# Patient Record
Sex: FEMALE | Race: WHITE | NOT HISPANIC OR LATINO
[De-identification: names, ages, dates, MRNs, and addresses within clinical notes are randomized per-mention and may not be internally consistent; named-entity substitution may affect disease eponyms.]

---

## 2017-09-18 ENCOUNTER — RX RENEWAL (OUTPATIENT)
Age: 82
End: 2017-09-18

## 2018-04-06 ENCOUNTER — INPATIENT (INPATIENT)
Facility: HOSPITAL | Age: 83
LOS: 16 days | Discharge: HOME | End: 2018-04-23
Attending: INTERNAL MEDICINE | Admitting: INTERNAL MEDICINE
Payer: MEDICARE

## 2018-04-06 VITALS — HEIGHT: 65 IN | WEIGHT: 184.09 LBS

## 2018-04-06 DIAGNOSIS — I10 ESSENTIAL (PRIMARY) HYPERTENSION: ICD-10-CM

## 2018-04-06 DIAGNOSIS — Z29.9 ENCOUNTER FOR PROPHYLACTIC MEASURES, UNSPECIFIED: ICD-10-CM

## 2018-04-06 DIAGNOSIS — D64.9 ANEMIA, UNSPECIFIED: ICD-10-CM

## 2018-04-06 DIAGNOSIS — J96.01 ACUTE RESPIRATORY FAILURE WITH HYPOXIA: ICD-10-CM

## 2018-04-06 DIAGNOSIS — K92.1 MELENA: ICD-10-CM

## 2018-04-06 DIAGNOSIS — J44.1 CHRONIC OBSTRUCTIVE PULMONARY DISEASE WITH (ACUTE) EXACERBATION: ICD-10-CM

## 2018-04-06 LAB
ALBUMIN SERPL ELPH-MCNC: 2.9 G/DL — LOW (ref 3.3–5)
ALP SERPL-CCNC: 77 U/L — SIGNIFICANT CHANGE UP (ref 40–120)
ALT FLD-CCNC: 20 U/L — SIGNIFICANT CHANGE UP (ref 12–78)
ANION GAP SERPL CALC-SCNC: 8 MMOL/L — SIGNIFICANT CHANGE UP (ref 5–17)
APPEARANCE UR: CLEAR — SIGNIFICANT CHANGE UP
APTT BLD: 25.8 SEC — LOW (ref 27.5–37.4)
AST SERPL-CCNC: 14 U/L — LOW (ref 15–37)
BACTERIA # UR AUTO: (no result)
BASOPHILS # BLD AUTO: 0.05 K/UL — SIGNIFICANT CHANGE UP (ref 0–0.2)
BASOPHILS NFR BLD AUTO: 0.5 % — SIGNIFICANT CHANGE UP (ref 0–2)
BILIRUB SERPL-MCNC: 0.4 MG/DL — SIGNIFICANT CHANGE UP (ref 0.2–1.2)
BILIRUB UR-MCNC: NEGATIVE — SIGNIFICANT CHANGE UP
BLD GP AB SCN SERPL QL: SIGNIFICANT CHANGE UP
BUN SERPL-MCNC: 19 MG/DL — SIGNIFICANT CHANGE UP (ref 7–23)
CALCIUM SERPL-MCNC: 9.1 MG/DL — SIGNIFICANT CHANGE UP (ref 8.5–10.1)
CHLORIDE SERPL-SCNC: 105 MMOL/L — SIGNIFICANT CHANGE UP (ref 96–108)
CO2 SERPL-SCNC: 28 MMOL/L — SIGNIFICANT CHANGE UP (ref 22–31)
COLOR SPEC: YELLOW — SIGNIFICANT CHANGE UP
CREAT SERPL-MCNC: 0.67 MG/DL — SIGNIFICANT CHANGE UP (ref 0.5–1.3)
DIFF PNL FLD: (no result)
EOSINOPHIL # BLD AUTO: 0.17 K/UL — SIGNIFICANT CHANGE UP (ref 0–0.5)
EOSINOPHIL NFR BLD AUTO: 1.6 % — SIGNIFICANT CHANGE UP (ref 0–6)
EPI CELLS # UR: SIGNIFICANT CHANGE UP
GLUCOSE SERPL-MCNC: 82 MG/DL — SIGNIFICANT CHANGE UP (ref 70–99)
GLUCOSE UR QL: NEGATIVE MG/DL — SIGNIFICANT CHANGE UP
HCT VFR BLD CALC: 28.5 % — LOW (ref 34.5–45)
HGB BLD-MCNC: 8.1 G/DL — LOW (ref 11.5–15.5)
IMM GRANULOCYTES NFR BLD AUTO: 0.3 % — SIGNIFICANT CHANGE UP (ref 0–1.5)
INR BLD: 1.09 RATIO — SIGNIFICANT CHANGE UP (ref 0.88–1.16)
KETONES UR-MCNC: NEGATIVE — SIGNIFICANT CHANGE UP
LEUKOCYTE ESTERASE UR-ACNC: (no result)
LYMPHOCYTES # BLD AUTO: 1.3 K/UL — SIGNIFICANT CHANGE UP (ref 1–3.3)
LYMPHOCYTES # BLD AUTO: 11.9 % — LOW (ref 13–44)
MCHC RBC-ENTMCNC: 22 PG — LOW (ref 27–34)
MCHC RBC-ENTMCNC: 28.4 GM/DL — LOW (ref 32–36)
MCV RBC AUTO: 77.4 FL — LOW (ref 80–100)
MONOCYTES # BLD AUTO: 0.95 K/UL — HIGH (ref 0–0.9)
MONOCYTES NFR BLD AUTO: 8.7 % — SIGNIFICANT CHANGE UP (ref 2–14)
NEUTROPHILS # BLD AUTO: 8.42 K/UL — HIGH (ref 1.8–7.4)
NEUTROPHILS NFR BLD AUTO: 77 % — SIGNIFICANT CHANGE UP (ref 43–77)
NITRITE UR-MCNC: POSITIVE
NRBC # BLD: 0 /100 WBCS — SIGNIFICANT CHANGE UP (ref 0–0)
PH UR: 5 — SIGNIFICANT CHANGE UP (ref 5–8)
PLATELET # BLD AUTO: 375 K/UL — SIGNIFICANT CHANGE UP (ref 150–400)
POTASSIUM SERPL-MCNC: 3.8 MMOL/L — SIGNIFICANT CHANGE UP (ref 3.5–5.3)
POTASSIUM SERPL-SCNC: 3.8 MMOL/L — SIGNIFICANT CHANGE UP (ref 3.5–5.3)
PROT SERPL-MCNC: 6.8 GM/DL — SIGNIFICANT CHANGE UP (ref 6–8.3)
PROT UR-MCNC: 15 MG/DL
PROTHROM AB SERPL-ACNC: 11.8 SEC — SIGNIFICANT CHANGE UP (ref 9.8–12.7)
RBC # BLD: 3.68 M/UL — LOW (ref 3.8–5.2)
RBC # FLD: 18.6 % — HIGH (ref 10.3–14.5)
RBC CASTS # UR COMP ASSIST: SIGNIFICANT CHANGE UP /HPF (ref 0–4)
SODIUM SERPL-SCNC: 141 MMOL/L — SIGNIFICANT CHANGE UP (ref 135–145)
SP GR SPEC: 1.01 — SIGNIFICANT CHANGE UP (ref 1.01–1.02)
TROPONIN I SERPL-MCNC: <0.015 NG/ML — SIGNIFICANT CHANGE UP (ref 0.01–0.04)
TROPONIN I SERPL-MCNC: <0.015 NG/ML — SIGNIFICANT CHANGE UP (ref 0.01–0.04)
TYPE + AB SCN PNL BLD: SIGNIFICANT CHANGE UP
UROBILINOGEN FLD QL: NEGATIVE MG/DL — SIGNIFICANT CHANGE UP
WBC # BLD: 10.92 K/UL — HIGH (ref 3.8–10.5)
WBC # FLD AUTO: 10.92 K/UL — HIGH (ref 3.8–10.5)
WBC UR QL: SIGNIFICANT CHANGE UP /HPF (ref 0–5)

## 2018-04-06 PROCEDURE — 71045 X-RAY EXAM CHEST 1 VIEW: CPT | Mod: 26

## 2018-04-06 PROCEDURE — 99285 EMERGENCY DEPT VISIT HI MDM: CPT

## 2018-04-06 PROCEDURE — 93010 ELECTROCARDIOGRAM REPORT: CPT

## 2018-04-06 RX ORDER — ALPRAZOLAM 0.25 MG
0.5 TABLET ORAL
Qty: 0 | Refills: 0 | COMMUNITY

## 2018-04-06 RX ORDER — ONDANSETRON 8 MG/1
4 TABLET, FILM COATED ORAL EVERY 6 HOURS
Qty: 0 | Refills: 0 | Status: DISCONTINUED | OUTPATIENT
Start: 2018-04-06 | End: 2018-04-23

## 2018-04-06 RX ORDER — UMECLIDINIUM BROMIDE AND VILANTEROL TRIFENATATE 62.5; 25 UG/1; UG/1
1 POWDER RESPIRATORY (INHALATION)
Qty: 0 | Refills: 0 | COMMUNITY

## 2018-04-06 RX ORDER — AMLODIPINE BESYLATE 2.5 MG/1
5 TABLET ORAL DAILY
Qty: 0 | Refills: 0 | Status: DISCONTINUED | OUTPATIENT
Start: 2018-04-06 | End: 2018-04-19

## 2018-04-06 RX ORDER — PANTOPRAZOLE SODIUM 20 MG/1
40 TABLET, DELAYED RELEASE ORAL EVERY 12 HOURS
Qty: 0 | Refills: 0 | Status: DISCONTINUED | OUTPATIENT
Start: 2018-04-06 | End: 2018-04-07

## 2018-04-06 RX ORDER — MONTELUKAST 4 MG/1
10 TABLET, CHEWABLE ORAL DAILY
Qty: 0 | Refills: 0 | Status: DISCONTINUED | OUTPATIENT
Start: 2018-04-06 | End: 2018-04-23

## 2018-04-06 RX ORDER — ALBUTEROL 90 UG/1
2 AEROSOL, METERED ORAL
Qty: 0 | Refills: 0 | COMMUNITY

## 2018-04-06 RX ORDER — IPRATROPIUM/ALBUTEROL SULFATE 18-103MCG
3 AEROSOL WITH ADAPTER (GRAM) INHALATION EVERY 6 HOURS
Qty: 0 | Refills: 0 | Status: DISCONTINUED | OUTPATIENT
Start: 2018-04-06 | End: 2018-04-23

## 2018-04-06 RX ORDER — ALPRAZOLAM 0.25 MG
0.12 TABLET ORAL DAILY
Qty: 0 | Refills: 0 | Status: DISCONTINUED | OUTPATIENT
Start: 2018-04-06 | End: 2018-04-13

## 2018-04-06 RX ORDER — IPRATROPIUM/ALBUTEROL SULFATE 18-103MCG
3 AEROSOL WITH ADAPTER (GRAM) INHALATION ONCE
Qty: 0 | Refills: 0 | Status: COMPLETED | OUTPATIENT
Start: 2018-04-06 | End: 2018-04-06

## 2018-04-06 RX ORDER — SODIUM CHLORIDE 9 MG/ML
3 INJECTION INTRAMUSCULAR; INTRAVENOUS; SUBCUTANEOUS ONCE
Qty: 0 | Refills: 0 | Status: COMPLETED | OUTPATIENT
Start: 2018-04-06 | End: 2018-04-06

## 2018-04-06 RX ORDER — HYDROCHLOROTHIAZIDE 25 MG
25 TABLET ORAL DAILY
Qty: 0 | Refills: 0 | Status: DISCONTINUED | OUTPATIENT
Start: 2018-04-06 | End: 2018-04-23

## 2018-04-06 RX ORDER — CHOLECALCIFEROL (VITAMIN D3) 125 MCG
1 CAPSULE ORAL
Qty: 0 | Refills: 0 | COMMUNITY

## 2018-04-06 RX ORDER — LOSARTAN POTASSIUM 100 MG/1
50 TABLET, FILM COATED ORAL DAILY
Qty: 0 | Refills: 0 | Status: DISCONTINUED | OUTPATIENT
Start: 2018-04-06 | End: 2018-04-19

## 2018-04-06 RX ORDER — MONTELUKAST 4 MG/1
1 TABLET, CHEWABLE ORAL
Qty: 0 | Refills: 0 | COMMUNITY

## 2018-04-06 RX ADMIN — Medication 40 MILLIGRAM(S): at 23:28

## 2018-04-06 RX ADMIN — SODIUM CHLORIDE 3 MILLILITER(S): 9 INJECTION INTRAMUSCULAR; INTRAVENOUS; SUBCUTANEOUS at 13:45

## 2018-04-06 RX ADMIN — MONTELUKAST 10 MILLIGRAM(S): 4 TABLET, CHEWABLE ORAL at 23:28

## 2018-04-06 RX ADMIN — Medication 3 MILLILITER(S): at 13:43

## 2018-04-06 RX ADMIN — Medication 3 MILLILITER(S): at 19:48

## 2018-04-06 RX ADMIN — PANTOPRAZOLE SODIUM 40 MILLIGRAM(S): 20 TABLET, DELAYED RELEASE ORAL at 23:21

## 2018-04-06 NOTE — H&P ADULT - ASSESSMENT
84 F w/PMH COPD, CPAP at night, HTN, has been experiencing progressively dyspnea over past several weeks.  Found w/ anemia, +melena  Currently, pt is dyspneic w/ conversation, she's satting 85% on 3LNC, changed to ventimask, now at 95%.  She is feeling very anxious and requesting xanax- which she takes prn at home 1/2 of 0.25mg dose.      Hg 8.1- receiving 2 units PRBC  wheezing on lung exam

## 2018-04-06 NOTE — H&P ADULT - NSHPLABSRESULTS_GEN_ALL_CORE
Labs personally reviewed.                          8.1    10.92 )-----------( 375      ( 2018 12:29 )             28.5           141  |  105  |  19  ----------------------------<  82  3.8   |  28  |  0.67    Ca    9.1      2018 12:29    TPro  6.8  /  Alb  2.9<L>  /  TBili  0.4  /  DBili  x   /  AST  14<L>  /  ALT  20  /  AlkPhos  77                Urinalysis Basic - ( 2018 16:57 )    Color: Yellow / Appearance: Clear / S.015 / pH: x  Gluc: x / Ketone: Negative  / Bili: Negative / Urobili: Negative mg/dL   Blood: x / Protein: 15 mg/dL / Nitrite: Positive   Leuk Esterase: Small / RBC: x / WBC x   Sq Epi: x / Non Sq Epi: x / Bacteria: x        PT/INR - ( 2018 12:29 )   PT: 11.8 sec;   INR: 1.09 ratio         PTT - ( 2018 12:29 )  PTT:25.8 sec    Lactate Trend      CARDIAC MARKERS ( 2018 16:07 )  <0.015 ng/mL / x     / x     / x     / x      CARDIAC MARKERS ( 2018 12:29 )  <0.015 ng/mL / x     / x     / x     / x        Imaging personally reviewed.      EKG personally reviewed. Labs personally reviewed.                          8.1    10.92 )-----------( 375      ( 2018 12:29 )             28.5           141  |  105  |  19  ----------------------------<  82  3.8   |  28  |  0.67    Ca    9.1      2018 12:29    TPro  6.8  /  Alb  2.9<L>  /  TBili  0.4  /  DBili  x   /  AST  14<L>  /  ALT  20  /  AlkPhos  77  -          Urinalysis Basic - ( 2018 16:57 )    Color: Yellow / Appearance: Clear / S.015 / pH: x  Gluc: x / Ketone: Negative  / Bili: Negative / Urobili: Negative mg/dL   Blood: x / Protein: 15 mg/dL / Nitrite: Positive   Leuk Esterase: Small / RBC: x / WBC x   Sq Epi: x / Non Sq Epi: x / Bacteria: x      PT/INR - ( 2018 12:29 )   PT: 11.8 sec;   INR: 1.09 ratio      PTT - ( 2018 12:29 )  PTT:25.8 sec    CARDIAC MARKERS ( 2018 16:07 )  <0.015 ng/mL / x     / x     / x     / x      CARDIAC MARKERS ( 2018 12:29 )  <0.015 ng/mL / x     / x     / x     / x        Imaging personally reviewed.  CXR neg    EKG personally reviewed.- No STT changes

## 2018-04-06 NOTE — H&P ADULT - PROBLEM SELECTOR PLAN 1
multifactorial: anemia d/t GIB, COPD, anxiety  85% on 3LNC, now on ventimask at 95%  monitor sats, titrate O2 to keep sats>90%  treat underlying cause

## 2018-04-06 NOTE — CHART NOTE - NSCHARTNOTEFT_GEN_A_CORE
Called by Dr. Taylor - he was GI consult for this patient. He reports that he hasn't  been coming in to  for ~2 years. Canceled the consult with him and placed new GI consult order with Dr. Vizcarra.

## 2018-04-06 NOTE — H&P ADULT - PROBLEM SELECTOR PLAN 3
request GI cs for further eval- may need EGD- pt made aware  IV PPI BID  no indication of active bleed at this time, vitals stable    monitor h/h and treat as above

## 2018-04-06 NOTE — ED ADULT NURSE REASSESSMENT NOTE - NS ED NURSE REASSESS COMMENT FT1
A+OX3 VSS/O2 92% 3LNC/respirations unlabored. 1 of 2 units PRBC infusing/no adverse reaction noted. Pt ate sandwich. Resting comfortably. Safety maintained/call bell within reach.

## 2018-04-06 NOTE — H&P ADULT - PROBLEM SELECTOR PLAN 2
contributing to progressive sob  monitor symptoms after transfusion  monitor H/H and transfuse as needed

## 2018-04-06 NOTE — H&P ADULT - NSHPSOCIALHISTORY_GEN_ALL_CORE
lives w/  (he requires home aid)  she has ind ADLs  quit smoking 20 years, smoked 1ppdx 50yrs  drinks one glass of kobi daily

## 2018-04-06 NOTE — ED PROVIDER NOTE - OBJECTIVE STATEMENT
83 y/o F with PMHx of emphysema not on home O2 and on CPAP at night, HTN and HLD presents to the ED with daughter at bedside c/o worsening SOB for the past two weeks. Pt states that she has felt more SOB than baseline. Pt saw Dr. Baker and had CXR WNL and was given Prednisone to be taken for a week. Pt completed Prednisone on the 3-. Pt still felt SOB after completion. Pt then saw Dr. Murguia and was considered for started beta-blockers. Pt was not started on any new medication. Pt was to f/u with Dr. Baker and Dr. Murguia at the end of this month. Pt was told to see GI. Pt saw Dr. Taylor yesterday and had blood work done. Pt had hemoglobin of 8 and was told to come to the ED for a transfusion. +abd pain. +melena (tarry stool) for the past week. Pt states that when she puts pressure on her abd she feels pain underneath her diaphragm. Denies fever, N/V, cough, CP or edema. Pt last had stress test and echo done in  which was WNL. Former smoker, quit 20 years ago. Dr. Oksana Espinosa, PCP. Dr. Baker, pulmonologist. Dr. Murguia, cardio. Dr. Taylor, GI.

## 2018-04-06 NOTE — ED PROVIDER NOTE - MEDICAL DECISION MAKING DETAILS
Plan for cardiac workup and recheck CBC and H&H. Obtain type & screen for likely transfusion for symptomatic anemia.

## 2018-04-06 NOTE — H&P ADULT - NEUROLOGICAL DETAILS
responds to pain/alert and oriented x 3/sensation intact/cranial nerves intact/responds to verbal commands

## 2018-04-06 NOTE — ED ADULT TRIAGE NOTE - CHIEF COMPLAINT QUOTE
c/o SOB. Patient reports she needs a blood transfusion. Hx of emphysema, reports low O2 sats. O2 sat 78% at RA, denies CP, dizziness. Patient A&ox4 at this time.

## 2018-04-06 NOTE — H&P ADULT - NSHPPHYSICALEXAM_GEN_ALL_CORE
Vital Signs Last 24 Hrs  T(C): 36.7 (06 Apr 2018 15:49), Max: 36.8 (06 Apr 2018 09:42)  T(F): 98.1 (06 Apr 2018 15:49), Max: 98.3 (06 Apr 2018 09:42)  HR: 81 (06 Apr 2018 15:49) (78 - 83)  BP: 125/65 (06 Apr 2018 15:49) (125/65 - 143/58)  SpO2: 92% (06 Apr 2018 15:49) (92% - 92%)

## 2018-04-06 NOTE — ED ADULT NURSE REASSESSMENT NOTE - NS ED NURSE REASSESS COMMENT FT1
17:30 O2 86% on 3LNC. Dr Hernández at bedside/pt placed on 35%VM. O2 97%/respirations unlabored. Safety maintained. Will continue to monitor.

## 2018-04-06 NOTE — H&P ADULT - PROBLEM SELECTOR PLAN 4
w/ hypoxia, wheezing on exam, dyspnea at rest   duonebs Q6 hr  IV solumedrol 40 mg   O2 supplement to keep sats>90%  pulm cs for further input

## 2018-04-06 NOTE — H&P ADULT - HISTORY OF PRESENT ILLNESS
84 F w/PMH COPD, CPAP at night, HTN, has been experiencing progressively dyspnea over past several weeks.  She went to her pulmonologist, CXR was neg, she referred to Cardio.  After work up w/ ST, TTE was not very revealing, she was referred to GI.  She was found on anemic w/ Hg of 8 on cbc, which is not normal for her.  She has noticed some melena 1-2 episodes over the past month.  She denies any hematochezia, hematemesis. She endorses a feeling of fullness after she eats a small amount.  She's also been having pain across her upper abdomen, just below her diaphragm.  She cannot tell me if this pain is related to eating.  She does have a good appetite but just gets full fast.  Gdtr at bedside notes that she doesn't eat as much.  Pt denies any nausea.  Her last colonoscopy was 2 years ago.  She endorsed h/o gastric ulcers.     Currently, pt is dyspneic w/ conversation, she's satting 85% on 3LNC, changed to ventimask, now at 95%.  She is feeling very anxious and requesting xanax- which she takes prn at home 1/2 of 0.25mg dose.

## 2018-04-07 LAB
ANION GAP SERPL CALC-SCNC: 7 MMOL/L — SIGNIFICANT CHANGE UP (ref 5–17)
APTT BLD: 26.3 SEC — LOW (ref 27.5–37.4)
BUN SERPL-MCNC: 17 MG/DL — SIGNIFICANT CHANGE UP (ref 7–23)
CALCIUM SERPL-MCNC: 8.8 MG/DL — SIGNIFICANT CHANGE UP (ref 8.5–10.1)
CHLORIDE SERPL-SCNC: 104 MMOL/L — SIGNIFICANT CHANGE UP (ref 96–108)
CO2 SERPL-SCNC: 30 MMOL/L — SIGNIFICANT CHANGE UP (ref 22–31)
CREAT SERPL-MCNC: 0.64 MG/DL — SIGNIFICANT CHANGE UP (ref 0.5–1.3)
D DIMER BLD IA.RAPID-MCNC: 831 NG/ML DDU — HIGH
FERRITIN SERPL-MCNC: 22 NG/ML — SIGNIFICANT CHANGE UP (ref 15–150)
FOLATE SERPL-MCNC: 15.9 NG/ML — SIGNIFICANT CHANGE UP (ref 4.8–24.2)
GLUCOSE SERPL-MCNC: 152 MG/DL — HIGH (ref 70–99)
HCT VFR BLD CALC: 32.4 % — LOW (ref 34.5–45)
HCT VFR BLD CALC: 35.7 % — SIGNIFICANT CHANGE UP (ref 34.5–45)
HGB BLD-MCNC: 10.6 G/DL — LOW (ref 11.5–15.5)
HGB BLD-MCNC: 9.6 G/DL — LOW (ref 11.5–15.5)
IRON SATN MFR SERPL: 18 UG/DL — LOW (ref 30–160)
IRON SATN MFR SERPL: 5 % — LOW (ref 14–50)
MCHC RBC-ENTMCNC: 23.3 PG — LOW (ref 27–34)
MCHC RBC-ENTMCNC: 29.7 GM/DL — LOW (ref 32–36)
MCV RBC AUTO: 78.5 FL — LOW (ref 80–100)
NRBC # BLD: 0 /100 WBCS — SIGNIFICANT CHANGE UP (ref 0–0)
PLATELET # BLD AUTO: 353 K/UL — SIGNIFICANT CHANGE UP (ref 150–400)
POTASSIUM SERPL-MCNC: 4 MMOL/L — SIGNIFICANT CHANGE UP (ref 3.5–5.3)
POTASSIUM SERPL-SCNC: 4 MMOL/L — SIGNIFICANT CHANGE UP (ref 3.5–5.3)
RAPID RVP RESULT: SIGNIFICANT CHANGE UP
RBC # BLD: 4.55 M/UL — SIGNIFICANT CHANGE UP (ref 3.8–5.2)
RBC # FLD: 17.9 % — HIGH (ref 10.3–14.5)
SODIUM SERPL-SCNC: 141 MMOL/L — SIGNIFICANT CHANGE UP (ref 135–145)
TIBC SERPL-MCNC: 348 UG/DL — SIGNIFICANT CHANGE UP (ref 220–430)
UIBC SERPL-MCNC: 330 UG/DL — SIGNIFICANT CHANGE UP (ref 110–370)
VIT B12 SERPL-MCNC: 495 PG/ML — SIGNIFICANT CHANGE UP (ref 232–1245)
WBC # BLD: 10.2 K/UL — SIGNIFICANT CHANGE UP (ref 3.8–10.5)
WBC # FLD AUTO: 10.2 K/UL — SIGNIFICANT CHANGE UP (ref 3.8–10.5)

## 2018-04-07 PROCEDURE — 71275 CT ANGIOGRAPHY CHEST: CPT | Mod: 26

## 2018-04-07 PROCEDURE — 93970 EXTREMITY STUDY: CPT | Mod: 26

## 2018-04-07 RX ORDER — PANTOPRAZOLE SODIUM 20 MG/1
8 TABLET, DELAYED RELEASE ORAL
Qty: 80 | Refills: 0 | Status: DISCONTINUED | OUTPATIENT
Start: 2018-04-07 | End: 2018-04-11

## 2018-04-07 RX ORDER — HEPARIN SODIUM 5000 [USP'U]/ML
5000 INJECTION INTRAVENOUS; SUBCUTANEOUS ONCE
Qty: 0 | Refills: 0 | Status: COMPLETED | OUTPATIENT
Start: 2018-04-07 | End: 2018-04-07

## 2018-04-07 RX ORDER — AZITHROMYCIN 500 MG/1
500 TABLET, FILM COATED ORAL ONCE
Qty: 0 | Refills: 0 | Status: COMPLETED | OUTPATIENT
Start: 2018-04-07 | End: 2018-04-07

## 2018-04-07 RX ORDER — AZITHROMYCIN 500 MG/1
500 TABLET, FILM COATED ORAL EVERY 24 HOURS
Qty: 0 | Refills: 0 | Status: COMPLETED | OUTPATIENT
Start: 2018-04-08 | End: 2018-04-09

## 2018-04-07 RX ORDER — HEPARIN SODIUM 5000 [USP'U]/ML
INJECTION INTRAVENOUS; SUBCUTANEOUS
Qty: 25000 | Refills: 0 | Status: DISCONTINUED | OUTPATIENT
Start: 2018-04-07 | End: 2018-04-21

## 2018-04-07 RX ORDER — HEPARIN SODIUM 5000 [USP'U]/ML
5000 INJECTION INTRAVENOUS; SUBCUTANEOUS EVERY 6 HOURS
Qty: 0 | Refills: 0 | Status: DISCONTINUED | OUTPATIENT
Start: 2018-04-07 | End: 2018-04-21

## 2018-04-07 RX ORDER — AZITHROMYCIN 500 MG/1
TABLET, FILM COATED ORAL
Qty: 0 | Refills: 0 | Status: COMPLETED | OUTPATIENT
Start: 2018-04-07 | End: 2018-04-09

## 2018-04-07 RX ADMIN — HEPARIN SODIUM 1000 UNIT(S)/HR: 5000 INJECTION INTRAVENOUS; SUBCUTANEOUS at 20:52

## 2018-04-07 RX ADMIN — Medication 40 MILLIGRAM(S): at 05:21

## 2018-04-07 RX ADMIN — PANTOPRAZOLE SODIUM 40 MILLIGRAM(S): 20 TABLET, DELAYED RELEASE ORAL at 05:21

## 2018-04-07 RX ADMIN — Medication 25 MILLIGRAM(S): at 05:21

## 2018-04-07 RX ADMIN — Medication 40 MILLIGRAM(S): at 18:40

## 2018-04-07 RX ADMIN — Medication 3 MILLILITER(S): at 13:01

## 2018-04-07 RX ADMIN — AZITHROMYCIN 255 MILLIGRAM(S): 500 TABLET, FILM COATED ORAL at 18:40

## 2018-04-07 RX ADMIN — Medication 200 MILLIGRAM(S): at 23:28

## 2018-04-07 RX ADMIN — PANTOPRAZOLE SODIUM 40 MILLIGRAM(S): 20 TABLET, DELAYED RELEASE ORAL at 18:40

## 2018-04-07 RX ADMIN — Medication 3 MILLILITER(S): at 20:08

## 2018-04-07 RX ADMIN — Medication 200 MILLIGRAM(S): at 18:41

## 2018-04-07 RX ADMIN — Medication 3 MILLILITER(S): at 03:18

## 2018-04-07 RX ADMIN — PANTOPRAZOLE SODIUM 10 MG/HR: 20 TABLET, DELAYED RELEASE ORAL at 21:49

## 2018-04-07 RX ADMIN — Medication 0.12 MILLIGRAM(S): at 00:48

## 2018-04-07 RX ADMIN — Medication 3 MILLILITER(S): at 07:59

## 2018-04-07 RX ADMIN — AMLODIPINE BESYLATE 5 MILLIGRAM(S): 2.5 TABLET ORAL at 05:21

## 2018-04-07 RX ADMIN — LOSARTAN POTASSIUM 50 MILLIGRAM(S): 100 TABLET, FILM COATED ORAL at 05:21

## 2018-04-07 RX ADMIN — HEPARIN SODIUM 5000 UNIT(S): 5000 INJECTION INTRAVENOUS; SUBCUTANEOUS at 20:50

## 2018-04-07 RX ADMIN — MONTELUKAST 10 MILLIGRAM(S): 4 TABLET, CHEWABLE ORAL at 15:13

## 2018-04-07 NOTE — CONSULT NOTE ADULT - ASSESSMENT
PROBLEMS:    Acute respiratory failure with hypoxia  acute excerbation of COPD  Symptomatic anemia- s/p transfusion  Gastrointestinal hemorrhage with melena  Essential hypertension    PLAN;    IV STEROIDS  AEROSLS  RVP  SUPPORTIVE CARE  DVT PROPHYLASIX

## 2018-04-07 NOTE — CONSULT NOTE ADULT - ASSESSMENT
85yo female with COPD presents with new anemia, upper abd discomfort and intermittent black stool  likely subacute upper gi bleed  pt currently at high risk for endoscopy given pulm status - increased chance of intubation during sedation for endoscopy  would maximize pulm status before consideration of EGD unless significant increased active bleeding  serial h and h, transfuse as needed  continue PPI  ok for po intake

## 2018-04-07 NOTE — CONSULT NOTE ADULT - SUBJECTIVE AND OBJECTIVE BOX
HPI:      84 y.o.f w/PMH COPD, CPAP at night, HTN, seen in the office for progressively dyspnea CXR was neg, Rx with short course of steroids & was send to Cardio & her work up w/ ST, TTE was neg, she was seen by GI & was found on anemic w/ Hg of 8 on cbc, & had melena 1-2 episodes over the past month. she had transfusion with two units of blood & her last colonoscopy was 2 years ago. but pt was continue dyspneic w/ conversation, & was satting 85% on 3LNC, changed to ventimask, now at 95%.  She was ask to for pulmonary for persistant sob.      PAST MEDICAL & SURGICAL HISTORY:  COPD (chronic obstructive pulmonary disease)  Hypertension  Peptic Ulcer  Hyperlipemia  S/P Cataract Extraction: right eye 5/27/10      Home Medications:  amLODIPine 5 mg oral tablet: 1 tab(s) orally once a day (2018 17:25)  Anoro Ellipta 62.5 mcg-25 mcg/inh inhalation powder: 1 puff(s) inhaled once a day (2018 17:25)  calcium (as carbonate) 600 mg oral tablet: 2 tab(s) orally once a day (2018 17:25)  hydroCHLOROthiazide 25 mg oral tablet: 1 tab(s) orally once a day (2018 17:25)  losartan 50 mg oral tablet: 1 tab(s) orally once a day (2018 17:25)  montelukast 10 mg oral tablet: 1 tab(s) orally once a day (2018 17:25)  predniSONE 20 mg oral tablet: 1 tab(s) orally once a day    ***COURSE COMPLETED*** (2018 17:25)  ProAir HFA 90 mcg/inh inhalation aerosol: 2 puff(s) inhaled 4 times a day, As Needed (2018 17:25)  Vitamin D3 2000 intl units oral tablet: 1 tab(s) orally once a day (2018 17:25)  Xanax 0.25 mg oral tablet: 0.5 tab(s) orally once a day, As Needed for anxiety  (2018 17:25)      MEDICATIONS  (STANDING):  ALBUTerol/ipratropium for Nebulization 3 milliLiter(s) Nebulizer every 6 hours  amLODIPine   Tablet 5 milliGRAM(s) Oral daily  hydrochlorothiazide 25 milliGRAM(s) Oral daily  losartan 50 milliGRAM(s) Oral daily  methylPREDNISolone sodium succinate Injectable 40 milliGRAM(s) IV Push every 12 hours  montelukast 10 milliGRAM(s) Oral daily  pantoprazole  Injectable 40 milliGRAM(s) IV Push every 12 hours    MEDICATIONS  (PRN):  ALPRAZolam 0.125 milliGRAM(s) Oral daily PRN anxiety  ondansetron Injectable 4 milliGRAM(s) IV Push every 6 hours PRN Nausea      Allergies    No Known Allergies    Intolerances        SOCIAL HISTORY: Denies tobacco, etoh abuse or illicit drug use    FAMILY HISTORY:  No pertinent family history in first degree relatives      Vital Signs Last 24 Hrs  T(C): 36.7 (2018 05:08), Max: 37.1 (2018 18:44)  T(F): 98.1 (2018 05:08), Max: 98.7 (2018 18:44)  HR: 66 (2018 08:01) (66 - 86)  BP: 140/64 (2018 05:08) (118/55 - 154/58)  BP(mean): --  RR: 20 (2018 05:08) (20 - 28)  SpO2: 93% (2018 05:08) (92% - 98%)        REVIEW OF SYSTEMS:    CONSTITUTIONAL:  As per HPI.  HEENT:  Eyes:  No diplopia or blurred vision. ENT:  No earache, sore throat or runny nose.  CARDIOVASCULAR:  No pressure, squeezing, tightness, heaviness or aching about the chest, neck, axilla or epigastrium.  RESPIRATORY:  No cough, shortness of breath, PND or orthopnea.  GASTROINTESTINAL:  No nausea, vomiting or diarrhea.  GENITOURINARY:  No dysuria, frequency or urgency.  MUSCULOSKELETAL:  As per HPI.  SKIN:  No change in skin, hair or nails.  NEUROLOGIC:  No paresthesias, fasciculations, seizures or weakness.  PSYCHIATRIC:  No disorder of thought or mood.  ENDOCRINE:  No heat or cold intolerance, polyuria or polydipsia.  HEMATOLOGICAL:  No easy bruising or bleedings:  .     PHYSICAL EXAMINATION:    GENERAL APPEARANCE:  Pt. is not currently dyspneic, in no distress. Pt. is alert, oriented, and pleasant.  HEENT:  Pupils are normal and react normally. No icterus. Mucous membranes well colored.  NECK:  Supple. No lymphadenopathy. Jugular venous pressure not elevated. Carotids equal.   HEART:   The cardiac impulse has a normal quality. Regular. Normal S1 and S2. There are no murmurs, rubs or gallops noted  CHEST:  Chest few rhonchi to auscultation. Normal respiratory effort.  ABDOMEN:  Soft and nontender.   EXTREMITIES:  There is no cyanosis, clubbing or edema.   SKIN:  No rash or significant lesions are noted.    LABS:                        10.6   10.20 )-----------( 353      ( 2018 07:23 )             35.7         141  |  104  |  17  ----------------------------<  152<H>  4.0   |  30  |  0.64    Ca    8.8      2018 07:23    TPro  6.8  /  Alb  2.9<L>  /  TBili  0.4  /  DBili  x   /  AST  14<L>  /  ALT  20  /  AlkPhos  77  04-06    LIVER FUNCTIONS - ( 2018 12:29 )  Alb: 2.9 g/dL / Pro: 6.8 gm/dL / ALK PHOS: 77 U/L / ALT: 20 U/L / AST: 14 U/L / GGT: x           PT/INR - ( 2018 12:29 )   PT: 11.8 sec;   INR: 1.09 ratio         PTT - ( 2018 12:29 )  PTT:25.8 sec  CARDIAC MARKERS ( 2018 16:07 )  <0.015 ng/mL / x     / x     / x     / x      CARDIAC MARKERS ( 2018 12:29 )  <0.015 ng/mL / x     / x     / x     / x          Urinalysis Basic - ( 2018 16:57 )    Color: Yellow / Appearance: Clear / S.015 / pH: x  Gluc: x / Ketone: Negative  / Bili: Negative / Urobili: Negative mg/dL   Blood: x / Protein: 15 mg/dL / Nitrite: Positive   Leuk Esterase: Small / RBC: 0-2 /HPF / WBC 5-8 /HPF   Sq Epi: x / Non Sq Epi: Occasional / Bacteria: Many    RADIOLOGY & ADDITIONAL STUDIES:     CXR: no infiltrates

## 2018-04-07 NOTE — PROGRESS NOTE ADULT - SUBJECTIVE AND OBJECTIVE BOX
Subjective:  Patient is a 84y old  Female who presents with a chief complaint of sob, anemia      HPI:     84 F w/PMH COPD, CPAP at night, HTN, presents on 18 with progressively worse dyspnea over past several weeks.  She went to her pulmonologist, CXR was neg, she referred to Cardio.  After work up w/ ST, TTE was not very revealing, she was referred to GI.  She was found on anemic w/ Hg of 8 on cbc, which is not normal for her.  She has noticed some melena 1-2 episodes over the past month.  She denies any hematochezia, hematemesis. She endorses a feeling of fullness after she eats a small amount.  She's also been having pain across her upper abdomen, just below her diaphragm.  She cannot tell me if this pain is related to eating.  She does have a good appetite but just gets full fast.  Gdtr at bedside notes that she doesn't eat as much.  Pt denies any nausea.  Her last colonoscopy was 2 years ago.  She endorsed h/o gastric ulcers.     In ED  pt is dyspneic w/ conversation, she's satting 85% on 3LNC, changed to ventimask, now at 95%.  She is feeling very anxious and requesting xanax- which she takes prn at home 1/2 of 0.25mg dose.     18 - Patient seen and examined at bedside earlier today, dyspneic with minimal exertion, even talking, but reports slight improvement of the dyspnea ,     Review of system- Rest of the review of system are negative except mentioned in HPI    OBJECTIVE:   T(C): 37.2 (18 @ 11:55), Max: 37.2 (18 @ 11:55)  HR: 64 (18 @ 13:02) (64 - 86)  BP: 123/50 (18 @ 11:55) (118/55 - 154/58)  RR: 18 (18 @ 11:55) (18 - 28)  SpO2: 93% (18 @ 11:55) (93% - 98%)  Wt(kg): --  Daily     Daily Weight in k.5 (2018 00:09)    PHYSICAL EXAM:  GENERAL: NAD  NERVOUS SYSTEM:  Alert & Oriented X3, non- focal exam,  HEAD:  Atraumatic, Normocephalic  EYES: EOMI, PERRLA, conjunctiva and sclera clear  HEENT: Moist mucous membranes  NECK: Supple, No JVD  CHEST/LUNG: BS decreased bilaterally; No rales, no rhonchi, +  wheezing,  HEART: Regular rate and rhythm; No murmurs, rubs, or gallops  ABDOMEN: Soft, Nontender, Nondistended; Bowel sounds present  GENITOURINARY- Voiding, no suprapubic tenderness  EXTREMITIES:  2+ Peripheral Pulses, No clubbing, cyanosis, or edema  MUSCULOSKELETAL:- No muscle tenderness, Muscle tone normal, No joint tenderness, no Joint swelling, Joint range of motion-normal  SKIN-no rash, no lesion    LABS:                        10.6   10.20 )-----------( 353      ( 2018 07:23 )             35.7         141  |  104  |  17  ----------------------------<  152<H>  4.0   |  30  |  0.64    Ca    8.8      2018 07:23    TPro  6.8  /  Alb  2.9<L>  /  TBili  0.4  /  DBili  x   /  AST  14<L>  /  ALT  20  /  AlkPhos  77      PT/INR - ( 2018 12:29 )   PT: 11.8 sec;   INR: 1.09 ratio         PTT - ( 2018 12:29 )  PTT:25.8 sec  CARDIAC MARKERS ( 2018 16:07 )  <0.015 ng/mL / x     / x     / x     / x      CARDIAC MARKERS ( 2018 12:29 )  <0.015 ng/mL / x     / x     / x     / x          Urinalysis Basic - ( 2018 16:57 )    Color: Yellow / Appearance: Clear / S.015 / pH: x  Gluc: x / Ketone: Negative  / Bili: Negative / Urobili: Negative mg/dL   Blood: x / Protein: 15 mg/dL / Nitrite: Positive   Leuk Esterase: Small / RBC: 0-2 /HPF / WBC 5-8 /HPF   Sq Epi: x / Non Sq Epi: Occasional / Bacteria: Many      CAPILLARY BLOOD GLUCOSE    RECENT CULTURES:    RADIOLOGY & ADDITIONAL TESTS:    < from: Xray Chest 1 View AP/PA. (18 @ 10:47) >  Heart magnified by projection, unchanged. Atherosclerotic aorta.   Symmetrically hyperinflated lungs. No focal consolidation or pleural   effusion. Old fracture right seventh posterior rib.    Impression: Hyperinflated lungs. No active infiltrates.    < end of copied text >  Current medications:  ALBUTerol/ipratropium for Nebulization 3 milliLiter(s) Nebulizer every 6 hours  ALPRAZolam 0.125 milliGRAM(s) Oral daily PRN  amLODIPine   Tablet 5 milliGRAM(s) Oral daily  hydrochlorothiazide 25 milliGRAM(s) Oral daily  losartan 50 milliGRAM(s) Oral daily  methylPREDNISolone sodium succinate Injectable 40 milliGRAM(s) IV Push every 12 hours  montelukast 10 milliGRAM(s) Oral daily  ondansetron Injectable 4 milliGRAM(s) IV Push every 6 hours PRN  pantoprazole  Injectable 40 milliGRAM(s) IV Push every 12 hours

## 2018-04-07 NOTE — CHART NOTE - NSCHARTNOTEFT_GEN_A_CORE
Doopler LE - positive for left soleal vein thrombosis   CTA - small focal PE in posterior RUL segmental vessels    A/P  * Distal left LE DVT  * PE  - d/w patient Dr. Arizmendi and Dr. Kramer  - pt high risk for full AC, but will try low range AC  - if she will develop GI bleed will benefit from IVC   - PTT stat and H/H q6h  - monitor for signd of bleeding  - AC consult   - Rossy De Leon consult     D/w pt , RN Doopler LE - positive for left soleal vein thrombosis   CTA - small focal PE in posterior RUL segmental vessels    A/P  * Distal left LE DVT  * PE  - d/w patient Dr. Arizmendi and Dr. Kramer  - pt high risk for full AC, but will try low range AC  - if she will develop GI bleed will benefit from IVC   - PTT stat and H/H q6h  - monitor for signd of bleeding  - AC consult   - Rossy De Leon consult   - 2 d echo  - cardiology Dr. Murguia    D/w pt , RN

## 2018-04-07 NOTE — PROGRESS NOTE ADULT - ASSESSMENT
84 F w/PMH COPD, CPAP at night, HTN, presents on 4/6/18 with progressively dyspnea over past several weeks.  Found w/ anemia, +melena  Currently, pt is dyspneic w/ conversation, she's satting 85% on 3LNC, changed to ventimask, now at 95%.  She is feeling very anxious and requesting xanax- which she takes prn at home 1/2 of 0.25mg dose.      Hg 8.1- receiving 2 units PRBC      * Acute respiratory failure with hypoxia,  multifactorial:  * Acute COPD exacerbation  - 85% on 3LNC, now on ventimask at 95%  - monitor sats, titrate O2 to keep sats>90%  - IV steroids  - add IV azithromycin 5 days  - nebs, mucolytics  - cingulair  - pulmonary consult    * Elevated D-dimers r/o VTE  - CTA chest   - Doppler LE  - can not AC due to suspected GI bleed    *  Symptomatic anemia.   - s/p 2 units of PRBC  - H/H q12h  - check iron studies, B12, folate      * Melena suspected subacute Upper GI bleed  - GI consult  - will benefit form EGD once respiratory status improves  - high risk for endoscopy at this time  - c/w PPI BID  - monitor H/H and transfuse as needed.     *  Essential hypertension.    bp stable,   c/w home meds HCTZ, losartan, amlodipine  monitor and titrate meds as indicated.     * Anxiety  - c/w benzo prn    * Prophylactic measure. Plan: SCDs- no chem d/t GIB.

## 2018-04-07 NOTE — CONSULT NOTE ADULT - SUBJECTIVE AND OBJECTIVE BOX
Patient is a 84y old  Female who presents with a chief complaint of sob, anemia (06 Apr 2018 17:10)      HPI:  84 F w/PMH COPD, CPAP at night, HTN, has been experiencing progressively dyspnea over past several weeks.  She went to her pulmonologist, CXR was neg, she referred to Cardio.  After work up w/ ST, TTE was not very revealing, she was referred to GI.  She was found to be newly anemic w/ Hg of 8 She has noticed some melena 1-2 episodes over the past month.  She denies any hematochezia, hematemesis. Pt does note some recent early satiety and intermittent upper abdominal pain, though unclear if exacerbated by eating.  Pt resting comfortably.  When awakened, she is tachypneic but comfortable.  She reports mild SOB but no abdominal pain.    PAST MEDICAL & SURGICAL HISTORY:  COPD (chronic obstructive pulmonary disease)  Hypertension  Peptic Ulcer  Hyperlipemia  S/P Cataract Extraction: right eye 5/27/10    MEDICATIONS  (STANDING):  ALBUTerol/ipratropium for Nebulization 3 milliLiter(s) Nebulizer every 6 hours  amLODIPine   Tablet 5 milliGRAM(s) Oral daily  hydrochlorothiazide 25 milliGRAM(s) Oral daily  losartan 50 milliGRAM(s) Oral daily  methylPREDNISolone sodium succinate Injectable 40 milliGRAM(s) IV Push daily  montelukast 10 milliGRAM(s) Oral daily  pantoprazole  Injectable 40 milliGRAM(s) IV Push every 12 hours    MEDICATIONS  (PRN):  ALPRAZolam 0.125 milliGRAM(s) Oral daily PRN anxiety  ondansetron Injectable 4 milliGRAM(s) IV Push every 6 hours PRN Nausea    Allergies  No Known Allergies    SOCIAL HISTORY: extob, no etoh, lives home    FAMILY HISTORY:  No pertinent family history in first degree relatives      REVIEW OF SYSTEMS:    CONSTITUTIONAL: weakness  EYES/ENT: No visual changes;  No vertigo or throat pain   NECK: No pain or stiffness  RESPIRATORY: cough, SOB  CARDIOVASCULAR: No chest pain or palpitations  GASTROINTESTINAL: as above  GENITOURINARY: No dysuria, frequency or hematuria  NEUROLOGICAL: No numbness or weakness  SKIN: No itching, burning, rashes, or lesions   PSYCH: Normal mood and affect  All other review of systems is negative unless indicated above.    Vital Signs Last 24 Hrs  T(C): 36.7 (07 Apr 2018 05:08), Max: 37.1 (06 Apr 2018 18:44)  T(F): 98.1 (07 Apr 2018 05:08), Max: 98.7 (06 Apr 2018 18:44)  HR: 72 (07 Apr 2018 05:08) (72 - 86)  BP: 140/64 (07 Apr 2018 05:08) (118/55 - 154/58)  BP(mean): --  RR: 24 (07 Apr 2018 05:08) (20 - 28)  SpO2: 93% (07 Apr 2018 05:08) (92% - 98%)    PHYSICAL EXAM:    Constitutional: NAD, on venti mask  HEENT: MMM  Neck: No LAD  Respiratory: rales and rhonchi  Cardiovascular: S1 and S2  Gastrointestinal: BS+, soft, NT/ND  Extremities: No peripheral edema  Vascular: 2+ peripheral pulses  Neurological: A/O x 3, no focal deficits  Psychiatric: Normal mood, normal affect  Skin: No rashes    LABS:                        8.1    10.92 )-----------( 375      ( 06 Apr 2018 12:29 )             28.5     04-06    141  |  105  |  19  ----------------------------<  82  3.8   |  28  |  0.67    Ca    9.1      06 Apr 2018 12:29    TPro  6.8  /  Alb  2.9<L>  /  TBili  0.4  /  DBili  x   /  AST  14<L>  /  ALT  20  /  AlkPhos  77  04-06    PT/INR - ( 06 Apr 2018 12:29 )   PT: 11.8 sec;   INR: 1.09 ratio         PTT - ( 06 Apr 2018 12:29 )  PTT:25.8 sec  LIVER FUNCTIONS - ( 06 Apr 2018 12:29 )  Alb: 2.9 g/dL / Pro: 6.8 gm/dL / ALK PHOS: 77 U/L / ALT: 20 U/L / AST: 14 U/L / GGT: x             RADIOLOGY & ADDITIONAL STUDIES:

## 2018-04-08 DIAGNOSIS — I26.99 OTHER PULMONARY EMBOLISM WITHOUT ACUTE COR PULMONALE: ICD-10-CM

## 2018-04-08 DIAGNOSIS — D50.9 IRON DEFICIENCY ANEMIA, UNSPECIFIED: ICD-10-CM

## 2018-04-08 DIAGNOSIS — I82.409 ACUTE EMBOLISM AND THROMBOSIS OF UNSPECIFIED DEEP VEINS OF UNSPECIFIED LOWER EXTREMITY: ICD-10-CM

## 2018-04-08 LAB
-  AMIKACIN: SIGNIFICANT CHANGE UP
-  AMOXICILLIN/CLAVULANIC ACID: SIGNIFICANT CHANGE UP
-  AMPICILLIN/SULBACTAM: SIGNIFICANT CHANGE UP
-  AMPICILLIN: SIGNIFICANT CHANGE UP
-  AZTREONAM: SIGNIFICANT CHANGE UP
-  CEFAZOLIN: SIGNIFICANT CHANGE UP
-  CEFEPIME: SIGNIFICANT CHANGE UP
-  CEFOXITIN: SIGNIFICANT CHANGE UP
-  CEFTRIAXONE: SIGNIFICANT CHANGE UP
-  CIPROFLOXACIN: SIGNIFICANT CHANGE UP
-  ERTAPENEM: SIGNIFICANT CHANGE UP
-  GENTAMICIN: SIGNIFICANT CHANGE UP
-  IMIPENEM: SIGNIFICANT CHANGE UP
-  LEVOFLOXACIN: SIGNIFICANT CHANGE UP
-  MEROPENEM: SIGNIFICANT CHANGE UP
-  NITROFURANTOIN: SIGNIFICANT CHANGE UP
-  PIPERACILLIN/TAZOBACTAM: SIGNIFICANT CHANGE UP
-  TIGECYCLINE: SIGNIFICANT CHANGE UP
-  TOBRAMYCIN: SIGNIFICANT CHANGE UP
-  TRIMETHOPRIM/SULFAMETHOXAZOLE: SIGNIFICANT CHANGE UP
ADD ON TEST-SPECIMEN IN LAB: SIGNIFICANT CHANGE UP
ANION GAP SERPL CALC-SCNC: 6 MMOL/L — SIGNIFICANT CHANGE UP (ref 5–17)
APTT BLD: 43.3 SEC — HIGH (ref 27.5–37.4)
APTT BLD: 51 SEC — HIGH (ref 27.5–37.4)
APTT BLD: 65.3 SEC — HIGH (ref 27.5–37.4)
BUN SERPL-MCNC: 25 MG/DL — HIGH (ref 7–23)
CALCIUM SERPL-MCNC: 9 MG/DL — SIGNIFICANT CHANGE UP (ref 8.5–10.1)
CHLORIDE SERPL-SCNC: 104 MMOL/L — SIGNIFICANT CHANGE UP (ref 96–108)
CO2 SERPL-SCNC: 31 MMOL/L — SIGNIFICANT CHANGE UP (ref 22–31)
CREAT SERPL-MCNC: 0.79 MG/DL — SIGNIFICANT CHANGE UP (ref 0.5–1.3)
CULTURE RESULTS: SIGNIFICANT CHANGE UP
GLUCOSE SERPL-MCNC: 140 MG/DL — HIGH (ref 70–99)
HCT VFR BLD CALC: 31.7 % — LOW (ref 34.5–45)
HCT VFR BLD CALC: 32.6 % — LOW (ref 34.5–45)
HCT VFR BLD CALC: 32.7 % — LOW (ref 34.5–45)
HCT VFR BLD CALC: 32.9 % — LOW (ref 34.5–45)
HCT VFR BLD CALC: 35 % — SIGNIFICANT CHANGE UP (ref 34.5–45)
HGB BLD-MCNC: 10 G/DL — LOW (ref 11.5–15.5)
HGB BLD-MCNC: 9.2 G/DL — LOW (ref 11.5–15.5)
HGB BLD-MCNC: 9.3 G/DL — LOW (ref 11.5–15.5)
HGB BLD-MCNC: 9.4 G/DL — LOW (ref 11.5–15.5)
HGB BLD-MCNC: 9.6 G/DL — LOW (ref 11.5–15.5)
MCHC RBC-ENTMCNC: 22.7 PG — LOW (ref 27–34)
MCHC RBC-ENTMCNC: 23 PG — LOW (ref 27–34)
MCHC RBC-ENTMCNC: 28.5 GM/DL — LOW (ref 32–36)
MCHC RBC-ENTMCNC: 29.4 GM/DL — LOW (ref 32–36)
MCV RBC AUTO: 78.4 FL — LOW (ref 80–100)
MCV RBC AUTO: 79.7 FL — LOW (ref 80–100)
METHOD TYPE: SIGNIFICANT CHANGE UP
NRBC # BLD: 0 /100 WBCS — SIGNIFICANT CHANGE UP (ref 0–0)
NRBC # BLD: 0 /100 WBCS — SIGNIFICANT CHANGE UP (ref 0–0)
ORGANISM # SPEC MICROSCOPIC CNT: SIGNIFICANT CHANGE UP
ORGANISM # SPEC MICROSCOPIC CNT: SIGNIFICANT CHANGE UP
PLATELET # BLD AUTO: 337 K/UL — SIGNIFICANT CHANGE UP (ref 150–400)
PLATELET # BLD AUTO: 351 K/UL — SIGNIFICANT CHANGE UP (ref 150–400)
POTASSIUM SERPL-MCNC: 4.3 MMOL/L — SIGNIFICANT CHANGE UP (ref 3.5–5.3)
POTASSIUM SERPL-SCNC: 4.3 MMOL/L — SIGNIFICANT CHANGE UP (ref 3.5–5.3)
RBC # BLD: 4.09 M/UL — SIGNIFICANT CHANGE UP (ref 3.8–5.2)
RBC # BLD: 4.17 M/UL — SIGNIFICANT CHANGE UP (ref 3.8–5.2)
RBC # FLD: 18.4 % — HIGH (ref 10.3–14.5)
RBC # FLD: 18.4 % — HIGH (ref 10.3–14.5)
SODIUM SERPL-SCNC: 141 MMOL/L — SIGNIFICANT CHANGE UP (ref 135–145)
SPECIMEN SOURCE: SIGNIFICANT CHANGE UP
T4 FREE SERPL-MCNC: 1.03 NG/DL — SIGNIFICANT CHANGE UP (ref 0.76–1.46)
TSH SERPL-MCNC: 0.35 UU/ML — LOW (ref 0.36–3.74)
WBC # BLD: 18.67 K/UL — HIGH (ref 3.8–10.5)
WBC # BLD: 18.8 K/UL — HIGH (ref 3.8–10.5)
WBC # FLD AUTO: 18.67 K/UL — HIGH (ref 3.8–10.5)
WBC # FLD AUTO: 18.8 K/UL — HIGH (ref 3.8–10.5)

## 2018-04-08 PROCEDURE — 93010 ELECTROCARDIOGRAM REPORT: CPT

## 2018-04-08 PROCEDURE — 99223 1ST HOSP IP/OBS HIGH 75: CPT

## 2018-04-08 RX ORDER — SODIUM FERRIC GLUCONAT/SUCROSE 62.5MG/5ML
125 AMPUL (ML) INTRAVENOUS DAILY
Qty: 0 | Refills: 0 | Status: COMPLETED | OUTPATIENT
Start: 2018-04-08 | End: 2018-04-13

## 2018-04-08 RX ADMIN — Medication 200 MILLIGRAM(S): at 17:20

## 2018-04-08 RX ADMIN — Medication 200 MILLIGRAM(S): at 06:34

## 2018-04-08 RX ADMIN — MONTELUKAST 10 MILLIGRAM(S): 4 TABLET, CHEWABLE ORAL at 11:33

## 2018-04-08 RX ADMIN — AZITHROMYCIN 255 MILLIGRAM(S): 500 TABLET, FILM COATED ORAL at 17:20

## 2018-04-08 RX ADMIN — Medication 200 MILLIGRAM(S): at 11:35

## 2018-04-08 RX ADMIN — Medication 110 MILLIGRAM(S): at 21:15

## 2018-04-08 RX ADMIN — Medication 3 MILLILITER(S): at 20:31

## 2018-04-08 RX ADMIN — AMLODIPINE BESYLATE 5 MILLIGRAM(S): 2.5 TABLET ORAL at 06:33

## 2018-04-08 RX ADMIN — Medication 3 MILLILITER(S): at 13:57

## 2018-04-08 RX ADMIN — Medication 40 MILLIGRAM(S): at 06:33

## 2018-04-08 RX ADMIN — LOSARTAN POTASSIUM 50 MILLIGRAM(S): 100 TABLET, FILM COATED ORAL at 06:33

## 2018-04-08 RX ADMIN — Medication 0.12 MILLIGRAM(S): at 01:44

## 2018-04-08 RX ADMIN — Medication 20 MILLIGRAM(S): at 17:20

## 2018-04-08 RX ADMIN — HEPARIN SODIUM 1150 UNIT(S)/HR: 5000 INJECTION INTRAVENOUS; SUBCUTANEOUS at 03:32

## 2018-04-08 RX ADMIN — Medication 200 MILLIGRAM(S): at 23:12

## 2018-04-08 RX ADMIN — HEPARIN SODIUM 1300 UNIT(S)/HR: 5000 INJECTION INTRAVENOUS; SUBCUTANEOUS at 18:47

## 2018-04-08 RX ADMIN — Medication 25 MILLIGRAM(S): at 06:33

## 2018-04-08 RX ADMIN — Medication 0.12 MILLIGRAM(S): at 23:12

## 2018-04-08 NOTE — CONSULT NOTE ADULT - SUBJECTIVE AND OBJECTIVE BOX
Cardiology Consultation    HPI: 84 F w/PMH COPD, CPAP at night, HTN, has been experiencing progressively dyspnea over past several weeks.  She went to her pulmonologist, CXR was neg, she referred to Cardio.  After work up w/ ST, TTE was not very revealing, she was referred to GI.  She was found on anemic w/ Hg of 8 on cbc, which is not normal for her.  She has noticed some melena 1-2 episodes over the past month.  She denies any hematochezia, hematemesis. She endorses a feeling of fullness after she eats a small amount.  She's also been having pain across her upper abdomen, just below her diaphragm.  She cannot tell me if this pain is related to eating.  She does have a good appetite but just gets full fast.  Gdtr at bedside notes that she doesn't eat as much.  Pt denies any nausea.  Her last colonoscopy was 2 years ago.  She endorsed h/o gastric ulcers. Pt is dyspneic w/ conversation, she's satting 85% on 3LNC, changed to ventimask, now at 95%.  She is feeling very anxious and requesting xanax- which she takes prn at home 1/2 of 0.25mg dose. (2018 17:10)    /- No CP. +SOB. SR on tele. Feels tired. No events last pm.     PAST MEDICAL & SURGICAL HISTORY:  COPD (chronic obstructive pulmonary disease)  Hypertension  Peptic Ulcer  Hyperlipemia  S/P Cataract Extraction: right eye 5/27/10    Allergies  No Known Allergies    SOCIAL HISTORY: Denies tobacco, etoh abuse or illicit drug use    FAMILY HISTORY: No pertinent family history in first degree relatives      MEDICATIONS  (STANDING):  ALBUTerol/ipratropium for Nebulization 3 milliLiter(s) Nebulizer every 6 hours  amLODIPine   Tablet 5 milliGRAM(s) Oral daily  azithromycin  IVPB 500 milliGRAM(s) IV Intermittent every 24 hours  azithromycin  IVPB      guaiFENesin    Syrup 200 milliGRAM(s) Oral every 6 hours  heparin  Infusion.  Unit(s)/Hr (10 mL/Hr) IV Continuous <Continuous>  hydrochlorothiazide 25 milliGRAM(s) Oral daily  losartan 50 milliGRAM(s) Oral daily  methylPREDNISolone sodium succinate Injectable 40 milliGRAM(s) IV Push every 12 hours  montelukast 10 milliGRAM(s) Oral daily  pantoprazole Infusion 8 mG/Hr (10 mL/Hr) IV Continuous <Continuous>    MEDICATIONS  (PRN):  ALPRAZolam 0.125 milliGRAM(s) Oral daily PRN anxiety  heparin  Injectable 5000 Unit(s) IV Push every 6 hours PRN For aPTT less than 40  ondansetron Injectable 4 milliGRAM(s) IV Push every 6 hours PRN Nausea      Vital Signs Last 24 Hrs  T(C): 36.4 (2018 05:42), Max: 37.2 (2018 11:55)  T(F): 97.5 (2018 05:42), Max: 99 (2018 11:55)  HR: 53 (2018 05:42) (50 - 88)  BP: 110/55 (2018 05:42) (110/55 - 131/65)  BP(mean): --  RR: 18 (2018 05:42) (18 - 22)  SpO2: 94% (2018 05:42) (85% - 95%)    REVIEW OF SYSTEMS:    CONSTITUTIONAL:  As per HPI.  HEENT:  Eyes:  No diplopia or blurred vision. ENT:  No earache, sore throat or runny nose.  CARDIOVASCULAR:  No pressure, squeezing, strangling, tightness, heaviness or aching about the chest, neck, axilla or epigastrium.  RESPIRATORY:  No cough, shortness of breath, PND or orthopnea.  GASTROINTESTINAL:  No nausea, vomiting or diarrhea.  GENITOURINARY:  No dysuria, frequency or urgency.  MUSCULOSKELETAL:  As per HPI.  SKIN:  No change in skin, hair or nails.  NEUROLOGIC:  No paresthesias, fasciculations, seizures or weakness.    PHYSICAL EXAMINATION:    GENERAL APPEARANCE:  Pt. is not currently dyspneic, in no distress. Pt. is alert, oriented, and pleasant.  HEENT:  Pupils are normal and react normally. No icterus. Mucous membranes well colored.  NECK:  Supple. No lymphadenopathy. Jugular venous pressure not elevated. Carotids equal.   HEART:   The cardiac impulse has a normal quality. There are no murmurs, rubs or gallops noted  CHEST:  Chest is clear to auscultation. Normal respiratory effort.  ABDOMEN:  Soft and nontender.   EXTREMITIES:  There is no edema.     I&O's Summary      LABS:                        9.3    18.67 )-----------( 351      ( 2018 06:03 )             32.6         141  |  104  |  25<H>  ----------------------------<  140<H>  4.3   |  31  |  0.79    Ca    9.0      2018 06:03    TPro  6.8  /  Alb  2.9<L>  /  TBili  0.4  /  DBili  x   /  AST  14<L>  /  ALT  20  /  AlkPhos  77  04-06    LIVER FUNCTIONS - ( 2018 12:29 )  Alb: 2.9 g/dL / Pro: 6.8 gm/dL / ALK PHOS: 77 U/L / ALT: 20 U/L / AST: 14 U/L / GGT: x           PT/INR - ( 2018 12:29 )   PT: 11.8 sec;   INR: 1.09 ratio         PTT - ( 2018 02:41 )  PTT:43.3 sec  CARDIAC MARKERS ( 2018 16:07 )  <0.015 ng/mL / x     / x     / x     / x      CARDIAC MARKERS ( 2018 12:29 )  <0.015 ng/mL / x     / x     / x     / x        Urinalysis Basic - ( 2018 16:57 )    Color: Yellow / Appearance: Clear / S.015 / pH: x  Gluc: x / Ketone: Negative  / Bili: Negative / Urobili: Negative mg/dL   Blood: x / Protein: 15 mg/dL / Nitrite: Positive   Leuk Esterase: Small / RBC: 0-2 /HPF / WBC 5-8 /HPF   Sq Epi: x / Non Sq Epi: Occasional / Bacteria: Many    EKG: < from: 12 Lead ECG (18 @ 09:44) >  Sinus rhythm with Premature supraventricular complexes and with occasional Premature ventricular complexes  Possible Left atrial enlargement  Nonspecific ST abnormality  Abnormal ECG    TELEMETRY: SR    CARDIAC TESTS: pending    RADIOLOGY & ADDITIONAL STUDIES: < from: US Duplex Venous Lower Ext Complete, Bilateral (18 @ 17:25) >    Left soleal vein thrombosis without extension into the popliteal vein.    No sonographic evidence of acute deep venous thrombosis in the   femoropopliteal systems bilaterally.     < from: CT Angio Chest PE Protocol w/ IV Cont (18 @ 16:53) >    Small focal pulmonary embolus in a posterior right upper lobe segmental   vessel.    ASSESSMENT & PLAN: Cardiology Consultation    HPI: 84 F w/PMH COPD, CPAP at night, HTN, has been experiencing progressively dyspnea over past several weeks.  She went to her pulmonologist, CXR was neg, she referred to Cardio.  After work up w/ ST, TTE was not very revealing, she was referred to GI.  She was found on anemic w/ Hg of 8 on cbc, which is not normal for her.  She has noticed some melena 1-2 episodes over the past month.  She denies any hematochezia, hematemesis. She endorses a feeling of fullness after she eats a small amount.  She's also been having pain across her upper abdomen, just below her diaphragm.  She cannot tell me if this pain is related to eating.  She does have a good appetite but just gets full fast.  Gdtr at bedside notes that she doesn't eat as much.  Pt denies any nausea.  Her last colonoscopy was 2 years ago.  She endorsed h/o gastric ulcers. Pt is dyspneic w/ conversation, she's satting 85% on 3LNC, changed to ventimask, now at 95%.  She is feeling very anxious and requesting xanax- which she takes prn at home 1/2 of 0.25mg dose. (2018 17:10)    /- No CP. +SOB. SR on tele. Feels tired. No events last pm.     PAST MEDICAL & SURGICAL HISTORY:  COPD (chronic obstructive pulmonary disease)  Hypertension  Peptic Ulcer  Hyperlipemia  S/P Cataract Extraction: right eye 5/27/10    Allergies  No Known Allergies    SOCIAL HISTORY: Denies tobacco, etoh abuse or illicit drug use    FAMILY HISTORY: No pertinent family history in first degree relatives      MEDICATIONS  (STANDING):  ALBUTerol/ipratropium for Nebulization 3 milliLiter(s) Nebulizer every 6 hours  amLODIPine   Tablet 5 milliGRAM(s) Oral daily  azithromycin  IVPB 500 milliGRAM(s) IV Intermittent every 24 hours  azithromycin  IVPB      guaiFENesin    Syrup 200 milliGRAM(s) Oral every 6 hours  heparin  Infusion.  Unit(s)/Hr (10 mL/Hr) IV Continuous <Continuous>  hydrochlorothiazide 25 milliGRAM(s) Oral daily  losartan 50 milliGRAM(s) Oral daily  methylPREDNISolone sodium succinate Injectable 40 milliGRAM(s) IV Push every 12 hours  montelukast 10 milliGRAM(s) Oral daily  pantoprazole Infusion 8 mG/Hr (10 mL/Hr) IV Continuous <Continuous>    MEDICATIONS  (PRN):  ALPRAZolam 0.125 milliGRAM(s) Oral daily PRN anxiety  heparin  Injectable 5000 Unit(s) IV Push every 6 hours PRN For aPTT less than 40  ondansetron Injectable 4 milliGRAM(s) IV Push every 6 hours PRN Nausea      Vital Signs Last 24 Hrs  T(C): 36.4 (2018 05:42), Max: 37.2 (2018 11:55)  T(F): 97.5 (2018 05:42), Max: 99 (2018 11:55)  HR: 53 (2018 05:42) (50 - 88)  BP: 110/55 (2018 05:42) (110/55 - 131/65)  BP(mean): --  RR: 18 (2018 05:42) (18 - 22)  SpO2: 94% (2018 05:42) (85% - 95%)    REVIEW OF SYSTEMS:    CONSTITUTIONAL:  As per HPI.  HEENT:  Eyes:  No diplopia or blurred vision. ENT:  No earache, sore throat or runny nose.  CARDIOVASCULAR:  No pressure, squeezing, strangling, tightness, heaviness or aching about the chest, neck, axilla or epigastrium.  RESPIRATORY:  No cough, shortness of breath, PND or orthopnea.  GASTROINTESTINAL:  No nausea, vomiting or diarrhea.  GENITOURINARY:  No dysuria, frequency or urgency.  MUSCULOSKELETAL:  As per HPI.  SKIN:  No change in skin, hair or nails.  NEUROLOGIC:  No paresthesias, fasciculations, seizures or weakness.    PHYSICAL EXAMINATION:    GENERAL APPEARANCE:  Pt. is not currently dyspneic, in no distress. Pt. is alert, oriented, and pleasant.  HEENT:  Pupils are normal and react normally. No icterus. Mucous membranes well colored.  NECK:  Supple. No lymphadenopathy. Jugular venous pressure not elevated. Carotids equal.   HEART:   The cardiac impulse has a normal quality. There are no murmurs, rubs or gallops noted  CHEST:  Chest is clear to auscultation. Normal respiratory effort.  ABDOMEN:  Soft and nontender.   EXTREMITIES:  There is no edema.     I&O's Summary      LABS:                        9.3    18.67 )-----------( 351      ( 2018 06:03 )             32.6         141  |  104  |  25<H>  ----------------------------<  140<H>  4.3   |  31  |  0.79    Ca    9.0      2018 06:03    TPro  6.8  /  Alb  2.9<L>  /  TBili  0.4  /  DBili  x   /  AST  14<L>  /  ALT  20  /  AlkPhos  77  04-06    LIVER FUNCTIONS - ( 2018 12:29 )  Alb: 2.9 g/dL / Pro: 6.8 gm/dL / ALK PHOS: 77 U/L / ALT: 20 U/L / AST: 14 U/L / GGT: x           PT/INR - ( 2018 12:29 )   PT: 11.8 sec;   INR: 1.09 ratio         PTT - ( 2018 02:41 )  PTT:43.3 sec  CARDIAC MARKERS ( 2018 16:07 )  <0.015 ng/mL / x     / x     / x     / x      CARDIAC MARKERS ( 2018 12:29 )  <0.015 ng/mL / x     / x     / x     / x        Urinalysis Basic - ( 2018 16:57 )    Color: Yellow / Appearance: Clear / S.015 / pH: x  Gluc: x / Ketone: Negative  / Bili: Negative / Urobili: Negative mg/dL   Blood: x / Protein: 15 mg/dL / Nitrite: Positive   Leuk Esterase: Small / RBC: 0-2 /HPF / WBC 5-8 /HPF   Sq Epi: x / Non Sq Epi: Occasional / Bacteria: Many    EKG: < from: 12 Lead ECG (18 @ 09:44) >  Sinus rhythm with Premature supraventricular complexes and with occasional Premature ventricular complexes  Possible Left atrial enlargement  Nonspecific ST abnormality  Abnormal ECG    TELEMETRY: SR    CARDIAC TESTS: pending    RADIOLOGY & ADDITIONAL STUDIES: < from: US Duplex Venous Lower Ext Complete, Bilateral (18 @ 17:25) >    Left soleal vein thrombosis without extension into the popliteal vein.    No sonographic evidence of acute deep venous thrombosis in the   femoropopliteal systems bilaterally.     < from: CT Angio Chest PE Protocol w/ IV Cont (18 @ 16:53) >    Small focal pulmonary embolus in a posterior right upper lobe segmental   vessel.        ASSESSMENT & PLAN:

## 2018-04-08 NOTE — PROGRESS NOTE ADULT - ASSESSMENT
84 F w/PMH COPD, CPAP at night, HTN, presents on 4/6/18 with progressively dyspnea over past several weeks.  Found w/ anemia, +melena  Currently, pt is dyspneic w/ conversation, she's satting 85% on 3LNC, changed to ventimask, now at 95%.  She is feeling very anxious and requesting xanax- which she takes prn at home 1/2 of 0.25mg dose.      Hg 8.1- receiving 2 units PRBC      * Acute respiratory failure with hypoxia,  multifactorial:  * Acute COPD exacerbation  * Sleep apnea   - 85% on 3LNC, now on ventimask at 95%  - monitor sats, titrate O2 to keep sats>90%  - IV steroids  - add IV azithromycin 5 days  - nebs, mucolytics  - cingulair  - pulmonary consult  - add CPAP qhs and incentive spirometry    * Elevated D-dimers due to   * Left DVT soleal vein  * PE RUL segmental vessel  - CTA - positive for PE  - Doppler LE - positive for DVT  - d/w Dr. Arizmendi due to high risk of GI bleed would benefit from IVC filter , ok to use low range heparin until source of GI bleed established, high range of heparin drip will be to risky  - started on IV low range heparin  - - AC services consult  - Dr. Mendoza consult - r/o hypercoagulability state, underlying malignancy , advised on appropriate AC  - O2 monitoring  -interventional cardiology consult Dr. Kramer for possible IVC filter placement   - 2 d echo  - Dr. Murguia cardiology consult    *  Symptomatic anemia. , stable  - no active bleeding  - s/p 2 units of PRBC  - H/H q12h--> q6h while on heparin drip  - check iron studies, B12, folate      * Melena suspected subacute Upper GI bleed  - GI consult  - will benefit form EGD once respiratory status improves  - high risk for endoscopy at this time  - c/w PPI BID--> PPI drip while on heparin drip  - monitor H/H and transfuse as needed.  if Hb <8    *  Essential hypertension.    bp stable,   c/w home meds HCTZ, losartan, amlodipine  monitor and titrate meds as indicated.     * Anxiety  - c/w benzo prn    * Prophylactic measure. Plan: SCDs- no chem d/t GIB.

## 2018-04-08 NOTE — CONSULT NOTE ADULT - SUBJECTIVE AND OBJECTIVE BOX
Patient is a 84y old  Female who presents with a chief complaint of sob, anemia       HPI:  84 F w/PMH COPD, CPAP at night, HTN, has been experiencing progressively dyspnea over past several weeks.    her breathing was worse than usual  diagnosed to have below knee DVt complicated by PE  now on IV heparin    also noted to have anemia due to iron deficiency  melena few months back. know peptic ulcer disease and colonic polyps    no past history of DVT even during pregnancy. no FH of DVT/PE        PAST MEDICAL & SURGICAL HISTORY:  COPD (chronic obstructive pulmonary disease)  Hypertension  Peptic Ulcer  Hyperlipemia  S/P Cataract Extraction: right eye 5/27/10      REVIEW OF SYSTEMS    General:	The patient feels short of breath  no unexpected weight loss. Appetite good. no night sweats.  Skin: no Rash.	  ENT: no sore throat or oral pain. no difficulty with swallowing  Respiratory: No chest pain, + shortness of breath, no hemoptysis, no cough, no chest pain.  Cardiovascular : No chest pain. no palpitation.  Gastrointestinal:  No anorexia. no pain, history of melena  Genitourinary: No hematuria , no dysuria	  Musculoskeletal: No myalgia, no arthralgia, no back pain, no joint swelling  Neurological: no headaches, no dizzyness, no weakness, no double vision. 	  Psychiatric: no change in mood. 	  Hematology/Lymphatics: = weakness. 	  Endocrine:	no burning in urine or frequency  Allergic/Immunologic:	 no fever.     Allergies    No Known Allergies    Intolerances        Occupation:  [facility]: Denied Smoking: former smoker   retired nurse         FAMILY HISTORY:  No pertinent family history in first degree relatives      Home Medications:  amLODIPine 5 mg oral tablet: 1 tab(s) orally once a day (06 Apr 2018 17:25)  Anoro Ellipta 62.5 mcg-25 mcg/inh inhalation powder: 1 puff(s) inhaled once a day (06 Apr 2018 17:25)  calcium (as carbonate) 600 mg oral tablet: 2 tab(s) orally once a day (06 Apr 2018 17:25)  hydroCHLOROthiazide 25 mg oral tablet: 1 tab(s) orally once a day (06 Apr 2018 17:25)  losartan 50 mg oral tablet: 1 tab(s) orally once a day (06 Apr 2018 17:25)  montelukast 10 mg oral tablet: 1 tab(s) orally once a day (06 Apr 2018 17:25)  predniSONE 20 mg oral tablet: 1 tab(s) orally once a day    ***COURSE COMPLETED*** (06 Apr 2018 17:25)  ProAir HFA 90 mcg/inh inhalation aerosol: 2 puff(s) inhaled 4 times a day, As Needed (06 Apr 2018 17:25)  Vitamin D3 2000 intl units oral tablet: 1 tab(s) orally once a day (06 Apr 2018 17:25)  Xanax 0.25 mg oral tablet: 0.5 tab(s) orally once a day, As Needed for anxiety  (06 Apr 2018 17:25)      MEDICATIONS  (STANDING):  ALBUTerol/ipratropium for Nebulization 3 milliLiter(s) Nebulizer every 6 hours  amLODIPine   Tablet 5 milliGRAM(s) Oral daily  azithromycin  IVPB 500 milliGRAM(s) IV Intermittent every 24 hours  azithromycin  IVPB      guaiFENesin    Syrup 200 milliGRAM(s) Oral every 6 hours  heparin  Infusion.  Unit(s)/Hr (10 mL/Hr) IV Continuous <Continuous>  hydrochlorothiazide 25 milliGRAM(s) Oral daily  losartan 50 milliGRAM(s) Oral daily  methylPREDNISolone sodium succinate Injectable 20 milliGRAM(s) IV Push every 12 hours  montelukast 10 milliGRAM(s) Oral daily  pantoprazole Infusion 8 mG/Hr (10 mL/Hr) IV Continuous <Continuous>    MEDICATIONS  (PRN):  ALPRAZolam 0.125 milliGRAM(s) Oral daily PRN anxiety  heparin  Injectable 5000 Unit(s) IV Push every 6 hours PRN For aPTT less than 40  ondansetron Injectable 4 milliGRAM(s) IV Push every 6 hours PRN Nausea      PHYSICAL EXAM:  Vital Signs Last 24 Hrs  T(C): 36.8 (08 Apr 2018 11:22), Max: 36.8 (08 Apr 2018 11:22)  T(F): 98.2 (08 Apr 2018 11:22), Max: 98.2 (08 Apr 2018 11:22)  HR: 92 (08 Apr 2018 11:22) (50 - 92)  BP: 130/91 (08 Apr 2018 11:22) (110/55 - 131/65)  BP(mean): --  RR: 18 (08 Apr 2018 11:22) (18 - 22)  SpO2: 97% (08 Apr 2018 11:22) (85% - 97%)        Gen:   somewhat overweight  short of breath  chest wheeze+  no LNs  no edema        LABS:                        9.3    18.67 )-----------( 351      ( 08 Apr 2018 06:03 )             32.6     08 Apr 2018 06:03    141    |  104    |  25     ----------------------------<  140    4.3     |  31     |  0.79     Ca    9.0        08 Apr 2018 06:03    Fe 18 TIBC 348  Iron sat 8%  Ferritin 22      PTT - ( 08 Apr 2018 09:45 )  PTT:65.3 sec      RADIOLOGY & ADDITIONAL STUDIES:    IMPRESSION:         Small focal pulmonary embolus in a posterior right upper lobe segmental   vessel.    US duplex    IMPRESSION:     Left soleal vein thrombosis without extension into the popliteal vein.    No sonographic evidence of acute deep venous thrombosis in the   femoropopliteal systems bilaterally.

## 2018-04-08 NOTE — CONSULT NOTE ADULT - PROBLEM SELECTOR RECOMMENDATION 9
below knee distal DVT however complicated by PE  shall send hypercoagulable workup as outpatient in 2-3 weeks  may have false positive tests now  rec CT abdomen pelvis to look for any malignancy given spontaneous DVT//PE  agree with anticoagulation  coumadin can be reversed easily if she has recurrent GI bleed  agree with plan for temporary IVC filter given history of GI Bleed recently

## 2018-04-08 NOTE — PROGRESS NOTE ADULT - ASSESSMENT
83 yo female with abdominal pain and anemia. No complaints currently of pain. Will need eventual upper endoscopy when improved from respiratory standpoint.

## 2018-04-08 NOTE — PROGRESS NOTE ADULT - SUBJECTIVE AND OBJECTIVE BOX
Rn reported that Pt was sating 85% on 3LNC while sitting on bedpan. O2 sat improved after few minutes to baseline 93% in 3LNC. Will transfer pt to Telemetry for continuous pulse ox. monitoring.    Pt had  Doppler LE - positive for left soleal vein thrombosis and CTA - small focal PE in posterior RUL segmental vessels. Pt is on Heparin gtt started during daytime. RN denies any blood in stool or urine.     Vitals: Temp: 98.1F, BP: 127/53 mm hg, HR: 50, RR: 20, O2 sat: 92% on 3LNC    # Monitor on Tele  # Continuous pulse oximetry  # Cardio consult    Hospitalist to f/u in AM    Plan d/w Night Rn on 5S Rn reported that Pt was sating 85% on 3LNC while sitting on bedpan. O2 sat improved after few minutes to baseline 93% in 3LNC. Will transfer pt to Telemetry for continuous pulse ox. monitoring.    Pt had  Doppler LE - positive for left soleal vein thrombosis and CTA - small focal PE in posterior RUL segmental vessels. Pt is on Heparin gtt started during daytime. RN denies any blood in stool or urine.     Vitals: Temp: 98.1F, BP: 127/53 mm hg, HR: 50, RR: 20, O2 sat: 92% on 3LNC    # Monitor on Tele  # Continuous pulse oximetry  # Cardio consult    Hospitalist to f/u in AM    Plan d/w Night Rn on 5S    Addendum: RN reported that pt states that she uses CPAP at home, order placed. Pt was seen at bedside as Rn reported that Pt was sating 85% on 3LNC while sitting on bedpan. O2 sat improved after few minutes to baseline 93% in 3LNC. Pt denies headache, dizziness, chest pain, SOB or palpitations. AAO x 3.  Will transfer pt to Telemetry for continuous pulse ox. monitoring.    Pt had  Doppler LE - positive for left soleal vein thrombosis and CTA - small focal PE in posterior RUL segmental vessels. Pt is on Heparin gtt started during daytime. RN denies any blood in stool or urine.     Vitals: Temp: 98.1F, BP: 127/53 mm hg, HR: 50, RR: 20, O2 sat: 92% on 3LNC    # Transfer to Tele  # Continuous pulse oximetry  # Cardio consult  # Heparin gtt       Hospitalist to f/u in AM    Plan d/w Night Rn on 5S    Addendum: RN reported that pt states that she uses CPAP at home, order placed.

## 2018-04-08 NOTE — CONSULT NOTE ADULT - SUBJECTIVE AND OBJECTIVE BOX
HPI: This is a 85 y/o female w PMH COPD, CPAP at night, HTN, has been experiencing progressively dyspnea over past several weeks.  She went to her pulmonologist, CXR was neg, she referred to Cardio.  After work up w/ ST, TTE was not very revealing, she was referred to GI.  She was found on anemic w/ Hg of 8 on cbc, which is not normal for her.  She has noticed some melena 1-2 episodes over the past month.  She denies any hematochezia, hematemesis. She endorses a feeling of fullness after she eats a small amount.  She's also been having pain across her upper abdomen, just below her diaphragm.  She cannot tell me if this pain is related to eating.  She does have a good appetite but just gets full fast.  Gdtr at bedside notes that she doesn't eat as much.  Pt denies any nausea.  Her last colonoscopy was 2 years ago.  She endorsed h/o gastric ulcers.     Currently, pt is dyspneic w/ conversation, she's satting 85% on 3LNC, changed to ventimask, now at 95%.  She is feeling very anxious and requesting xanax- which she takes prn at home 1/2 of 0.25mg dose. (06 Apr 2018 17:10)      Patient is a 84y old  Female who presents with a chief complaint of sob, anemia (06 Apr 2018 17:10)      Consulted by            for VTE prophylaxis, risk stratification, and anticoagulation management.    PAST MEDICAL & SURGICAL HISTORY:  COPD (chronic obstructive pulmonary disease)  Hypertension  Peptic Ulcer  Hyperlipemia  S/P Cataract Extraction: right eye 5/27/10          BMI:    CrCL:    Caprini VTE Risk Score:    IMPROVE VTE Risk Score:    [id number] Score:     IMPROVE Bleeding Risk Score    Falls Risk:   High (  )  Mod (  )  Low (  )    FAMILY HISTORY:  No pertinent family history in first degree relatives    Denies any personal or familial history of clotting or bleeding disorders.    Allergies    No Known Allergies    Intolerances    REVIEW OF SYSTEMS    (  )Fever	     (  )Constipation	(  )SOB			(  )Headache	(  )Dysuria  (  )Chills	     (  )Melena	(  )Dyspnea present on exertion    (  )Dizziness                    (  )Polyuria  (  )Nausea	     (  )Hematochezia	(  )Cough		                    (  )Syncope   	(  )Hematuria  (  )Vomiting    (  )Chest Pain	(  )Wheezing		(  )Weakness  (  )Diarrhea     (  )Palpitations	(  )Anorexia		(  )Myalgia    All other review of systems negative: Yes    Unable to obtain review of systems due to:      PHYSICAL EXAM:    Constitutional: Appears Well    Neurological: A& O x 3    Skin: Warm    Respiratory and Thorax: normal effort; Breath sounds: normal; No rales/wheezing/rhonchi  	  Cardiovascular: S1, S2, regular, NMBR	    Gastrointestinal: BS + x 4Q, nontender	    Genitourinary:  Bladder nondistended, nontender    Musculoskeletal:   General Right:   no muscle/joint tenderness,   normal tone, no joint swelling,   ROM: limited/full	    General Left:   no muscle/joint tenderness,   normal tone, no joint swelling,   ROM: limited/full    Lower extrems:   Right: no calf tenderness              negative spencer's sign               + pedal pulses    Left:   no calf tenderness              negative spencer's sign               + pedal pulses                          9.3    18.67 )-----------( 351      ( 08 Apr 2018 06:03 )             32.6       04-08    141  |  104  |  25<H>  ----------------------------<  140<H>  4.3   |  31  |  0.79    Ca    9.0      08 Apr 2018 06:03    TPro  6.8  /  Alb  2.9<L>  /  TBili  0.4  /  DBili  x   /  AST  14<L>  /  ALT  20  /  AlkPhos  77  04-06    PT/INR - ( 06 Apr 2018 12:29 )   PT: 11.8 sec;   INR: 1.09 ratio    PTT - ( 08 Apr 2018 09:45 )  PTT:65.3 sec				    MEDICATIONS  (STANDING):  ALBUTerol/ipratropium for Nebulization 3 milliLiter(s) Nebulizer every 6 hours  amLODIPine   Tablet 5 milliGRAM(s) Oral daily  azithromycin  IVPB 500 milliGRAM(s) IV Intermittent every 24 hours  azithromycin  IVPB      guaiFENesin    Syrup 200 milliGRAM(s) Oral every 6 hours  heparin  Infusion.  Unit(s)/Hr IV Continuous <Continuous>  hydrochlorothiazide 25 milliGRAM(s) Oral daily  losartan 50 milliGRAM(s) Oral daily  methylPREDNISolone sodium succinate Injectable 20 milliGRAM(s) IV Push every 12 hours  montelukast 10 milliGRAM(s) Oral daily  pantoprazole Infusion 8 mG/Hr IV Continuous <Continuous>    Vital Signs Last 24 Hrs  T(C): 36.8 (08 Apr 2018 11:22), Max: 37.2 (07 Apr 2018 11:55)  T(F): 98.2 (08 Apr 2018 11:22), Max: 99 (07 Apr 2018 11:55)  HR: 92 (08 Apr 2018 11:22) (50 - 92)  BP: 130/91 (08 Apr 2018 11:22) (110/55 - 131/65)  BP(mean): --  RR: 18 (08 Apr 2018 11:22) (18 - 22)  SpO2: 97% (08 Apr 2018 11:22) (85% - 97%)    IMAGING:  US DPLX LWR EXT VEINS COMPL BI:  04/07/2018    FINDINGS:  RIGHT:  There is normal compressibility of the common femoral, femoral, and popliteal veins.  Visualized posterior tibial veins are within normal limits. Doppler examination shows normal spontaneous and phasic flow. Right popliteal cyst.    LEFT: There is normal compressibility of the common femoral, femoral, and popliteal veins. Thrombosis of the left soleal vein without extension into the popliteal vein. Doppler examination shows normal spontaneous and phasic flow. Left popliteal fossa cyst.    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 No sonographic evidence of acute deep venous thrombosis in the femoropopliteal systems bilaterally.     EXAM:  CT ANGIO CHEST PE PROTOCOL IC:  04/07/2018    IMPRESSION: Small focal pulmonary embolus in a posterior right upper lobe segmental vessel.    DVT Prophylaxis:  LMWH                   (  )  Heparin SQ           (  )  Coumadin             (  )  Xarelto                  (  )  Eliquis                   (  )  Venodynes           (  )  Ambulation          (X )  UFH                       ( X )  Contraindicated  (  ) HPI: This is a 85 y/o female w PMH COPD, CPAP at night, HTN, has been experiencing progressively dyspnea over past several weeks.  She went to her pulmonologist, CXR was neg, she referred to Cardio.  After work up w/ ST, TTE was not very revealing, she was referred to GI.  She was found on anemic w/ Hg of 8 on cbc, which is not normal for her.  She has noticed some melena 1-2 episodes over the past month.  She denies any hematochezia, hematemesis. She endorses a feeling of fullness after she eats a small amount.  She's also been having pain across her upper abdomen, just below her diaphragm.  She cannot tell me if this pain is related to eating.  She does have a good appetite but just gets full fast.  Gdtr at bedside notes that she doesn't eat as much.  Pt denies any nausea.  Her last colonoscopy was 2 years ago.  She endorsed h/o gastric ulcers.     Currently, pt is dyspneic w/ conversation, she's satting 85% on 3LNC, changed to ventimask, now at 95%.  She is feeling very anxious and requesting xanax- which she takes prn at home 1/2 of 0.25mg dose. 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 She continues to report of SOB. She denies prior VTE, CVA, MI, no blood clotting disorder. She is a former smoker, has been under stress recently. She reports of being an active caregiver to her  with Parkinson's disease, but now has HHA and decreased her mobility. Informed pt of possible oral anticoagulation with warfarin and she agrees to therapy, but will need to have endoscopy done first. Pending IVC filter placement tomorrow. Discussed with primary RN and no active bleeding noted today. Her H/H today 9.3/32.6 after s/p 2 units pRBC.    Patient is a 84y old  Female who presents with a chief complaint of sob, anemia (06 Apr 2018 17:10)    Consulted by Dr. Sinha for VTE prophylaxis, risk stratification, and anticoagulation management.    PAST MEDICAL & SURGICAL HISTORY:  COPD (chronic obstructive pulmonary disease)  Hypertension  Peptic Ulcer  Hyperlipemia  S/P Cataract Extraction: right eye 5/27/10    BMI: 30.6    CrCL: 69.88    IMPROVE VTE Risk Score: 5    IMPROVE Bleeding Risk Score: 5.5    Falls Risk:   High (  )  Mod (  )  Low ( X )    FAMILY HISTORY:  No pertinent family history in first degree relatives    Denies any personal or familial history of clotting or bleeding disorders.    Allergies    No Known Allergies    Intolerances    REVIEW OF SYSTEMS    (  )Fever	     (  )Constipation	( X )SOB			(  )Headache	(  )Dysuria  (  )Chills	     (  )Melena	(  )Dyspnea present on exertion    (  )Dizziness                    (  )Polyuria  (  )Nausea	     (  )Hematochezia	(  )Cough		                    (  )Syncope   	(  )Hematuria  (  )Vomiting    (  )Chest Pain	(  )Wheezing		(  )Weakness  (  )Diarrhea     (  )Palpitations	(  )Anorexia		(  )Myalgia    All other review of systems negative: Yes    PHYSICAL EXAM:    Constitutional: Appears Well    Neurological: A& O x 3    Skin: Warm    Respiratory and Thorax: increased effort; Breath sounds: normal; No rales/wheezing/rhonchi  	  Cardiovascular: S1, S2, regular, NMBR	    Gastrointestinal: BS + x 4Q, nontender	    Genitourinary:  Bladder nondistended, nontender    Musculoskeletal:   General Right:   no muscle/joint tenderness,   normal tone, no joint swelling,   ROM: full	    General Left:   no muscle/joint tenderness,   normal tone, no joint swelling,   ROM: full    Lower extrems:   Right: no calf tenderness              negative spencer's sign               + pedal pulses    Left:   no calf tenderness              negative spencer's sign               + pedal pulses                          9.3    18.67 )-----------( 351      ( 08 Apr 2018 06:03 )             32.6       04-08    141  |  104  |  25<H>  ----------------------------<  140<H>  4.3   |  31  |  0.79    Ca    9.0      08 Apr 2018 06:03    TPro  6.8  /  Alb  2.9<L>  /  TBili  0.4  /  DBili  x   /  AST  14<L>  /  ALT  20  /  AlkPhos  77  04-06    PT/INR - ( 06 Apr 2018 12:29 )   PT: 11.8 sec;   INR: 1.09 ratio    PTT - ( 08 Apr 2018 09:45 )  PTT:65.3 sec				    MEDICATIONS  (STANDING):  ALBUTerol/ipratropium for Nebulization 3 milliLiter(s) Nebulizer every 6 hours  amLODIPine   Tablet 5 milliGRAM(s) Oral daily  azithromycin  IVPB 500 milliGRAM(s) IV Intermittent every 24 hours  azithromycin  IVPB      guaiFENesin    Syrup 200 milliGRAM(s) Oral every 6 hours  heparin  Infusion.  Unit(s)/Hr IV Continuous <Continuous>  hydrochlorothiazide 25 milliGRAM(s) Oral daily  losartan 50 milliGRAM(s) Oral daily  methylPREDNISolone sodium succinate Injectable 20 milliGRAM(s) IV Push every 12 hours  montelukast 10 milliGRAM(s) Oral daily  pantoprazole Infusion 8 mG/Hr IV Continuous <Continuous>    Vital Signs Last 24 Hrs  T(C): 36.8 (08 Apr 2018 11:22), Max: 37.2 (07 Apr 2018 11:55)  T(F): 98.2 (08 Apr 2018 11:22), Max: 99 (07 Apr 2018 11:55)  HR: 92 (08 Apr 2018 11:22) (50 - 92)  BP: 130/91 (08 Apr 2018 11:22) (110/55 - 131/65)  BP(mean): --  RR: 18 (08 Apr 2018 11:22) (18 - 22)  SpO2: 97% (08 Apr 2018 11:22) (85% - 97%)    IMAGING:  US DPLX LWR EXT VEINS COMPL BI:  04/07/2018    FINDINGS:  RIGHT:  There is normal compressibility of the common femoral, femoral, and popliteal veins.  Visualized posterior tibial veins are within normal limits. Doppler examination shows normal spontaneous and phasic flow. Right popliteal cyst.    LEFT: There is normal compressibility of the common femoral, femoral, and popliteal veins. Thrombosis of the left soleal vein without extension into the popliteal vein. Doppler examination shows normal spontaneous and phasic flow. Left popliteal fossa cyst.    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 No sonographic evidence of acute deep venous thrombosis in the femoropopliteal systems bilaterally.     EXAM:  CT ANGIO CHEST PE PROTOCOL IC:  04/07/2018    IMPRESSION: Small focal pulmonary embolus in a posterior right upper lobe segmental vessel.    DVT Prophylaxis:  LMWH                   (  )  Heparin SQ           (  )  Coumadin             (  )  Xarelto                  (  )  Eliquis                   (  )  Venodynes           (  )  Ambulation          (X )  UFH                       ( X )  Contraindicated  (  )

## 2018-04-08 NOTE — PROGRESS NOTE ADULT - SUBJECTIVE AND OBJECTIVE BOX
Subjective:  Patient is a 84y old  Female who presents with a chief complaint of sob, anemia      HPI:     84 F w/PMH COPD, CPAP at night, HTN, presents on 18 with progressively worse dyspnea over past several weeks.  She went to her pulmonologist, CXR was neg, she referred to Cardio.  After work up w/ ST, TTE was not very revealing, she was referred to GI.  She was found on anemic w/ Hg of 8 on cbc, which is not normal for her.  She has noticed some melena 1-2 episodes over the past month.  She denies any hematochezia, hematemesis. She endorses a feeling of fullness after she eats a small amount.  She's also been having pain across her upper abdomen, just below her diaphragm.  She cannot tell me if this pain is related to eating.  She does have a good appetite but just gets full fast.  Gdtr at bedside notes that she doesn't eat as much.  Pt denies any nausea.  Her last colonoscopy was 2 years ago.  She endorsed h/o gastric ulcers.     In ED  pt is dyspneic w/ conversation, she's satting 85% on 3LNC, changed to ventimask, now at 95%.  She is feeling very anxious and requesting xanax- which she takes prn at home 1/2 of 0.25mg dose.     18 - Patient seen and examined at bedside earlier today, dyspneic with minimal exertion, even talking, but reports slight improvement of the dyspnea   18 - events noted, transferred to tele for o2 monitoring due to desaturation , reports improvement of the dyspnea, cough, 1 BM brown overnight  tele- o2 sats to 85%, episodes of bigemeny     Review of system- Rest of the review of system are negative except mentioned in HPI    OBJECTIVE:   T(C): 36.4 (18 @ 05:42), Max: 37.2 (18 @ 11:55)  T(F): 97.5 (18 @ 05:42), Max: 99 (18 @ 11:55)  HR: 53 (18 @ 05:42) (50 - 88)  BP: 110/55 (18 @ 05:42) (110/55 - 131/65)  RR: 18 (18 @ 05:42) (18 - 22)  SpO2: 94% (18 @ 05:42) (85% - 95%)  Wt(kg): --  Daily Weight in k.5 (2018 00:09)    PHYSICAL EXAM:  GENERAL: NAD  NERVOUS SYSTEM:  Alert & Oriented X3, non- focal exam,  HEAD:  Atraumatic, Normocephalic  EYES: EOMI, PERRLA, conjunctiva and sclera clear  HEENT: Moist mucous membranes  NECK: Supple, No JVD  CHEST/LUNG: BS decreased bilaterally; No rales, no rhonchi, +  wheezing,  HEART: Regular rate and rhythm; No murmurs, rubs, or gallops  ABDOMEN: Soft, Nontender, Nondistended; Bowel sounds present  GENITOURINARY- Voiding, no suprapubic tenderness  EXTREMITIES:  2+ Peripheral Pulses, No clubbing, cyanosis, or edema  MUSCULOSKELETAL:- No muscle tenderness, Muscle tone normal, No joint tenderness, no Joint swelling, Joint range of motion-normal  SKIN-no rash, no lesion    LABS:      141  |  104  |  25<H>  ----------------------------<  140<H>  4.3   |  31  |  0.79    Ca    9.0      2018 06:03    TPro  6.8  /  Alb  2.9<L>  /  TBili  0.4  /  DBili  x   /  AST  14<L>  /  ALT  20  /  AlkPhos  77                              9.3    18.67 )-----------( 351      ( 2018 06:03 )             32.6       CARDIAC MARKERS ( 2018 16:07 )  <0.015 ng/mL / x     / x     / x     / x      CARDIAC MARKERS ( 2018 12:29 )  <0.015 ng/mL / x     / x     / x     / x            LIVER FUNCTIONS - ( 2018 12:29 )  Alb: 2.9 g/dL / Pro: 6.8 gm/dL / ALK PHOS: 77 U/L / ALT: 20 U/L / AST: 14 U/L / GGT: x             PT/INR - ( 2018 12:29 )   PT: 11.8 sec;   INR: 1.09 ratio         PTT - ( 2018 09:45 )  PTT:65.3 sec    Urinalysis Basic - ( 2018 16:57 )    Color: Yellow / Appearance: Clear / S.015 / pH: x  Gluc: x / Ketone: Negative  / Bili: Negative / Urobili: Negative mg/dL   Blood: x / Protein: 15 mg/dL / Nitrite: Positive   Leuk Esterase: Small / RBC: 0-2 /HPF / WBC 5-8 /HPF   Sq Epi: x / Non Sq Epi: Occasional / Bacteria: Many                            10.6   10.20 )-----------( 353      ( 2018 07:23 )             35.7         141  |  104  |  17  ----------------------------<  152<H>  4.0   |  30  |  0.64    Ca    8.8      2018 07:23    TPro  6.8  /  Alb  2.9<L>  /  TBili  0.4  /  DBili  x   /  AST  14<L>  /  ALT  20  /  AlkPhos  77  04-06    PT/INR - ( 2018 12:29 )   PT: 11.8 sec;   INR: 1.09 ratio         PTT - ( 2018 12:29 )  PTT:25.8 sec  CARDIAC MARKERS ( 2018 16:07 )  <0.015 ng/mL / x     / x     / x     / x      CARDIAC MARKERS ( 2018 12:29 )  <0.015 ng/mL / x     / x     / x     / x          Urinalysis Basic - ( 2018 16:57 )    Color: Yellow / Appearance: Clear / S.015 / pH: x  Gluc: x / Ketone: Negative  / Bili: Negative / Urobili: Negative mg/dL   Blood: x / Protein: 15 mg/dL / Nitrite: Positive   Leuk Esterase: Small / RBC: 0-2 /HPF / WBC 5-8 /HPF   Sq Epi: x / Non Sq Epi: Occasional / Bacteria: Many      CAPILLARY BLOOD GLUCOSE    RECENT CULTURES:    RADIOLOGY & ADDITIONAL TESTS:    < from: US Duplex Venous Lower Ext Complete, Bilateral (18 @ 17:25) >  IMPRESSION:     Left soleal vein thrombosis without extension into the popliteal vein.    No sonographic evidence of acute deep venous thrombosis in the   femoropopliteal systems bilaterally.     < end of copied text >  < from: CT Angio Chest PE Protocol w/ IV Cont (18 @ 16:53) >  Lungs, Airways and Pleura: Central tracheobronchial tree is grossly   patent. No endobronchial lesions. Small to mild bilateral pleural   effusions right greater than left with bibasilar compressive atelectasis.   No pneumothorax. No discrete pulmonary nodules. Subsegmental atelectasis   in the lingula.    Heart and Great Vessels: Atherosclerotic changes of the aorta and   coronary vasculature. Study is mildly limited secondary to heterogeneous   opacification of the distal segmental and subsegmental pulmonary vessels.   Focal filling defect within the mid to distal posterior right upper lobe   segmental vessel compatible with a focal pulmonary embolus.. No other   evidence of acute pulmonary embolismwithin the limits of the study.. No   aortic aneurysm. No definite evidence of aortic dissection. Heart is   normal in size. No pericardial effusion.    Mediastinum and Lanette: Pretracheal and right hilar lymph nodes measuring   up to 1.3 cm.    Neck and Chest Wall: within normal limits    Bones: Degenerative changes and osteopenia. Mild to moderate compression   deformities of T6-T8 chronic in appearance. Compression deformity of L1   appears chronic in nature.    Upper Abdomen:  Left lateral midpole renal cyst. Elevation of the right   hemidiaphragm.    IMPRESSION:         Small focal pulmonary embolus in a posterior right upper lobe segmental   vessel.    < end of copied text >    < from: Xray Chest 1 View AP/PA. (18 @ 10:47) >  Heart magnified by projection, unchanged. Atherosclerotic aorta.   Symmetrically hyperinflated lungs. No focal consolidation or pleural   effusion. Old fracture right seventh posterior rib.    Impression: Hyperinflated lungs. No active infiltrates.    < end of copied text >  MEDICATIONS  (STANDING):  ALBUTerol/ipratropium for Nebulization 3 milliLiter(s) Nebulizer every 6 hours  amLODIPine   Tablet 5 milliGRAM(s) Oral daily  azithromycin  IVPB 500 milliGRAM(s) IV Intermittent every 24 hours  azithromycin  IVPB      guaiFENesin    Syrup 200 milliGRAM(s) Oral every 6 hours  heparin  Infusion.  Unit(s)/Hr (10 mL/Hr) IV Continuous <Continuous>  hydrochlorothiazide 25 milliGRAM(s) Oral daily  losartan 50 milliGRAM(s) Oral daily  methylPREDNISolone sodium succinate Injectable 20 milliGRAM(s) IV Push every 12 hours  montelukast 10 milliGRAM(s) Oral daily  pantoprazole Infusion 8 mG/Hr (10 mL/Hr) IV Continuous <Continuous>    MEDICATIONS  (PRN):  ALPRAZolam 0.125 milliGRAM(s) Oral daily PRN anxiety  heparin  Injectable 5000 Unit(s) IV Push every 6 hours PRN For aPTT less than 40  ondansetron Injectable 4 milliGRAM(s) IV Push every 6 hours PRN Nausea

## 2018-04-08 NOTE — CHART NOTE - NSCHARTNOTEFT_GEN_A_CORE
Bipap ordered initially on another patient in error. RRT not made aware of BiPAP order or transfer of patient. RRT only aware of order around 5AM. Will put pt on CPAP tonight. Pt resting well at this time and sleeping peacefully. RN made aware of issue.

## 2018-04-08 NOTE — CONSULT NOTE ADULT - ASSESSMENT
84 F w/PMH COPD, CPAP at night, HTN, presents on 4/6/18 with progressively dyspnea over past several weeks.  Found w/ anemia, +melena. Pt also with small PE, L soleal vein thrombosis.     1. GI bleed- GI following. Transfuse to keep Hgb > 8. Will need EGD to assess cause. Trend H/H. Continue PPI. Mgmt as per GI.    2. PE- small RUL PE- Also with L soleal vein thrombosis. Pt currently on IV heparin in setting of GI bleed. Will need IVC filter to prevent worsening of PE and in setting of contraindication to LTA. Suggest hematology consult as well. Keep NPO after MN.     3. Essential hypertension- stable, continue medical mgmt.     4. DVT proph- SCDs. Replete lytes as needed. 84 F w/PMH COPD, CPAP at night, HTN, presents on 4/6/18 with progressively dyspnea over past several weeks.  Found w/ anemia, +melena. Pt also with small PE, L soleal vein thrombosis.     1. GI bleed- GI following. Transfuse to keep Hgb > 8. Will need EGD to assess cause. Trend H/H. Continue PPI. Mgmt as per GI.    2. PE- small RUL PE- Also with L soleal vein thrombosis. Pt currently on IV heparin in setting of GI bleed. Will need IVC filter to prevent worsening of PE and in setting of contraindication to LTA. Suggest hematology consult as well. Keep NPO after MN. Check 2Decho as well.     3. Essential hypertension- stable, continue medical mgmt.     4. DVT proph- SCDs. Replete lytes as needed.

## 2018-04-08 NOTE — PROGRESS NOTE ADULT - SUBJECTIVE AND OBJECTIVE BOX
Subjective:    pat better, R soleal vein DVT & RUL PE, on iv heparin., high wbc due to steroids.    Home Medications:  amLODIPine 5 mg oral tablet: 1 tab(s) orally once a day (2018 17:25)  Anoro Ellipta 62.5 mcg-25 mcg/inh inhalation powder: 1 puff(s) inhaled once a day (2018 17:25)  calcium (as carbonate) 600 mg oral tablet: 2 tab(s) orally once a day (2018 17:25)  hydroCHLOROthiazide 25 mg oral tablet: 1 tab(s) orally once a day (2018 17:25)  losartan 50 mg oral tablet: 1 tab(s) orally once a day (2018 17:25)  montelukast 10 mg oral tablet: 1 tab(s) orally once a day (2018 17:25)  predniSONE 20 mg oral tablet: 1 tab(s) orally once a day    ***COURSE COMPLETED*** (2018 17:25)  ProAir HFA 90 mcg/inh inhalation aerosol: 2 puff(s) inhaled 4 times a day, As Needed (2018 17:25)  Vitamin D3 2000 intl units oral tablet: 1 tab(s) orally once a day (2018 17:25)  Xanax 0.25 mg oral tablet: 0.5 tab(s) orally once a day, As Needed for anxiety  (2018 17:25)    MEDICATIONS  (STANDING):  ALBUTerol/ipratropium for Nebulization 3 milliLiter(s) Nebulizer every 6 hours  amLODIPine   Tablet 5 milliGRAM(s) Oral daily  azithromycin  IVPB 500 milliGRAM(s) IV Intermittent every 24 hours  azithromycin  IVPB      guaiFENesin    Syrup 200 milliGRAM(s) Oral every 6 hours  heparin  Infusion.  Unit(s)/Hr (10 mL/Hr) IV Continuous <Continuous>  hydrochlorothiazide 25 milliGRAM(s) Oral daily  losartan 50 milliGRAM(s) Oral daily  methylPREDNISolone sodium succinate Injectable 40 milliGRAM(s) IV Push every 12 hours  montelukast 10 milliGRAM(s) Oral daily  pantoprazole Infusion 8 mG/Hr (10 mL/Hr) IV Continuous <Continuous>    MEDICATIONS  (PRN):  ALPRAZolam 0.125 milliGRAM(s) Oral daily PRN anxiety  heparin  Injectable 5000 Unit(s) IV Push every 6 hours PRN For aPTT less than 40  ondansetron Injectable 4 milliGRAM(s) IV Push every 6 hours PRN Nausea      Allergies    No Known Allergies    Intolerances        Vital Signs Last 24 Hrs  T(C): 36.4 (2018 05:42), Max: 37.2 (2018 11:55)  T(F): 97.5 (2018 05:42), Max: 99 (2018 11:55)  HR: 53 (2018 05:42) (50 - 88)  BP: 110/55 (2018 05:42) (110/55 - 131/65)  BP(mean): --  RR: 18 (2018 05:42) (18 - 22)  SpO2: 94% (2018 05:42) (85% - 95%)      PHYSICAL EXAMINATION:    NECK:  Supple. No lymphadenopathy. Jugular venous pressure not elevated. Carotids equal.   HEART:   The cardiac impulse has a normal quality. Reg., Nl S1 and S2.  There are no murmurs, rubs or gallops noted  CHEST:  Chest rhonchi to auscultation. Normal respiratory effort.  ABDOMEN:  Soft and nontender.   EXTREMITIES:  There is no edema.       LABS:                        9.3    18.67 )-----------( 351      ( 2018 06:03 )             32.6     04-08    141  |  104  |  25<H>  ----------------------------<  140<H>  4.3   |  31  |  0.79    Ca    9.0      2018 06:03    TPro  6.8  /  Alb  2.9<L>  /  TBili  0.4  /  DBili  x   /  AST  14<L>  /  ALT  20  /  AlkPhos  77  04-06    PT/INR - ( 2018 12:29 )   PT: 11.8 sec;   INR: 1.09 ratio         PTT - ( 2018 02:41 )  PTT:43.3 sec  Urinalysis Basic - ( 2018 16:57 )    Color: Yellow / Appearance: Clear / S.015 / pH: x  Gluc: x / Ketone: Negative  / Bili: Negative / Urobili: Negative mg/dL   Blood: x / Protein: 15 mg/dL / Nitrite: Positive   Leuk Esterase: Small / RBC: 0-2 /HPF / WBC 5-8 /HPF   Sq Epi: x / Non Sq Epi: Occasional / Bacteria: Many    Rapid Respiratory Viral Panel (18 @ 12:15)    Rapid RVP Result: NotDetec: The Film[employer] RVP Rapid uses polymerase chain reaction (PCR) and melt  curve analysis to screen for adenovirus; coronavirus HKU1, NL63, 229E,  OC43; human metapneumovirus (hMPV); human enterovirus/rhinovirus  (Entero/RV); influenza A; influenza A/H1;influenza A/H3; influenza  A/H1-2009; influenza B; parainfluenza viruses 1, 2, 3, 4; respiratory  syncytial virus; Bordetella pertussis; Mycoplasma pneumoniae; and  Chlamydophila pneumoniae.    CT Angio Chest PE Protocol w/ IV Cont (18 @ 16:53) >  IMPRESSION:         Small focal pulmonary embolus in a posterior right upper lobe segmental   vessel.    US Duplex Venous Lower Ext Complete, Bilateral (18 @ 17:25) >  IMPRESSION:     Left soleal vein thrombosis without extension into the popliteal vein.    No sonographic evidence of acute deep venous thrombosis in the   femoropopliteal systems bilaterally.

## 2018-04-08 NOTE — PROGRESS NOTE ADULT - ASSESSMENT
PROBLEMS:    RUL PE & DVT  Acute respiratory failure with hypoxia  acute excerbation of COPD  Symptomatic anemia- s/p transfusion  Gastrointestinal hemorrhage with melena  Essential hypertension    PLAN;    iv heparin  taper IV solumedrols 20mg q12hr  AEROSLS  RVP  SUPPORTIVE CARE  DVT PROPHYLASIX

## 2018-04-08 NOTE — PROGRESS NOTE ADULT - SUBJECTIVE AND OBJECTIVE BOX
Patient is a 84y old  Female who presents with a chief complaint of sob, anemia (06 Apr 2018 17:10)      HPI:  84 F w/PMH COPD, CPAP at night, HTN, has been experiencing progressively dyspnea over past several weeks.  She went to her pulmonologist, CXR was neg, she referred to Cardio.  After work up w/ ST, TTE was not very revealing, she was referred to GI.  She was found on anemic w/ Hg of 8 on cbc, which is not normal for her.  She has noticed some melena 1-2 episodes over the past month.  She denies any hematochezia, hematemesis. She endorses a feeling of fullness after she eats a small amount.  She's also been having pain across her upper abdomen, just below her diaphragm.  She cannot tell me if this pain is related to eating.  She does have a good appetite but just gets full fast.  Gdtr at bedside notes that she doesn't eat as much.  Pt denies any nausea.  Her last colonoscopy was 2 years ago.  She endorsed h/o gastric ulcers.     Currently, pt is dyspneic w/ conversation, she's satting 85% on 3LNC, changed to ventimask, now at 95%.  She is feeling very anxious and requesting xanax- which she takes prn at home 1/2 of 0.25mg dose. (06 Apr 2018 17:10)    No complaints of abdominal pain. Tolerating diet. Main issues is dyspnea.      PAST MEDICAL & SURGICAL HISTORY:  COPD (chronic obstructive pulmonary disease)  Hypertension  Peptic Ulcer  Hyperlipemia  S/P Cataract Extraction: right eye 5/27/10      MEDICATIONS  (STANDING):  ALBUTerol/ipratropium for Nebulization 3 milliLiter(s) Nebulizer every 6 hours  amLODIPine   Tablet 5 milliGRAM(s) Oral daily  azithromycin  IVPB 500 milliGRAM(s) IV Intermittent every 24 hours  azithromycin  IVPB      guaiFENesin    Syrup 200 milliGRAM(s) Oral every 6 hours  heparin  Infusion.  Unit(s)/Hr (10 mL/Hr) IV Continuous <Continuous>  hydrochlorothiazide 25 milliGRAM(s) Oral daily  losartan 50 milliGRAM(s) Oral daily  methylPREDNISolone sodium succinate Injectable 40 milliGRAM(s) IV Push every 12 hours  montelukast 10 milliGRAM(s) Oral daily  pantoprazole Infusion 8 mG/Hr (10 mL/Hr) IV Continuous <Continuous>    MEDICATIONS  (PRN):  ALPRAZolam 0.125 milliGRAM(s) Oral daily PRN anxiety  heparin  Injectable 5000 Unit(s) IV Push every 6 hours PRN For aPTT less than 40  ondansetron Injectable 4 milliGRAM(s) IV Push every 6 hours PRN Nausea      Allergies    No Known Allergies    Intolerances        SOCIAL HISTORY:    FAMILY HISTORY:  No pertinent family history in first degree relatives        Vital Signs Last 24 Hrs  T(C): 36.4 (08 Apr 2018 05:42), Max: 37.2 (07 Apr 2018 11:55)  T(F): 97.5 (08 Apr 2018 05:42), Max: 99 (07 Apr 2018 11:55)  HR: 53 (08 Apr 2018 05:42) (50 - 88)  BP: 110/55 (08 Apr 2018 05:42) (110/55 - 131/65)  BP(mean): --  RR: 18 (08 Apr 2018 05:42) (18 - 22)  SpO2: 94% (08 Apr 2018 05:42) (85% - 95%)    PHYSICAL EXAM:    Constitutional: NAD, well-developed  HEENT: MMM  Neck: No LAD  Respiratory: decreased  Cardiovascular: S1 and S2, RRR, no M/G/R  Gastrointestinal: BS+, soft, NT/ND    LABS:                        9.3    18.67 )-----------( 351      ( 08 Apr 2018 06:03 )             32.6     04-08    141  |  104  |  25<H>  ----------------------------<  140<H>  4.3   |  31  |  0.79    Ca    9.0      08 Apr 2018 06:03    TPro  6.8  /  Alb  2.9<L>  /  TBili  0.4  /  DBili  x   /  AST  14<L>  /  ALT  20  /  AlkPhos  77  04-06    PT/INR - ( 06 Apr 2018 12:29 )   PT: 11.8 sec;   INR: 1.09 ratio         PTT - ( 08 Apr 2018 02:41 )  PTT:43.3 sec  LIVER FUNCTIONS - ( 06 Apr 2018 12:29 )  Alb: 2.9 g/dL / Pro: 6.8 gm/dL / ALK PHOS: 77 U/L / ALT: 20 U/L / AST: 14 U/L / GGT: x             RADIOLOGY & ADDITIONAL STUDIES:

## 2018-04-08 NOTE — CONSULT NOTE ADULT - ASSESSMENT
A 85 y/o female w PMH COPD, HTN/HLD, peptic ulcer presents for progressively worsening dyspnea. She was seen by her pulmonologist and CXR was negative then referred to cardiology. ST & TTE negative, and was referred to GI.  She was found to be anemic w/ hgb of 8, which is not normal for her.  She has noticed some melena 1-2 episodes over the past month. Her last colonoscopy was 2 years ago and was informed of "polyps." She is s/p 2 units pRBC with H/H improvement noted of 9.3/32.6 today. Pending endoscopy but pt in acute respiratory failure, saturating ~85% on 3LNC, and improved with ventimask. GI team would like to stabilize her respiratory status prior to procedure. Pt currently on low dose heparin gtt for new left soleal vein DVT and RUL focal PE found on admission. Will need to transition her to oral AC, possibly with warfarin, after endoscopy and hematology's evaluation.     PLAN:  - c/w current heparin gtt for now since pending IVC filter, endoscopy  - will eventually recommend oral AC with warfarin once cleared from GI and hematology team  - serial CBCs, daily BMP  - encourage ambulation  - venodyne contraindicated in left lower extremity    Thank you for consult, will continue to follow.

## 2018-04-09 LAB
ANION GAP SERPL CALC-SCNC: 7 MMOL/L — SIGNIFICANT CHANGE UP (ref 5–17)
APTT BLD: 167.8 SEC — CRITICAL HIGH (ref 27.5–37.4)
APTT BLD: 65.7 SEC — HIGH (ref 27.5–37.4)
APTT BLD: > 200 SEC (ref 27.5–37.4)
BUN SERPL-MCNC: 35 MG/DL — HIGH (ref 7–23)
CALCIUM SERPL-MCNC: 9 MG/DL — SIGNIFICANT CHANGE UP (ref 8.5–10.1)
CHLORIDE SERPL-SCNC: 103 MMOL/L — SIGNIFICANT CHANGE UP (ref 96–108)
CO2 SERPL-SCNC: 31 MMOL/L — SIGNIFICANT CHANGE UP (ref 22–31)
CREAT SERPL-MCNC: 0.89 MG/DL — SIGNIFICANT CHANGE UP (ref 0.5–1.3)
GLUCOSE SERPL-MCNC: 124 MG/DL — HIGH (ref 70–99)
HCT VFR BLD CALC: 33.6 % — LOW (ref 34.5–45)
HCT VFR BLD CALC: 34.6 % — SIGNIFICANT CHANGE UP (ref 34.5–45)
HCT VFR BLD CALC: 34.8 % — SIGNIFICANT CHANGE UP (ref 34.5–45)
HGB BLD-MCNC: 10 G/DL — LOW (ref 11.5–15.5)
HGB BLD-MCNC: 9.4 G/DL — LOW (ref 11.5–15.5)
HGB BLD-MCNC: 9.9 G/DL — LOW (ref 11.5–15.5)
MCHC RBC-ENTMCNC: 22.8 PG — LOW (ref 27–34)
MCHC RBC-ENTMCNC: 28 GM/DL — LOW (ref 32–36)
MCV RBC AUTO: 81.6 FL — SIGNIFICANT CHANGE UP (ref 80–100)
NRBC # BLD: 0 /100 WBCS — SIGNIFICANT CHANGE UP (ref 0–0)
PLATELET # BLD AUTO: 332 K/UL — SIGNIFICANT CHANGE UP (ref 150–400)
POTASSIUM SERPL-MCNC: 4.2 MMOL/L — SIGNIFICANT CHANGE UP (ref 3.5–5.3)
POTASSIUM SERPL-SCNC: 4.2 MMOL/L — SIGNIFICANT CHANGE UP (ref 3.5–5.3)
RBC # BLD: 4.12 M/UL — SIGNIFICANT CHANGE UP (ref 3.8–5.2)
RBC # FLD: 18.6 % — HIGH (ref 10.3–14.5)
SODIUM SERPL-SCNC: 141 MMOL/L — SIGNIFICANT CHANGE UP (ref 135–145)
T3FREE SERPL-MCNC: 1.98 PG/ML — SIGNIFICANT CHANGE UP (ref 1.8–4.6)
WBC # BLD: 22.5 K/UL — HIGH (ref 3.8–10.5)
WBC # FLD AUTO: 22.5 K/UL — HIGH (ref 3.8–10.5)

## 2018-04-09 PROCEDURE — 74177 CT ABD & PELVIS W/CONTRAST: CPT | Mod: 26

## 2018-04-09 PROCEDURE — 99233 SBSQ HOSP IP/OBS HIGH 50: CPT

## 2018-04-09 PROCEDURE — 93306 TTE W/DOPPLER COMPLETE: CPT | Mod: 26

## 2018-04-09 RX ORDER — MEROPENEM 1 G/30ML
1000 INJECTION INTRAVENOUS EVERY 8 HOURS
Qty: 0 | Refills: 0 | Status: COMPLETED | OUTPATIENT
Start: 2018-04-09 | End: 2018-04-11

## 2018-04-09 RX ORDER — MEROPENEM 1 G/30ML
1000 INJECTION INTRAVENOUS ONCE
Qty: 0 | Refills: 0 | Status: COMPLETED | OUTPATIENT
Start: 2018-04-09 | End: 2018-04-09

## 2018-04-09 RX ORDER — SENNA PLUS 8.6 MG/1
2 TABLET ORAL AT BEDTIME
Qty: 0 | Refills: 0 | Status: DISCONTINUED | OUTPATIENT
Start: 2018-04-09 | End: 2018-04-23

## 2018-04-09 RX ORDER — DOCUSATE SODIUM 100 MG
100 CAPSULE ORAL
Qty: 0 | Refills: 0 | Status: DISCONTINUED | OUTPATIENT
Start: 2018-04-09 | End: 2018-04-23

## 2018-04-09 RX ORDER — MEROPENEM 1 G/30ML
INJECTION INTRAVENOUS
Qty: 0 | Refills: 0 | Status: COMPLETED | OUTPATIENT
Start: 2018-04-09 | End: 2018-04-11

## 2018-04-09 RX ADMIN — Medication 3 MILLILITER(S): at 02:00

## 2018-04-09 RX ADMIN — MEROPENEM 100 MILLIGRAM(S): 1 INJECTION INTRAVENOUS at 22:01

## 2018-04-09 RX ADMIN — Medication 3 MILLILITER(S): at 13:22

## 2018-04-09 RX ADMIN — MEROPENEM 100 MILLIGRAM(S): 1 INJECTION INTRAVENOUS at 13:47

## 2018-04-09 RX ADMIN — Medication 25 MILLIGRAM(S): at 06:35

## 2018-04-09 RX ADMIN — Medication 3 MILLILITER(S): at 20:34

## 2018-04-09 RX ADMIN — MONTELUKAST 10 MILLIGRAM(S): 4 TABLET, CHEWABLE ORAL at 22:01

## 2018-04-09 RX ADMIN — Medication 200 MILLIGRAM(S): at 12:07

## 2018-04-09 RX ADMIN — Medication 110 MILLIGRAM(S): at 12:06

## 2018-04-09 RX ADMIN — Medication 200 MILLIGRAM(S): at 17:02

## 2018-04-09 RX ADMIN — HEPARIN SODIUM 800 UNIT(S)/HR: 5000 INJECTION INTRAVENOUS; SUBCUTANEOUS at 19:30

## 2018-04-09 RX ADMIN — HEPARIN SODIUM 1050 UNIT(S)/HR: 5000 INJECTION INTRAVENOUS; SUBCUTANEOUS at 03:45

## 2018-04-09 RX ADMIN — HEPARIN SODIUM 0 UNIT(S)/HR: 5000 INJECTION INTRAVENOUS; SUBCUTANEOUS at 11:56

## 2018-04-09 RX ADMIN — Medication 100 MILLIGRAM(S): at 17:02

## 2018-04-09 RX ADMIN — AZITHROMYCIN 255 MILLIGRAM(S): 500 TABLET, FILM COATED ORAL at 15:37

## 2018-04-09 RX ADMIN — Medication 200 MILLIGRAM(S): at 06:35

## 2018-04-09 RX ADMIN — HEPARIN SODIUM 0 UNIT(S)/HR: 5000 INJECTION INTRAVENOUS; SUBCUTANEOUS at 02:41

## 2018-04-09 RX ADMIN — Medication 200 MILLIGRAM(S): at 22:01

## 2018-04-09 RX ADMIN — PANTOPRAZOLE SODIUM 10 MG/HR: 20 TABLET, DELAYED RELEASE ORAL at 22:40

## 2018-04-09 RX ADMIN — Medication 20 MILLIGRAM(S): at 12:06

## 2018-04-09 RX ADMIN — HEPARIN SODIUM 800 UNIT(S)/HR: 5000 INJECTION INTRAVENOUS; SUBCUTANEOUS at 11:59

## 2018-04-09 RX ADMIN — AMLODIPINE BESYLATE 5 MILLIGRAM(S): 2.5 TABLET ORAL at 06:35

## 2018-04-09 RX ADMIN — SENNA PLUS 2 TABLET(S): 8.6 TABLET ORAL at 22:01

## 2018-04-09 RX ADMIN — LOSARTAN POTASSIUM 50 MILLIGRAM(S): 100 TABLET, FILM COATED ORAL at 06:35

## 2018-04-09 RX ADMIN — Medication 3 MILLILITER(S): at 08:44

## 2018-04-09 RX ADMIN — Medication 20 MILLIGRAM(S): at 06:34

## 2018-04-09 NOTE — PROGRESS NOTE ADULT - SUBJECTIVE AND OBJECTIVE BOX
Subjective:  Patient is a 84y old  Female who presents with a chief complaint of sob, anemia      HPI:     84 F w/PMH COPD, CPAP at night, HTN, presents on 18 with progressively worse dyspnea over past several weeks.  She went to her pulmonologist, CXR was neg, she referred to Cardio.  After work up w/ ST, TTE was not very revealing, she was referred to GI.  She was found on anemic w/ Hg of 8 on cbc, which is not normal for her.  She has noticed some melena 1-2 episodes over the past month.  She denies any hematochezia, hematemesis. She endorses a feeling of fullness after she eats a small amount.  She's also been having pain across her upper abdomen, just below her diaphragm.  She cannot tell me if this pain is related to eating.  She does have a good appetite but just gets full fast.  Gdtr at bedside notes that she doesn't eat as much.  Pt denies any nausea.  Her last colonoscopy was 2 years ago.  She endorsed h/o gastric ulcers.     In ED  pt is dyspneic w/ conversation, she's satting 85% on 3LNC, changed to ventimask, now at 95%.  She is feeling very anxious and requesting xanax- which she takes prn at home 1/2 of 0.25mg dose.     18 - Patient seen and examined at bedside earlier today, dyspneic with minimal exertion, even talking, but reports slight improvement of the dyspnea   18 - events noted, transferred to tele for o2 monitoring due to desaturation , reports improvement of the dyspnea, cough, 1 BM brown overnight  tele- o2 sats to 85%, episodes of bigemeny    - tele O2 sats on O2 wnl, reports improvement of dyspnea and cough since yesterday, no BM, denies abd pain, feels constipated    Review of system- Rest of the review of system are negative except mentioned in HPI    OBJECTIVE:   T(C): 36.3 (18 @ 09:00), Max: 36.9 (18 @ 17:11)  T(F): 97.4 (18 @ 09:00), Max: 98.5 (18 @ 17:11)  HR: 80 (18 @ 09:00) (69 - 87)  BP: 127/75 (18 @ 09:00) (120/52 - 127/75)  RR: 18 (18 @ 09:00) (18 - 18)  SpO2: 94% (18 @ 09:00) (94% - 95%)  Wt(kg): --  Daily Weight in k.5 (2018 00:09)    PHYSICAL EXAM:  GENERAL: NAD  NERVOUS SYSTEM:  Alert & Oriented X3, non- focal exam,  HEAD:  Atraumatic, Normocephalic  EYES: EOMI, PERRLA, conjunctiva and sclera clear  HEENT: Moist mucous membranes  NECK: Supple, No JVD  CHEST/LUNG: BS decreased bilaterally; No rales, no rhonchi, +  wheezing,  HEART: Regular rate and rhythm; No murmurs, rubs, or gallops  ABDOMEN: Soft, Nontender, Nondistended; Bowel sounds present  GENITOURINARY- Voiding, no suprapubic tenderness  EXTREMITIES:  2+ Peripheral Pulses, No clubbing, cyanosis, or edema  MUSCULOSKELETAL:- No muscle tenderness, Muscle tone normal, No joint tenderness, no Joint swelling, Joint range of motion-normal  SKIN-no rash, no lesion    LABS:      141  |  103  |  35<H>  ----------------------------<  124<H>  4.2   |  31  |  0.89    Ca    9.0      2018 06:22                       10.0   x     )-----------( x        ( 2018 12:19 )             34.6     PTT - ( 2018 09:39 )  PTT:167.8 sec          141  |  104  |  25<H>  ----------------------------<  140<H>  4.3   |  31  |  0.79    Ca    9.0      2018 06:03    TPro  6.8  /  Alb  2.9<L>  /  TBili  0.4  /  DBili  x   /  AST  14<L>  /  ALT  20  /  AlkPhos  77                              9.3    18.67 )-----------( 351      ( 2018 06:03 )             32.6       CARDIAC MARKERS ( 2018 16:07 )  <0.015 ng/mL / x     / x     / x     / x      CARDIAC MARKERS ( 2018 12:29 )  <0.015 ng/mL / x     / x     / x     / x            LIVER FUNCTIONS - ( 2018 12:29 )  Alb: 2.9 g/dL / Pro: 6.8 gm/dL / ALK PHOS: 77 U/L / ALT: 20 U/L / AST: 14 U/L / GGT: x             PT/INR - ( 2018 12:29 )   PT: 11.8 sec;   INR: 1.09 ratio         PTT - ( 2018 09:45 )  PTT:65.3 sec    Urinalysis Basic - ( 2018 16:57 )    Color: Yellow / Appearance: Clear / S.015 / pH: x  Gluc: x / Ketone: Negative  / Bili: Negative / Urobili: Negative mg/dL   Blood: x / Protein: 15 mg/dL / Nitrite: Positive   Leuk Esterase: Small / RBC: 0-2 /HPF / WBC 5-8 /HPF   Sq Epi: x / Non Sq Epi: Occasional / Bacteria: Many                            10.6   10.20 )-----------( 353      ( 2018 07:23 )             35.7     04-07    141  |  104  |  17  ----------------------------<  152<H>  4.0   |  30  |  0.64    Ca    8.8      2018 07:23    TPro  6.8  /  Alb  2.9<L>  /  TBili  0.4  /  DBili  x   /  AST  14<L>  /  ALT  20  /  AlkPhos  77  04-06    PT/INR - ( 2018 12:29 )   PT: 11.8 sec;   INR: 1.09 ratio         PTT - ( 2018 12:29 )  PTT:25.8 sec  CARDIAC MARKERS ( 2018 16:07 )  <0.015 ng/mL / x     / x     / x     / x      CARDIAC MARKERS ( 2018 12:29 )  <0.015 ng/mL / x     / x     / x     / x          Urinalysis Basic - ( 2018 16:57 )    Color: Yellow / Appearance: Clear / S.015 / pH: x  Gluc: x / Ketone: Negative  / Bili: Negative / Urobili: Negative mg/dL   Blood: x / Protein: 15 mg/dL / Nitrite: Positive   Leuk Esterase: Small / RBC: 0-2 /HPF / WBC 5-8 /HPF   Sq Epi: x / Non Sq Epi: Occasional / Bacteria: Many      CAPILLARY BLOOD GLUCOSE    RECENT CULTURES:    RADIOLOGY & ADDITIONAL TESTS:    < from: US Duplex Venous Lower Ext Complete, Bilateral (18 @ 17:25) >  IMPRESSION:     Left soleal vein thrombosis without extension into the popliteal vein.    No sonographic evidence of acute deep venous thrombosis in the   femoropopliteal systems bilaterally.     < end of copied text >  < from: CT Angio Chest PE Protocol w/ IV Cont (18 @ 16:53) >  Lungs, Airways and Pleura: Central tracheobronchial tree is grossly   patent. No endobronchial lesions. Small to mild bilateral pleural   effusions right greater than left with bibasilar compressive atelectasis.   No pneumothorax. No discrete pulmonary nodules. Subsegmental atelectasis   in the lingula.    Heart and Great Vessels: Atherosclerotic changes of the aorta and   coronary vasculature. Study is mildly limited secondary to heterogeneous   opacification of the distal segmental and subsegmental pulmonary vessels.   Focal filling defect within the mid to distal posterior right upper lobe   segmental vessel compatible with a focal pulmonary embolus.. No other   evidence of acute pulmonary embolismwithin the limits of the study.. No   aortic aneurysm. No definite evidence of aortic dissection. Heart is   normal in size. No pericardial effusion.    Mediastinum and Lanette: Pretracheal and right hilar lymph nodes measuring   up to 1.3 cm.    Neck and Chest Wall: within normal limits    Bones: Degenerative changes and osteopenia. Mild to moderate compression   deformities of T6-T8 chronic in appearance. Compression deformity of L1   appears chronic in nature.    Upper Abdomen:  Left lateral midpole renal cyst. Elevation of the right   hemidiaphragm.    IMPRESSION:         Small focal pulmonary embolus in a posterior right upper lobe segmental   vessel.    < end of copied text >    < from: Xray Chest 1 View AP/PA. (18 @ 10:47) >  Heart magnified by projection, unchanged. Atherosclerotic aorta.   Symmetrically hyperinflated lungs. No focal consolidation or pleural   effusion. Old fracture right seventh posterior rib.    Impression: Hyperinflated lungs. No active infiltrates.    < end of copied text >  MEDICATIONS  (STANDING):  ALBUTerol/ipratropium for Nebulization 3 milliLiter(s) Nebulizer every 6 hours  amLODIPine   Tablet 5 milliGRAM(s) Oral daily  azithromycin  IVPB 500 milliGRAM(s) IV Intermittent every 24 hours  azithromycin  IVPB      guaiFENesin    Syrup 200 milliGRAM(s) Oral every 6 hours  heparin  Infusion.  Unit(s)/Hr (10 mL/Hr) IV Continuous <Continuous>  hydrochlorothiazide 25 milliGRAM(s) Oral daily  losartan 50 milliGRAM(s) Oral daily  methylPREDNISolone sodium succinate Injectable 20 milliGRAM(s) IV Push every 12 hours  montelukast 10 milliGRAM(s) Oral daily  pantoprazole Infusion 8 mG/Hr (10 mL/Hr) IV Continuous <Continuous>    MEDICATIONS  (PRN):  ALPRAZolam 0.125 milliGRAM(s) Oral daily PRN anxiety  heparin  Injectable 5000 Unit(s) IV Push every 6 hours PRN For aPTT less than 40  ondansetron Injectable 4 milliGRAM(s) IV Push every 6 hours PRN Nausea

## 2018-04-09 NOTE — CONSULT NOTE ADULT - ASSESSMENT
DVT of soleal vein with small PE  Hx of GI bleed  Tolerating Low range heparin at this point with no evidence of bleeding  I do not think patient will benefit from IVC filter at this point unless she develops GI bleed , and will be unable to tolerate anticoagulation  Recall if needed

## 2018-04-09 NOTE — PROGRESS NOTE ADULT - SUBJECTIVE AND OBJECTIVE BOX
HPI: This is a 83 y/o female w PMH COPD, CPAP at night, HTN, has been experiencing progressively dyspnea over past several weeks.  She went to her pulmonologist, CXR was neg, she referred to Cardio.  After work up w/ ST, TTE was not very revealing, she was referred to GI.  She was found on anemic w/ Hg of 8 on cbc, which is not normal for her.  She has noticed some melena 1-2 episodes over the past month.  She denies any hematochezia, hematemesis. She endorses a feeling of fullness after she eats a small amount.  She's also been having pain across her upper abdomen, just below her diaphragm.  She cannot tell me if this pain is related to eating.  She does have a good appetite but just gets full fast.  Gdtr at bedside notes that she doesn't eat as much.  Pt denies any nausea.  Her last colonoscopy was 2 years ago.  She endorsed h/o gastric ulcers.     Currently, pt is dyspneic w/ conversation, she's satting 85% on 3LNC, changed to ventimask, now at 95%.  She is feeling very anxious and requesting xanax- which she takes prn at home 1/2 of 0.25mg dose. 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 She continues to report of SOB. She denies prior VTE, CVA, MI, no blood clotting disorder. She is a former smoker, has been under stress recently. She reports of being an active caregiver to her  with Parkinson's disease, but now has HHA and decreased her mobility. Informed pt of possible oral anticoagulation with warfarin and she agrees to therapy, but will need to have endoscopy done first. Pending IVC filter placement tomorrow. Discussed with primary RN and no active bleeding noted today. Her H/H today 9.3/32.6 after s/p 2 units pRBC.      Patient is a 84y old  Female who presents with a chief complaint of sob, anemia (06 Apr 2018 17:10)    Consulted by Dr. Sinha for VTE prophylaxis, risk stratification, and anticoagulation management.    PAST MEDICAL & SURGICAL HISTORY:  COPD (chronic obstructive pulmonary disease)  Hypertension  Peptic Ulcer  Hyperlipemia  S/P Cataract Extraction: right eye 5/27/10    BMI: 30.6    CrCL: 69.88    IMPROVE VTE Risk Score: 5    IMPROVE Bleeding Risk Score: 5.5    Falls Risk:   High (  )  Mod (  )  Low ( X )    4/9: Patient seen at bedside with Dr. Kramer- explained will hold off on IVC filter as DVT is in small vein and no obvious bleeding at this time. Remain on UFH. Patient reports some improvement in breathing- but still with RUDD and O2 nc.    FAMILY HISTORY:  No pertinent family history in first degree relatives    Denies any personal or familial history of clotting or bleeding disorders.    Allergies    No Known Allergies    Intolerances    REVIEW OF SYSTEMS    (  )Fever	     (  )Constipation	( X )SOB			(  )Headache	(  )Dysuria  (  )Chills	     (  )Melena	(  )Dyspnea present on exertion    (  )Dizziness                    (  )Polyuria  (  )Nausea	     (  )Hematochezia	(  )Cough		                    (  )Syncope   	(  )Hematuria  (  )Vomiting    (  )Chest Pain	(  )Wheezing		(  )Weakness  (  )Diarrhea     (  )Palpitations	(  )Anorexia		(  )Myalgia    All other review of systems negative: Yes    PHYSICAL EXAM:    Constitutional: Appears Well    Neurological: A& O x 3    Skin: Warm    Respiratory and Thorax: increased effort; Breath sounds: decreased with b/l rhonchi- non-prod moist cough   	  Cardiovascular: S1, S2, regular, NMBR	    Gastrointestinal: BS + x 4Q, nontender	    Genitourinary:  Bladder nondistended, nontender    Musculoskeletal:   General Right:   no muscle/joint tenderness,   normal tone, no joint swelling,   ROM: full	    General Left:   no muscle/joint tenderness,   normal tone, no joint swelling,   ROM: full    Lower extrems:   Right: no calf tenderness              negative spencer's sign               + pedal pulses    Left:   no calf tenderness              negative spencer's sign               + pedal pulses                          9.4    22.50 )-----------( 332      ( 09 Apr 2018 06:22 )             33.6       04-09    141  |  103  |  35<H>  ----------------------------<  124<H>  4.2   |  31  |  0.89    Ca    9.0      09 Apr 2018 06:22        PTT - ( 09 Apr 2018 09:39 )  PTT:167.8 sec                          9.3    18.67 )-----------( 351      ( 08 Apr 2018 06:03 )             32.6       04-08    141  |  104  |  25<H>  ----------------------------<  140<H>  4.3   |  31  |  0.79    Ca    9.0      08 Apr 2018 06:03    TPro  6.8  /  Alb  2.9<L>  /  TBili  0.4  /  DBili  x   /  AST  14<L>  /  ALT  20  /  AlkPhos  77  04-06    PT/INR - ( 06 Apr 2018 12:29 )   PT: 11.8 sec;   INR: 1.09 ratio    PTT - ( 08 Apr 2018 09:45 )  PTT:65.3 sec				    MEDICATIONS  (STANDING):  ALBUTerol/ipratropium for Nebulization 3 milliLiter(s) Nebulizer every 6 hours  amLODIPine   Tablet 5 milliGRAM(s) Oral daily  azithromycin  IVPB 500 milliGRAM(s) IV Intermittent every 24 hours  azithromycin  IVPB      guaiFENesin    Syrup 200 milliGRAM(s) Oral every 6 hours  heparin  Infusion.  Unit(s)/Hr IV Continuous <Continuous>  hydrochlorothiazide 25 milliGRAM(s) Oral daily  losartan 50 milliGRAM(s) Oral daily  methylPREDNISolone sodium succinate Injectable 20 milliGRAM(s) IV Push every 12 hours  montelukast 10 milliGRAM(s) Oral daily  pantoprazole Infusion 8 mG/Hr IV Continuous <Continuous>    Vital Signs Last 24 Hrs  T(C): 36.3 (04-09-18 @ 09:00), Max: 36.9 (04-08-18 @ 17:11)  T(F): 97.4 (04-09-18 @ 09:00), Max: 98.5 (04-08-18 @ 17:11)  HR: 80 (04-09-18 @ 09:00) (69 - 87)  BP: 127/75 (04-09-18 @ 09:00) (120/52 - 127/75)  BP(mean): --  RR: 18 (04-09-18 @ 09:00) (18 - 18)  SpO2: 94% (04-09-18 @ 09:00) (94% - 95%)    IMAGING:  US DPLX LWR EXT VEINS COMPL BI:  04/07/2018    FINDINGS:  RIGHT:  There is normal compressibility of the common femoral, femoral, and popliteal veins.  Visualized posterior tibial veins are within normal limits. Doppler examination shows normal spontaneous and phasic flow. Right popliteal cyst.    LEFT: There is normal compressibility of the common femoral, femoral, and popliteal veins. Thrombosis of the left soleal vein without extension into the popliteal vein. Doppler examination shows normal spontaneous and phasic flow. Left popliteal fossa cyst.    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 No sonographic evidence of acute deep venous thrombosis in the femoropopliteal systems bilaterally.     EXAM:  CT ANGIO CHEST PE PROTOCOL IC:  04/07/2018    IMPRESSION: Small focal pulmonary embolus in a posterior right upper lobe segmental vessel.    DVT Prophylaxis:  LMWH                   (  )  Heparin SQ           (  )  Coumadin             (  )  Xarelto                  (  )  Eliquis                   (  )  Venodynes           (  )  Ambulation          (X )  UFH                       ( X )  Contraindicated  (  )

## 2018-04-09 NOTE — PROGRESS NOTE ADULT - ASSESSMENT
84 F w/PMH COPD, CPAP at night, HTN, presents on 4/6/18 with progressively dyspnea over past several weeks.  Found w/ anemia, +melena  Currently, pt is dyspneic w/ conversation, she's satting 85% on 3LNC, changed to ventimask, now at 95%.  She is feeling very anxious and requesting xanax- which she takes prn at home 1/2 of 0.25mg dose.      Hg 8.1- receiving 2 units PRBC      * Acute respiratory failure with hypoxia,  multifactorial:  * Acute COPD exacerbation  * Sleep apnea   - 85% on 3LNC, now on ventimask at 95%  - monitor sats, titrate O2 to keep sats>90%  - IV steroids--> taper  - add IV azithromycin 5 days  - nebs, mucolytics  - cingulair  - pulmonary consult  - add CPAP qhs and incentive spirometry    * Elevated D-dimers due to   * Left DVT soleal vein  * PE RUL segmental vessel  - CTA - positive for PE  - Doppler LE - positive for DVT  - d/w Dr. Arizmendi due to high risk of GI bleed would benefit from IVC filter , ok to use low range heparin until source of GI bleed established, high range of heparin drip will be to risky  - started on IV low range heparin  - - AC services consult  - Dr. Mendoza consult - r/o hypercoagulability state as o/p, Ct abd to r/o malignancies, coumadin will be better option for AC  - O2 monitoring  -interventional cardiology consult Dr. Kramer for possible IVC filter placement  - no need for IVC filter at this time , but if develops GI bleed than may benefit  - 2 d echo - pending   - Dr. Murguia cardiology consult    *  Symptomatic anemia. , stable  - no active bleeding  - s/p 2 units of PRBC  - H/H q12h--> q6h while on heparin drip  - check iron studies, B12, folate    * Leukocytosis, multifactorial  * ESBL UTI  * Steroids induced  - started on Imepenem by ID  - isolation    * Melena suspected subacute Upper GI bleed  - GI consult  - will benefit form EGD once respiratory status improves  - high risk for endoscopy at this time  - c/w PPI BID--> PPI drip while on heparin drip  - monitor H/H and transfuse as needed.  if Hb <8  - Ct abd/pelv - to r/o malignancies given unprovoked VTE   - GI consult Dr. Vizcarra for further endoscopic evaluation  - d/w Dr. Baker pt stable for endoscopic procedure    *  Essential hypertension.    bp stable,   c/w home meds HCTZ, losartan, amlodipine  monitor and titrate meds as indicated.     * Anxiety  - c/w benzo prn    * Prophylactic measure. Plan: SCDs- no chem d/t GIB.

## 2018-04-09 NOTE — PROGRESS NOTE ADULT - SUBJECTIVE AND OBJECTIVE BOX
Subjective:    pat better, less sob, for GFF today. WBC 22.    Home Medications:  amLODIPine 5 mg oral tablet: 1 tab(s) orally once a day (06 Apr 2018 17:25)  Anoro Ellipta 62.5 mcg-25 mcg/inh inhalation powder: 1 puff(s) inhaled once a day (06 Apr 2018 17:25)  calcium (as carbonate) 600 mg oral tablet: 2 tab(s) orally once a day (06 Apr 2018 17:25)  hydroCHLOROthiazide 25 mg oral tablet: 1 tab(s) orally once a day (06 Apr 2018 17:25)  losartan 50 mg oral tablet: 1 tab(s) orally once a day (06 Apr 2018 17:25)  montelukast 10 mg oral tablet: 1 tab(s) orally once a day (06 Apr 2018 17:25)  predniSONE 20 mg oral tablet: 1 tab(s) orally once a day    ***COURSE COMPLETED*** (06 Apr 2018 17:25)  ProAir HFA 90 mcg/inh inhalation aerosol: 2 puff(s) inhaled 4 times a day, As Needed (06 Apr 2018 17:25)  Vitamin D3 2000 intl units oral tablet: 1 tab(s) orally once a day (06 Apr 2018 17:25)  Xanax 0.25 mg oral tablet: 0.5 tab(s) orally once a day, As Needed for anxiety  (06 Apr 2018 17:25)    MEDICATIONS  (STANDING):  ALBUTerol/ipratropium for Nebulization 3 milliLiter(s) Nebulizer every 6 hours  amLODIPine   Tablet 5 milliGRAM(s) Oral daily  azithromycin  IVPB 500 milliGRAM(s) IV Intermittent every 24 hours  azithromycin  IVPB      guaiFENesin    Syrup 200 milliGRAM(s) Oral every 6 hours  heparin  Infusion.  Unit(s)/Hr (10 mL/Hr) IV Continuous <Continuous>  hydrochlorothiazide 25 milliGRAM(s) Oral daily  losartan 50 milliGRAM(s) Oral daily  methylPREDNISolone sodium succinate Injectable 20 milliGRAM(s) IV Push every 12 hours  montelukast 10 milliGRAM(s) Oral daily  pantoprazole Infusion 8 mG/Hr (10 mL/Hr) IV Continuous <Continuous>  sodium ferric gluconate complex IVPB 125 milliGRAM(s) IV Intermittent daily    MEDICATIONS  (PRN):  ALPRAZolam 0.125 milliGRAM(s) Oral daily PRN anxiety  heparin  Injectable 5000 Unit(s) IV Push every 6 hours PRN For aPTT less than 40  ondansetron Injectable 4 milliGRAM(s) IV Push every 6 hours PRN Nausea      Allergies    No Known Allergies    Intolerances        Vital Signs Last 24 Hrs  T(C): 36.9 (08 Apr 2018 17:11), Max: 36.9 (08 Apr 2018 17:11)  T(F): 98.5 (08 Apr 2018 17:11), Max: 98.5 (08 Apr 2018 17:11)  HR: 72 (09 Apr 2018 02:01) (69 - 92)  BP: 120/52 (08 Apr 2018 17:11) (120/52 - 130/91)  BP(mean): --  RR: 18 (08 Apr 2018 11:22) (18 - 18)  SpO2: 95% (08 Apr 2018 17:11) (95% - 97%)      PHYSICAL EXAMINATION:    NECK:  Supple. No lymphadenopathy. Jugular venous pressure not elevated. Carotids equal.   HEART:   The cardiac impulse has a normal quality. Reg., Nl S1 and S2.  There are no murmurs, rubs or gallops noted  CHEST:  Chest is clear to auscultation. Normal respiratory effort.  ABDOMEN:  Soft and nontender.   EXTREMITIES:  There is no edema.       LABS:                        9.4    22.50 )-----------( 332      ( 09 Apr 2018 06:22 )             33.6     04-09    141  |  103  |  35<H>  ----------------------------<  124<H>  4.2   |  31  |  0.89    Ca    9.0      09 Apr 2018 06:22      PTT - ( 09 Apr 2018 01:00 )  PTT:> 200 sec

## 2018-04-09 NOTE — PROGRESS NOTE ADULT - ASSESSMENT
PROBLEMS:    RUL PE & DVT  Acute respiratory failure with hypoxia  acute excerbation of COPD  Symptomatic anemia- s/p transfusion  Gastrointestinal hemorrhage with melena  Essential hypertension    PLAN;    CT abd & pelvis  iv heparin  taper IV solumedrols 20mg daily  AEROSLS  RVP  SUPPORTIVE CARE  DVT PROPHYLASIX

## 2018-04-09 NOTE — CONSULT NOTE ADULT - SUBJECTIVE AND OBJECTIVE BOX
Patient is a 84y old  Female who presents with a chief complaint of sob, anemia (06 Apr 2018 17:10)      HPI:  84 F w/PMH COPD, CPAP at night, HTN admitted 4/6 with experiencing progressively dyspnea over past several weeks.  She went to her pulmonologist, CXR was neg, she referred to Cardio.  After work up w/ ST, TTE was not very revealing, she was referred to GI.  She was found to be anemic w/ Hg of 8 on cbc, She has noticed some melena 1-2 episodes over the past month.  She endorsed h/o gastric ulcers. She denies any hematochezia, hematemesis. She endorses a feeling of fullness after she eats a small amount.  She's also been having pain across her upper abdomen, just below her diaphragm. Her last colonoscopy was 2 years ago.  Here, noted with hypoxic resp failure with PE on CTA and superficial thrombophlebitis of LLE, started on AC, given IV steroids/inhalers for COPD, noted with positive UA + urinary frequency and ESBL ecoli in urine cx.     PMH: as above    PSH: as above    Meds: per reconciliation sheet, noted below    MEDICATIONS  (STANDING):  ALBUTerol/ipratropium for Nebulization 3 milliLiter(s) Nebulizer every 6 hours  amLODIPine   Tablet 5 milliGRAM(s) Oral daily  azithromycin  IVPB 500 milliGRAM(s) IV Intermittent every 24 hours  azithromycin  IVPB      guaiFENesin    Syrup 200 milliGRAM(s) Oral every 6 hours  heparin  Infusion.  Unit(s)/Hr (10 mL/Hr) IV Continuous <Continuous>  hydrochlorothiazide 25 milliGRAM(s) Oral daily  losartan 50 milliGRAM(s) Oral daily  meropenem  IVPB      meropenem  IVPB 1000 milliGRAM(s) IV Intermittent once  meropenem  IVPB 1000 milliGRAM(s) IV Intermittent every 8 hours  methylPREDNISolone sodium succinate Injectable 20 milliGRAM(s) IV Push daily  montelukast 10 milliGRAM(s) Oral daily  pantoprazole Infusion 8 mG/Hr (10 mL/Hr) IV Continuous <Continuous>  sodium ferric gluconate complex IVPB 125 milliGRAM(s) IV Intermittent daily      Allergies    No Known Allergies    Intolerances      Social: no smoking, no alcohol, no illegal drugs; no recent travel, no exposure to TB    Family history:  No pertinent family history in first degree relatives      ROS: the patient denies fever, no chills, no HA, no dizziness, no sore throat, no blurry vision, no CP, no palpitations, no abdominal pain, no diarrhea, no N/V, no leg pain, no claudication, no rash, no joint aches, no rectal pain or bleeding, no night sweats    Vital Signs Last 24 Hrs  T(C): 36.3 (09 Apr 2018 09:00), Max: 36.9 (08 Apr 2018 17:11)  T(F): 97.4 (09 Apr 2018 09:00), Max: 98.5 (08 Apr 2018 17:11)  HR: 80 (09 Apr 2018 09:00) (69 - 87)  BP: 127/75 (09 Apr 2018 09:00) (120/52 - 127/75)  BP(mean): --  RR: 18 (09 Apr 2018 09:00) (18 - 18)  SpO2: 94% (09 Apr 2018 09:00) (94% - 95%)      PE:  Constitutional: frail looking  HEENT: NC/AT, EOMI, PERRLA  Neck: supple  Back: no tenderness  Respiratory: decreased breath sounds  Cardiovascular: S1S2 regular, no murmurs  Abdomen: soft, not tender, not distended, positive BS  Genitourinary: deferred  Rectal: deferred  Musculoskeletal: no muscle tenderness, no joint swelling or tenderness  Extremities: no pedal edema  Neurological: no focal deficits  Skin: no rashes    Labs:                        10.0   x     )-----------( x        ( 09 Apr 2018 12:19 )             34.6     04-09    141  |  103  |  35<H>  ----------------------------<  124<H>  4.2   |  31  |  0.89    Ca    9.0      09 Apr 2018 06:22       Culture - Urine (04.06.18 @ 16:57)    -  Amikacin: S <=8    -  Amoxicillin/Clavulanic Acid: S <=8/4    -  Ampicillin: S <=2 These ampicillin results predict results for amoxicillin    -  Ampicillin/Sulbactam: S <=4/2    -  Aztreonam: S 8    -  Cefazolin: S <=2 This predicts results for oral agents cefaclor, cefdinir, cefpodoxime, cefprozil, cefuroxime axetil, cephalexin and locarbef for uncomplicated UTI. Note that some isolates may be susceptible to these agents while testing resistant to cefazolin.    -  Cefepime: I 16    -  Cefoxitin: S <=4    -  Ceftriaxone: I 2 Enterobacter, Citrobacter, and Serratia may develop resistance during prolonged therapy    -  Ciprofloxacin: S <=0.5    -  Ertapenem: S <=0.5    -  Gentamicin: I 8    -  Imipenem: S <=1    -  Levofloxacin: S <=1    -  Meropenem: S <=1    -  Nitrofurantoin: R >64 Should not be used to treat pyelonephritis    -  Piperacillin/Tazobactam: S <=8    -  Tigecycline: S <=1    -  Tobramycin: I 8    -  Trimethoprim/Sulfamethoxazole: S <=0.5/9.5    Specimen Source: .Urine Clean Catch (Midstream)    Culture Results:   >100,000 CFU/ml Escherichia coli    Organism Identification: Escherichia coli    Organism: Escherichia coli    Method Type: BHUPINDER              Radiology:  < from: CT Angio Chest PE Protocol w/ IV Cont (04.07.18 @ 16:53) >    EXAM:  CT ANGIO CHEST PE PROTOCOL IC                            PROCEDURE DATE:  04/07/2018          INTERPRETATION:  Exam Date: 4/7/2018 4:53 PM  Clinical Information: Elevated d-dimer, hypoxemia  Technique:       CT angiography of the chest was performed with axial   images obtained from the thoracic to the inlet to the bilateral adrenal   glands following administration of IV contrast      100 ml of Omnipaque   350 was injected intravenously. Maximum intensity projection images were   obtained.  Comparison:  5/2/2014    FINDINGS:    Lungs, Airways and Pleura: Central tracheobronchial tree is grossly   patent. No endobronchial lesions. Small to mild bilateral pleural   effusions right greater than left with bibasilar compressive atelectasis.   No pneumothorax. No discrete pulmonary nodules. Subsegmental atelectasis   in the lingula.    Heart and Great Vessels: Atherosclerotic changes of the aorta and   coronary vasculature. Study is mildly limited secondary to heterogeneous   opacification of the distal segmental and subsegmental pulmonary vessels.   Focal filling defect within the mid to distal posterior right upper lobe   segmental vessel compatible with a focal pulmonary embolus.. No other   evidence of acute pulmonary embolismwithin the limits of the study.. No   aortic aneurysm. No definite evidence of aortic dissection. Heart is   normal in size. No pericardial effusion.    Mediastinum and Lanette: Pretracheal and right hilar lymph nodes measuring   up to 1.3 cm.    Neck and Chest Wall: within normal limits    Bones: Degenerative changes and osteopenia. Mild to moderate compression   deformities of T6-T8 chronic in appearance. Compression deformity of L1   appears chronic in nature.    Upper Abdomen:  Left lateral midpole renal cyst. Elevation of the right   hemidiaphragm.    IMPRESSION:         Small focal pulmonary embolus in a posterior right upper lobe segmental   vessel.      < end of copied text >      < from: US Duplex Venous Lower Ext Complete, Bilateral (04.07.18 @ 17:25) >    EXAM:  US DPLX LWR EXT VEINS COMPL BI                            PROCEDURE DATE:  04/07/2018          INTERPRETATION:  Exam Date: 4/7/2018 5:25 PM  Indication: Elevated d-dimer, history of DVT   Comparison: None    COMMENT:    TECHNIQUE: Real-time duplex sonography of the deep veins of both lower   extremities with color and spectral Doppler, with and without   compression.   Image quality is good.    FINDINGS:    RIGHT:  There is normal compressibility of the common femoral, femoral,   and popliteal veins.  Visualized posterior tibial veins are within normal   limits.    Doppler examination shows normal spontaneous and phasic flow.    Right popliteal cyst.    LEFT:  There is normal compressibility of the common femoral, femoral,   and popliteal veins. Thrombosis of the left soleal vein without extension   into the popliteal vein.    Doppler examination shows normal spontaneous and phasic flow.    Left popliteal fossa cyst.    IMPRESSION:     Left soleal vein thrombosis without extension into the popliteal vein.    No sonographic evidence of acute deep venous thrombosis in the   femoropopliteal systems bilaterally.         Advanced directives addressed: full resuscitation

## 2018-04-09 NOTE — PROGRESS NOTE ADULT - ASSESSMENT
84 F w/PMH COPD, CPAP at night, HTN, presents on 4/6/18 with progressively dyspnea over past several weeks.  Found w/ anemia, +melena. Pt also with small PE, L soleal vein thrombosis.     1. GI bleed- GI following. Transfuse to keep Hgb > 8. Will need EGD to assess cause. Trend H/H- stable today. Continue PPI. Mgmt as per GI.    2. PE- small RUL PE- Also with L soleal vein thrombosis. Pt currently on IV heparin in setting of GI bleed. Will need LTA with coumadin as well. IVC filter pending today to prevent worsening of PE and in setting of contraindication to LTA. Heme following and agrees. Keep NPO after MN. Check 2Decho as well. Hypercoagulable w/u pending.      3. Essential hypertension- stable, continue medical mgmt.     4. DVT proph- SCDs. Replete lytes as needed.

## 2018-04-09 NOTE — PROGRESS NOTE ADULT - SUBJECTIVE AND OBJECTIVE BOX
Cardiology Progress Note    HPI: 84 F w/PMH COPD, CPAP at night, HTN, has been experiencing progressively dyspnea over past several weeks.  She went to her pulmonologist, CXR was neg, she referred to Cardio.  After work up w/ ST, TTE was not very revealing, she was referred to GI.  She was found on anemic w/ Hg of 8 on cbc, which is not normal for her.  She has noticed some melena 1-2 episodes over the past month.  She denies any hematochezia, hematemesis. She endorses a feeling of fullness after she eats a small amount.  She's also been having pain across her upper abdomen, just below her diaphragm.  She cannot tell me if this pain is related to eating.  She does have a good appetite but just gets full fast.  Gdtr at bedside notes that she doesn't eat as much.  Pt denies any nausea.  Her last colonoscopy was 2 years ago.  She endorsed h/o gastric ulcers. Pt is dyspneic w/ conversation, she's satting 85% on 3LNC, changed to ventimask, now at 95%.  She is feeling very anxious and requesting xanax- which she takes prn at home 1/2 of 0.25mg dose. (2018 17:10)    - No CP. +SOB. SR on tele. Feels tired. No events last pm.     - Feels slightly better today. +SOB, no CP. Awaiting IVC filter today.     PAST MEDICAL & SURGICAL HISTORY:  COPD (chronic obstructive pulmonary disease)  Hypertension  Peptic Ulcer  Hyperlipemia  S/P Cataract Extraction: right eye 5/27/10    Allergies  No Known Allergies    SOCIAL HISTORY: Denies tobacco, etoh abuse or illicit drug use    FAMILY HISTORY: No pertinent family history in first degree relatives      MEDICATIONS  (STANDING):  ALBUTerol/ipratropium for Nebulization 3 milliLiter(s) Nebulizer every 6 hours  amLODIPine   Tablet 5 milliGRAM(s) Oral daily  azithromycin  IVPB 500 milliGRAM(s) IV Intermittent every 24 hours  azithromycin  IVPB      guaiFENesin    Syrup 200 milliGRAM(s) Oral every 6 hours  heparin  Infusion.  Unit(s)/Hr (10 mL/Hr) IV Continuous <Continuous>  hydrochlorothiazide 25 milliGRAM(s) Oral daily  losartan 50 milliGRAM(s) Oral daily  methylPREDNISolone sodium succinate Injectable 40 milliGRAM(s) IV Push every 12 hours  montelukast 10 milliGRAM(s) Oral daily  pantoprazole Infusion 8 mG/Hr (10 mL/Hr) IV Continuous <Continuous>    MEDICATIONS  (PRN):  ALPRAZolam 0.125 milliGRAM(s) Oral daily PRN anxiety  heparin  Injectable 5000 Unit(s) IV Push every 6 hours PRN For aPTT less than 40  ondansetron Injectable 4 milliGRAM(s) IV Push every 6 hours PRN Nausea      Vital Signs Last 24 Hrs  T(C): 36.4 (2018 05:42), Max: 37.2 (2018 11:55)  T(F): 97.5 (2018 05:42), Max: 99 (2018 11:55)  HR: 53 (2018 05:42) (50 - 88)  BP: 110/55 (2018 05:42) (110/55 - 131/65)  BP(mean): --  RR: 18 (2018 05:42) (18 - 22)  SpO2: 94% (2018 05:42) (85% - 95%)    REVIEW OF SYSTEMS:    CONSTITUTIONAL:  As per HPI.  HEENT:  Eyes:  No diplopia or blurred vision. ENT:  No earache, sore throat or runny nose.  CARDIOVASCULAR:  No pressure, squeezing, strangling, tightness, heaviness or aching about the chest, neck, axilla or epigastrium.  RESPIRATORY:  No cough, shortness of breath, PND or orthopnea.  GASTROINTESTINAL:  No nausea, vomiting or diarrhea.  GENITOURINARY:  No dysuria, frequency or urgency.  MUSCULOSKELETAL:  As per HPI.  SKIN:  No change in skin, hair or nails.  NEUROLOGIC:  No paresthesias, fasciculations, seizures or weakness.    PHYSICAL EXAMINATION:    GENERAL APPEARANCE:  Pt. is not currently dyspneic, in no distress. Pt. is alert, oriented, and pleasant.  HEENT:  Pupils are normal and react normally. No icterus. Mucous membranes well colored.  NECK:  Supple. No lymphadenopathy. Jugular venous pressure not elevated. Carotids equal.   HEART:   The cardiac impulse has a normal quality. There are no murmurs, rubs or gallops noted  CHEST:  Chest is clear to auscultation. Normal respiratory effort.  ABDOMEN:  Soft and nontender.   EXTREMITIES:  There is no edema.     I&O's Summary      LABS:                        9.3    18.67 )-----------( 351      ( 2018 06:03 )             32.6     04-    141  |  104  |  25<H>  ----------------------------<  140<H>  4.3   |  31  |  0.79    Ca    9.0      2018 06:03    TPro  6.8  /  Alb  2.9<L>  /  TBili  0.4  /  DBili  x   /  AST  14<L>  /  ALT  20  /  AlkPhos  77  04-06    LIVER FUNCTIONS - ( 2018 12:29 )  Alb: 2.9 g/dL / Pro: 6.8 gm/dL / ALK PHOS: 77 U/L / ALT: 20 U/L / AST: 14 U/L / GGT: x           PT/INR - ( 2018 12:29 )   PT: 11.8 sec;   INR: 1.09 ratio         PTT - ( 2018 02:41 )  PTT:43.3 sec  CARDIAC MARKERS ( 2018 16:07 )  <0.015 ng/mL / x     / x     / x     / x      CARDIAC MARKERS ( 2018 12:29 )  <0.015 ng/mL / x     / x     / x     / x        Urinalysis Basic - ( 2018 16:57 )    Color: Yellow / Appearance: Clear / S.015 / pH: x  Gluc: x / Ketone: Negative  / Bili: Negative / Urobili: Negative mg/dL   Blood: x / Protein: 15 mg/dL / Nitrite: Positive   Leuk Esterase: Small / RBC: 0-2 /HPF / WBC 5-8 /HPF   Sq Epi: x / Non Sq Epi: Occasional / Bacteria: Many    EKG: < from: 12 Lead ECG (18 @ 09:44) >  Sinus rhythm with Premature supraventricular complexes and with occasional Premature ventricular complexes  Possible Left atrial enlargement  Nonspecific ST abnormality  Abnormal ECG    TELEMETRY: SR    CARDIAC TESTS: pending    RADIOLOGY & ADDITIONAL STUDIES: < from: US Duplex Venous Lower Ext Complete, Bilateral (18 @ 17:25) >    Left soleal vein thrombosis without extension into the popliteal vein.    No sonographic evidence of acute deep venous thrombosis in the   femoropopliteal systems bilaterally.     < from: CT Angio Chest PE Protocol w/ IV Cont (18 @ 16:53) >    Small focal pulmonary embolus in a posterior right upper lobe segmental   vessel.        ASSESSMENT & PLAN:

## 2018-04-09 NOTE — CONSULT NOTE ADULT - SUBJECTIVE AND OBJECTIVE BOX
Patient is a 84y old  Female who presents with a chief complaint of sob, anemia (06 Apr 2018 17:10)      HPI:  84 F w/PMH COPD, CPAP at night, HTN, has been experiencing progressively dyspnea over past several weeks.  She went to her pulmonologist, CXR was neg, she referred to Cardio.  After work up w/ ST, TTE was not very revealing, she was referred to GI.  She was found on anemic w/ Hg of 8 on cbc, which is not normal for her.  She has noticed some melena 1-2 episodes over the past month.  She denies any hematochezia, hematemesis. She endorses a feeling of fullness after she eats a small amount.  She's also been having pain across her upper abdomen, just below her diaphragm.  She cannot tell me if this pain is related to eating.  She does have a good appetite but just gets full fast.  Gdtr at bedside notes that she doesn't eat as much.  Pt denies any nausea.  Her last colonoscopy was 2 years ago.  She endorsed h/o gastric ulcers.     Currently, pt is dyspneic w/ conversation, she's satting 85% on 3LNC, changed to ventimask, now at 95%.  She is feeling very anxious and requesting xanax- which she takes prn at home 1/2 of 0.25mg dose. (06 Apr 2018 17:10)  Called to evaluate patient for IVC filter placement, at present still tolerating the low dose heparin with no evidence of active bleeding      PAST MEDICAL & SURGICAL HISTORY:  COPD (chronic obstructive pulmonary disease)  Hypertension  Peptic Ulcer  Hyperlipemia  S/P Cataract Extraction: right eye 5/27/10      HPI:                PREVIOUS DIAGNOSTIC TESTING:      ECHO  FINDINGS:    STRESS  FINDINGS:    CATHETERIZATION  FINDINGS:    MEDICATIONS  (STANDING):  ALBUTerol/ipratropium for Nebulization 3 milliLiter(s) Nebulizer every 6 hours  amLODIPine   Tablet 5 milliGRAM(s) Oral daily  azithromycin  IVPB 500 milliGRAM(s) IV Intermittent every 24 hours  azithromycin  IVPB      guaiFENesin    Syrup 200 milliGRAM(s) Oral every 6 hours  heparin  Infusion.  Unit(s)/Hr (10 mL/Hr) IV Continuous <Continuous>  hydrochlorothiazide 25 milliGRAM(s) Oral daily  losartan 50 milliGRAM(s) Oral daily  methylPREDNISolone sodium succinate Injectable 20 milliGRAM(s) IV Push daily  montelukast 10 milliGRAM(s) Oral daily  pantoprazole Infusion 8 mG/Hr (10 mL/Hr) IV Continuous <Continuous>  sodium ferric gluconate complex IVPB 125 milliGRAM(s) IV Intermittent daily    MEDICATIONS  (PRN):  ALPRAZolam 0.125 milliGRAM(s) Oral daily PRN anxiety  heparin  Injectable 5000 Unit(s) IV Push every 6 hours PRN For aPTT less than 40  ondansetron Injectable 4 milliGRAM(s) IV Push every 6 hours PRN Nausea      FAMILY HISTORY:  No pertinent family history in first degree relatives              Vital Signs Last 24 Hrs  T(C): 36.3 (09 Apr 2018 09:00), Max: 36.9 (08 Apr 2018 17:11)  T(F): 97.4 (09 Apr 2018 09:00), Max: 98.5 (08 Apr 2018 17:11)  HR: 80 (09 Apr 2018 09:00) (69 - 87)  BP: 127/75 (09 Apr 2018 09:00) (120/52 - 127/75)  BP(mean): --  RR: 18 (09 Apr 2018 09:00) (18 - 18)  SpO2: 94% (09 Apr 2018 09:00) (94% - 95%)    PHYSICAL EXAM-        Neck: The patient's neck is supple without enlargement, has no palpable thyromegaly nor thyroid nodules and has no jugular venous distention. No audible carotid bruits. There are strong carotid pulses bilaterally. No JVD.     Cardiovascular: Regular rate and rhythm without S3, S4. No murmurs or rubs are appreciated.      Respiratory: The patient has scattered rhonchi.     Abdomen: Soft, nontender, nondistended with positive bowel sounds.      Extremity: No tenderness. There is no pitting edema, skin discoloration, clubbing and cyanosis.           INTERPRETATION OF TELEMETRY:    ECG:    I&O's Detail      LABS:                        10.0   x     )-----------( x        ( 09 Apr 2018 12:19 )             34.6     04-09    141  |  103  |  35<H>  ----------------------------<  124<H>  4.2   |  31  |  0.89    Ca    9.0      09 Apr 2018 06:22          PTT - ( 09 Apr 2018 09:39 )  PTT:167.8 sec    I&O's Summary    BNP  RADIOLOGY & ADDITIONAL STUDIES:

## 2018-04-09 NOTE — PROGRESS NOTE ADULT - ASSESSMENT
* Acute COPD exacerbation  Steroids and Abx    RUL PE  -Cont anticoag    *  Symptomatic ángel deficiencay anemia. , stable  - no active bleeding  - s/p 2 units of PRBC  - Favor EGD/colonoscopy preferably this week once respiratory status can tolerate    * Leukocytosis, multifactorial  * ESBL UTI - meropenem per ID    *  Essential hypertension.    bp stable,   c/w home meds HCTZ, losartan, amlodipine  monitor and titrate meds as indicated.     * Anxiety  - c/w benzo prn

## 2018-04-09 NOTE — PROGRESS NOTE ADULT - ASSESSMENT
A 83 y/o female w PMH COPD, HTN/HLD, peptic ulcer presents for progressively worsening dyspnea. She was seen by her pulmonologist and CXR was negative then referred to cardiology. ST & TTE negative, and was referred to GI.  She was found to be anemic w/ hgb of 8, which is not normal for her.  She has noticed some melena 1-2 episodes over the past month. Her last colonoscopy was 2 years ago and was informed of "polyps." She is s/p 2 units pRBC with H/H improvement noted of 9.3/32.6 today. Pending endoscopy but pt in acute respiratory failure, saturating ~85% on 3LNC, and improved with ventimask. GI team would like to stabilize her respiratory status prior to procedure. Pt currently on low dose heparin gtt for new left soleal vein DVT and RUL focal PE found on admission. Will need to transition her to oral AC, possibly with warfarin, after endoscopy and hematology's evaluation.     4/9: Per vascular and med- will hold off on IVC filter for now. Awaiting respiratory status to improve for endo to r/u GI bleed. HH holding at this time. No active bleeding as of now- awaiting endoscopy. Discussed Coumadin and bridging with patient. Educated on lovenox as well. She reports being able to self inject if the occasion does arise. Will start coumadin when cleared by GI.     PLAN:  ::Continue UFH, titrate per aPTT  ::CBC/BMP  ::Monitor for s/s bleeding  ::Encourage ambulation  ::Venodyne contraindicated in left lower extremity    Will continue to follow.

## 2018-04-09 NOTE — CONSULT NOTE ADULT - ASSESSMENT
84 F w/PMH COPD, CPAP at night, HTN admitted 4/6 with experiencing progressively dyspnea over past several weeks.  She went to her pulmonologist, CXR was neg, she referred to Cardio.  After work up w/ ST, TTE was not very revealing, she was referred to GI.  She was found to be anemic w/ Hg of 8 on cbc, She has noticed some melena 1-2 episodes over the past month.  She endorsed h/o gastric ulcers. She denies any hematochezia, hematemesis. She endorses a feeling of fullness after she eats a small amount.  She's also been having pain across her upper abdomen, just below her diaphragm. Her last colonoscopy was 2 years ago.  Here, noted with hypoxic resp failure with PE on CTA and superficial thrombophlebitis of LLE, started on AC, given IV steroids/inhalers for COPD, noted with positive UA + urinary frequency and ESBL ecoli in urine cx.     1. ESBL Ecoli UTI  - urine cx reviewed  - start meropenem 1gmq8h   - on ac for PE  - on IV steroids/inhalers for COPD  - s/p 3 days of azithromycin, d/c  - f/u cbc  - pulm/GI eval noted  - monitor temps  -tolerating abx well so far; no side effects noted  -reason for abx use and side effects reviewed with patient  - supportive care    2. other issues - COPD/PE/anemia - care per medicine

## 2018-04-09 NOTE — PROGRESS NOTE ADULT - SUBJECTIVE AND OBJECTIVE BOX
Patient is a 84y old  Female who presents with a chief complaint of sob, anemia (06 Apr 2018 17:10)      Subective:  Dyspnea improving. No overt rectal bleeding  Reports had colonoscopy and/or virtual a year or two ago with Dr. Taylor. No recent EGD per her recollection.    PAST MEDICAL & SURGICAL HISTORY:  COPD (chronic obstructive pulmonary disease)  Hypertension  Peptic Ulcer  Hyperlipemia  S/P Cataract Extraction: right eye 5/27/10      MEDICATIONS  (STANDING):  ALBUTerol/ipratropium for Nebulization 3 milliLiter(s) Nebulizer every 6 hours  amLODIPine   Tablet 5 milliGRAM(s) Oral daily  docusate sodium 100 milliGRAM(s) Oral two times a day  guaiFENesin    Syrup 200 milliGRAM(s) Oral every 6 hours  heparin  Infusion.  Unit(s)/Hr (10 mL/Hr) IV Continuous <Continuous>  hydrochlorothiazide 25 milliGRAM(s) Oral daily  losartan 50 milliGRAM(s) Oral daily  meropenem  IVPB      meropenem  IVPB 1000 milliGRAM(s) IV Intermittent every 8 hours  methylPREDNISolone sodium succinate Injectable 20 milliGRAM(s) IV Push daily  montelukast 10 milliGRAM(s) Oral daily  pantoprazole Infusion 8 mG/Hr (10 mL/Hr) IV Continuous <Continuous>  senna 2 Tablet(s) Oral at bedtime  sodium ferric gluconate complex IVPB 125 milliGRAM(s) IV Intermittent daily    MEDICATIONS  (PRN):  ALPRAZolam 0.125 milliGRAM(s) Oral daily PRN anxiety  heparin  Injectable 5000 Unit(s) IV Push every 6 hours PRN For aPTT less than 40  ondansetron Injectable 4 milliGRAM(s) IV Push every 6 hours PRN Nausea      REVIEW OF SYSTEMS:    RESPIRATORY: Dyspnea  CARDIOVASCULAR: No chest pain  All other review of systems is negative unless indicated above.    Vital Signs Last 24 Hrs  T(C): 36.3 (09 Apr 2018 09:00), Max: 36.3 (09 Apr 2018 09:00)  T(F): 97.4 (09 Apr 2018 09:00), Max: 97.4 (09 Apr 2018 09:00)  HR: 72 (09 Apr 2018 13:25) (70 - 87)  BP: 127/75 (09 Apr 2018 09:00) (127/75 - 127/75)  BP(mean): --  RR: 18 (09 Apr 2018 09:00) (18 - 18)  SpO2: 94% (09 Apr 2018 09:00) (94% - 94%)    PHYSICAL EXAM:    Constitutional: NAD, well-developed  Respiratory: CTAB  Cardiovascular: S1 and S2, RRR  Gastrointestinal: BS+, soft, NT/ND  Extremities: No peripheral edema  Psychiatric: Normal mood, normal affect    LABS:                        10.0   x     )-----------( x        ( 09 Apr 2018 12:19 )             34.6     04-09    141  |  103  |  35<H>  ----------------------------<  124<H>  4.2   |  31  |  0.89    Ca    9.0      09 Apr 2018 06:22      PTT - ( 09 Apr 2018 09:39 )  PTT:167.8 sec      RADIOLOGY & ADDITIONAL STUDIES:

## 2018-04-10 LAB
ANION GAP SERPL CALC-SCNC: 4 MMOL/L — LOW (ref 5–17)
APTT BLD: 106.9 SEC — HIGH (ref 27.5–37.4)
APTT BLD: 50.4 SEC — HIGH (ref 27.5–37.4)
APTT BLD: 59.6 SEC — HIGH (ref 27.5–37.4)
BUN SERPL-MCNC: 29 MG/DL — HIGH (ref 7–23)
CALCIUM SERPL-MCNC: 8.8 MG/DL — SIGNIFICANT CHANGE UP (ref 8.5–10.1)
CHLORIDE SERPL-SCNC: 102 MMOL/L — SIGNIFICANT CHANGE UP (ref 96–108)
CO2 SERPL-SCNC: 33 MMOL/L — HIGH (ref 22–31)
CREAT SERPL-MCNC: 0.74 MG/DL — SIGNIFICANT CHANGE UP (ref 0.5–1.3)
GLUCOSE SERPL-MCNC: 77 MG/DL — SIGNIFICANT CHANGE UP (ref 70–99)
HCT VFR BLD CALC: 32.7 % — LOW (ref 34.5–45)
HGB BLD-MCNC: 9.5 G/DL — LOW (ref 11.5–15.5)
MCHC RBC-ENTMCNC: 23.2 PG — LOW (ref 27–34)
MCHC RBC-ENTMCNC: 29.1 GM/DL — LOW (ref 32–36)
MCV RBC AUTO: 79.8 FL — LOW (ref 80–100)
NRBC # BLD: 0 /100 WBCS — SIGNIFICANT CHANGE UP (ref 0–0)
PLATELET # BLD AUTO: 310 K/UL — SIGNIFICANT CHANGE UP (ref 150–400)
POTASSIUM SERPL-MCNC: 4.3 MMOL/L — SIGNIFICANT CHANGE UP (ref 3.5–5.3)
POTASSIUM SERPL-SCNC: 4.3 MMOL/L — SIGNIFICANT CHANGE UP (ref 3.5–5.3)
RBC # BLD: 4.1 M/UL — SIGNIFICANT CHANGE UP (ref 3.8–5.2)
RBC # FLD: 18.7 % — HIGH (ref 10.3–14.5)
SODIUM SERPL-SCNC: 139 MMOL/L — SIGNIFICANT CHANGE UP (ref 135–145)
WBC # BLD: 15.5 K/UL — HIGH (ref 3.8–10.5)
WBC # FLD AUTO: 15.5 K/UL — HIGH (ref 3.8–10.5)

## 2018-04-10 PROCEDURE — 99233 SBSQ HOSP IP/OBS HIGH 50: CPT

## 2018-04-10 RX ADMIN — Medication 3 MILLILITER(S): at 20:16

## 2018-04-10 RX ADMIN — Medication 25 MILLIGRAM(S): at 05:49

## 2018-04-10 RX ADMIN — MONTELUKAST 10 MILLIGRAM(S): 4 TABLET, CHEWABLE ORAL at 11:29

## 2018-04-10 RX ADMIN — Medication 110 MILLIGRAM(S): at 11:29

## 2018-04-10 RX ADMIN — HEPARIN SODIUM 550 UNIT(S)/HR: 5000 INJECTION INTRAVENOUS; SUBCUTANEOUS at 04:09

## 2018-04-10 RX ADMIN — MEROPENEM 100 MILLIGRAM(S): 1 INJECTION INTRAVENOUS at 14:23

## 2018-04-10 RX ADMIN — MEROPENEM 100 MILLIGRAM(S): 1 INJECTION INTRAVENOUS at 22:13

## 2018-04-10 RX ADMIN — HEPARIN SODIUM 700 UNIT(S)/HR: 5000 INJECTION INTRAVENOUS; SUBCUTANEOUS at 17:59

## 2018-04-10 RX ADMIN — LOSARTAN POTASSIUM 50 MILLIGRAM(S): 100 TABLET, FILM COATED ORAL at 05:49

## 2018-04-10 RX ADMIN — Medication 200 MILLIGRAM(S): at 05:48

## 2018-04-10 RX ADMIN — MEROPENEM 100 MILLIGRAM(S): 1 INJECTION INTRAVENOUS at 05:48

## 2018-04-10 RX ADMIN — SENNA PLUS 2 TABLET(S): 8.6 TABLET ORAL at 22:14

## 2018-04-10 RX ADMIN — HEPARIN SODIUM 0 UNIT(S)/HR: 5000 INJECTION INTRAVENOUS; SUBCUTANEOUS at 03:02

## 2018-04-10 RX ADMIN — Medication 20 MILLIGRAM(S): at 05:49

## 2018-04-10 RX ADMIN — Medication 200 MILLIGRAM(S): at 17:14

## 2018-04-10 RX ADMIN — Medication 200 MILLIGRAM(S): at 11:29

## 2018-04-10 RX ADMIN — Medication 0.12 MILLIGRAM(S): at 22:20

## 2018-04-10 RX ADMIN — AMLODIPINE BESYLATE 5 MILLIGRAM(S): 2.5 TABLET ORAL at 05:49

## 2018-04-10 RX ADMIN — Medication 20 MILLIGRAM(S): at 22:13

## 2018-04-10 RX ADMIN — Medication 3 MILLILITER(S): at 03:36

## 2018-04-10 RX ADMIN — HEPARIN SODIUM 550 UNIT(S)/HR: 5000 INJECTION INTRAVENOUS; SUBCUTANEOUS at 10:31

## 2018-04-10 RX ADMIN — Medication 100 MILLIGRAM(S): at 05:49

## 2018-04-10 NOTE — PROGRESS NOTE ADULT - ASSESSMENT
PROBLEMS:    RUL PE & DVT  Acute respiratory failure with hypoxia  acute excerbation of COPD  Symptomatic anemia- s/p transfusion  Gastrointestinal hemorrhage with melena  Essential hypertension    PLAN;    pulmonary condition optimized for EGD, no absolute contraindication for procedure.  iv heparin  IV solumedrols 20mg daily  AEROSLS  SUPPORTIVE CARE  DVT PROPHYLASIX

## 2018-04-10 NOTE — PROGRESS NOTE ADULT - SUBJECTIVE AND OBJECTIVE BOX
Cardiology Progress Note    HPI: 84 F w/PMH COPD, CPAP at night, HTN, has been experiencing progressively dyspnea over past several weeks.  She went to her pulmonologist, CXR was neg, she referred to Cardio.  After work up w/ ST, TTE was not very revealing, she was referred to GI.  She was found on anemic w/ Hg of 8 on cbc, which is not normal for her.  She has noticed some melena 1-2 episodes over the past month.  She denies any hematochezia, hematemesis. She endorses a feeling of fullness after she eats a small amount.  She's also been having pain across her upper abdomen, just below her diaphragm.  She cannot tell me if this pain is related to eating.  She does have a good appetite but just gets full fast.  Gdtr at bedside notes that she doesn't eat as much.  Pt denies any nausea.  Her last colonoscopy was 2 years ago.  She endorsed h/o gastric ulcers. Pt is dyspneic w/ conversation, she's satting 85% on 3LNC, changed to ventimask, now at 95%.  She is feeling very anxious and requesting xanax- which she takes prn at home 1/2 of 0.25mg dose. (2018 17:10)    - No CP. +SOB. SR on tele. Feels tired. No events last pm.     - Feels slightly better today. +SOB, no CP. Awaiting IVC filter today.     4/10- Discussed case with interventional cardiology- holding off on IVC filter for now. No active GI bleed now. +SOB- mildly improved. No CP.     PAST MEDICAL & SURGICAL HISTORY:  COPD (chronic obstructive pulmonary disease)  Hypertension  Peptic Ulcer  Hyperlipemia  S/P Cataract Extraction: right eye 5/27/10    Allergies  No Known Allergies    SOCIAL HISTORY: Denies tobacco, etoh abuse or illicit drug use    FAMILY HISTORY: No pertinent family history in first degree relatives      MEDICATIONS  (STANDING):  ALBUTerol/ipratropium for Nebulization 3 milliLiter(s) Nebulizer every 6 hours  amLODIPine   Tablet 5 milliGRAM(s) Oral daily  azithromycin  IVPB 500 milliGRAM(s) IV Intermittent every 24 hours  azithromycin  IVPB      guaiFENesin    Syrup 200 milliGRAM(s) Oral every 6 hours  heparin  Infusion.  Unit(s)/Hr (10 mL/Hr) IV Continuous <Continuous>  hydrochlorothiazide 25 milliGRAM(s) Oral daily  losartan 50 milliGRAM(s) Oral daily  methylPREDNISolone sodium succinate Injectable 40 milliGRAM(s) IV Push every 12 hours  montelukast 10 milliGRAM(s) Oral daily  pantoprazole Infusion 8 mG/Hr (10 mL/Hr) IV Continuous <Continuous>    MEDICATIONS  (PRN):  ALPRAZolam 0.125 milliGRAM(s) Oral daily PRN anxiety  heparin  Injectable 5000 Unit(s) IV Push every 6 hours PRN For aPTT less than 40  ondansetron Injectable 4 milliGRAM(s) IV Push every 6 hours PRN Nausea      Vital Signs Last 24 Hrs  T(C): 36.4 (2018 05:42), Max: 37.2 (2018 11:55)  T(F): 97.5 (2018 05:42), Max: 99 (2018 11:55)  HR: 53 (2018 05:42) (50 - 88)  BP: 110/55 (2018 05:42) (110/55 - 131/65)  BP(mean): --  RR: 18 (2018 05:42) (18 - 22)  SpO2: 94% (2018 05:42) (85% - 95%)    REVIEW OF SYSTEMS:    CONSTITUTIONAL:  As per HPI.  HEENT:  Eyes:  No diplopia or blurred vision. ENT:  No earache, sore throat or runny nose.  CARDIOVASCULAR:  No pressure, squeezing, strangling, tightness, heaviness or aching about the chest, neck, axilla or epigastrium.  RESPIRATORY:  No cough, shortness of breath, PND or orthopnea.  GASTROINTESTINAL:  No nausea, vomiting or diarrhea.  GENITOURINARY:  No dysuria, frequency or urgency.  MUSCULOSKELETAL:  As per HPI.  SKIN:  No change in skin, hair or nails.  NEUROLOGIC:  No paresthesias, fasciculations, seizures or weakness.    PHYSICAL EXAMINATION:    GENERAL APPEARANCE:  Pt. is not currently dyspneic, in no distress. Pt. is alert, oriented, and pleasant.  HEENT:  Pupils are normal and react normally. No icterus. Mucous membranes well colored.  NECK:  Supple. No lymphadenopathy. Jugular venous pressure not elevated. Carotids equal.   HEART:   The cardiac impulse has a normal quality. There are no murmurs, rubs or gallops noted  CHEST:  Chest is clear to auscultation. Normal respiratory effort.  ABDOMEN:  Soft and nontender.   EXTREMITIES:  There is no edema.     I&O's Summary      LABS:                        9.3    18.67 )-----------( 351      ( 2018 06:03 )             32.6     04-    141  |  104  |  25<H>  ----------------------------<  140<H>  4.3   |  31  |  0.79    Ca    9.0      2018 06:03    TPro  6.8  /  Alb  2.9<L>  /  TBili  0.4  /  DBili  x   /  AST  14<L>  /  ALT  20  /  AlkPhos  77  04-06    LIVER FUNCTIONS - ( 2018 12:29 )  Alb: 2.9 g/dL / Pro: 6.8 gm/dL / ALK PHOS: 77 U/L / ALT: 20 U/L / AST: 14 U/L / GGT: x           PT/INR - ( 2018 12:29 )   PT: 11.8 sec;   INR: 1.09 ratio         PTT - ( 2018 02:41 )  PTT:43.3 sec  CARDIAC MARKERS ( 2018 16:07 )  <0.015 ng/mL / x     / x     / x     / x      CARDIAC MARKERS ( 2018 12:29 )  <0.015 ng/mL / x     / x     / x     / x        Urinalysis Basic - ( 2018 16:57 )    Color: Yellow / Appearance: Clear / S.015 / pH: x  Gluc: x / Ketone: Negative  / Bili: Negative / Urobili: Negative mg/dL   Blood: x / Protein: 15 mg/dL / Nitrite: Positive   Leuk Esterase: Small / RBC: 0-2 /HPF / WBC 5-8 /HPF   Sq Epi: x / Non Sq Epi: Occasional / Bacteria: Many    EKG: < from: 12 Lead ECG (18 @ 09:44) >  Sinus rhythm with Premature supraventricular complexes and with occasional Premature ventricular complexes  Possible Left atrial enlargement  Nonspecific ST abnormality  Abnormal ECG    TELEMETRY: SR    CARDIAC TESTS: < from: Transthoracic Echocardiogram (18 @ 10:22) >   Fibrocalcific changes noted to the mitral valve leaflets with preserved   leaflet excursion.   EA reversal of the mitral inflow consistent with reduced compliance of   the   left ventricle.   Fibrocalcific changes noted to the Aortic valve leaflets with preserved   leaflet excursion.   Mild aortic annular calcification is noted.   Mild (1+) tricuspid valve regurgitation is present.   Trace pulmonic valvular regurgitation is present.   Normal appearing left atrium.   The left ventricle is normal in size, wall thickness, wall motion and   contractility.   Estimated left ventricular ejection fraction is 65-70 %.   The right atrium appears mildly dilated.   Normal appearing right ventricle structure and function.   The IVC is dilated but collapsing with inspiration.   No evidence of pericardial effusion.   No evidence of pleural effusion.    < end of copied text >      RADIOLOGY & ADDITIONAL STUDIES: < from: US Duplex Venous Lower Ext Complete, Bilateral (18 @ 17:25) >    Left soleal vein thrombosis without extension into the popliteal vein.    No sonographic evidence of acute deep venous thrombosis in the   femoropopliteal systems bilaterally.     < from: CT Angio Chest PE Protocol w/ IV Cont (18 @ 16:53) >    Small focal pulmonary embolus in a posterior right upper lobe segmental   vessel.        ASSESSMENT & PLAN:

## 2018-04-10 NOTE — PROGRESS NOTE ADULT - ASSESSMENT
84 F w/PMH COPD, CPAP at night, HTN, presents on 4/6/18 with progressively dyspnea over past several weeks.  Found w/ anemia, +melena  Currently, pt is dyspneic w/ conversation, she's satting 85% on 3LNC, changed to ventimask, now at 95%.  She is feeling very anxious and requesting xanax- which she takes prn at home 1/2 of 0.25mg dose.      Hg 8.1- receiving 2 units PRBC      * Acute respiratory failure with hypoxia,  multifactorial:  * Acute COPD exacerbation, slow improvement  * Sleep apnea   - 85% on 3LNC, now on ventimask at 95%  - monitor sats, titrate O2 to keep sats>90%  - IV steroids--> taper 20 q8h ( wheezing worse 4/10/18)   - s/p Azithromycin  - nebs, mucolytics  - cingulair  - pulmonary consult  - add CPAP qhs and incentive spirometry    * Elevated D-dimers due to   * Left DVT soleal vein  * PE RUL segmental vessel  - CTA - positive for PE  - Doppler LE - positive for DVT  - d/w Dr. Arizmendi due to high risk of GI bleed would benefit from IVC filter , ok to use low range heparin until source of GI bleed established, high range of heparin drip will be to risky  - started on IV low range heparin  - - AC services consult  - Dr. Mendoza consult - r/o hypercoagulability state as o/p, Ct abd to r/o malignancies, coumadin will be better option for AC  - O2 monitoring  -interventional cardiology consult Dr. Kramer for possible IVC filter placement  - no need for IVC filter at this time , but if develops GI bleed than may benefit  - 2 d echo - EF 65%  - Dr. Murguia cardiology consult    *  Symptomatic anemia. , stable  - no active bleeding  - s/p 2 units of PRBC  - H/H q12h--> q6h while on heparin drip  - check iron studies, B12, folate    * Leukocytosis, multifactorial  * ESBL UTI  * Steroids induced  - started on Imepenem by ID  - isolation    * Melena suspected subacute Upper GI bleed  - GI consult - EGD possible on Thursday  - will benefit form EGD once respiratory status improves  - high risk for endoscopy at this time  - c/w PPI BID--> PPI drip while on heparin drip  - monitor H/H and transfuse as needed.  if Hb <8  - Ct abd/pelv - to r/o malignancies given unprovoked VTE   - GI consult Dr. Vizcarra for further endoscopic evaluation  - d/w Dr. Baker pt stable for endoscopic procedure    *  Essential hypertension.    bp stable,   c/w home meds HCTZ, losartan, amlodipine  monitor and titrate meds as indicated.     * Anxiety  - c/w benzo prn    * Prophylactic measure. Plan: SCDs- no chem d/t GIB.

## 2018-04-10 NOTE — PROGRESS NOTE ADULT - SUBJECTIVE AND OBJECTIVE BOX
Patient is a 84y old  Female who presents with a chief complaint of sob, anemia (06 Apr 2018 17:10)      Subective:  Breathing improved but still short. No bleeding.    PAST MEDICAL & SURGICAL HISTORY:  COPD (chronic obstructive pulmonary disease)  Hypertension  Peptic Ulcer  Hyperlipemia  S/P Cataract Extraction: right eye 5/27/10      MEDICATIONS  (STANDING):  ALBUTerol/ipratropium for Nebulization 3 milliLiter(s) Nebulizer every 6 hours  amLODIPine   Tablet 5 milliGRAM(s) Oral daily  docusate sodium 100 milliGRAM(s) Oral two times a day  guaiFENesin    Syrup 200 milliGRAM(s) Oral every 6 hours  heparin  Infusion.  Unit(s)/Hr (10 mL/Hr) IV Continuous <Continuous>  hydrochlorothiazide 25 milliGRAM(s) Oral daily  losartan 50 milliGRAM(s) Oral daily  meropenem  IVPB      meropenem  IVPB 1000 milliGRAM(s) IV Intermittent every 8 hours  methylPREDNISolone sodium succinate Injectable 20 milliGRAM(s) IV Push daily  montelukast 10 milliGRAM(s) Oral daily  pantoprazole Infusion 8 mG/Hr (10 mL/Hr) IV Continuous <Continuous>  senna 2 Tablet(s) Oral at bedtime  sodium ferric gluconate complex IVPB 125 milliGRAM(s) IV Intermittent daily    MEDICATIONS  (PRN):  ALPRAZolam 0.125 milliGRAM(s) Oral daily PRN anxiety  heparin  Injectable 5000 Unit(s) IV Push every 6 hours PRN For aPTT less than 40  ondansetron Injectable 4 milliGRAM(s) IV Push every 6 hours PRN Nausea      REVIEW OF SYSTEMS:    RESPIRATORY: No shortness of breath  CARDIOVASCULAR: No chest pain  All other review of systems is negative unless indicated above.    Vital Signs Last 24 Hrs  T(C): 36.2 (10 Apr 2018 05:27), Max: 36.2 (10 Apr 2018 05:27)  T(F): 97.2 (10 Apr 2018 05:27), Max: 97.2 (10 Apr 2018 05:27)  HR: 56 (10 Apr 2018 05:27) (56 - 74)  BP: 119/54 (10 Apr 2018 05:27) (119/54 - 119/54)  BP(mean): 71 (10 Apr 2018 05:27) (71 - 71)  RR: --  SpO2: 97% (10 Apr 2018 05:27) (97% - 97%)    PHYSICAL EXAM:    Constitutional: NAD, well-developed  Respiratory: CTAB  Cardiovascular: S1 and S2, RRR  Gastrointestinal: BS+, soft, NT/ND  Extremities: No peripheral edema  Psychiatric: Normal mood, normal affect    LABS:                        9.5    15.50 )-----------( 310      ( 10 Apr 2018 06:51 )             32.7     04-10    139  |  102  |  29<H>  ----------------------------<  77  4.3   |  33<H>  |  0.74    Ca    8.8      10 Apr 2018 06:51      PTT - ( 10 Apr 2018 02:08 )  PTT:106.9 sec      RADIOLOGY & ADDITIONAL STUDIES:

## 2018-04-10 NOTE — PROGRESS NOTE ADULT - ASSESSMENT
84 F w/PMH COPD, CPAP at night, HTN admitted 4/6 with experiencing progressively dyspnea over past several weeks.  She went to her pulmonologist, CXR was neg, she referred to Cardio.  After work up w/ ST, TTE was not very revealing, she was referred to GI.  She was found to be anemic w/ Hg of 8 on cbc, She has noticed some melena 1-2 episodes over the past month.  She endorsed h/o gastric ulcers. She denies any hematochezia, hematemesis. She endorses a feeling of fullness after she eats a small amount.  She's also been having pain across her upper abdomen, just below her diaphragm. Her last colonoscopy was 2 years ago.  Here, noted with hypoxic resp failure with PE on CTA and superficial thrombophlebitis of LLE, started on AC, given IV steroids/inhalers for COPD, noted with positive UA + urinary frequency and ESBL ecoli in urine cx.     1. ESBL Ecoli UTI  - urine cx reviewed  - on meropenem 1gmq8h #2  - continue with antibiotic coverage  - plan for 5 day course  - on ac for PE  - on IV steroids/inhalers for COPD  - s/p 3 days of azithromycin  - f/u cbc  - pulm/GI eval noted  - monitor temps  -tolerating abx well so far; no side effects noted  -reason for abx use and side effects reviewed with patient  - supportive care    2. other issues - COPD/PE/anemia - care per medicine

## 2018-04-10 NOTE — PROGRESS NOTE ADULT - SUBJECTIVE AND OBJECTIVE BOX
Subjective:    pat better, NO GFF at this time, because of no definite indication, CT abd- non significant. Waiting for EGD. isolated for VRE.    Home Medications:  amLODIPine 5 mg oral tablet: 1 tab(s) orally once a day (06 Apr 2018 17:25)  Anoro Ellipta 62.5 mcg-25 mcg/inh inhalation powder: 1 puff(s) inhaled once a day (06 Apr 2018 17:25)  calcium (as carbonate) 600 mg oral tablet: 2 tab(s) orally once a day (06 Apr 2018 17:25)  hydroCHLOROthiazide 25 mg oral tablet: 1 tab(s) orally once a day (06 Apr 2018 17:25)  losartan 50 mg oral tablet: 1 tab(s) orally once a day (06 Apr 2018 17:25)  montelukast 10 mg oral tablet: 1 tab(s) orally once a day (06 Apr 2018 17:25)  predniSONE 20 mg oral tablet: 1 tab(s) orally once a day    ***COURSE COMPLETED*** (06 Apr 2018 17:25)  ProAir HFA 90 mcg/inh inhalation aerosol: 2 puff(s) inhaled 4 times a day, As Needed (06 Apr 2018 17:25)  Vitamin D3 2000 intl units oral tablet: 1 tab(s) orally once a day (06 Apr 2018 17:25)  Xanax 0.25 mg oral tablet: 0.5 tab(s) orally once a day, As Needed for anxiety  (06 Apr 2018 17:25)    MEDICATIONS  (STANDING):  ALBUTerol/ipratropium for Nebulization 3 milliLiter(s) Nebulizer every 6 hours  amLODIPine   Tablet 5 milliGRAM(s) Oral daily  docusate sodium 100 milliGRAM(s) Oral two times a day  guaiFENesin    Syrup 200 milliGRAM(s) Oral every 6 hours  heparin  Infusion.  Unit(s)/Hr (10 mL/Hr) IV Continuous <Continuous>  hydrochlorothiazide 25 milliGRAM(s) Oral daily  losartan 50 milliGRAM(s) Oral daily  meropenem  IVPB      meropenem  IVPB 1000 milliGRAM(s) IV Intermittent every 8 hours  methylPREDNISolone sodium succinate Injectable 20 milliGRAM(s) IV Push daily  montelukast 10 milliGRAM(s) Oral daily  pantoprazole Infusion 8 mG/Hr (10 mL/Hr) IV Continuous <Continuous>  senna 2 Tablet(s) Oral at bedtime  sodium ferric gluconate complex IVPB 125 milliGRAM(s) IV Intermittent daily    MEDICATIONS  (PRN):  ALPRAZolam 0.125 milliGRAM(s) Oral daily PRN anxiety  heparin  Injectable 5000 Unit(s) IV Push every 6 hours PRN For aPTT less than 40  ondansetron Injectable 4 milliGRAM(s) IV Push every 6 hours PRN Nausea      Allergies    No Known Allergies    Intolerances        Vital Signs Last 24 Hrs  T(C): 36.2 (10 Apr 2018 05:27), Max: 36.2 (10 Apr 2018 05:27)  T(F): 97.2 (10 Apr 2018 05:27), Max: 97.2 (10 Apr 2018 05:27)  HR: 56 (10 Apr 2018 05:27) (56 - 74)  BP: 119/54 (10 Apr 2018 05:27) (119/54 - 119/54)  BP(mean): 71 (10 Apr 2018 05:27) (71 - 71)  RR: --  SpO2: 97% (10 Apr 2018 05:27) (97% - 97%)      PHYSICAL EXAMINATION:    NECK:  Supple. No lymphadenopathy. Jugular venous pressure not elevated. Carotids equal.   HEART:   The cardiac impulse has a normal quality. Reg., Nl S1 and S2.  There are no murmurs, rubs or gallops noted  CHEST:  Chest rhonchi to auscultation. Normal respiratory effort.  ABDOMEN:  Soft and nontender.   EXTREMITIES:  There is no edema.       LABS:                        9.5    15.50 )-----------( 310      ( 10 Apr 2018 06:51 )             32.7     04-10    139  |  102  |  29<H>  ----------------------------<  77  4.3   |  33<H>  |  0.74    Ca    8.8      10 Apr 2018 06:51      PTT - ( 10 Apr 2018 02:08 )  PTT:106.9 sec

## 2018-04-10 NOTE — PROGRESS NOTE ADULT - SUBJECTIVE AND OBJECTIVE BOX
HPI: This is a 85 y/o female w PMH COPD, CPAP at night, HTN, has been experiencing progressively dyspnea over past several weeks.  She went to her pulmonologist, CXR was neg, she referred to Cardio.  After work up w/ ST, TTE was not very revealing, she was referred to GI.  She was found on anemic w/ Hg of 8 on cbc, which is not normal for her.  She has noticed some melena 1-2 episodes over the past month.  She denies any hematochezia, hematemesis. She endorses a feeling of fullness after she eats a small amount.  She's also been having pain across her upper abdomen, just below her diaphragm.  She cannot tell me if this pain is related to eating.  She does have a good appetite but just gets full fast.  Gdtr at bedside notes that she doesn't eat as much.  Pt denies any nausea.  Her last colonoscopy was 2 years ago.  She endorsed h/o gastric ulcers.     Currently, pt is dyspneic w/ conversation, she's satting 85% on 3LNC, changed to ventimask, now at 95%.  She is feeling very anxious and requesting xanax- which she takes prn at home 1/2 of 0.25mg dose. 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 She continues to report of SOB. She denies prior VTE, CVA, MI, no blood clotting disorder. She is a former smoker, has been under stress recently. She reports of being an active caregiver to her  with Parkinson's disease, but now has HHA and decreased her mobility. Informed pt of possible oral anticoagulation with warfarin and she agrees to therapy, but will need to have endoscopy done first. Pending IVC filter placement tomorrow. Discussed with primary RN and no active bleeding noted today. Her H/H today 9.3/32.6 after s/p 2 units pRBC.      Patient is a 84y old  Female who presents with a chief complaint of sob, anemia (06 Apr 2018 17:10)    Consulted by Dr. Sinha for VTE prophylaxis, risk stratification, and anticoagulation management.    PAST MEDICAL & SURGICAL HISTORY:  COPD (chronic obstructive pulmonary disease)  Hypertension  Peptic Ulcer  Hyperlipemia  S/P Cataract Extraction: right eye 5/27/10    BMI: 30.6    CrCL: 69.88    IMPROVE VTE Risk Score: 5    IMPROVE Bleeding Risk Score: 5.5    Falls Risk:   High (  )  Mod (  )  Low ( X )    4/9: Patient seen at bedside with Dr. Kramer- explained will hold off on IVC filter as DVT is in small vein and no obvious bleeding at this time. Remain on UFH. Patient reports some improvement in breathing- but still with RUDD and O2 nc.  4/10: seen at bedside, some RUDD on exertion O 2 in place    FAMILY HISTORY:  No pertinent family history in first degree relatives    Denies any personal or familial history of clotting or bleeding disorders.    Allergies    No Known Allergies    Intolerances    REVIEW OF SYSTEMS    (  )Fever	     (  )Constipation	( X )SOB			(  )Headache	(  )Dysuria  (  )Chills	     (  )Melena	(  )Dyspnea present on exertion    (  )Dizziness                    (  )Polyuria  (  )Nausea	     (  )Hematochezia	(  )Cough		                    (  )Syncope   	(  )Hematuria  (  )Vomiting    (  )Chest Pain	(  )Wheezing		(  )Weakness  (  )Diarrhea     (  )Palpitations	(  )Anorexia		(  )Myalgia    All other review of systems negative: Yes    PHYSICAL EXAM:    Constitutional: Appears Well    Neurological: A& O x 3    Skin: Warm    Respiratory and Thorax: increased effort; Breath sounds: decreased with b/l rhonchi- non-prod moist cough   	  Cardiovascular: S1, S2, regular, NMBR	    Gastrointestinal: BS + x 4Q, nontender	    Genitourinary:  Bladder nondistended, nontender    Musculoskeletal:   General Right:   no muscle/joint tenderness,   normal tone, no joint swelling,   ROM: full	    General Left:   no muscle/joint tenderness,   normal tone, no joint swelling,   ROM: full    Lower extrems:   Right: no calf tenderness              negative spencer's sign               + pedal pulses    Left:   no calf tenderness              negative spencer's sign               + pedal pulses                        9.5    15.50 )-----------( 310      ( 10 Apr 2018 06:51 )             32.7       04-10    139  |  102  |  29<H>  ----------------------------<  77  4.3   |  33<H>  |  0.74    Ca    8.8      10 Apr 2018 06:51        PTT - ( 10 Apr 2018 09:51 )  PTT:59.6 sec                        9.4    22.50 )-----------( 332      ( 09 Apr 2018 06:22 )             33.6       04-09    141  |  103  |  35<H>  ----------------------------<  124<H>  4.2   |  31  |  0.89    Ca    9.0      09 Apr 2018 06:22        PTT - ( 09 Apr 2018 09:39 )  PTT:167.8 sec                          9.3    18.67 )-----------( 351      ( 08 Apr 2018 06:03 )             32.6       04-08    141  |  104  |  25<H>  ----------------------------<  140<H>  4.3   |  31  |  0.79    Ca    9.0      08 Apr 2018 06:03    TPro  6.8  /  Alb  2.9<L>  /  TBili  0.4  /  DBili  x   /  AST  14<L>  /  ALT  20  /  AlkPhos  77  04-06    PT/INR - ( 06 Apr 2018 12:29 )   PT: 11.8 sec;   INR: 1.09 ratio    PTT - ( 08 Apr 2018 09:45 )  PTT:65.3 sec				  MEDICATIONS  (STANDING):  ALBUTerol/ipratropium for Nebulization 3 milliLiter(s) Nebulizer every 6 hours  amLODIPine   Tablet 5 milliGRAM(s) Oral daily  docusate sodium 100 milliGRAM(s) Oral two times a day  guaiFENesin    Syrup 200 milliGRAM(s) Oral every 6 hours  heparin  Infusion.  Unit(s)/Hr IV Continuous <Continuous>  hydrochlorothiazide 25 milliGRAM(s) Oral daily  losartan 50 milliGRAM(s) Oral daily  meropenem  IVPB      meropenem  IVPB 1000 milliGRAM(s) IV Intermittent every 8 hours  methylPREDNISolone sodium succinate Injectable 20 milliGRAM(s) IV Push daily  montelukast 10 milliGRAM(s) Oral daily  pantoprazole Infusion 8 mG/Hr IV Continuous <Continuous>  senna 2 Tablet(s) Oral at bedtime  sodium ferric gluconate complex IVPB 125 milliGRAM(s) IV Intermittent daily  Vital Signs Last 24 Hrs      Vital Signs Last 24 Hrs  T(C): 36.4 (04-10-18 @ 11:35), Max: 36.4 (04-10-18 @ 11:35)  T(F): 97.6 (04-10-18 @ 11:35), Max: 97.6 (04-10-18 @ 11:35)  HR: 70 (04-10-18 @ 11:35) (56 - 73)  BP: 119/52 (04-10-18 @ 11:35) (119/52 - 119/54)  BP(mean): 71 (04-10-18 @ 05:27) (71 - 71)  RR: 17 (04-10-18 @ 11:35) (17 - 17)  SpO2: 96% (04-10-18 @ 11:35) (96% - 97%)      IMAGING:  US DPLX LWR EXT VEINS COMPL BI:  04/07/2018    FINDINGS:  RIGHT:  There is normal compressibility of the common femoral, femoral, and popliteal veins.  Visualized posterior tibial veins are within normal limits. Doppler examination shows normal spontaneous and phasic flow. Right popliteal cyst.    LEFT: There is normal compressibility of the common femoral, femoral, and popliteal veins. Thrombosis of the left soleal vein without extension into the popliteal vein. Doppler examination shows normal spontaneous and phasic flow. Left popliteal fossa cyst.    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 No sonographic evidence of acute deep venous thrombosis in the femoropopliteal systems bilaterally.     EXAM:  CT ANGIO CHEST PE PROTOCOL IC:  04/07/2018    IMPRESSION: Small focal pulmonary embolus in a posterior right upper lobe segmental vessel.    DVT Prophylaxis:  LMWH                   (  )  Heparin SQ           (  )  Coumadin             (  )  Xarelto                  (  )  Eliquis                   (  )  Venodynes           (  )  Ambulation          (X )  UFH                       ( X )  Contraindicated  (  )

## 2018-04-10 NOTE — PROGRESS NOTE ADULT - SUBJECTIVE AND OBJECTIVE BOX
Date of service: 04-10-18 @ 10:46    pt seen and examined  has some sob  no resp distress  urinating without difficulty    ROS: no fever or chills; denies dizziness, no HA,  no abdominal pain, no diarrhea or constipation; no dysuria, no urinary frequency, no legs pain, no rashes    MEDICATIONS  (STANDING):  ALBUTerol/ipratropium for Nebulization 3 milliLiter(s) Nebulizer every 6 hours  amLODIPine   Tablet 5 milliGRAM(s) Oral daily  docusate sodium 100 milliGRAM(s) Oral two times a day  guaiFENesin    Syrup 200 milliGRAM(s) Oral every 6 hours  heparin  Infusion.  Unit(s)/Hr (10 mL/Hr) IV Continuous <Continuous>  hydrochlorothiazide 25 milliGRAM(s) Oral daily  losartan 50 milliGRAM(s) Oral daily  meropenem  IVPB      meropenem  IVPB 1000 milliGRAM(s) IV Intermittent every 8 hours  methylPREDNISolone sodium succinate Injectable 20 milliGRAM(s) IV Push daily  montelukast 10 milliGRAM(s) Oral daily  pantoprazole Infusion 8 mG/Hr (10 mL/Hr) IV Continuous <Continuous>  senna 2 Tablet(s) Oral at bedtime  sodium ferric gluconate complex IVPB 125 milliGRAM(s) IV Intermittent daily      Vital Signs Last 24 Hrs  T(C): 36.2 (10 Apr 2018 05:27), Max: 36.2 (10 Apr 2018 05:27)  T(F): 97.2 (10 Apr 2018 05:27), Max: 97.2 (10 Apr 2018 05:27)  HR: 56 (10 Apr 2018 05:27) (56 - 74)  BP: 119/54 (10 Apr 2018 05:27) (119/54 - 119/54)  BP(mean): 71 (10 Apr 2018 05:27) (71 - 71)  RR: --  SpO2: 97% (10 Apr 2018 05:27) (97% - 97%)          PE:  Constitutional: frail looking  HEENT: NC/AT, EOMI, PERRLA  Neck: supple  Back: no tenderness  Respiratory: decreased breath sounds  Cardiovascular: S1S2 regular, no murmurs  Abdomen: soft, not tender, not distended, positive BS  Genitourinary: deferred  Rectal: deferred  Musculoskeletal: no muscle tenderness, no joint swelling or tenderness  Extremities: no pedal edema  Neurological: no focal deficits  Skin: no rashes    Labs:                        9.5    15.50 )-----------( 310      ( 10 Apr 2018 06:51 )             32.7     04-10    139  |  102  |  29<H>  ----------------------------<  77  4.3   |  33<H>  |  0.74    Ca    8.8      10 Apr 2018 06:51        Culture - Urine (04.06.18 @ 16:57)    -  Amikacin: S <=8    -  Amoxicillin/Clavulanic Acid: S <=8/4    -  Ampicillin: S <=2 These ampicillin results predict results for amoxicillin    -  Ampicillin/Sulbactam: S <=4/2    -  Aztreonam: S 8    -  Cefazolin: S <=2 This predicts results for oral agents cefaclor, cefdinir, cefpodoxime, cefprozil, cefuroxime axetil, cephalexin and locarbef for uncomplicated UTI. Note that some isolates may be susceptible to these agents while testing resistant to cefazolin.    -  Cefepime: I 16    -  Cefoxitin: S <=4    -  Ceftriaxone: I 2 Enterobacter, Citrobacter, and Serratia may develop resistance during prolonged therapy    -  Ciprofloxacin: S <=0.5    -  Ertapenem: S <=0.5    -  Gentamicin: I 8    -  Imipenem: S <=1    -  Levofloxacin: S <=1    -  Meropenem: S <=1    -  Nitrofurantoin: R >64 Should not be used to treat pyelonephritis    -  Piperacillin/Tazobactam: S <=8    -  Tigecycline: S <=1    -  Tobramycin: I 8    -  Trimethoprim/Sulfamethoxazole: S <=0.5/9.5    Specimen Source: .Urine Clean Catch (Midstream)    Culture Results:   >100,000 CFU/ml Escherichia coli    Organism Identification: Escherichia coli    Organism: Escherichia coli    Method Type: BHUPINDER        Radiology:  < from: CT Angio Chest PE Protocol w/ IV Cont (04.07.18 @ 16:53) >    EXAM:  CT ANGIO CHEST PE PROTOCOL IC                            PROCEDURE DATE:  04/07/2018          INTERPRETATION:  Exam Date: 4/7/2018 4:53 PM  Clinical Information: Elevated d-dimer, hypoxemia  Technique:       CT angiography of the chest was performed with axial   images obtained from the thoracic to the inlet to the bilateral adrenal   glands following administration of IV contrast      100 ml of Omnipaque   350 was injected intravenously. Maximum intensity projection images were   obtained.  Comparison:  5/2/2014    FINDINGS:    Lungs, Airways and Pleura: Central tracheobronchial tree is grossly   patent. No endobronchial lesions. Small to mild bilateral pleural   effusions right greater than left with bibasilar compressive atelectasis.   No pneumothorax. No discrete pulmonary nodules. Subsegmental atelectasis   in the lingula.    Heart and Great Vessels: Atherosclerotic changes of the aorta and   coronary vasculature. Study is mildly limited secondary to heterogeneous   opacification of the distal segmental and subsegmental pulmonary vessels.   Focal filling defect within the mid to distal posterior right upper lobe   segmental vessel compatible with a focal pulmonary embolus.. No other   evidence of acute pulmonary embolismwithin the limits of the study.. No   aortic aneurysm. No definite evidence of aortic dissection. Heart is   normal in size. No pericardial effusion.    Mediastinum and Lanette: Pretracheal and right hilar lymph nodes measuring   up to 1.3 cm.    Neck and Chest Wall: within normal limits    Bones: Degenerative changes and osteopenia. Mild to moderate compression   deformities of T6-T8 chronic in appearance. Compression deformity of L1   appears chronic in nature.    Upper Abdomen:  Left lateral midpole renal cyst. Elevation of the right   hemidiaphragm.    IMPRESSION:         Small focal pulmonary embolus in a posterior right upper lobe segmental   vessel.      < end of copied text >      < from: US Duplex Venous Lower Ext Complete, Bilateral (04.07.18 @ 17:25) >    EXAM:  US DPLX LWR EXT VEINS COMPL BI                            PROCEDURE DATE:  04/07/2018          INTERPRETATION:  Exam Date: 4/7/2018 5:25 PM  Indication: Elevated d-dimer, history of DVT   Comparison: None    COMMENT:    TECHNIQUE: Real-time duplex sonography of the deep veins of both lower   extremities with color and spectral Doppler, with and without   compression.   Image quality is good.    FINDINGS:    RIGHT:  There is normal compressibility of the common femoral, femoral,   and popliteal veins.  Visualized posterior tibial veins are within normal   limits.    Doppler examination shows normal spontaneous and phasic flow.    Right popliteal cyst.    LEFT:  There is normal compressibility of the common femoral, femoral,   and popliteal veins. Thrombosis of the left soleal vein without extension   into the popliteal vein.    Doppler examination shows normal spontaneous and phasic flow.    Left popliteal fossa cyst.    IMPRESSION:     Left soleal vein thrombosis without extension into the popliteal vein.    No sonographic evidence of acute deep venous thrombosis in the   femoropopliteal systems bilaterally.         Advanced directives addressed: full resuscitation

## 2018-04-10 NOTE — PROGRESS NOTE ADULT - SUBJECTIVE AND OBJECTIVE BOX
Subjective:  Patient is a 84y old  Female who presents with a chief complaint of sob, anemia      HPI:     84 F w/PMH COPD, CPAP at night, HTN, presents on 18 with progressively worse dyspnea over past several weeks.  She went to her pulmonologist, CXR was neg, she referred to Cardio.  After work up w/ ST, TTE was not very revealing, she was referred to GI.  She was found on anemic w/ Hg of 8 on cbc, which is not normal for her.  She has noticed some melena 1-2 episodes over the past month.  She denies any hematochezia, hematemesis. She endorses a feeling of fullness after she eats a small amount.  She's also been having pain across her upper abdomen, just below her diaphragm.  She cannot tell me if this pain is related to eating.  She does have a good appetite but just gets full fast.  Gdtr at bedside notes that she doesn't eat as much.  Pt denies any nausea.  Her last colonoscopy was 2 years ago.  She endorsed h/o gastric ulcers.     In ED  pt is dyspneic w/ conversation, she's satting 85% on 3LNC, changed to ventimask, now at 95%.  She is feeling very anxious and requesting xanax- which she takes prn at home 1/2 of 0.25mg dose.     18 - Patient seen and examined at bedside earlier today, dyspneic with minimal exertion, even talking, but reports slight improvement of the dyspnea   18 - events noted, transferred to tele for o2 monitoring due to desaturation , reports improvement of the dyspnea, cough, 1 BM brown overnight  tele- o2 sats to 85%, episodes of bigemeny    - tele O2 sats on O2 wnl, reports improvement of dyspnea and cough since yesterday, no BM, denies abd pain, feels constipated  4/10 - dyspnea slightly worse today, cough productive, denies CP, + brown BM, no blood, denies abd pain    Review of system- Rest of the review of system are negative except mentioned in HPI    OBJECTIVE:   T(C): 36.4 (04-10-18 @ 11:35), Max: 36.4 (04-10-18 @ 11:35)  T(F): 97.6 (04-10-18 @ 11:35), Max: 97.6 (04-10-18 @ 11:35)  HR: 70 (04-10-18 @ 11:35) (56 - 73)  BP: 119/52 (04-10-18 @ 11:35) (119/52 - 119/54)  RR: 17 (04-10-18 @ 11:35) (17 - 17)  SpO2: 96% (04-10-18 @ 11:35) (96% - 97%)  Daily Weight in k.5 (2018 00:09)    PHYSICAL EXAM:  GENERAL: NAD  NERVOUS SYSTEM:  Alert & Oriented X3, non- focal exam,  HEAD:  Atraumatic, Normocephalic  EYES: EOMI, PERRLA, conjunctiva and sclera clear  HEENT: Moist mucous membranes  NECK: Supple, No JVD  CHEST/LUNG: BS decreased bilaterally; No rales, no rhonchi, +  wheezing,  HEART: Regular rate and rhythm; No murmurs, rubs, or gallops  ABDOMEN: Soft, Nontender, Nondistended; Bowel sounds present  GENITOURINARY- Voiding, no suprapubic tenderness  EXTREMITIES:  2+ Peripheral Pulses, No clubbing, cyanosis, or edema  MUSCULOSKELETAL:- No muscle tenderness, Muscle tone normal, No joint tenderness, no Joint swelling, Joint range of motion-normal  SKIN-no rash, no lesion    LABS:  04-10    139  |  102  |  29<H>  ----------------------------<  77  4.3   |  33<H>  |  0.74    Ca    8.8      10 Apr 2018 06:51                        9.5    15.50 )-----------( 310      ( 10 Apr 2018 06:51 )             32.7     PTT - ( 10 Apr 2018 09:51 )  PTT:59.6 sec          141  |  103  |  35<H>  ----------------------------<  124<H>  4.2   |  31  |  0.89    Ca    9.0      2018 06:22                       10.0   x     )-----------( x        ( 2018 12:19 )             34.6     PTT - ( 2018 09:39 )  PTT:167.8 sec          141  |  104  |  25<H>  ----------------------------<  140<H>  4.3   |  31  |  0.79    Ca    9.0      2018 06:03    TPro  6.8  /  Alb  2.9<L>  /  TBili  0.4  /  DBili  x   /  AST  14<L>  /  ALT  20  /  AlkPhos  77                              9.3    18.67 )-----------( 351      ( 2018 06:03 )             32.6       CARDIAC MARKERS ( 2018 16:07 )  <0.015 ng/mL / x     / x     / x     / x      CARDIAC MARKERS ( 2018 12:29 )  <0.015 ng/mL / x     / x     / x     / x            LIVER FUNCTIONS - ( 2018 12:29 )  Alb: 2.9 g/dL / Pro: 6.8 gm/dL / ALK PHOS: 77 U/L / ALT: 20 U/L / AST: 14 U/L / GGT: x             PT/INR - ( 2018 12:29 )   PT: 11.8 sec;   INR: 1.09 ratio         PTT - ( 2018 09:45 )  PTT:65.3 sec    Urinalysis Basic - ( 2018 16:57 )    Color: Yellow / Appearance: Clear / S.015 / pH: x  Gluc: x / Ketone: Negative  / Bili: Negative / Urobili: Negative mg/dL   Blood: x / Protein: 15 mg/dL / Nitrite: Positive   Leuk Esterase: Small / RBC: 0-2 /HPF / WBC 5-8 /HPF   Sq Epi: x / Non Sq Epi: Occasional / Bacteria: Many                            10.6   10.20 )-----------( 353      ( 2018 07:23 )             35.7         141  |  104  |  17  ----------------------------<  152<H>  4.0   |  30  |  0.64    Ca    8.8      2018 07:23    TPro  6.8  /  Alb  2.9<L>  /  TBili  0.4  /  DBili  x   /  AST  14<L>  /  ALT  20  /  AlkPhos  77  -    PT/INR - ( 2018 12:29 )   PT: 11.8 sec;   INR: 1.09 ratio         PTT - ( 2018 12:29 )  PTT:25.8 sec  CARDIAC MARKERS ( 2018 16:07 )  <0.015 ng/mL / x     / x     / x     / x      CARDIAC MARKERS ( 2018 12:29 )  <0.015 ng/mL / x     / x     / x     / x          Urinalysis Basic - ( 2018 16:57 )    Color: Yellow / Appearance: Clear / S.015 / pH: x  Gluc: x / Ketone: Negative  / Bili: Negative / Urobili: Negative mg/dL   Blood: x / Protein: 15 mg/dL / Nitrite: Positive   Leuk Esterase: Small / RBC: 0-2 /HPF / WBC 5-8 /HPF   Sq Epi: x / Non Sq Epi: Occasional / Bacteria: Many      CAPILLARY BLOOD GLUCOSE    RECENT CULTURES:    RADIOLOGY & ADDITIONAL TESTS:    < from: US Duplex Venous Lower Ext Complete, Bilateral (18 @ 17:25) >  IMPRESSION:     Left soleal vein thrombosis without extension into the popliteal vein.    No sonographic evidence of acute deep venous thrombosis in the   femoropopliteal systems bilaterally.     < end of copied text >  < from: CT Angio Chest PE Protocol w/ IV Cont (18 @ 16:53) >  Lungs, Airways and Pleura: Central tracheobronchial tree is grossly   patent. No endobronchial lesions. Small to mild bilateral pleural   effusions right greater than left with bibasilar compressive atelectasis.   No pneumothorax. No discrete pulmonary nodules. Subsegmental atelectasis   in the lingula.    Heart and Great Vessels: Atherosclerotic changes of the aorta and   coronary vasculature. Study is mildly limited secondary to heterogeneous   opacification of the distal segmental and subsegmental pulmonary vessels.   Focal filling defect within the mid to distal posterior right upper lobe   segmental vessel compatible with a focal pulmonary embolus.. No other   evidence of acute pulmonary embolismwithin the limits of the study.. No   aortic aneurysm. No definite evidence of aortic dissection. Heart is   normal in size. No pericardial effusion.    Mediastinum and Lanette: Pretracheal and right hilar lymph nodes measuring   up to 1.3 cm.    Neck and Chest Wall: within normal limits    Bones: Degenerative changes and osteopenia. Mild to moderate compression   deformities of T6-T8 chronic in appearance. Compression deformity of L1   appears chronic in nature.    Upper Abdomen:  Left lateral midpole renal cyst. Elevation of the right   hemidiaphragm.    IMPRESSION:         Small focal pulmonary embolus in a posterior right upper lobe segmental   vessel.    < end of copied text >    < from: Xray Chest 1 View AP/PA. (18 @ 10:47) >  Heart magnified by projection, unchanged. Atherosclerotic aorta.   Symmetrically hyperinflated lungs. No focal consolidation or pleural   effusion. Old fracture right seventh posterior rib.    Impression: Hyperinflated lungs. No active infiltrates.    < end of copied text >  MEDICATIONS  (STANDING):  ALBUTerol/ipratropium for Nebulization 3 milliLiter(s) Nebulizer every 6 hours  amLODIPine   Tablet 5 milliGRAM(s) Oral daily  azithromycin  IVPB 500 milliGRAM(s) IV Intermittent every 24 hours  azithromycin  IVPB      guaiFENesin    Syrup 200 milliGRAM(s) Oral every 6 hours  heparin  Infusion.  Unit(s)/Hr (10 mL/Hr) IV Continuous <Continuous>  hydrochlorothiazide 25 milliGRAM(s) Oral daily  losartan 50 milliGRAM(s) Oral daily  methylPREDNISolone sodium succinate Injectable 20 milliGRAM(s) IV Push every 12 hours  montelukast 10 milliGRAM(s) Oral daily  pantoprazole Infusion 8 mG/Hr (10 mL/Hr) IV Continuous <Continuous>    MEDICATIONS  (PRN):  ALPRAZolam 0.125 milliGRAM(s) Oral daily PRN anxiety  heparin  Injectable 5000 Unit(s) IV Push every 6 hours PRN For aPTT less than 40  ondansetron Injectable 4 milliGRAM(s) IV Push every 6 hours PRN Nausea

## 2018-04-10 NOTE — PROGRESS NOTE ADULT - ASSESSMENT
84 F w/PMH COPD, CPAP at night, HTN, presents on 4/6/18 with progressively dyspnea over past several weeks.  Found w/ anemia, +melena. Pt also with small PE, L soleal vein thrombosis.     1. GI bleed- GI following. Transfuse to keep Hgb > 8. Will need EGD to assess cause. Trend H/H- stable today in 9-10 range. Continue PPI. Mgmt as per GI. No active bleeding now.     2. PE- small RUL PE- Also with L soleal vein thrombosis. Pt tolerating IV heparin for last few days. Will eventually need LTA with coumadin as well. IVC filter on hold for now as pt is tolerating IV heparin. Heme following. 2Decho- normal LV and RV fxn. Hypercoagulable w/u pending.      3. Essential hypertension- stable, continue medical mgmt.     4. DVT proph- SCDs. Replete lytes as needed.     5. COPD- cont steroid taper, nebs, inhalers- will have EGD when breathing improves.

## 2018-04-10 NOTE — PROGRESS NOTE ADULT - ASSESSMENT
Imp:  Anemia/guaiac +    Rec:  Agrees to EGD when breathing improved  Declines colonoscopy even if EGD negative

## 2018-04-11 LAB
ANION GAP SERPL CALC-SCNC: 4 MMOL/L — LOW (ref 5–17)
APTT BLD: 32.7 SEC — SIGNIFICANT CHANGE UP (ref 27.5–37.4)
APTT BLD: 76.8 SEC — HIGH (ref 27.5–37.4)
APTT BLD: >200 SEC — SIGNIFICANT CHANGE UP (ref 27.5–37.4)
BASE EXCESS BLDA CALC-SCNC: 6.1 MMOL/L — HIGH (ref -2–2)
BLOOD GAS COMMENTS ARTERIAL: SIGNIFICANT CHANGE UP
BUN SERPL-MCNC: 29 MG/DL — HIGH (ref 7–23)
CALCIUM SERPL-MCNC: 8.6 MG/DL — SIGNIFICANT CHANGE UP (ref 8.5–10.1)
CHLORIDE SERPL-SCNC: 101 MMOL/L — SIGNIFICANT CHANGE UP (ref 96–108)
CO2 SERPL-SCNC: 34 MMOL/L — HIGH (ref 22–31)
CREAT SERPL-MCNC: 0.75 MG/DL — SIGNIFICANT CHANGE UP (ref 0.5–1.3)
GAS PNL BLDA: SIGNIFICANT CHANGE UP
GLUCOSE SERPL-MCNC: 130 MG/DL — HIGH (ref 70–99)
HCO3 BLDA-SCNC: 32 MMOL/L — HIGH (ref 21–29)
HCT VFR BLD CALC: 33 % — LOW (ref 34.5–45)
HGB BLD-MCNC: 9.3 G/DL — LOW (ref 11.5–15.5)
HOROWITZ INDEX BLDA+IHG-RTO: 28 — SIGNIFICANT CHANGE UP
MCHC RBC-ENTMCNC: 23 PG — LOW (ref 27–34)
MCHC RBC-ENTMCNC: 28.2 GM/DL — LOW (ref 32–36)
MCV RBC AUTO: 81.5 FL — SIGNIFICANT CHANGE UP (ref 80–100)
NRBC # BLD: 0 /100 WBCS — SIGNIFICANT CHANGE UP (ref 0–0)
PCO2 BLDA: 60 MMHG — HIGH (ref 32–46)
PH BLDA: 7.35 — SIGNIFICANT CHANGE UP (ref 7.35–7.45)
PLATELET # BLD AUTO: 314 K/UL — SIGNIFICANT CHANGE UP (ref 150–400)
PO2 BLDA: 67 MMHG — LOW (ref 74–108)
POTASSIUM SERPL-MCNC: 5 MMOL/L — SIGNIFICANT CHANGE UP (ref 3.5–5.3)
POTASSIUM SERPL-SCNC: 5 MMOL/L — SIGNIFICANT CHANGE UP (ref 3.5–5.3)
RBC # BLD: 4.05 M/UL — SIGNIFICANT CHANGE UP (ref 3.8–5.2)
RBC # FLD: 18.9 % — HIGH (ref 10.3–14.5)
SAO2 % BLDA: 92 % — SIGNIFICANT CHANGE UP (ref 92–96)
SODIUM SERPL-SCNC: 139 MMOL/L — SIGNIFICANT CHANGE UP (ref 135–145)
WBC # BLD: 11.49 K/UL — HIGH (ref 3.8–10.5)
WBC # FLD AUTO: 11.49 K/UL — HIGH (ref 3.8–10.5)

## 2018-04-11 RX ORDER — PANTOPRAZOLE SODIUM 20 MG/1
40 TABLET, DELAYED RELEASE ORAL EVERY 12 HOURS
Qty: 0 | Refills: 0 | Status: DISCONTINUED | OUTPATIENT
Start: 2018-04-11 | End: 2018-04-21

## 2018-04-11 RX ADMIN — Medication 3 MILLILITER(S): at 10:59

## 2018-04-11 RX ADMIN — HEPARIN SODIUM 850 UNIT(S)/HR: 5000 INJECTION INTRAVENOUS; SUBCUTANEOUS at 08:33

## 2018-04-11 RX ADMIN — Medication 200 MILLIGRAM(S): at 17:33

## 2018-04-11 RX ADMIN — LOSARTAN POTASSIUM 50 MILLIGRAM(S): 100 TABLET, FILM COATED ORAL at 06:15

## 2018-04-11 RX ADMIN — AMLODIPINE BESYLATE 5 MILLIGRAM(S): 2.5 TABLET ORAL at 06:15

## 2018-04-11 RX ADMIN — Medication 3 MILLILITER(S): at 01:01

## 2018-04-11 RX ADMIN — MEROPENEM 100 MILLIGRAM(S): 1 INJECTION INTRAVENOUS at 14:37

## 2018-04-11 RX ADMIN — Medication 20 MILLIGRAM(S): at 14:38

## 2018-04-11 RX ADMIN — HEPARIN SODIUM 5000 UNIT(S): 5000 INJECTION INTRAVENOUS; SUBCUTANEOUS at 08:36

## 2018-04-11 RX ADMIN — Medication 200 MILLIGRAM(S): at 22:51

## 2018-04-11 RX ADMIN — PANTOPRAZOLE SODIUM 40 MILLIGRAM(S): 20 TABLET, DELAYED RELEASE ORAL at 11:43

## 2018-04-11 RX ADMIN — Medication 3 MILLILITER(S): at 19:48

## 2018-04-11 RX ADMIN — Medication 100 MILLIGRAM(S): at 06:15

## 2018-04-11 RX ADMIN — HEPARIN SODIUM 600 UNIT(S)/HR: 5000 INJECTION INTRAVENOUS; SUBCUTANEOUS at 02:02

## 2018-04-11 RX ADMIN — Medication 0.12 MILLIGRAM(S): at 22:51

## 2018-04-11 RX ADMIN — HEPARIN SODIUM 0 UNIT(S)/HR: 5000 INJECTION INTRAVENOUS; SUBCUTANEOUS at 17:33

## 2018-04-11 RX ADMIN — Medication 110 MILLIGRAM(S): at 12:08

## 2018-04-11 RX ADMIN — MONTELUKAST 10 MILLIGRAM(S): 4 TABLET, CHEWABLE ORAL at 11:44

## 2018-04-11 RX ADMIN — Medication 200 MILLIGRAM(S): at 11:44

## 2018-04-11 RX ADMIN — Medication 25 MILLIGRAM(S): at 06:15

## 2018-04-11 RX ADMIN — Medication 3 MILLILITER(S): at 15:32

## 2018-04-11 RX ADMIN — HEPARIN SODIUM 600 UNIT(S)/HR: 5000 INJECTION INTRAVENOUS; SUBCUTANEOUS at 18:36

## 2018-04-11 RX ADMIN — Medication 20 MILLIGRAM(S): at 06:15

## 2018-04-11 RX ADMIN — MEROPENEM 100 MILLIGRAM(S): 1 INJECTION INTRAVENOUS at 21:18

## 2018-04-11 RX ADMIN — MEROPENEM 100 MILLIGRAM(S): 1 INJECTION INTRAVENOUS at 06:14

## 2018-04-11 RX ADMIN — Medication 200 MILLIGRAM(S): at 06:15

## 2018-04-11 RX ADMIN — Medication 20 MILLIGRAM(S): at 21:18

## 2018-04-11 NOTE — PROGRESS NOTE ADULT - ASSESSMENT
PROBLEMS:    ESBL UTI  RUL PE & DVT  Acute respiratory failure with hypoxia  acute excerbation of COPD  Symptomatic anemia- s/p transfusion  Gastrointestinal hemorrhage with melena  Essential hypertension    PLAN;    iv meropenem  iv heparin  IV solumedrols 20mg q8hr  AEROSLS  SUPPORTIVE CARE  DVT PROPHYLASIX

## 2018-04-11 NOTE — PROGRESS NOTE ADULT - SUBJECTIVE AND OBJECTIVE BOX
Cardiology Progress Note    HPI: 84 F w/PMH COPD, CPAP at night, HTN, has been experiencing progressively dyspnea over past several weeks.  She went to her pulmonologist, CXR was neg, she referred to Cardio.  After work up w/ ST, TTE was not very revealing, she was referred to GI.  She was found on anemic w/ Hg of 8 on cbc, which is not normal for her.  She has noticed some melena 1-2 episodes over the past month.  She denies any hematochezia, hematemesis. She endorses a feeling of fullness after she eats a small amount.  She's also been having pain across her upper abdomen, just below her diaphragm.  She cannot tell me if this pain is related to eating.  She does have a good appetite but just gets full fast.  Gdtr at bedside notes that she doesn't eat as much.  Pt denies any nausea.  Her last colonoscopy was 2 years ago.  She endorsed h/o gastric ulcers. Pt is dyspneic w/ conversation, she's satting 85% on 3LNC, changed to ventimask, now at 95%.  She is feeling very anxious and requesting xanax- which she takes prn at home 1/2 of 0.25mg dose. (2018 17:10)    - No CP. +SOB. SR on tele. Feels tired. No events last pm.     - Feels slightly better today. +SOB, no CP. Awaiting IVC filter today.     4/10- Discussed case with interventional cardiology- holding off on IVC filter for now. No active GI bleed now. +SOB- mildly improved. No CP.     - No CP, SOB mildly improved. No fevers. No events last pm.    PAST MEDICAL & SURGICAL HISTORY:  COPD (chronic obstructive pulmonary disease)  Hypertension  Peptic Ulcer  Hyperlipemia  S/P Cataract Extraction: right eye 5/27/10    Allergies  No Known Allergies    SOCIAL HISTORY: Denies tobacco, etoh abuse or illicit drug use    FAMILY HISTORY: No pertinent family history in first degree relatives      MEDICATIONS  (STANDING):  ALBUTerol/ipratropium for Nebulization 3 milliLiter(s) Nebulizer every 6 hours  amLODIPine   Tablet 5 milliGRAM(s) Oral daily  azithromycin  IVPB 500 milliGRAM(s) IV Intermittent every 24 hours  azithromycin  IVPB      guaiFENesin    Syrup 200 milliGRAM(s) Oral every 6 hours  heparin  Infusion.  Unit(s)/Hr (10 mL/Hr) IV Continuous <Continuous>  hydrochlorothiazide 25 milliGRAM(s) Oral daily  losartan 50 milliGRAM(s) Oral daily  methylPREDNISolone sodium succinate Injectable 40 milliGRAM(s) IV Push every 12 hours  montelukast 10 milliGRAM(s) Oral daily  pantoprazole Infusion 8 mG/Hr (10 mL/Hr) IV Continuous <Continuous>    MEDICATIONS  (PRN):  ALPRAZolam 0.125 milliGRAM(s) Oral daily PRN anxiety  heparin  Injectable 5000 Unit(s) IV Push every 6 hours PRN For aPTT less than 40  ondansetron Injectable 4 milliGRAM(s) IV Push every 6 hours PRN Nausea      Vital Signs Last 24 Hrs  T(C): 36.4 (2018 05:42), Max: 37.2 (2018 11:55)  T(F): 97.5 (2018 05:42), Max: 99 (2018 11:55)  HR: 53 (2018 05:42) (50 - 88)  BP: 110/55 (2018 05:42) (110/55 - 131/65)  BP(mean): --  RR: 18 (2018 05:42) (18 - 22)  SpO2: 94% (2018 05:42) (85% - 95%)    REVIEW OF SYSTEMS:    CONSTITUTIONAL:  As per HPI.  HEENT:  Eyes:  No diplopia or blurred vision. ENT:  No earache, sore throat or runny nose.  CARDIOVASCULAR:  No pressure, squeezing, strangling, tightness, heaviness or aching about the chest, neck, axilla or epigastrium.  RESPIRATORY:  No cough, shortness of breath, PND or orthopnea.  GASTROINTESTINAL:  No nausea, vomiting or diarrhea.  GENITOURINARY:  No dysuria, frequency or urgency.  MUSCULOSKELETAL:  As per HPI.  SKIN:  No change in skin, hair or nails.  NEUROLOGIC:  No paresthesias, fasciculations, seizures or weakness.    PHYSICAL EXAMINATION:    GENERAL APPEARANCE:  Pt. is not currently dyspneic, in no distress. Pt. is alert, oriented, and pleasant.  HEENT:  Pupils are normal and react normally. No icterus. Mucous membranes well colored.  NECK:  Supple. No lymphadenopathy. Jugular venous pressure not elevated. Carotids equal.   HEART:   The cardiac impulse has a normal quality. There are no murmurs, rubs or gallops noted  CHEST:  Chest is clear to auscultation. Normal respiratory effort.  ABDOMEN:  Soft and nontender.   EXTREMITIES:  There is no edema.     I&O's Summary      LABS:                        9.3    18.67 )-----------( 351      ( 2018 06:03 )             32.6     04-    141  |  104  |  25<H>  ----------------------------<  140<H>  4.3   |  31  |  0.79    Ca    9.0      2018 06:03    TPro  6.8  /  Alb  2.9<L>  /  TBili  0.4  /  DBili  x   /  AST  14<L>  /  ALT  20  /  AlkPhos  77  04-06    LIVER FUNCTIONS - ( 2018 12:29 )  Alb: 2.9 g/dL / Pro: 6.8 gm/dL / ALK PHOS: 77 U/L / ALT: 20 U/L / AST: 14 U/L / GGT: x           PT/INR - ( 2018 12:29 )   PT: 11.8 sec;   INR: 1.09 ratio         PTT - ( 2018 02:41 )  PTT:43.3 sec  CARDIAC MARKERS ( 2018 16:07 )  <0.015 ng/mL / x     / x     / x     / x      CARDIAC MARKERS ( 2018 12:29 )  <0.015 ng/mL / x     / x     / x     / x        Urinalysis Basic - ( 2018 16:57 )    Color: Yellow / Appearance: Clear / S.015 / pH: x  Gluc: x / Ketone: Negative  / Bili: Negative / Urobili: Negative mg/dL   Blood: x / Protein: 15 mg/dL / Nitrite: Positive   Leuk Esterase: Small / RBC: 0-2 /HPF / WBC 5-8 /HPF   Sq Epi: x / Non Sq Epi: Occasional / Bacteria: Many    EKG: < from: 12 Lead ECG (18 @ 09:44) >  Sinus rhythm with Premature supraventricular complexes and with occasional Premature ventricular complexes  Possible Left atrial enlargement  Nonspecific ST abnormality  Abnormal ECG    TELEMETRY: SR    CARDIAC TESTS: < from: Transthoracic Echocardiogram (18 @ 10:22) >   Fibrocalcific changes noted to the mitral valve leaflets with preserved   leaflet excursion.   EA reversal of the mitral inflow consistent with reduced compliance of   the   left ventricle.   Fibrocalcific changes noted to the Aortic valve leaflets with preserved   leaflet excursion.   Mild aortic annular calcification is noted.   Mild (1+) tricuspid valve regurgitation is present.   Trace pulmonic valvular regurgitation is present.   Normal appearing left atrium.   The left ventricle is normal in size, wall thickness, wall motion and   contractility.   Estimated left ventricular ejection fraction is 65-70 %.   The right atrium appears mildly dilated.   Normal appearing right ventricle structure and function.   The IVC is dilated but collapsing with inspiration.   No evidence of pericardial effusion.   No evidence of pleural effusion.    < end of copied text >      RADIOLOGY & ADDITIONAL STUDIES: < from: US Duplex Venous Lower Ext Complete, Bilateral (18 @ 17:25) >    Left soleal vein thrombosis without extension into the popliteal vein.    No sonographic evidence of acute deep venous thrombosis in the   femoropopliteal systems bilaterally.     < from: CT Angio Chest PE Protocol w/ IV Cont (18 @ 16:53) >    Small focal pulmonary embolus in a posterior right upper lobe segmental   vessel.        ASSESSMENT & PLAN:

## 2018-04-11 NOTE — PROGRESS NOTE ADULT - ASSESSMENT
84 F w/PMH COPD, CPAP at night, HTN, presents on 4/6/18 with progressively dyspnea over past several weeks.  Found w/ anemia, +melena. Pt also with small PE, L soleal vein thrombosis.     1. GI bleed- GI following. Transfuse to keep Hgb > 8. Awaiting EGD to assess cause. Trend H/H- stable today in 9-10 range. Continue PPI. Mgmt as per GI. No active bleeding now. Pt stable for EGD from cardiac standpoint.    2. PE- small RUL PE- Also with L soleal vein thrombosis. Pt tolerating IV heparin for last few days. Will need LTA with coumadin as well. IVC filter on hold for now as pt is tolerating IV heparin. Heme following. 2Decho- normal LV and RV fxn. Hypercoagulable w/u pending.      3. Essential hypertension- stable, continue medical mgmt.     4. DVT proph- SCDs. Replete lytes as needed.     5. COPD- cont steroid taper, nebs, inhalers- will have EGD when breathing improves.

## 2018-04-11 NOTE — PROGRESS NOTE ADULT - SUBJECTIVE AND OBJECTIVE BOX
Date of service: 04-11-18 @ 11:03    pt seen and examined  feels better  no resp distress  urinating without difficulty    ROS: no fever or chills; denies dizziness, no HA,  no abdominal pain, no diarrhea or constipation; no dysuria, no urinary frequency, no legs pain, no rashes    MEDICATIONS  (STANDING):  ALBUTerol/ipratropium for Nebulization 3 milliLiter(s) Nebulizer every 6 hours  amLODIPine   Tablet 5 milliGRAM(s) Oral daily  docusate sodium 100 milliGRAM(s) Oral two times a day  guaiFENesin    Syrup 200 milliGRAM(s) Oral every 6 hours  heparin  Infusion.  Unit(s)/Hr (10 mL/Hr) IV Continuous <Continuous>  hydrochlorothiazide 25 milliGRAM(s) Oral daily  losartan 50 milliGRAM(s) Oral daily  meropenem  IVPB      meropenem  IVPB 1000 milliGRAM(s) IV Intermittent every 8 hours  methylPREDNISolone sodium succinate Injectable 20 milliGRAM(s) IV Push every 8 hours  montelukast 10 milliGRAM(s) Oral daily  pantoprazole  Injectable 40 milliGRAM(s) IV Push every 12 hours  senna 2 Tablet(s) Oral at bedtime  sodium ferric gluconate complex IVPB 125 milliGRAM(s) IV Intermittent daily      Vital Signs Last 24 Hrs  T(C): 36.4 (11 Apr 2018 04:53), Max: 36.7 (10 Apr 2018 18:15)  T(F): 97.5 (11 Apr 2018 04:53), Max: 98.1 (10 Apr 2018 18:15)  HR: 62 (11 Apr 2018 04:53) (62 - 72)  BP: 121/64 (11 Apr 2018 04:53) (117/48 - 121/64)  BP(mean): --  RR: 16 (11 Apr 2018 04:53) (16 - 17)  SpO2: 97% (11 Apr 2018 04:53) (93% - 97%)        PE:  Constitutional: frail looking  HEENT: NC/AT, EOMI, PERRLA  Neck: supple  Back: no tenderness  Respiratory: decreased breath sounds  Cardiovascular: S1S2 regular, no murmurs  Abdomen: soft, not tender, not distended, positive BS  Genitourinary: deferred  Rectal: deferred  Musculoskeletal: no muscle tenderness, no joint swelling or tenderness  Extremities: no pedal edema  Neurological: no focal deficits  Skin: no rashes    Labs:                                   9.3    11.49 )-----------( 314      ( 11 Apr 2018 06:03 )             33.0     04-11    139  |  101  |  29<H>  ----------------------------<  130<H>  5.0   |  34<H>  |  0.75    Ca    8.6      11 Apr 2018 06:03          Culture - Urine (04.06.18 @ 16:57)    -  Amikacin: S <=8    -  Amoxicillin/Clavulanic Acid: S <=8/4    -  Ampicillin: S <=2 These ampicillin results predict results for amoxicillin    -  Ampicillin/Sulbactam: S <=4/2    -  Aztreonam: S 8    -  Cefazolin: S <=2 This predicts results for oral agents cefaclor, cefdinir, cefpodoxime, cefprozil, cefuroxime axetil, cephalexin and locarbef for uncomplicated UTI. Note that some isolates may be susceptible to these agents while testing resistant to cefazolin.    -  Cefepime: I 16    -  Cefoxitin: S <=4    -  Ceftriaxone: I 2 Enterobacter, Citrobacter, and Serratia may develop resistance during prolonged therapy    -  Ciprofloxacin: S <=0.5    -  Ertapenem: S <=0.5    -  Gentamicin: I 8    -  Imipenem: S <=1    -  Levofloxacin: S <=1    -  Meropenem: S <=1    -  Nitrofurantoin: R >64 Should not be used to treat pyelonephritis    -  Piperacillin/Tazobactam: S <=8    -  Tigecycline: S <=1    -  Tobramycin: I 8    -  Trimethoprim/Sulfamethoxazole: S <=0.5/9.5    Specimen Source: .Urine Clean Catch (Midstream)    Culture Results:   >100,000 CFU/ml Escherichia coli    Organism Identification: Escherichia coli    Organism: Escherichia coli    Method Type: BHUPINDER        Radiology:  < from: CT Angio Chest PE Protocol w/ IV Cont (04.07.18 @ 16:53) >    EXAM:  CT ANGIO CHEST PE PROTOCOL IC                            PROCEDURE DATE:  04/07/2018          INTERPRETATION:  Exam Date: 4/7/2018 4:53 PM  Clinical Information: Elevated d-dimer, hypoxemia  Technique:       CT angiography of the chest was performed with axial   images obtained from the thoracic to the inlet to the bilateral adrenal   glands following administration of IV contrast      100 ml of Omnipaque   350 was injected intravenously. Maximum intensity projection images were   obtained.  Comparison:  5/2/2014    FINDINGS:    Lungs, Airways and Pleura: Central tracheobronchial tree is grossly   patent. No endobronchial lesions. Small to mild bilateral pleural   effusions right greater than left with bibasilar compressive atelectasis.   No pneumothorax. No discrete pulmonary nodules. Subsegmental atelectasis   in the lingula.    Heart and Great Vessels: Atherosclerotic changes of the aorta and   coronary vasculature. Study is mildly limited secondary to heterogeneous   opacification of the distal segmental and subsegmental pulmonary vessels.   Focal filling defect within the mid to distal posterior right upper lobe   segmental vessel compatible with a focal pulmonary embolus.. No other   evidence of acute pulmonary embolismwithin the limits of the study.. No   aortic aneurysm. No definite evidence of aortic dissection. Heart is   normal in size. No pericardial effusion.    Mediastinum and Lanette: Pretracheal and right hilar lymph nodes measuring   up to 1.3 cm.    Neck and Chest Wall: within normal limits    Bones: Degenerative changes and osteopenia. Mild to moderate compression   deformities of T6-T8 chronic in appearance. Compression deformity of L1   appears chronic in nature.    Upper Abdomen:  Left lateral midpole renal cyst. Elevation of the right   hemidiaphragm.    IMPRESSION:         Small focal pulmonary embolus in a posterior right upper lobe segmental   vessel.      < end of copied text >      < from: US Duplex Venous Lower Ext Complete, Bilateral (04.07.18 @ 17:25) >    EXAM:  US DPLX LWR EXT VEINS COMPL BI                            PROCEDURE DATE:  04/07/2018          INTERPRETATION:  Exam Date: 4/7/2018 5:25 PM  Indication: Elevated d-dimer, history of DVT   Comparison: None    COMMENT:    TECHNIQUE: Real-time duplex sonography of the deep veins of both lower   extremities with color and spectral Doppler, with and without   compression.   Image quality is good.    FINDINGS:    RIGHT:  There is normal compressibility of the common femoral, femoral,   and popliteal veins.  Visualized posterior tibial veins are within normal   limits.    Doppler examination shows normal spontaneous and phasic flow.    Right popliteal cyst.    LEFT:  There is normal compressibility of the common femoral, femoral,   and popliteal veins. Thrombosis of the left soleal vein without extension   into the popliteal vein.    Doppler examination shows normal spontaneous and phasic flow.    Left popliteal fossa cyst.    IMPRESSION:     Left soleal vein thrombosis without extension into the popliteal vein.    No sonographic evidence of acute deep venous thrombosis in the   femoropopliteal systems bilaterally.         Advanced directives addressed: full resuscitation

## 2018-04-11 NOTE — DIETITIAN INITIAL EVALUATION ADULT. - ENERGY NEEDS
Ht.  65    "        Wt.184    #              BMI  30.6                IBW  127  #               Pt is at   145 %  IBW  #

## 2018-04-11 NOTE — DIETITIAN INITIAL EVALUATION ADULT. - OTHER INFO
Nutrition assessment 2/2 to LOS. 83yo female admitted with smptomatic anemia. History of COPD, HTN, Peptic ulcer, and HLD. PO intake good ~% of meals consumed. BMI indicates obesity status (30.6). As per pt no significant weight changes noted. Nicholas 21 with no skin breakdown. +1 generalized edema documented. Pt reports refrains from adding salt to meals/foods PTA. Denies any difficulty chewing or swallowing. Recommendations: 1) encourage pt to continue to order meals at  for food preferences.

## 2018-04-11 NOTE — PROGRESS NOTE ADULT - SUBJECTIVE AND OBJECTIVE BOX
Patient is a 84y old  Female who presents with a chief complaint of sob, anemia (06 Apr 2018 17:10)      Subective:  Still feels short of breath. No bleeding.    PAST MEDICAL & SURGICAL HISTORY:  COPD (chronic obstructive pulmonary disease)  Hypertension  Peptic Ulcer  Hyperlipemia  S/P Cataract Extraction: right eye 5/27/10      MEDICATIONS  (STANDING):  ALBUTerol/ipratropium for Nebulization 3 milliLiter(s) Nebulizer every 6 hours  amLODIPine   Tablet 5 milliGRAM(s) Oral daily  docusate sodium 100 milliGRAM(s) Oral two times a day  guaiFENesin    Syrup 200 milliGRAM(s) Oral every 6 hours  heparin  Infusion.  Unit(s)/Hr (10 mL/Hr) IV Continuous <Continuous>  hydrochlorothiazide 25 milliGRAM(s) Oral daily  losartan 50 milliGRAM(s) Oral daily  meropenem  IVPB      meropenem  IVPB 1000 milliGRAM(s) IV Intermittent every 8 hours  methylPREDNISolone sodium succinate Injectable 20 milliGRAM(s) IV Push every 8 hours  montelukast 10 milliGRAM(s) Oral daily  pantoprazole  Injectable 40 milliGRAM(s) IV Push every 12 hours  senna 2 Tablet(s) Oral at bedtime  sodium ferric gluconate complex IVPB 125 milliGRAM(s) IV Intermittent daily    MEDICATIONS  (PRN):  ALPRAZolam 0.125 milliGRAM(s) Oral daily PRN anxiety  heparin  Injectable 5000 Unit(s) IV Push every 6 hours PRN For aPTT less than 40  ondansetron Injectable 4 milliGRAM(s) IV Push every 6 hours PRN Nausea      REVIEW OF SYSTEMS:    RESPIRATORY: No shortness of breath  CARDIOVASCULAR: No chest pain  All other review of systems is negative unless indicated above.    Vital Signs Last 24 Hrs  T(C): 36.9 (11 Apr 2018 11:22), Max: 36.9 (11 Apr 2018 11:22)  T(F): 98.4 (11 Apr 2018 11:22), Max: 98.4 (11 Apr 2018 11:22)  HR: 68 (11 Apr 2018 11:22) (62 - 72)  BP: 111/53 (11 Apr 2018 11:22) (111/53 - 121/64)  BP(mean): --  RR: 17 (11 Apr 2018 11:22) (16 - 17)  SpO2: 98% (11 Apr 2018 11:22) (93% - 98%)    PHYSICAL EXAM:    Constitutional: NAD, well-developed  Respiratory: moderately dyspneic appearing  Cardiovascular: S1 and S2, RRR  Gastrointestinal: BS+, soft, NT/ND  Extremities: No peripheral edema  Psychiatric: Normal mood, normal affect    LABS:                        9.3    11.49 )-----------( 314      ( 11 Apr 2018 06:03 )             33.0     04-11    139  |  101  |  29<H>  ----------------------------<  130<H>  5.0   |  34<H>  |  0.75    Ca    8.6      11 Apr 2018 06:03      PTT - ( 11 Apr 2018 06:03 )  PTT:32.7 sec      RADIOLOGY & ADDITIONAL STUDIES:

## 2018-04-11 NOTE — PROGRESS NOTE ADULT - ASSESSMENT
84 F w/PMH COPD, CPAP at night, HTN admitted 4/6 with experiencing progressively dyspnea over past several weeks.  She went to her pulmonologist, CXR was neg, she referred to Cardio.  After work up w/ ST, TTE was not very revealing, she was referred to GI.  She was found to be anemic w/ Hg of 8 on cbc, She has noticed some melena 1-2 episodes over the past month.  She endorsed h/o gastric ulcers. She denies any hematochezia, hematemesis. She endorses a feeling of fullness after she eats a small amount.  She's also been having pain across her upper abdomen, just below her diaphragm. Her last colonoscopy was 2 years ago.  Here, noted with hypoxic resp failure with PE on CTA and superficial thrombophlebitis of LLE, started on AC, given IV steroids/inhalers for COPD, noted with positive UA + urinary frequency and ESBL ecoli in urine cx.     1. ESBL Ecoli UTI  - urine cx reviewed  - on meropenem 1gmq8h #3/3  - continue with antibiotic coverage  - plan for 3 day course  - on ac for PE  - on IV steroids/inhalers for COPD  - s/p 3 days of azithromycin  - f/u cbc  - pulm/GI eval noted  - monitor temps  -tolerating abx well so far; no side effects noted  -reason for abx use and side effects reviewed with patient  - supportive care    2. other issues - COPD/PE/anemia - care per medicine

## 2018-04-11 NOTE — PROGRESS NOTE ADULT - ASSESSMENT
Imp:  Anemia/guaiac +  However, I still don't think she's ready for EGD    Rec:  Cont treatment for PE  EGD deferred for now, potentially friday or just outpatient

## 2018-04-11 NOTE — PROGRESS NOTE ADULT - SUBJECTIVE AND OBJECTIVE BOX
Subjective:  Patient is a 84y old  Female who presents with a chief complaint of sob, anemia      HPI:     84 F w/PMH COPD, CPAP at night, HTN, presents on 18 with progressively worse dyspnea over past several weeks.  She went to her pulmonologist, CXR was neg, she referred to Cardio.  After work up w/ ST, TTE was not very revealing, she was referred to GI.  She was found on anemic w/ Hg of 8 on cbc, which is not normal for her.  She has noticed some melena 1-2 episodes over the past month.  She denies any hematochezia, hematemesis. She endorses a feeling of fullness after she eats a small amount.  She's also been having pain across her upper abdomen, just below her diaphragm.  She cannot tell me if this pain is related to eating.  She does have a good appetite but just gets full fast.  Gdtr at bedside notes that she doesn't eat as much.  Pt denies any nausea.  Her last colonoscopy was 2 years ago.  She endorsed h/o gastric ulcers.     In ED  pt is dyspneic w/ conversation, she's satting 85% on 3LNC, changed to ventimask, now at 95%.  She is feeling very anxious and requesting xanax- which she takes prn at home 1/2 of 0.25mg dose.     18 - Patient seen and examined at bedside earlier today, dyspneic with minimal exertion, even talking, but reports slight improvement of the dyspnea   18 - events noted, transferred to tele for o2 monitoring due to desaturation , reports improvement of the dyspnea, cough, 1 BM brown overnight  tele- o2 sats to 85%, episodes of bigemeny    - tele O2 sats on O2 wnl, reports improvement of dyspnea and cough since yesterday, no BM, denies abd pain, feels constipated  4/10 - dyspnea slightly worse today, cough productive, denies CP, + brown BM, no blood, denies abd pain   - dyspnea slightly better on higher dose of IV steroids, + productive cough, overall better    Review of system- Rest of the review of system are negative except mentioned in HPI    OBJECTIVE:   T(C): 36.9 (18 @ 11:22), Max: 36.9 (18 @ 11:22)  T(F): 98.4 (18 @ 11:22), Max: 98.4 (18 @ 11:22)  HR: 68 (18 @ :22) (62 - 72)  BP: 111/53 (18 @ 11:22) (111/53 - 121/64)  RR: 17 (18 @ 11:22) (16 - 17)  SpO2: 98% (18 @ :22) (93% - 98%)  Wt(kg): --  Daily Weight in k.5 (2018 00:09)    PHYSICAL EXAM:  GENERAL: NAD  NERVOUS SYSTEM:  Alert & Oriented X3, non- focal exam,  HEAD:  Atraumatic, Normocephalic  EYES: EOMI, PERRLA, conjunctiva and sclera clear  HEENT: Moist mucous membranes  NECK: Supple, No JVD  CHEST/LUNG: BS decreased bilaterally; No rales, no rhonchi, +  wheezing,  HEART: Regular rate and rhythm; No murmurs, rubs, or gallops  ABDOMEN: Soft, Nontender, Nondistended; Bowel sounds present  GENITOURINARY- Voiding, no suprapubic tenderness  EXTREMITIES:  2+ Peripheral Pulses, No clubbing, cyanosis, or edema  MUSCULOSKELETAL:- No muscle tenderness, Muscle tone normal, No joint tenderness, no Joint swelling, Joint range of motion-normal  SKIN-no rash, no lesion    LABS:      139  |  101  |  29<H>  ----------------------------<  130<H>  5.0   |  34<H>  |  0.75    Ca    8.6      2018 06:03                        9.3    11.49 )-----------( 314      ( 2018 06:03 )             33.0     PTT - ( 2018 06:03 )  PTT:32.7 sec      04-10    139  |  102  |  29<H>  ----------------------------<  77  4.3   |  33<H>  |  0.74    Ca    8.8      10 Apr 2018 06:51                        9.5    15.50 )-----------( 310      ( 10 Apr 2018 06:51 )             32.7     PTT - ( 10 Apr 2018 09:51 )  PTT:59.6 sec          141  |  103  |  35<H>  ----------------------------<  124<H>  4.2   |  31  |  0.89    Ca    9.0      2018 06:22                       10.0   x     )-----------( x        ( 2018 12:19 )             34.6     PTT - ( 2018 09:39 )  PTT:167.8 sec          141  |  104  |  25<H>  ----------------------------<  140<H>  4.3   |  31  |  0.79    Ca    9.0      2018 06:03    TPro  6.8  /  Alb  2.9<L>  /  TBili  0.4  /  DBili  x   /  AST  14<L>  /  ALT  20  /  AlkPhos  77                              9.3    18.67 )-----------( 351      ( 2018 06:03 )             32.6       CARDIAC MARKERS ( 2018 16:07 )  <0.015 ng/mL / x     / x     / x     / x      CARDIAC MARKERS ( 2018 12:29 )  <0.015 ng/mL / x     / x     / x     / x            LIVER FUNCTIONS - ( 2018 12:29 )  Alb: 2.9 g/dL / Pro: 6.8 gm/dL / ALK PHOS: 77 U/L / ALT: 20 U/L / AST: 14 U/L / GGT: x             PT/INR - ( 2018 12:29 )   PT: 11.8 sec;   INR: 1.09 ratio         PTT - ( 2018 09:45 )  PTT:65.3 sec    Urinalysis Basic - ( 2018 16:57 )    Color: Yellow / Appearance: Clear / S.015 / pH: x  Gluc: x / Ketone: Negative  / Bili: Negative / Urobili: Negative mg/dL   Blood: x / Protein: 15 mg/dL / Nitrite: Positive   Leuk Esterase: Small / RBC: 0-2 /HPF / WBC 5-8 /HPF   Sq Epi: x / Non Sq Epi: Occasional / Bacteria: Many                            10.6   10.20 )-----------( 353      ( 2018 07:23 )             35.7         141  |  104  |  17  ----------------------------<  152<H>  4.0   |  30  |  0.64    Ca    8.8      2018 07:23    TPro  6.8  /  Alb  2.9<L>  /  TBili  0.4  /  DBili  x   /  AST  14<L>  /  ALT  20  /  AlkPhos  77      PT/INR - ( 2018 12:29 )   PT: 11.8 sec;   INR: 1.09 ratio         PTT - ( 2018 12:29 )  PTT:25.8 sec  CARDIAC MARKERS ( 2018 16:07 )  <0.015 ng/mL / x     / x     / x     / x      CARDIAC MARKERS ( 2018 12:29 )  <0.015 ng/mL / x     / x     / x     / x          Urinalysis Basic - ( 2018 16:57 )    Color: Yellow / Appearance: Clear / S.015 / pH: x  Gluc: x / Ketone: Negative  / Bili: Negative / Urobili: Negative mg/dL   Blood: x / Protein: 15 mg/dL / Nitrite: Positive   Leuk Esterase: Small / RBC: 0-2 /HPF / WBC 5-8 /HPF   Sq Epi: x / Non Sq Epi: Occasional / Bacteria: Many      CAPILLARY BLOOD GLUCOSE    RECENT CULTURES:    RADIOLOGY & ADDITIONAL TESTS:    < from: US Duplex Venous Lower Ext Complete, Bilateral (18 @ 17:25) >  IMPRESSION:     Left soleal vein thrombosis without extension into the popliteal vein.    No sonographic evidence of acute deep venous thrombosis in the   femoropopliteal systems bilaterally.     < end of copied text >  < from: CT Angio Chest PE Protocol w/ IV Cont (18 @ 16:53) >  Lungs, Airways and Pleura: Central tracheobronchial tree is grossly   patent. No endobronchial lesions. Small to mild bilateral pleural   effusions right greater than left with bibasilar compressive atelectasis.   No pneumothorax. No discrete pulmonary nodules. Subsegmental atelectasis   in the lingula.    Heart and Great Vessels: Atherosclerotic changes of the aorta and   coronary vasculature. Study is mildly limited secondary to heterogeneous   opacification of the distal segmental and subsegmental pulmonary vessels.   Focal filling defect within the mid to distal posterior right upper lobe   segmental vessel compatible with a focal pulmonary embolus.. No other   evidence of acute pulmonary embolismwithin the limits of the study.. No   aortic aneurysm. No definite evidence of aortic dissection. Heart is   normal in size. No pericardial effusion.    Mediastinum and Lanette: Pretracheal and right hilar lymph nodes measuring   up to 1.3 cm.    Neck and Chest Wall: within normal limits    Bones: Degenerative changes and osteopenia. Mild to moderate compression   deformities of T6-T8 chronic in appearance. Compression deformity of L1   appears chronic in nature.    Upper Abdomen:  Left lateral midpole renal cyst. Elevation of the right   hemidiaphragm.    IMPRESSION:         Small focal pulmonary embolus in a posterior right upper lobe segmental   vessel.    < end of copied text >    < from: Xray Chest 1 View AP/PA. (18 @ 10:47) >  Heart magnified by projection, unchanged. Atherosclerotic aorta.   Symmetrically hyperinflated lungs. No focal consolidation or pleural   effusion. Old fracture right seventh posterior rib.    Impression: Hyperinflated lungs. No active infiltrates.    < end of copied text >  MEDICATIONS  (STANDING):  ALBUTerol/ipratropium for Nebulization 3 milliLiter(s) Nebulizer every 6 hours  amLODIPine   Tablet 5 milliGRAM(s) Oral daily  azithromycin  IVPB 500 milliGRAM(s) IV Intermittent every 24 hours  azithromycin  IVPB      guaiFENesin    Syrup 200 milliGRAM(s) Oral every 6 hours  heparin  Infusion.  Unit(s)/Hr (10 mL/Hr) IV Continuous <Continuous>  hydrochlorothiazide 25 milliGRAM(s) Oral daily  losartan 50 milliGRAM(s) Oral daily  methylPREDNISolone sodium succinate Injectable 20 milliGRAM(s) IV Push every 12 hours  montelukast 10 milliGRAM(s) Oral daily  pantoprazole Infusion 8 mG/Hr (10 mL/Hr) IV Continuous <Continuous>    MEDICATIONS  (PRN):  ALPRAZolam 0.125 milliGRAM(s) Oral daily PRN anxiety  heparin  Injectable 5000 Unit(s) IV Push every 6 hours PRN For aPTT less than 40  ondansetron Injectable 4 milliGRAM(s) IV Push every 6 hours PRN Nausea

## 2018-04-11 NOTE — PROGRESS NOTE ADULT - SUBJECTIVE AND OBJECTIVE BOX
Subjective:    pat no new complaint, still wheezing, sitting in chair.    Home Medications:  amLODIPine 5 mg oral tablet: 1 tab(s) orally once a day (06 Apr 2018 17:25)  Anoro Ellipta 62.5 mcg-25 mcg/inh inhalation powder: 1 puff(s) inhaled once a day (06 Apr 2018 17:25)  calcium (as carbonate) 600 mg oral tablet: 2 tab(s) orally once a day (06 Apr 2018 17:25)  hydroCHLOROthiazide 25 mg oral tablet: 1 tab(s) orally once a day (06 Apr 2018 17:25)  losartan 50 mg oral tablet: 1 tab(s) orally once a day (06 Apr 2018 17:25)  montelukast 10 mg oral tablet: 1 tab(s) orally once a day (06 Apr 2018 17:25)  predniSONE 20 mg oral tablet: 1 tab(s) orally once a day    ***COURSE COMPLETED*** (06 Apr 2018 17:25)  ProAir HFA 90 mcg/inh inhalation aerosol: 2 puff(s) inhaled 4 times a day, As Needed (06 Apr 2018 17:25)  Vitamin D3 2000 intl units oral tablet: 1 tab(s) orally once a day (06 Apr 2018 17:25)  Xanax 0.25 mg oral tablet: 0.5 tab(s) orally once a day, As Needed for anxiety  (06 Apr 2018 17:25)    MEDICATIONS  (STANDING):  ALBUTerol/ipratropium for Nebulization 3 milliLiter(s) Nebulizer every 6 hours  amLODIPine   Tablet 5 milliGRAM(s) Oral daily  docusate sodium 100 milliGRAM(s) Oral two times a day  guaiFENesin    Syrup 200 milliGRAM(s) Oral every 6 hours  heparin  Infusion.  Unit(s)/Hr (10 mL/Hr) IV Continuous <Continuous>  hydrochlorothiazide 25 milliGRAM(s) Oral daily  losartan 50 milliGRAM(s) Oral daily  meropenem  IVPB      meropenem  IVPB 1000 milliGRAM(s) IV Intermittent every 8 hours  methylPREDNISolone sodium succinate Injectable 20 milliGRAM(s) IV Push every 8 hours  montelukast 10 milliGRAM(s) Oral daily  pantoprazole Infusion 8 mG/Hr (10 mL/Hr) IV Continuous <Continuous>  senna 2 Tablet(s) Oral at bedtime  sodium ferric gluconate complex IVPB 125 milliGRAM(s) IV Intermittent daily    MEDICATIONS  (PRN):  ALPRAZolam 0.125 milliGRAM(s) Oral daily PRN anxiety  heparin  Injectable 5000 Unit(s) IV Push every 6 hours PRN For aPTT less than 40  ondansetron Injectable 4 milliGRAM(s) IV Push every 6 hours PRN Nausea      Allergies    No Known Allergies    Intolerances        Vital Signs Last 24 Hrs  T(C): 36.4 (11 Apr 2018 04:53), Max: 36.7 (10 Apr 2018 18:15)  T(F): 97.5 (11 Apr 2018 04:53), Max: 98.1 (10 Apr 2018 18:15)  HR: 62 (11 Apr 2018 04:53) (62 - 72)  BP: 121/64 (11 Apr 2018 04:53) (117/48 - 121/64)  BP(mean): --  RR: 16 (11 Apr 2018 04:53) (16 - 17)  SpO2: 97% (11 Apr 2018 04:53) (93% - 97%)      PHYSICAL EXAMINATION:    NECK:  Supple. No lymphadenopathy. Jugular venous pressure not elevated. Carotids equal.   HEART:   The cardiac impulse has a normal quality. Reg., Nl S1 and S2.  There are no murmurs, rubs or gallops noted  CHEST:  Chest rhonchi to auscultation. Normal respiratory effort.  ABDOMEN:  Soft and nontender.   EXTREMITIES:  There is no edema.       LABS:                        9.3    11.49 )-----------( 314      ( 11 Apr 2018 06:03 )             33.0     04-11    139  |  101  |  29<H>  ----------------------------<  130<H>  5.0   |  34<H>  |  0.75    Ca    8.6      11 Apr 2018 06:03      PTT - ( 11 Apr 2018 06:03 )  PTT:32.7 sec      Culture - Urine (04.06.18 @ 16:57)    -  Amikacin: S <=8    -  Amoxicillin/Clavulanic Acid: S <=8/4    -  Ampicillin: S <=2 These ampicillin results predict results for amoxicillin    -  Ampicillin/Sulbactam: S <=4/2    -  Aztreonam: S 8    -  Cefazolin: S <=2 This predicts results for oral agents cefaclor, cefdinir, cefpodoxime, cefprozil, cefuroxime axetil, cephalexin and locarbef for uncomplicated UTI. Note that some isolates may be susceptible to these agents while testing resistant to cefazolin.    -  Cefepime: I 16    -  Cefoxitin: S <=4    -  Ceftriaxone: I 2 Enterobacter, Citrobacter, and Serratia may develop resistance during prolonged therapy    -  Ciprofloxacin: S <=0.5    -  Ertapenem: S <=0.5    -  Gentamicin: I 8    -  Imipenem: S <=1    -  Levofloxacin: S <=1    -  Meropenem: S <=1    -  Nitrofurantoin: R >64 Should not be used to treat pyelonephritis    -  Piperacillin/Tazobactam: S <=8    -  Tigecycline: S <=1    -  Tobramycin: I 8    -  Trimethoprim/Sulfamethoxazole: S <=0.5/9.5    Specimen Source: .Urine Clean Catch (Midstream)    Culture Results:   >100,000 CFU/ml Escherichia coli    Organism Identification: Escherichia coli    Organism: Escherichia coli    Method Type: BHUPINDER

## 2018-04-11 NOTE — PROGRESS NOTE ADULT - ASSESSMENT
84 F w/PMH COPD, CPAP at night, HTN, presents on 4/6/18 with progressively dyspnea over past several weeks.  Found w/ anemia, +melena  Currently, pt is dyspneic w/ conversation, she's satting 85% on 3LNC, changed to ventimask, now at 95%.  She is feeling very anxious and requesting xanax- which she takes prn at home 1/2 of 0.25mg dose.      Hg 8.1- receiving 2 units PRBC      * Acute respiratory failure with hypoxia,  multifactorial:  * Acute COPD exacerbation, slow improvement  * Sleep apnea   - 85% on 3LNC, now on ventimask at 95%  - monitor sats, titrate O2 to keep sats>90%  - IV steroids--> taper 20 q8h ( wheezing worse 4/10/18)   - s/p Azithromycin  - nebs, mucolytics  - cingulair  - pulmonary consult  - add CPAP qhs and incentive spirometry    * Elevated D-dimers due to   * Left DVT soleal vein  * PE RUL segmental vessel  - CTA - positive for PE  - Doppler LE - positive for DVT  - d/w Dr. Arizmendi due to high risk of GI bleed would benefit from IVC filter , ok to use low range heparin until source of GI bleed established, high range of heparin drip will be to risky  - started on IV low range heparin  - - AC services consult  - Dr. Mendoza consult - r/o hypercoagulability state as o/p, Ct abd to r/o malignancies, coumadin will be better option for AC  - O2 monitoring  -interventional cardiology consult Dr. Kramer for possible IVC filter placement  - no need for IVC filter at this time , but if develops GI bleed than may benefit  - 2 d echo - EF 65%  - Dr. Murguia cardiology consult    *  Symptomatic anemia. , stable  - no active bleeding  - s/p 2 units of PRBC  - H/H q12h--> q6h while on heparin drip  - check iron studies, B12, folate    * Leukocytosis, multifactorial  * ESBL UTI  * Steroids induced  - started on Imepenem by ID  - isolation    * Melena suspected subacute Upper GI bleed  - GI consult - EGD possible on Thursday  - will benefit form EGD once respiratory status improves  - high risk for endoscopy at this time  - c/w PPI BID--> PPI drip while on heparin drip  - monitor H/H and transfuse as needed.  if Hb <8  - Ct abd/pelv - to r/o malignancies given unprovoked VTE   - GI consult Dr. Vizcarra for further endoscopic evaluation - plan for Thursday  - d/w Dr. Baker pt stable for endoscopic procedure  - medically optimized for endoscopic     *  Essential hypertension.    bp stable,   c/w home meds HCTZ, losartan, amlodipine  monitor and titrate meds as indicated.     * Anxiety  - c/w benzo prn    * Prophylactic measure. Plan: SCDs- no chem d/t GIB.

## 2018-04-12 LAB
ANION GAP SERPL CALC-SCNC: 6 MMOL/L — SIGNIFICANT CHANGE UP (ref 5–17)
APTT BLD: 53.3 SEC — HIGH (ref 27.5–37.4)
APTT BLD: 71 SEC — HIGH (ref 27.5–37.4)
BUN SERPL-MCNC: 32 MG/DL — HIGH (ref 7–23)
CALCIUM SERPL-MCNC: 9 MG/DL — SIGNIFICANT CHANGE UP (ref 8.5–10.1)
CHLORIDE SERPL-SCNC: 100 MMOL/L — SIGNIFICANT CHANGE UP (ref 96–108)
CO2 SERPL-SCNC: 33 MMOL/L — HIGH (ref 22–31)
CREAT SERPL-MCNC: 0.59 MG/DL — SIGNIFICANT CHANGE UP (ref 0.5–1.3)
GLUCOSE SERPL-MCNC: 135 MG/DL — HIGH (ref 70–99)
HCT VFR BLD CALC: 33.8 % — LOW (ref 34.5–45)
HGB BLD-MCNC: 9.7 G/DL — LOW (ref 11.5–15.5)
INR BLD: 1.03 RATIO — SIGNIFICANT CHANGE UP (ref 0.88–1.16)
MCHC RBC-ENTMCNC: 23.1 PG — LOW (ref 27–34)
MCHC RBC-ENTMCNC: 28.7 GM/DL — LOW (ref 32–36)
MCV RBC AUTO: 80.5 FL — SIGNIFICANT CHANGE UP (ref 80–100)
NRBC # BLD: 0 /100 WBCS — SIGNIFICANT CHANGE UP (ref 0–0)
PLATELET # BLD AUTO: 330 K/UL — SIGNIFICANT CHANGE UP (ref 150–400)
POTASSIUM SERPL-MCNC: 4.7 MMOL/L — SIGNIFICANT CHANGE UP (ref 3.5–5.3)
POTASSIUM SERPL-SCNC: 4.7 MMOL/L — SIGNIFICANT CHANGE UP (ref 3.5–5.3)
PROTHROM AB SERPL-ACNC: 11.1 SEC — SIGNIFICANT CHANGE UP (ref 9.8–12.7)
RBC # BLD: 4.2 M/UL — SIGNIFICANT CHANGE UP (ref 3.8–5.2)
RBC # FLD: 19.5 % — HIGH (ref 10.3–14.5)
SODIUM SERPL-SCNC: 139 MMOL/L — SIGNIFICANT CHANGE UP (ref 135–145)
WBC # BLD: 15.28 K/UL — HIGH (ref 3.8–10.5)
WBC # FLD AUTO: 15.28 K/UL — HIGH (ref 3.8–10.5)

## 2018-04-12 PROCEDURE — 99233 SBSQ HOSP IP/OBS HIGH 50: CPT

## 2018-04-12 RX ORDER — THEOPHYLLINE ANHYDROUS 200 MG
200 TABLET, EXTENDED RELEASE 12 HR ORAL DAILY
Qty: 0 | Refills: 0 | Status: DISCONTINUED | OUTPATIENT
Start: 2018-04-12 | End: 2018-04-18

## 2018-04-12 RX ORDER — SODIUM CHLORIDE 9 MG/ML
1000 INJECTION, SOLUTION INTRAVENOUS
Qty: 0 | Refills: 0 | Status: DISCONTINUED | OUTPATIENT
Start: 2018-04-12 | End: 2018-04-13

## 2018-04-12 RX ORDER — LACTOBACILLUS ACIDOPHILUS 100MM CELL
1 CAPSULE ORAL
Qty: 0 | Refills: 0 | Status: DISCONTINUED | OUTPATIENT
Start: 2018-04-12 | End: 2018-04-21

## 2018-04-12 RX ADMIN — Medication 110 MILLIGRAM(S): at 11:40

## 2018-04-12 RX ADMIN — Medication 200 MILLIGRAM(S): at 14:03

## 2018-04-12 RX ADMIN — AMLODIPINE BESYLATE 5 MILLIGRAM(S): 2.5 TABLET ORAL at 05:33

## 2018-04-12 RX ADMIN — Medication 200 MILLIGRAM(S): at 17:26

## 2018-04-12 RX ADMIN — LOSARTAN POTASSIUM 50 MILLIGRAM(S): 100 TABLET, FILM COATED ORAL at 05:33

## 2018-04-12 RX ADMIN — Medication 20 MILLIGRAM(S): at 14:26

## 2018-04-12 RX ADMIN — Medication 25 MILLIGRAM(S): at 05:33

## 2018-04-12 RX ADMIN — HEPARIN SODIUM 600 UNIT(S)/HR: 5000 INJECTION INTRAVENOUS; SUBCUTANEOUS at 02:04

## 2018-04-12 RX ADMIN — Medication 100 MILLIGRAM(S): at 05:33

## 2018-04-12 RX ADMIN — Medication 3 MILLILITER(S): at 07:58

## 2018-04-12 RX ADMIN — Medication 3 MILLILITER(S): at 14:32

## 2018-04-12 RX ADMIN — Medication 200 MILLIGRAM(S): at 11:40

## 2018-04-12 RX ADMIN — SENNA PLUS 2 TABLET(S): 8.6 TABLET ORAL at 22:24

## 2018-04-12 RX ADMIN — Medication 3 MILLILITER(S): at 01:22

## 2018-04-12 RX ADMIN — PANTOPRAZOLE SODIUM 40 MILLIGRAM(S): 20 TABLET, DELAYED RELEASE ORAL at 17:25

## 2018-04-12 RX ADMIN — Medication 200 MILLIGRAM(S): at 05:32

## 2018-04-12 RX ADMIN — Medication 20 MILLIGRAM(S): at 22:24

## 2018-04-12 RX ADMIN — Medication 200 MILLIGRAM(S): at 22:24

## 2018-04-12 RX ADMIN — PANTOPRAZOLE SODIUM 40 MILLIGRAM(S): 20 TABLET, DELAYED RELEASE ORAL at 05:32

## 2018-04-12 RX ADMIN — HEPARIN SODIUM 600 UNIT(S)/HR: 5000 INJECTION INTRAVENOUS; SUBCUTANEOUS at 08:02

## 2018-04-12 RX ADMIN — Medication 3 MILLILITER(S): at 19:20

## 2018-04-12 RX ADMIN — MONTELUKAST 10 MILLIGRAM(S): 4 TABLET, CHEWABLE ORAL at 11:40

## 2018-04-12 RX ADMIN — Medication 0.12 MILLIGRAM(S): at 22:34

## 2018-04-12 RX ADMIN — Medication 20 MILLIGRAM(S): at 05:32

## 2018-04-12 NOTE — PROGRESS NOTE ADULT - ASSESSMENT
Imp:  Anemia/guaiac +  Breathing improved now    Rec:  Cont treatment for PE  EGD tomorrow afternoon, assuming respiratory status permits. D/W  patient who agrees

## 2018-04-12 NOTE — PROGRESS NOTE ADULT - SUBJECTIVE AND OBJECTIVE BOX
HPI: This is a 85 y/o female w PMH COPD, CPAP at night, HTN, has been experiencing progressively dyspnea over past several weeks.  She went to her pulmonologist, CXR was neg, she referred to Cardio.  After work up w/ ST, TTE was not very revealing, she was referred to GI.  She was found on anemic w/ Hg of 8 on cbc, which is not normal for her.  She has noticed some melena 1-2 episodes over the past month.  She denies any hematochezia, hematemesis. She endorses a feeling of fullness after she eats a small amount.  She's also been having pain across her upper abdomen, just below her diaphragm.  She cannot tell me if this pain is related to eating.  She does have a good appetite but just gets full fast.  Gdtr at bedside notes that she doesn't eat as much.  Pt denies any nausea.  Her last colonoscopy was 2 years ago.  She endorsed h/o gastric ulcers.     Currently, pt is dyspneic w/ conversation, she's satting 85% on 3LNC, changed to ventimask, now at 95%.  She is feeling very anxious and requesting xanax- which she takes prn at home 1/2 of 0.25mg dose. 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 She continues to report of SOB. She denies prior VTE, CVA, MI, no blood clotting disorder. She is a former smoker, has been under stress recently. She reports of being an active caregiver to her  with Parkinson's disease, but now has HHA and decreased her mobility. Informed pt of possible oral anticoagulation with warfarin and she agrees to therapy, but will need to have endoscopy done first. Pending IVC filter placement tomorrow. Discussed with primary RN and no active bleeding noted today. Her H/H today 9.3/32.6 after s/p 2 units pRBC.      4/9: Patient seen at bedside with Dr. Kramer- explained will hold off on IVC filter as DVT is in small vein and no obvious bleeding at this time. Remain on UFH. Patient reports some improvement in breathing- but still with RUDD and O2 nc.  4/10: seen at bedside, some RUDD on exertion O 2 in place        4/11: seen at bedside, for Upper endo tomorrow, coumadin still on hold but discussed with pt foods to avoid/ vit k diet      Patient is a 84y old  Female who presents with a chief complaint of sob, anemia (06 Apr 2018 17:10)    Consulted by Dr. Sinha for VTE prophylaxis, risk stratification, and anticoagulation management.    PAST MEDICAL & SURGICAL HISTORY:  COPD (chronic obstructive pulmonary disease)  Hypertension  Peptic Ulcer  Hyperlipemia  S/P Cataract Extraction: right eye 5/27/10    BMI: 30.6    CrCL: 69.88    IMPROVE VTE Risk Score: 5    IMPROVE Bleeding Risk Score: 5.5    Falls Risk:   High (  )  Mod (  )  Low ( X )  FAMILY HISTORY:  No pertinent family history in first degree relatives    Denies any personal or familial history of clotting or bleeding disorders.    Allergies    No Known Allergies    Intolerances    REVIEW OF SYSTEMS    (  )Fever	     (  )Constipation	( X )SOB			(  )Headache	(  )Dysuria  (  )Chills	     (  )Melena	(  )Dyspnea present on exertion    (  )Dizziness                    (  )Polyuria  (  )Nausea	     (  )Hematochezia	(  )Cough		                    (  )Syncope   	(  )Hematuria  (  )Vomiting    (  )Chest Pain	(  )Wheezing		(  )Weakness  (  )Diarrhea     (  )Palpitations	(  )Anorexia		(  )Myalgia    All other review of systems negative: Yes    PHYSICAL EXAM:    Constitutional: Appears Well    Neurological: A& O x 3    Skin: Warm    Respiratory and Thorax: increased effort; Breath sounds: decreased with b/l rhonchi- non-prod moist cough   	  Cardiovascular: S1, S2, regular, NMBR	    Gastrointestinal: BS + x 4Q, nontender	    Genitourinary:  Bladder nondistended, nontender    Musculoskeletal:   General Right:   no muscle/joint tenderness,   normal tone, no joint swelling,   ROM: full	    General Left:   no muscle/joint tenderness,   normal tone, no joint swelling,   ROM: full    Lower extrems:   Right: no calf tenderness              negative spencer's sign               + pedal pulses    Left:   no calf tenderness              negative spencer's sign               + pedal pulses                                   9.7    15.28 )-----------( 330      ( 12 Apr 2018 06:52 )             33.8       04-12    139  |  100  |  32<H>  ----------------------------<  135<H>  4.7   |  33<H>  |  0.59    Ca    9.0      12 Apr 2018 06:52        PT/INR - ( 12 Apr 2018 06:52 )   PT: 11.1 sec;   INR: 1.03 ratio         PTT - ( 12 Apr 2018 06:52 )  PTT:53.3 sec                 9.5    15.50 )-----------( 310      ( 10 Apr 2018 06:51 )             32.7       04-10    139  |  102  |  29<H>  ----------------------------<  77  4.3   |  33<H>  |  0.74    Ca    8.8      10 Apr 2018 06:51        PTT - ( 10 Apr 2018 09:51 )  PTT:59.6 sec                        9.4    22.50 )-----------( 332      ( 09 Apr 2018 06:22 )             33.6       04-09    141  |  103  |  35<H>  ----------------------------<  124<H>  4.2   |  31  |  0.89    Ca    9.0      09 Apr 2018 06:22        PTT - ( 09 Apr 2018 09:39 )  PTT:167.8 sec                          9.3    18.67 )-----------( 351      ( 08 Apr 2018 06:03 )             32.6       04-08    141  |  104  |  25<H>  ----------------------------<  140<H>  4.3   |  31  |  0.79    Ca    9.0      08 Apr 2018 06:03    TPro  6.8  /  Alb  2.9<L>  /  TBili  0.4  /  DBili  x   /  AST  14<L>  /  ALT  20  /  AlkPhos  77  04-06    PT/INR - ( 06 Apr 2018 12:29 )   PT: 11.8 sec;   INR: 1.09 ratio    PTT - ( 08 Apr 2018 09:45 )  PTT:65.3 sec		    Vital Signs Last 24 Hrs  T(C): 37.3 (04-12-18 @ 11:14), Max: 37.3 (04-12-18 @ 11:14)  T(F): 99.1 (04-12-18 @ 11:14), Max: 99.1 (04-12-18 @ 11:14)  HR: 62 (04-12-18 @ 14:40) (46 - 74)  BP: 134/61 (04-12-18 @ 11:14) (127/51 - 135/46)  BP(mean): --  RR: 18 (04-12-18 @ 11:14) (17 - 18)  SpO2: 93% (04-12-18 @ 11:14) (90% - 96%)      MEDICATIONS  (STANDING):  ALBUTerol/ipratropium for Nebulization 3 milliLiter(s) Nebulizer every 6 hours  amLODIPine   Tablet 5 milliGRAM(s) Oral daily  docusate sodium 100 milliGRAM(s) Oral two times a day  guaiFENesin    Syrup 200 milliGRAM(s) Oral every 6 hours  heparin  Infusion.  Unit(s)/Hr IV Continuous <Continuous>  hydrochlorothiazide 25 milliGRAM(s) Oral daily  losartan 50 milliGRAM(s) Oral daily  methylPREDNISolone sodium succinate Injectable 20 milliGRAM(s) IV Push every 8 hours  montelukast 10 milliGRAM(s) Oral daily  pantoprazole  Injectable 40 milliGRAM(s) IV Push every 12 hours  senna 2 Tablet(s) Oral at bedtime  sodium ferric gluconate complex IVPB 125 milliGRAM(s) IV Intermittent daily  theophylline SR Tablet 200 milliGRAM(s) Oral daily    IMAGING:  US DPLX LWR EXT VEINS COMPL BI:  04/07/2018    FINDINGS:  RIGHT:  There is normal compressibility of the common femoral, femoral, and popliteal veins.  Visualized posterior tibial veins are within normal limits. Doppler examination shows normal spontaneous and phasic flow. Right popliteal cyst.    LEFT: There is normal compressibility of the common femoral, femoral, and popliteal veins. Thrombosis of the left soleal vein without extension into the popliteal vein. Doppler examination shows normal spontaneous and phasic flow. Left popliteal fossa cyst.    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 No sonographic evidence of acute deep venous thrombosis in the femoropopliteal systems bilaterally.     EXAM:  CT ANGIO CHEST PE PROTOCOL IC:  04/07/2018    IMPRESSION: Small focal pulmonary embolus in a posterior right upper lobe segmental vessel.    DVT Prophylaxis:  LMWH                   (  )  Heparin SQ           (  )  Coumadin             (  )  Xarelto                  (  )  Eliquis                   (  )  Venodynes           (  )  Ambulation          (X )  UFH                       ( X )  Contraindicated  (  )

## 2018-04-12 NOTE — PROGRESS NOTE ADULT - SUBJECTIVE AND OBJECTIVE BOX
Subjective:    pat better, less sob, sitting in chair.    Home Medications:  amLODIPine 5 mg oral tablet: 1 tab(s) orally once a day (06 Apr 2018 17:25)  Anoro Ellipta 62.5 mcg-25 mcg/inh inhalation powder: 1 puff(s) inhaled once a day (06 Apr 2018 17:25)  calcium (as carbonate) 600 mg oral tablet: 2 tab(s) orally once a day (06 Apr 2018 17:25)  hydroCHLOROthiazide 25 mg oral tablet: 1 tab(s) orally once a day (06 Apr 2018 17:25)  losartan 50 mg oral tablet: 1 tab(s) orally once a day (06 Apr 2018 17:25)  montelukast 10 mg oral tablet: 1 tab(s) orally once a day (06 Apr 2018 17:25)  predniSONE 20 mg oral tablet: 1 tab(s) orally once a day    ***COURSE COMPLETED*** (06 Apr 2018 17:25)  ProAir HFA 90 mcg/inh inhalation aerosol: 2 puff(s) inhaled 4 times a day, As Needed (06 Apr 2018 17:25)  Vitamin D3 2000 intl units oral tablet: 1 tab(s) orally once a day (06 Apr 2018 17:25)  Xanax 0.25 mg oral tablet: 0.5 tab(s) orally once a day, As Needed for anxiety  (06 Apr 2018 17:25)    MEDICATIONS  (STANDING):  ALBUTerol/ipratropium for Nebulization 3 milliLiter(s) Nebulizer every 6 hours  amLODIPine   Tablet 5 milliGRAM(s) Oral daily  docusate sodium 100 milliGRAM(s) Oral two times a day  guaiFENesin    Syrup 200 milliGRAM(s) Oral every 6 hours  heparin  Infusion.  Unit(s)/Hr (10 mL/Hr) IV Continuous <Continuous>  hydrochlorothiazide 25 milliGRAM(s) Oral daily  losartan 50 milliGRAM(s) Oral daily  methylPREDNISolone sodium succinate Injectable 20 milliGRAM(s) IV Push every 8 hours  montelukast 10 milliGRAM(s) Oral daily  pantoprazole  Injectable 40 milliGRAM(s) IV Push every 12 hours  senna 2 Tablet(s) Oral at bedtime  sodium ferric gluconate complex IVPB 125 milliGRAM(s) IV Intermittent daily    MEDICATIONS  (PRN):  ALPRAZolam 0.125 milliGRAM(s) Oral daily PRN anxiety  heparin  Injectable 5000 Unit(s) IV Push every 6 hours PRN For aPTT less than 40  ondansetron Injectable 4 milliGRAM(s) IV Push every 6 hours PRN Nausea      Allergies    No Known Allergies    Intolerances        Vital Signs Last 24 Hrs  T(C): 36.7 (12 Apr 2018 05:08), Max: 36.9 (11 Apr 2018 11:22)  T(F): 98.1 (12 Apr 2018 05:08), Max: 98.4 (11 Apr 2018 11:22)  HR: 66 (12 Apr 2018 05:08) (46 - 68)  BP: 127/51 (12 Apr 2018 05:08) (111/53 - 135/46)  BP(mean): --  RR: 17 (11 Apr 2018 16:50) (17 - 17)  SpO2: 95% (12 Apr 2018 05:08) (90% - 98%)      PHYSICAL EXAMINATION:    NECK:  Supple. No lymphadenopathy. Jugular venous pressure not elevated. Carotids equal.   HEART:   The cardiac impulse has a normal quality. Reg., Nl S1 and S2.  There are no murmurs, rubs or gallops noted  CHEST:  Chest few rhonchi to auscultation. Normal respiratory effort.  ABDOMEN:  Soft and nontender.   EXTREMITIES:  There is no edema.       LABS:                        9.7    15.28 )-----------( 330      ( 12 Apr 2018 06:52 )             33.8     04-12    139  |  100  |  32<H>  ----------------------------<  135<H>  4.7   |  33<H>  |  0.59    Ca    9.0      12 Apr 2018 06:52      PT/INR - ( 12 Apr 2018 06:52 )   PT: 11.1 sec;   INR: 1.03 ratio         PTT - ( 12 Apr 2018 06:52 )  PTT:53.3 sec

## 2018-04-12 NOTE — PROGRESS NOTE ADULT - ASSESSMENT
84 F w/PMH COPD, CPAP at night, HTN, presents on 4/6/18 with progressively dyspnea over past several weeks.  Found w/ anemia, +melena  Currently, pt is dyspneic w/ conversation, she's satting 85% on 3LNC, changed to ventimask, now at 95%.  She is feeling very anxious and requesting xanax- which she takes prn at home 1/2 of 0.25mg dose.      Hg 8.1- receiving 2 units PRBC      * Acute respiratory failure with hypoxia,  multifactorial:  * Acute COPD exacerbation, slow improvement  * Sleep apnea   - 85% on 3LNC, now on ventimask at 95%  - monitor sats, titrate O2 to keep sats>90%  - IV steroids--> taper 20 q8h ( wheezing worse 4/10/18)   - s/p Azithromycin  - nebs, mucolytics  - cingulair  - pulmonary consult  - add CPAP qhs and incentive spirometry    * Elevated D-dimers due to   * Left DVT soleal vein  * PE RUL segmental vessel  - CTA - positive for PE  - Doppler LE - positive for DVT  - d/w Dr. Arizmendi due to high risk of GI bleed would benefit from IVC filter , ok to use low range heparin until source of GI bleed established, high range of heparin drip will be to risky  - started on IV low range heparin  - - AC services consult  - Dr. Mendoza consult - r/o hypercoagulability state as o/p, Ct abd to r/o malignancies, coumadin will be better option for AC  - O2 monitoring  -interventional cardiology consult Dr. Kramer for possible IVC filter placement  - no need for IVC filter at this time , but if develops GI bleed than may benefit  - 2 d echo - EF 65%  - Dr. Murguia cardiology consult    *  Symptomatic anemia. , stable  - no active bleeding  - s/p 2 units of PRBC  - H/H q12h--> q6h while on heparin drip  - check iron studies, B12, folate    * Leukocytosis, multifactorial  * ESBL UTI  * Steroids induced  - started on Imepenem by ID  - isolation    * Melena suspected subacute Upper GI bleed  - GI consult - EGD possible on Thursday  - will benefit form EGD once respiratory status improves  - high risk for endoscopy at this time  - c/w PPI BID--> PPI drip while on heparin drip  - monitor H/H and transfuse as needed.  if Hb <8  - Ct abd/pelv - to r/o malignancies given unprovoked VTE   - GI consult Dr. Vizcarra for further endoscopic evaluation - plan for Thursday  - d/w Dr. Baker pt stable for endoscopic procedure  - medically optimized for endoscopic     *  Essential hypertension.    bp stable,   c/w home meds HCTZ, losartan, amlodipine  monitor and titrate meds as indicated.     * Anxiety  - c/w benzo prn    * Prophylactic measure. Plan: SCDs- no chem d/t GIB. 84 F w/PMH COPD, CPAP at night, HTN, presents on 4/6/18 with progressively dyspnea over past several weeks.  Found w/ anemia, +melena  Currently, pt is dyspneic w/ conversation, she's satting 85% on 3LNC, changed to ventimask, now at 95%.  She is feeling very anxious and requesting xanax- which she takes prn at home 1/2 of 0.25mg dose.      Hg 8.1- receiving 2 units PRBC      * Acute respiratory failure with hypoxia,  multifactorial:  * Acute COPD exacerbation, slow improvement  * Sleep apnea   - 85% on 3LNC, now on ventimask at 95%  - monitor sats, titrate O2 to keep sats>90%  - IV steroids--> taper 20 q8h ( wheezing worse 4/10/18)   - s/p Azithromycin  - nebs, mucolytics  - Po theophyline added by pulmonology  - cingulair  - pulmonary consult  - add CPAP qhs and incentive spirometry    * Elevated D-dimers due to   * Left DVT soleal vein  * PE RUL segmental vessel  - CTA - positive for PE  - Doppler LE - positive for DVT  - d/w Dr. Arizmendi due to high risk of GI bleed would benefit from IVC filter , ok to use low range heparin until source of GI bleed established, high range of heparin drip will be to risky  - started on IV low range heparin  - - AC services consult  - Dr. Mendoza consult - r/o hypercoagulability state as o/p, Ct abd to r/o malignancies, coumadin will be better option for AC  - O2 monitoring  -interventional cardiology consult Dr. Kramer for possible IVC filter placement  - no need for IVC filter at this time , but if develops GI bleed than may benefit  - 2 d echo - EF 65%  - Dr. Murguia cardiology consult    *  Symptomatic anemia. , stable  - no active bleeding  - s/p 2 units of PRBC  - H/H q12h--> q6h while on heparin drip  - check iron studies, B12, folate    * Leukocytosis, multifactorial  * ESBL UTI  * Steroids induced  - started on Imipenem by ID  - isolation    * Melena suspected subacute Upper GI bleed  - GI consult - EGD possible on Thursday  - will benefit form EGD once respiratory status improves  - high risk for endoscopy at this time  - c/w PPI BID--> PPI drip while on heparin drip  - monitor H/H and transfuse as needed.  if Hb <8  - Ct abd/pelv - to r/o malignancies given unprovoked VTE   - GI consult Dr. Vizcarra for further endoscopic evaluation - plan for Thursday  - d/w Dr. Baker pt stable for endoscopic procedure  - medically optimized for endoscopic     *  Essential hypertension.    bp stable,   c/w home meds HCTZ, losartan, amlodipine  monitor and titrate meds as indicated.     * Anxiety  - c/w benzo prn    * Prophylactic measure. Plan: SCDs- no chem d/t GIB.

## 2018-04-12 NOTE — PROGRESS NOTE ADULT - ASSESSMENT
A 85 y/o female w PMH COPD, HTN/HLD, peptic ulcer presents for progressively worsening dyspnea. She was seen by her pulmonologist and CXR was negative then referred to cardiology. ST & TTE negative, and was referred to GI.  She was found to be anemic w/ hgb of 8, which is not normal for her.  She has noticed some melena 1-2 episodes over the past month. Her last colonoscopy was 2 years ago and was informed of "polyps." She is s/p 2 units pRBC with H/H improvement noted of 9.3/32.6 today. Pending endoscopy but pt in acute respiratory failure, saturating ~85% on 3LNC, and improved with ventimask. GI team would like to stabilize her respiratory status prior to procedure. Pt currently on low dose heparin gtt for new left soleal vein DVT and RUL focal PE found on admission. Will need to transition her to oral AC, possibly with warfarin, after endoscopy and hematology's evaluation.       PLAN:  for upper endo tomorrow   start coumadin when OK by GI  ::Continue UFH, titrate per aPTT  ::CBC/BMP  ::Monitor for s/s bleeding  ::Encourage ambulation  ::Venodyne contraindicated in left lower extremity    Will continue to follow.

## 2018-04-12 NOTE — PROGRESS NOTE ADULT - ASSESSMENT
PROBLEMS:    ESBL UTI  RUL PE & DVT  Acute respiratory failure with hypoxia  acute excerbation of COPD  Symptomatic anemia- s/p transfusion  Gastrointestinal hemorrhage with melena  Essential hypertension    PLAN;    add theophylline for persistant bronchspasm  iv meropenem  iv heparin  IV solumedrols 20mg q8hr  AEROSLS  SUPPORTIVE CARE  DVT PROPHYLASIX

## 2018-04-12 NOTE — PROGRESS NOTE ADULT - SUBJECTIVE AND OBJECTIVE BOX
Patient is a 84y old  Female who presents with a chief complaint of sob, anemia (06 Apr 2018 17:10)      Subective:  Breathing improved. No bleeding.    PAST MEDICAL & SURGICAL HISTORY:  COPD (chronic obstructive pulmonary disease)  Hypertension  Peptic Ulcer  Hyperlipemia  S/P Cataract Extraction: right eye 5/27/10      MEDICATIONS  (STANDING):  ALBUTerol/ipratropium for Nebulization 3 milliLiter(s) Nebulizer every 6 hours  amLODIPine   Tablet 5 milliGRAM(s) Oral daily  docusate sodium 100 milliGRAM(s) Oral two times a day  guaiFENesin    Syrup 200 milliGRAM(s) Oral every 6 hours  heparin  Infusion.  Unit(s)/Hr (10 mL/Hr) IV Continuous <Continuous>  hydrochlorothiazide 25 milliGRAM(s) Oral daily  losartan 50 milliGRAM(s) Oral daily  methylPREDNISolone sodium succinate Injectable 20 milliGRAM(s) IV Push every 8 hours  montelukast 10 milliGRAM(s) Oral daily  pantoprazole  Injectable 40 milliGRAM(s) IV Push every 12 hours  senna 2 Tablet(s) Oral at bedtime  sodium ferric gluconate complex IVPB 125 milliGRAM(s) IV Intermittent daily  theophylline SR Tablet 200 milliGRAM(s) Oral daily    MEDICATIONS  (PRN):  ALPRAZolam 0.125 milliGRAM(s) Oral daily PRN anxiety  heparin  Injectable 5000 Unit(s) IV Push every 6 hours PRN For aPTT less than 40  ondansetron Injectable 4 milliGRAM(s) IV Push every 6 hours PRN Nausea      REVIEW OF SYSTEMS:    RESPIRATORY: No shortness of breath  CARDIOVASCULAR: No chest pain  All other review of systems is negative unless indicated above.    Vital Signs Last 24 Hrs  T(C): 36.7 (12 Apr 2018 05:08), Max: 36.9 (11 Apr 2018 11:22)  T(F): 98.1 (12 Apr 2018 05:08), Max: 98.4 (11 Apr 2018 11:22)  HR: 66 (12 Apr 2018 05:08) (46 - 68)  BP: 127/51 (12 Apr 2018 05:08) (111/53 - 135/46)  BP(mean): --  RR: 17 (11 Apr 2018 16:50) (17 - 17)  SpO2: 95% (12 Apr 2018 05:08) (90% - 98%)    PHYSICAL EXAM:    Constitutional: NAD, well-developed  Respiratory: CTAB  Cardiovascular: S1 and S2, RRR  Gastrointestinal: BS+, soft, NT/ND  Extremities: No peripheral edema  Psychiatric: Normal mood, normal affect    LABS:                        9.7    15.28 )-----------( 330      ( 12 Apr 2018 06:52 )             33.8     04-12    139  |  100  |  32<H>  ----------------------------<  135<H>  4.7   |  33<H>  |  0.59    Ca    9.0      12 Apr 2018 06:52      PT/INR - ( 12 Apr 2018 06:52 )   PT: 11.1 sec;   INR: 1.03 ratio         PTT - ( 12 Apr 2018 06:52 )  PTT:53.3 sec      RADIOLOGY & ADDITIONAL STUDIES:

## 2018-04-13 LAB
ANION GAP SERPL CALC-SCNC: 8 MMOL/L — SIGNIFICANT CHANGE UP (ref 5–17)
APTT BLD: 123.2 SEC — CRITICAL HIGH (ref 27.5–37.4)
APTT BLD: 34.1 SEC — SIGNIFICANT CHANGE UP (ref 27.5–37.4)
APTT BLD: 39.6 SEC — HIGH (ref 27.5–37.4)
BUN SERPL-MCNC: 28 MG/DL — HIGH (ref 7–23)
CALCIUM SERPL-MCNC: 9 MG/DL — SIGNIFICANT CHANGE UP (ref 8.5–10.1)
CHLORIDE SERPL-SCNC: 100 MMOL/L — SIGNIFICANT CHANGE UP (ref 96–108)
CO2 SERPL-SCNC: 32 MMOL/L — HIGH (ref 22–31)
CREAT SERPL-MCNC: 0.58 MG/DL — SIGNIFICANT CHANGE UP (ref 0.5–1.3)
GLUCOSE SERPL-MCNC: 131 MG/DL — HIGH (ref 70–99)
HCT VFR BLD CALC: 33 % — LOW (ref 34.5–45)
HGB BLD-MCNC: 9.5 G/DL — LOW (ref 11.5–15.5)
MCHC RBC-ENTMCNC: 23.2 PG — LOW (ref 27–34)
MCHC RBC-ENTMCNC: 28.8 GM/DL — LOW (ref 32–36)
MCV RBC AUTO: 80.7 FL — SIGNIFICANT CHANGE UP (ref 80–100)
NRBC # BLD: 0 /100 WBCS — SIGNIFICANT CHANGE UP (ref 0–0)
PLATELET # BLD AUTO: 329 K/UL — SIGNIFICANT CHANGE UP (ref 150–400)
POTASSIUM SERPL-MCNC: 4.9 MMOL/L — SIGNIFICANT CHANGE UP (ref 3.5–5.3)
POTASSIUM SERPL-SCNC: 4.9 MMOL/L — SIGNIFICANT CHANGE UP (ref 3.5–5.3)
RBC # BLD: 4.09 M/UL — SIGNIFICANT CHANGE UP (ref 3.8–5.2)
RBC # FLD: 20.6 % — HIGH (ref 10.3–14.5)
SODIUM SERPL-SCNC: 140 MMOL/L — SIGNIFICANT CHANGE UP (ref 135–145)
WBC # BLD: 15.8 K/UL — HIGH (ref 3.8–10.5)
WBC # FLD AUTO: 15.8 K/UL — HIGH (ref 3.8–10.5)

## 2018-04-13 PROCEDURE — 99233 SBSQ HOSP IP/OBS HIGH 50: CPT

## 2018-04-13 RX ORDER — FUROSEMIDE 40 MG
20 TABLET ORAL ONCE
Qty: 0 | Refills: 0 | Status: COMPLETED | OUTPATIENT
Start: 2018-04-13 | End: 2018-04-13

## 2018-04-13 RX ADMIN — Medication 0.12 MILLIGRAM(S): at 22:28

## 2018-04-13 RX ADMIN — HEPARIN SODIUM 0 UNIT(S)/HR: 5000 INJECTION INTRAVENOUS; SUBCUTANEOUS at 14:54

## 2018-04-13 RX ADMIN — Medication 110 MILLIGRAM(S): at 12:38

## 2018-04-13 RX ADMIN — Medication 1 TABLET(S): at 18:12

## 2018-04-13 RX ADMIN — PANTOPRAZOLE SODIUM 40 MILLIGRAM(S): 20 TABLET, DELAYED RELEASE ORAL at 18:11

## 2018-04-13 RX ADMIN — Medication 3 MILLILITER(S): at 13:33

## 2018-04-13 RX ADMIN — HEPARIN SODIUM 500 UNIT(S)/HR: 5000 INJECTION INTRAVENOUS; SUBCUTANEOUS at 16:00

## 2018-04-13 RX ADMIN — Medication 20 MILLIGRAM(S): at 22:19

## 2018-04-13 RX ADMIN — SODIUM CHLORIDE 50 MILLILITER(S): 9 INJECTION, SOLUTION INTRAVENOUS at 00:10

## 2018-04-13 RX ADMIN — SODIUM CHLORIDE 50 MILLILITER(S): 9 INJECTION, SOLUTION INTRAVENOUS at 09:29

## 2018-04-13 RX ADMIN — Medication 3 MILLILITER(S): at 01:18

## 2018-04-13 RX ADMIN — Medication 200 MILLIGRAM(S): at 18:12

## 2018-04-13 RX ADMIN — Medication 3 MILLILITER(S): at 08:27

## 2018-04-13 RX ADMIN — Medication 20 MILLIGRAM(S): at 13:26

## 2018-04-13 RX ADMIN — Medication 200 MILLIGRAM(S): at 18:28

## 2018-04-13 RX ADMIN — Medication 3 MILLILITER(S): at 20:24

## 2018-04-13 RX ADMIN — HEPARIN SODIUM 750 UNIT(S)/HR: 5000 INJECTION INTRAVENOUS; SUBCUTANEOUS at 08:07

## 2018-04-13 RX ADMIN — Medication 20 MILLIGRAM(S): at 06:10

## 2018-04-13 RX ADMIN — PANTOPRAZOLE SODIUM 40 MILLIGRAM(S): 20 TABLET, DELAYED RELEASE ORAL at 06:10

## 2018-04-13 RX ADMIN — Medication 20 MILLIGRAM(S): at 10:56

## 2018-04-13 RX ADMIN — MONTELUKAST 10 MILLIGRAM(S): 4 TABLET, CHEWABLE ORAL at 18:11

## 2018-04-13 RX ADMIN — SENNA PLUS 2 TABLET(S): 8.6 TABLET ORAL at 22:20

## 2018-04-13 NOTE — PROGRESS NOTE ADULT - SUBJECTIVE AND OBJECTIVE BOX
Cardiology Progress Note    HPI: 84 F w/PMH COPD, CPAP at night, HTN, has been experiencing progressively dyspnea over past several weeks.  She went to her pulmonologist, CXR was neg, she referred to Cardio.  After work up w/ ST, TTE was not very revealing, she was referred to GI.  She was found on anemic w/ Hg of 8 on cbc, which is not normal for her.  She has noticed some melena 1-2 episodes over the past month.  She denies any hematochezia, hematemesis. She endorses a feeling of fullness after she eats a small amount.  She's also been having pain across her upper abdomen, just below her diaphragm.  She cannot tell me if this pain is related to eating.  She does have a good appetite but just gets full fast.  Gdtr at bedside notes that she doesn't eat as much.  Pt denies any nausea.  Her last colonoscopy was 2 years ago.  She endorsed h/o gastric ulcers. Pt is dyspneic w/ conversation, she's satting 85% on 3LNC, changed to ventimask, now at 95%.  She is feeling very anxious and requesting xanax- which she takes prn at home 1/2 of 0.25mg dose. (2018 17:10)    - No CP. +SOB. SR on tele. Feels tired. No events last pm.     - Feels slightly better today. +SOB, no CP. Awaiting IVC filter today.     4/10- Discussed case with interventional cardiology- holding off on IVC filter for now. No active GI bleed now. +SOB- mildly improved. No CP.     - No CP, SOB mildly improved. No fevers. No events last pm.    - Still awaiting EGD. Now with LE edema. No CP, SOB improved.     PAST MEDICAL & SURGICAL HISTORY:  COPD (chronic obstructive pulmonary disease)  Hypertension  Peptic Ulcer  Hyperlipemia  S/P Cataract Extraction: right eye 5/27/10    Allergies  No Known Allergies    SOCIAL HISTORY: Denies tobacco, etoh abuse or illicit drug use    FAMILY HISTORY: No pertinent family history in first degree relatives    MEDICATIONS  (STANDING):  ALBUTerol/ipratropium for Nebulization 3 milliLiter(s) Nebulizer every 6 hours  amLODIPine   Tablet 5 milliGRAM(s) Oral daily  azithromycin  IVPB 500 milliGRAM(s) IV Intermittent every 24 hours  azithromycin  IVPB      guaiFENesin    Syrup 200 milliGRAM(s) Oral every 6 hours  heparin  Infusion.  Unit(s)/Hr (10 mL/Hr) IV Continuous <Continuous>  hydrochlorothiazide 25 milliGRAM(s) Oral daily  losartan 50 milliGRAM(s) Oral daily  methylPREDNISolone sodium succinate Injectable 40 milliGRAM(s) IV Push every 12 hours  montelukast 10 milliGRAM(s) Oral daily  pantoprazole Infusion 8 mG/Hr (10 mL/Hr) IV Continuous <Continuous>    MEDICATIONS  (PRN):  ALPRAZolam 0.125 milliGRAM(s) Oral daily PRN anxiety  heparin  Injectable 5000 Unit(s) IV Push every 6 hours PRN For aPTT less than 40  ondansetron Injectable 4 milliGRAM(s) IV Push every 6 hours PRN Nausea      Vital Signs Last 24 Hrs  T(C): 36.4 (2018 05:42), Max: 37.2 (2018 11:55)  T(F): 97.5 (2018 05:42), Max: 99 (2018 11:55)  HR: 53 (2018 05:42) (50 - 88)  BP: 110/55 (2018 05:42) (110/55 - 131/65)  BP(mean): --  RR: 18 (2018 05:42) (18 - 22)  SpO2: 94% (2018 05:42) (85% - 95%)    REVIEW OF SYSTEMS:    CONSTITUTIONAL:  As per HPI.  HEENT:  Eyes:  No diplopia or blurred vision. ENT:  No earache, sore throat or runny nose.  CARDIOVASCULAR:  No pressure, squeezing, strangling, tightness, heaviness or aching about the chest, neck, axilla or epigastrium.  RESPIRATORY:  No cough, shortness of breath, PND or orthopnea.  GASTROINTESTINAL:  No nausea, vomiting or diarrhea.  GENITOURINARY:  No dysuria, frequency or urgency.  MUSCULOSKELETAL:  As per HPI.  SKIN:  No change in skin, hair or nails.  NEUROLOGIC:  No paresthesias, fasciculations, seizures or weakness.    PHYSICAL EXAMINATION:    GENERAL APPEARANCE:  Pt. is not currently dyspneic, in no distress. Pt. is alert, oriented, and pleasant.  HEENT:  Pupils are normal and react normally. No icterus. Mucous membranes well colored.  NECK:  Supple. No lymphadenopathy. Jugular venous pressure not elevated. Carotids equal.   HEART:   The cardiac impulse has a normal quality. There are no murmurs, rubs or gallops noted  CHEST:  Chest is clear to auscultation. Normal respiratory effort.  ABDOMEN:  Soft and nontender.   EXTREMITIES:  1-2+ LE edema.    I&O's Summary      LABS:                        9.3    18.67 )-----------( 351      ( 2018 06:03 )             32.6         141  |  104  |  25<H>  ----------------------------<  140<H>  4.3   |  31  |  0.79    Ca    9.0      2018 06:03    TPro  6.8  /  Alb  2.9<L>  /  TBili  0.4  /  DBili  x   /  AST  14<L>  /  ALT  20  /  AlkPhos  77  04-06    LIVER FUNCTIONS - ( 2018 12:29 )  Alb: 2.9 g/dL / Pro: 6.8 gm/dL / ALK PHOS: 77 U/L / ALT: 20 U/L / AST: 14 U/L / GGT: x           PT/INR - ( 2018 12:29 )   PT: 11.8 sec;   INR: 1.09 ratio         PTT - ( 2018 02:41 )  PTT:43.3 sec  CARDIAC MARKERS ( 2018 16:07 )  <0.015 ng/mL / x     / x     / x     / x      CARDIAC MARKERS ( 2018 12:29 )  <0.015 ng/mL / x     / x     / x     / x        Urinalysis Basic - ( 2018 16:57 )    Color: Yellow / Appearance: Clear / S.015 / pH: x  Gluc: x / Ketone: Negative  / Bili: Negative / Urobili: Negative mg/dL   Blood: x / Protein: 15 mg/dL / Nitrite: Positive   Leuk Esterase: Small / RBC: 0-2 /HPF / WBC 5-8 /HPF   Sq Epi: x / Non Sq Epi: Occasional / Bacteria: Many    EKG: < from: 12 Lead ECG (18 @ 09:44) >  Sinus rhythm with Premature supraventricular complexes and with occasional Premature ventricular complexes  Possible Left atrial enlargement  Nonspecific ST abnormality  Abnormal ECG    TELEMETRY: SR    CARDIAC TESTS: < from: Transthoracic Echocardiogram (18 @ 10:22) >   Fibrocalcific changes noted to the mitral valve leaflets with preserved   leaflet excursion.   EA reversal of the mitral inflow consistent with reduced compliance of   the   left ventricle.   Fibrocalcific changes noted to the Aortic valve leaflets with preserved   leaflet excursion.   Mild aortic annular calcification is noted.   Mild (1+) tricuspid valve regurgitation is present.   Trace pulmonic valvular regurgitation is present.   Normal appearing left atrium.   The left ventricle is normal in size, wall thickness, wall motion and   contractility.   Estimated left ventricular ejection fraction is 65-70 %.   The right atrium appears mildly dilated.   Normal appearing right ventricle structure and function.   The IVC is dilated but collapsing with inspiration.   No evidence of pericardial effusion.   No evidence of pleural effusion.    < end of copied text >      RADIOLOGY & ADDITIONAL STUDIES: < from: US Duplex Venous Lower Ext Complete, Bilateral (18 @ 17:25) >    Left soleal vein thrombosis without extension into the popliteal vein.    No sonographic evidence of acute deep venous thrombosis in the   femoropopliteal systems bilaterally.     < from: CT Angio Chest PE Protocol w/ IV Cont (18 @ 16:53) >    Small focal pulmonary embolus in a posterior right upper lobe segmental   vessel.        ASSESSMENT & PLAN:

## 2018-04-13 NOTE — PROGRESS NOTE ADULT - SUBJECTIVE AND OBJECTIVE BOX
Subjective:  Patient is a 84y old  Female who presents with a chief complaint of sob, anemia      HPI:     84 F w/PMH COPD, CPAP at night, HTN, presents on 18 with progressively worse dyspnea over past several weeks.  She went to her pulmonologist, CXR was neg, she referred to Cardio.  After work up w/ ST, TTE was not very revealing, she was referred to GI.  She was found on anemic w/ Hg of 8 on cbc, which is not normal for her.  She has noticed some melena 1-2 episodes over the past month.  She denies any hematochezia, hematemesis. She endorses a feeling of fullness after she eats a small amount.  She's also been having pain across her upper abdomen, just below her diaphragm.  She cannot tell me if this pain is related to eating.  She does have a good appetite but just gets full fast.  Gdtr at bedside notes that she doesn't eat as much.  Pt denies any nausea.  Her last colonoscopy was 2 years ago.  She endorsed h/o gastric ulcers.     In ED  pt is dyspneic w/ conversation, she's satting 85% on 3LNC, changed to ventimask, now at 95%.  She is feeling very anxious and requesting xanax- which she takes prn at home 1/2 of 0.25mg dose.     18 - Patient seen and examined at bedside earlier today, dyspneic with minimal exertion, even talking, but reports slight improvement of the dyspnea   18 - events noted, transferred to tele for o2 monitoring due to desaturation , reports improvement of the dyspnea, cough, 1 BM brown overnight  tele- o2 sats to 85%, episodes of bigemeny    - tele O2 sats on O2 wnl, reports improvement of dyspnea and cough since yesterday, no BM, denies abd pain, feels constipated  4/10 - dyspnea slightly worse today, cough productive, denies CP, + brown BM, no blood, denies abd pain   - dyspnea slightly better on higher dose of IV steroids, + productive cough, overall better   - dyspnea better, afebrile, denies chest pain, abdominal pain, no events overnight POC discussed   - pt seen and examined, dyspnea and cough better, afebrile, cleared for EGD by cardiology and pulmonology, no new complains  Review of system- Rest of the review of system are negative except mentioned in HPI    OBJECTIVE:   T(C): 36.8 (18 @ 10:11), Max: 36.8 (18 @ 10:11)  T(F): 98.3 (18 @ 10:11), Max: 98.3 (18 @ 10:11)  HR: 64 (18 @ 13:45) (61 - 105)  BP: 130/54 (18 @ 10:11) (108/46 - 130/54)  RR: 17 (18 @ 10:11) (17 - 18)  SpO2: 94% (18 @ 10:11) (92% - 97%)  Wt(kg): --  Daily Weight in k.5 (2018 00:09)    PHYSICAL EXAM:  GENERAL: NAD  NERVOUS SYSTEM:  Alert & Oriented X3, non- focal exam,  HEAD:  Atraumatic, Normocephalic  EYES: EOMI, PERRLA, conjunctiva and sclera clear  HEENT: Moist mucous membranes  NECK: Supple, No JVD  CHEST/LUNG: BS decreased bilaterally; No rales, no rhonchi, +  wheezing,  HEART: Regular rate and rhythm; No murmurs, rubs, or gallops  ABDOMEN: Soft, Nontender, Nondistended; Bowel sounds present  GENITOURINARY- Voiding, no suprapubic tenderness  EXTREMITIES:  2+ Peripheral Pulses, No clubbing, cyanosis, or edema  MUSCULOSKELETAL:- No muscle tenderness, Muscle tone normal, No joint tenderness, no Joint swelling, Joint range of motion-normal  SKIN-no rash, no lesion    LABS:      140  |  100  |  28<H>  ----------------------------<  131<H>  4.9   |  32<H>  |  0.58    Ca    9.0      2018 05:47                         9.5    15.80 )-----------( 329      ( 2018 05:47 )             33.0     PT/INR - ( 2018 06:52 )   PT: 11.1 sec;   INR: 1.03 ratio         PTT - ( 2018 05:47 )  PTT:39.6 sec          139  |  100  |  32<H>  ----------------------------<  135<H>  4.7   |  33<H>  |  0.59    Ca    9.0      2018 06:52                        9.7    15.28 )-----------( 330      ( :52 )             33.8     PT/INR - ( :52 )   PT: 11.1 sec;   INR: 1.03 ratio         PTT - ( 2018 06:52 )  PTT:53.3 sec          139  |  101  |  29<H>  ----------------------------<  130<H>  5.0   |  34<H>  |  0.75    Ca    8.6      2018 06:03                        9.3    11.49 )-----------( 314      ( 2018 06:03 )             33.0     PTT - ( 2018 06:03 )  PTT:32.7 sec      04-10    139  |  102  |  29<H>  ----------------------------<  77  4.3   |  33<H>  |  0.74    Ca    8.8      10 Apr 2018 06:51                        9.5    15.50 )-----------( 310      ( 10 Apr 2018 06:51 )             32.7     PTT - ( 10 Apr 2018 09:51 )  PTT:59.6 sec          141  |  103  |  35<H>  ----------------------------<  124<H>  4.2   |  31  |  0.89    Ca    9.0      2018 06:22                       10.0   x     )-----------( x        ( 2018 12:19 )             34.6     PTT - ( 2018 09:39 )  PTT:167.8 sec          141  |  104  |  25<H>  ----------------------------<  140<H>  4.3   |  31  |  0.79    Ca    9.0      2018 06:03    TPro  6.8  /  Alb  2.9<L>  /  TBili  0.4  /  DBili  x   /  AST  14<L>  /  ALT  20  /  AlkPhos  77  04-06                            9.3    18.67 )-----------( 351      ( 2018 06:03 )             32.6       CARDIAC MARKERS ( 2018 16:07 )  <0.015 ng/mL / x     / x     / x     / x      CARDIAC MARKERS ( 2018 12:29 )  <0.015 ng/mL / x     / x     / x     / x            LIVER FUNCTIONS - ( 2018 12:29 )  Alb: 2.9 g/dL / Pro: 6.8 gm/dL / ALK PHOS: 77 U/L / ALT: 20 U/L / AST: 14 U/L / GGT: x             PT/INR - ( 2018 12:29 )   PT: 11.8 sec;   INR: 1.09 ratio         PTT - ( 2018 09:45 )  PTT:65.3 sec    Urinalysis Basic - ( 2018 16:57 )    Color: Yellow / Appearance: Clear / S.015 / pH: x  Gluc: x / Ketone: Negative  / Bili: Negative / Urobili: Negative mg/dL   Blood: x / Protein: 15 mg/dL / Nitrite: Positive   Leuk Esterase: Small / RBC: 0-2 /HPF / WBC 5-8 /HPF   Sq Epi: x / Non Sq Epi: Occasional / Bacteria: Many                            10.6   10.20 )-----------( 353      ( 2018 07:23 )             35.7     04-07    141  |  104  |  17  ----------------------------<  152<H>  4.0   |  30  |  0.64    Ca    8.8      2018 07:23    TPro  6.8  /  Alb  2.9<L>  /  TBili  0.4  /  DBili  x   /  AST  14<L>  /  ALT  20  /  AlkPhos  77  04-06    PT/INR - ( 2018 12:29 )   PT: 11.8 sec;   INR: 1.09 ratio         PTT - ( 2018 12:29 )  PTT:25.8 sec  CARDIAC MARKERS ( 2018 16:07 )  <0.015 ng/mL / x     / x     / x     / x      CARDIAC MARKERS ( 2018 12:29 )  <0.015 ng/mL / x     / x     / x     / x          Urinalysis Basic - ( 2018 16:57 )    Color: Yellow / Appearance: Clear / S.015 / pH: x  Gluc: x / Ketone: Negative  / Bili: Negative / Urobili: Negative mg/dL   Blood: x / Protein: 15 mg/dL / Nitrite: Positive   Leuk Esterase: Small / RBC: 0-2 /HPF / WBC 5-8 /HPF   Sq Epi: x / Non Sq Epi: Occasional / Bacteria: Many      CAPILLARY BLOOD GLUCOSE    RECENT CULTURES:    RADIOLOGY & ADDITIONAL TESTS:    < from: US Duplex Venous Lower Ext Complete, Bilateral (18 @ 17:25) >  IMPRESSION:     Left soleal vein thrombosis without extension into the popliteal vein.    No sonographic evidence of acute deep venous thrombosis in the   femoropopliteal systems bilaterally.     < end of copied text >  < from: CT Angio Chest PE Protocol w/ IV Cont (18 @ 16:53) >  Lungs, Airways and Pleura: Central tracheobronchial tree is grossly   patent. No endobronchial lesions. Small to mild bilateral pleural   effusions right greater than left with bibasilar compressive atelectasis.   No pneumothorax. No discrete pulmonary nodules. Subsegmental atelectasis   in the lingula.    Heart and Great Vessels: Atherosclerotic changes of the aorta and   coronary vasculature. Study is mildly limited secondary to heterogeneous   opacification of the distal segmental and subsegmental pulmonary vessels.   Focal filling defect within the mid to distal posterior right upper lobe   segmental vessel compatible with a focal pulmonary embolus.. No other   evidence of acute pulmonary embolismwithin the limits of the study.. No   aortic aneurysm. No definite evidence of aortic dissection. Heart is   normal in size. No pericardial effusion.    Mediastinum and Lanette: Pretracheal and right hilar lymph nodes measuring   up to 1.3 cm.    Neck and Chest Wall: within normal limits    Bones: Degenerative changes and osteopenia. Mild to moderate compression   deformities of T6-T8 chronic in appearance. Compression deformity of L1   appears chronic in nature.    Upper Abdomen:  Left lateral midpole renal cyst. Elevation of the right   hemidiaphragm.    IMPRESSION:         Small focal pulmonary embolus in a posterior right upper lobe segmental   vessel.    < end of copied text >    < from: Xray Chest 1 View AP/PA. (18 @ 10:47) >  Heart magnified by projection, unchanged. Atherosclerotic aorta.   Symmetrically hyperinflated lungs. No focal consolidation or pleural   effusion. Old fracture right seventh posterior rib.    Impression: Hyperinflated lungs. No active infiltrates.    < end of copied text >  MEDICATIONS  (STANDING):  ALBUTerol/ipratropium for Nebulization 3 milliLiter(s) Nebulizer every 6 hours  amLODIPine   Tablet 5 milliGRAM(s) Oral daily  azithromycin  IVPB 500 milliGRAM(s) IV Intermittent every 24 hours  azithromycin  IVPB      guaiFENesin    Syrup 200 milliGRAM(s) Oral every 6 hours  heparin  Infusion.  Unit(s)/Hr (10 mL/Hr) IV Continuous <Continuous>  hydrochlorothiazide 25 milliGRAM(s) Oral daily  losartan 50 milliGRAM(s) Oral daily  methylPREDNISolone sodium succinate Injectable 20 milliGRAM(s) IV Push every 12 hours  montelukast 10 milliGRAM(s) Oral daily  pantoprazole Infusion 8 mG/Hr (10 mL/Hr) IV Continuous <Continuous>    MEDICATIONS  (PRN):  ALPRAZolam 0.125 milliGRAM(s) Oral daily PRN anxiety  heparin  Injectable 5000 Unit(s) IV Push every 6 hours PRN For aPTT less than 40  ondansetron Injectable 4 milliGRAM(s) IV Push every 6 hours PRN Nausea

## 2018-04-13 NOTE — PROGRESS NOTE ADULT - ASSESSMENT
84 F w/PMH COPD, CPAP at night, HTN, presents on 4/6/18 with progressively dyspnea over past several weeks.  Found w/ anemia, +melena  Currently, pt is dyspneic w/ conversation, she's satting 85% on 3LNC, changed to ventimask, now at 95%.  She is feeling very anxious and requesting xanax- which she takes prn at home 1/2 of 0.25mg dose.      Hg 8.1- receiving 2 units PRBC      * Acute respiratory failure with hypoxia,  multifactorial:  * Acute COPD exacerbation, slow improvement  * Sleep apnea   - 85% on 3LNC, now on ventimask at 95%  - monitor sats, titrate O2 to keep sats>90%  - IV steroids--> taper 20 q8h ( wheezing worse 4/10/18)   - s/p Azithromycin  - started on theophyline 200 daily  - nebs, mucolytics  - Po theophyline added by pulmonology  - cingulair  - pulmonary consult  - add CPAP qhs and incentive spirometry    * Elevated D-dimers due to   * Left DVT soleal vein  * PE RUL segmental vessel  - CTA - positive for PE  - Doppler LE - positive for DVT  - d/w Dr. Arizmendi due to high risk of GI bleed would benefit from IVC filter , ok to use low range heparin until source of GI bleed established, high range of heparin drip will be to risky  - started on IV low range heparin  - - AC services consult  - Dr. Mendoza consult - r/o hypercoagulability state as o/p, Ct abd to r/o malignancies, coumadin will be better option for AC  - O2 monitoring  -interventional cardiology consult Dr. Kramer for possible IVC filter placement  - no need for IVC filter at this time , but if develops GI bleed than may benefit  - 2 d echo - EF 65%  - cardiology input appretiated  *  Symptomatic anemia. , stable  - no active bleeding  - s/p 2 units of PRBC  - H/H q12h--> q6h while on heparin drip  - check iron studies, B12, folate  * Leukocytosis, multifactorial  * ESBL UTI  * Steroids induced  - started on Imipenem by ID  - isolation  * Melena suspected subacute Upper GI bleed  - GI consult - EGD possible on Thursday  - will benefit form EGD once respiratory status improves  - high risk for endoscopy at this time  - c/w PPI BID--> PPI drip while on heparin drip  - monitor H/H and transfuse as needed.  if Hb <8  - Ct abd/pelv - to r/o malignancies given unprovoked VTE   - GI consult Dr. Vizcarra for further endoscopic evaluation - plan for Thursday  - d/w Dr. Baker pt stable for endoscopic procedure  - medically optimized for endoscopic  - today  *  Essential hypertension.    bp stable,   c/w home meds HCTZ, losartan, amlodipine  monitor and titrate meds as indicated.   * Anxiety  - c/w benzo prn  * Prophylactic measure. Plan: SCDs- no chem d/t GIB.

## 2018-04-13 NOTE — PROGRESS NOTE ADULT - SUBJECTIVE AND OBJECTIVE BOX
EGD:  Normal esophagus and stomach  Bulging with superficial ulceration with small amount of adjacent blood in the second portion of the duodenum -- favor submucosal or extrinsic ulceration mass.  Biopsies deferred due to acute desaturation and anticoagulation    Rec;  Consider IVC filter  Will review CT from a few days ago -- may benefits from MRI pancreas if feasible

## 2018-04-13 NOTE — PROGRESS NOTE ADULT - SUBJECTIVE AND OBJECTIVE BOX
Subjective:    pat better, no new complaint, waiting EGD.    Home Medications:  amLODIPine 5 mg oral tablet: 1 tab(s) orally once a day (06 Apr 2018 17:25)  Anoro Ellipta 62.5 mcg-25 mcg/inh inhalation powder: 1 puff(s) inhaled once a day (06 Apr 2018 17:25)  calcium (as carbonate) 600 mg oral tablet: 2 tab(s) orally once a day (06 Apr 2018 17:25)  hydroCHLOROthiazide 25 mg oral tablet: 1 tab(s) orally once a day (06 Apr 2018 17:25)  losartan 50 mg oral tablet: 1 tab(s) orally once a day (06 Apr 2018 17:25)  montelukast 10 mg oral tablet: 1 tab(s) orally once a day (06 Apr 2018 17:25)  predniSONE 20 mg oral tablet: 1 tab(s) orally once a day    ***COURSE COMPLETED*** (06 Apr 2018 17:25)  ProAir HFA 90 mcg/inh inhalation aerosol: 2 puff(s) inhaled 4 times a day, As Needed (06 Apr 2018 17:25)  Vitamin D3 2000 intl units oral tablet: 1 tab(s) orally once a day (06 Apr 2018 17:25)  Xanax 0.25 mg oral tablet: 0.5 tab(s) orally once a day, As Needed for anxiety  (06 Apr 2018 17:25)    MEDICATIONS  (STANDING):  ALBUTerol/ipratropium for Nebulization 3 milliLiter(s) Nebulizer every 6 hours  amLODIPine   Tablet 5 milliGRAM(s) Oral daily  docusate sodium 100 milliGRAM(s) Oral two times a day  furosemide   Injectable 20 milliGRAM(s) IV Push once  guaiFENesin    Syrup 200 milliGRAM(s) Oral every 6 hours  heparin  Infusion.  Unit(s)/Hr (10 mL/Hr) IV Continuous <Continuous>  hydrochlorothiazide 25 milliGRAM(s) Oral daily  lactobacillus acidophilus 1 Tablet(s) Oral three times a day with meals  losartan 50 milliGRAM(s) Oral daily  methylPREDNISolone sodium succinate Injectable 20 milliGRAM(s) IV Push every 8 hours  montelukast 10 milliGRAM(s) Oral daily  pantoprazole  Injectable 40 milliGRAM(s) IV Push every 12 hours  senna 2 Tablet(s) Oral at bedtime  sodium ferric gluconate complex IVPB 125 milliGRAM(s) IV Intermittent daily  theophylline SR Tablet 200 milliGRAM(s) Oral daily    MEDICATIONS  (PRN):  ALPRAZolam 0.125 milliGRAM(s) Oral daily PRN anxiety  heparin  Injectable 5000 Unit(s) IV Push every 6 hours PRN For aPTT less than 40  ondansetron Injectable 4 milliGRAM(s) IV Push every 6 hours PRN Nausea      Allergies    No Known Allergies    Intolerances        Vital Signs Last 24 Hrs  T(C): 36.5 (13 Apr 2018 05:00), Max: 37.3 (12 Apr 2018 11:14)  T(F): 97.7 (13 Apr 2018 05:00), Max: 99.1 (12 Apr 2018 11:14)  HR: 63 (13 Apr 2018 05:00) (61 - 74)  BP: 108/46 (13 Apr 2018 05:00) (108/46 - 134/61)  BP(mean): --  RR: 18 (12 Apr 2018 17:03) (18 - 18)  SpO2: 97% (13 Apr 2018 05:00) (92% - 97%)      PHYSICAL EXAMINATION:    NECK:  Supple. No lymphadenopathy. Jugular venous pressure not elevated. Carotids equal.   HEART:   The cardiac impulse has a normal quality. Reg., Nl S1 and S2.  There are no murmurs, rubs or gallops noted  CHEST:  Chest few rhonchi to auscultation. Normal respiratory effort.  ABDOMEN:  Soft and nontender.   EXTREMITIES:  There is no edema.       LABS:                        9.5    15.80 )-----------( 329      ( 13 Apr 2018 05:47 )             33.0     04-13    140  |  100  |  28<H>  ----------------------------<  131<H>  4.9   |  32<H>  |  0.58    Ca    9.0      13 Apr 2018 05:47      PT/INR - ( 12 Apr 2018 06:52 )   PT: 11.1 sec;   INR: 1.03 ratio         PTT - ( 13 Apr 2018 05:47 )  PTT:39.6 sec

## 2018-04-13 NOTE — PROGRESS NOTE ADULT - ASSESSMENT
84 F w/PMH COPD, CPAP at night, HTN, presents on 4/6/18 with progressively dyspnea over past several weeks.  Found w/ anemia, +melena. Pt also with small PE, L soleal vein thrombosis.     1. GI bleed- GI following. Transfuse to keep Hgb > 8. Awaiting EGD to assess cause. Trend H/H- stable today in 9-10 range. Continue PPI. Mgmt as per GI. No active bleeding now. Pt stable for EGD from cardiac standpoint.    2. PE- small RUL PE- Also with L soleal vein thrombosis. Pt tolerating IV heparin for last few days. Will need LTA with coumadin as well. IVC filter on hold for now as pt is tolerating IV heparin. Heme following. 2Decho- normal LV and RV fxn. Hypercoagulable w/u as per Heme.      3. Essential hypertension- stable, continue medical mgmt.     4. DVT proph- SCDs. Replete lytes as needed.     5. COPD- cont steroid taper, nebs, inhalers- will have EGD when breathing improves.     6. LE edema- will give lasix 20mg x 1 this AM. D/C IVF.

## 2018-04-13 NOTE — PROGRESS NOTE ADULT - ASSESSMENT
PROBLEMS:    ESBL UTI  RUL PE & DVT  Acute respiratory failure with hypoxia  acute excerbation of COPD  Symptomatic anemia- s/p transfusion  Gastrointestinal hemorrhage with melena  Essential hypertension    PLAN;    po theophylline   iv meropenem  iv heparin  IV solumedrols 20mg q8hr  AEROSLS  SUPPORTIVE CARE  DVT PROPHYLASIX

## 2018-04-13 NOTE — PROGRESS NOTE ADULT - SUBJECTIVE AND OBJECTIVE BOX
HPI: This is a 85 y/o female w PMH COPD, CPAP at night, HTN, has been experiencing progressively dyspnea over past several weeks.  She went to her pulmonologist, CXR was neg, she referred to Cardio.  After work up w/ ST, TTE was not very revealing, she was referred to GI.  She was found on anemic w/ Hg of 8 on cbc, which is not normal for her.  She has noticed some melena 1-2 episodes over the past month.  She denies any hematochezia, hematemesis. She endorses a feeling of fullness after she eats a small amount.  She's also been having pain across her upper abdomen, just below her diaphragm.  She cannot tell me if this pain is related to eating.  She does have a good appetite but just gets full fast.  Gdtr at bedside notes that she doesn't eat as much.  Pt denies any nausea.  Her last colonoscopy was 2 years ago.  She endorsed h/o gastric ulcers.     Currently, pt is dyspneic w/ conversation, she's satting 85% on 3LNC, changed to ventimask, now at 95%.  She is feeling very anxious and requesting xanax- which she takes prn at home 1/2 of 0.25mg dose. 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 She continues to report of SOB. She denies prior VTE, CVA, MI, no blood clotting disorder. She is a former smoker, has been under stress recently. She reports of being an active caregiver to her  with Parkinson's disease, but now has HHA and decreased her mobility. Informed pt of possible oral anticoagulation with warfarin and she agrees to therapy, but will need to have endoscopy done first. Pending IVC filter placement tomorrow. Discussed with primary RN and no active bleeding noted today. Her H/H today 9.3/32.6 after s/p 2 units pRBC.      4/9: Patient seen at bedside with Dr. Kramer- explained will hold off on IVC filter as DVT is in small vein and no obvious bleeding at this time. Remain on UFH. Patient reports some improvement in breathing- but still with RUDD and O2 nc.  4/10: seen at bedside, some RUDD on exertion O 2 in place  4/11: seen at bedside, for Upper endo tomorrow, coumadin still on hold but discussed with pt foods to avoid/ vit k diet  4/13: Patient seen at bedside- on isolation- still with difficulty breathing- RUDD. Endo on hold until respiratory status improves. Solumedrol/theophylline on board per pulmon. Continuing UFH as IVC filter not necessary due to stable- no bleeding.       Patient is a 84y old  Female who presents with a chief complaint of sob, anemia (06 Apr 2018 17:10)    Consulted by Dr. Sinha for VTE prophylaxis, risk stratification, and anticoagulation management.    PAST MEDICAL & SURGICAL HISTORY:  COPD (chronic obstructive pulmonary disease)  Hypertension  Peptic Ulcer  Hyperlipemia  S/P Cataract Extraction: right eye 5/27/10    BMI: 30.6    CrCL: 69.88    IMPROVE VTE Risk Score: 5    IMPROVE Bleeding Risk Score: 5.5    Falls Risk:   High (  )  Mod (  )  Low ( X )  FAMILY HISTORY:  No pertinent family history in first degree relatives    Denies any personal or familial history of clotting or bleeding disorders.    Allergies    No Known Allergies    Intolerances    REVIEW OF SYSTEMS    (  )Fever	     (  )Constipation	( X )SOB			(  )Headache	(  )Dysuria  (  )Chills	     (  )Melena	(  )Dyspnea present on exertion    (  )Dizziness                    (  )Polyuria  (  )Nausea	     (  )Hematochezia	(  )Cough		                    (  )Syncope   	(  )Hematuria  (  )Vomiting    (  )Chest Pain	(  )Wheezing		(  )Weakness  (  )Diarrhea     (  )Palpitations	(  )Anorexia		(  )Myalgia    All other review of systems negative: Yes    PHYSICAL EXAM:    Constitutional: Appears Well    Neurological: A& O x 3    Skin: Warm    Respiratory and Thorax: increased effort; Breath sounds: decreased with b/l rhonchi- non-prod moist cough   	  Cardiovascular: S1, S2, regular, NMBR	    Gastrointestinal: BS + x 4Q, nontender	    Genitourinary:  Bladder nondistended, nontender    Musculoskeletal:   General Right:   no muscle/joint tenderness,   normal tone, no joint swelling,   ROM: full	    General Left:   no muscle/joint tenderness,   normal tone, no joint swelling,   ROM: full    Lower extrems:   Right: no calf tenderness              negative spencer's sign               + pedal pulses    Left:   no calf tenderness              negative spencer's sign               + pedal pulses                                   9.5    15.80 )-----------( 329      ( 13 Apr 2018 05:47 )             33.0       04-13    140  |  100  |  28<H>  ----------------------------<  131<H>  4.9   |  32<H>  |  0.58    Ca    9.0      13 Apr 2018 05:47      Vital Signs Last 24 Hrs  T(C): 36.8 (04-13-18 @ 10:11), Max: 36.8 (04-13-18 @ 10:11)  T(F): 98.3 (04-13-18 @ 10:11), Max: 98.3 (04-13-18 @ 10:11)  HR: 64 (04-13-18 @ 13:45) (61 - 105)  BP: 130/54 (04-13-18 @ 10:11) (108/46 - 130/54)  BP(mean): --  RR: 17 (04-13-18 @ 10:11) (17 - 18)  SpO2: 94% (04-13-18 @ 10:11) (92% - 97%)    MEDICATIONS  (STANDING):  ALBUTerol/ipratropium for Nebulization 3 milliLiter(s) Nebulizer every 6 hours  amLODIPine   Tablet 5 milliGRAM(s) Oral daily  docusate sodium 100 milliGRAM(s) Oral two times a day  guaiFENesin    Syrup 200 milliGRAM(s) Oral every 6 hours  heparin  Infusion.  Unit(s)/Hr IV Continuous <Continuous>  hydrochlorothiazide 25 milliGRAM(s) Oral daily  lactobacillus acidophilus 1 Tablet(s) Oral three times a day with meals  losartan 50 milliGRAM(s) Oral daily  methylPREDNISolone sodium succinate Injectable 20 milliGRAM(s) IV Push every 8 hours  montelukast 10 milliGRAM(s) Oral daily  pantoprazole  Injectable 40 milliGRAM(s) IV Push every 12 hours  senna 2 Tablet(s) Oral at bedtime  theophylline SR Tablet 200 milliGRAM(s) Oral daily      IMAGING:  US DPLX LWR EXT VEINS COMPL BI:  04/07/2018    FINDINGS:  RIGHT:  There is normal compressibility of the common femoral, femoral, and popliteal veins.  Visualized posterior tibial veins are within normal limits. Doppler examination shows normal spontaneous and phasic flow. Right popliteal cyst.    LEFT: There is normal compressibility of the common femoral, femoral, and popliteal veins. Thrombosis of the left soleal vein without extension into the popliteal vein. Doppler examination shows normal spontaneous and phasic flow. Left popliteal fossa cyst.    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 No sonographic evidence of acute deep venous thrombosis in the femoropopliteal systems bilaterally.     EXAM:  CT ANGIO CHEST PE PROTOCOL IC:  04/07/2018    IMPRESSION: Small focal pulmonary embolus in a posterior right upper lobe segmental vessel.    DVT Prophylaxis:  LMWH                   (  )  Heparin SQ           (  )  Coumadin             (  )  Xarelto                  (  )  Eliquis                   (  )  Venodynes           (  )  Ambulation          (X )  UFH                       ( X )  Contraindicated  (  )

## 2018-04-13 NOTE — PROGRESS NOTE ADULT - ASSESSMENT
A 85 y/o female w PMH COPD, HTN/HLD, peptic ulcer presents for progressively worsening dyspnea. She was seen by her pulmonologist and CXR was negative then referred to cardiology. ST & TTE negative, and was referred to GI.  She was found to be anemic w/ hgb of 8, which is not normal for her.  She has noticed some melena 1-2 episodes over the past month. Her last colonoscopy was 2 years ago and was informed of "polyps." She is s/p 2 units pRBC with H/H improvement noted of 9.3/32.6 today. Pending endoscopy but pt in acute respiratory failure, saturating ~85% on 3LNC, and improved with ventimask. GI team would like to stabilize her respiratory status prior to procedure. Pt currently on low dose heparin gtt for new left soleal vein DVT and RUL focal PE found on admission. Will need to transition her to oral AC, possibly with warfarin, after endoscopy and hematology's evaluation.       PLAN:   ::Awaiting upper endo  ::Continue UFH, titrate per aPTT  ::CBC/BMP  ::Monitor for s/s bleeding  ::Encourage ambulation  ::Venodyne contraindicated in left lower extremity    Will continue to follow.

## 2018-04-14 LAB
APTT BLD: 33 SEC — SIGNIFICANT CHANGE UP (ref 27.5–37.4)
APTT BLD: 54.2 SEC — HIGH (ref 27.5–37.4)
APTT BLD: >200 SEC — CRITICAL HIGH (ref 27.5–37.4)
HCT VFR BLD CALC: 35.4 % — SIGNIFICANT CHANGE UP (ref 34.5–45)
HGB BLD-MCNC: 10.3 G/DL — LOW (ref 11.5–15.5)
MCHC RBC-ENTMCNC: 23.9 PG — LOW (ref 27–34)
MCHC RBC-ENTMCNC: 29.1 GM/DL — LOW (ref 32–36)
MCV RBC AUTO: 82.1 FL — SIGNIFICANT CHANGE UP (ref 80–100)
NRBC # BLD: 0 /100 WBCS — SIGNIFICANT CHANGE UP (ref 0–0)
PLATELET # BLD AUTO: 326 K/UL — SIGNIFICANT CHANGE UP (ref 150–400)
RBC # BLD: 4.31 M/UL — SIGNIFICANT CHANGE UP (ref 3.8–5.2)
RBC # FLD: 21.5 % — HIGH (ref 10.3–14.5)
WBC # BLD: 12.16 K/UL — HIGH (ref 3.8–10.5)
WBC # FLD AUTO: 12.16 K/UL — HIGH (ref 3.8–10.5)

## 2018-04-14 PROCEDURE — 99233 SBSQ HOSP IP/OBS HIGH 50: CPT

## 2018-04-14 RX ORDER — ALPRAZOLAM 0.25 MG
0.12 TABLET ORAL ONCE
Qty: 0 | Refills: 0 | Status: DISCONTINUED | OUTPATIENT
Start: 2018-04-14 | End: 2018-04-14

## 2018-04-14 RX ADMIN — Medication 3 MILLILITER(S): at 19:47

## 2018-04-14 RX ADMIN — HEPARIN SODIUM 5000 UNIT(S): 5000 INJECTION INTRAVENOUS; SUBCUTANEOUS at 09:03

## 2018-04-14 RX ADMIN — HEPARIN SODIUM 750 UNIT(S)/HR: 5000 INJECTION INTRAVENOUS; SUBCUTANEOUS at 00:41

## 2018-04-14 RX ADMIN — Medication 200 MILLIGRAM(S): at 18:01

## 2018-04-14 RX ADMIN — AMLODIPINE BESYLATE 5 MILLIGRAM(S): 2.5 TABLET ORAL at 06:07

## 2018-04-14 RX ADMIN — Medication 200 MILLIGRAM(S): at 13:11

## 2018-04-14 RX ADMIN — Medication 3 MILLILITER(S): at 13:45

## 2018-04-14 RX ADMIN — Medication 20 MILLIGRAM(S): at 06:06

## 2018-04-14 RX ADMIN — PANTOPRAZOLE SODIUM 40 MILLIGRAM(S): 20 TABLET, DELAYED RELEASE ORAL at 06:06

## 2018-04-14 RX ADMIN — Medication 200 MILLIGRAM(S): at 22:57

## 2018-04-14 RX ADMIN — PANTOPRAZOLE SODIUM 40 MILLIGRAM(S): 20 TABLET, DELAYED RELEASE ORAL at 18:02

## 2018-04-14 RX ADMIN — Medication 20 MILLIGRAM(S): at 21:01

## 2018-04-14 RX ADMIN — Medication 1 TABLET(S): at 08:22

## 2018-04-14 RX ADMIN — MONTELUKAST 10 MILLIGRAM(S): 4 TABLET, CHEWABLE ORAL at 13:11

## 2018-04-14 RX ADMIN — Medication 1 TABLET(S): at 13:11

## 2018-04-14 RX ADMIN — LOSARTAN POTASSIUM 50 MILLIGRAM(S): 100 TABLET, FILM COATED ORAL at 06:07

## 2018-04-14 RX ADMIN — Medication 0.12 MILLIGRAM(S): at 22:57

## 2018-04-14 RX ADMIN — Medication 200 MILLIGRAM(S): at 06:07

## 2018-04-14 RX ADMIN — Medication 1 TABLET(S): at 18:01

## 2018-04-14 RX ADMIN — Medication 3 MILLILITER(S): at 02:15

## 2018-04-14 RX ADMIN — HEPARIN SODIUM 5000 UNIT(S): 5000 INJECTION INTRAVENOUS; SUBCUTANEOUS at 00:43

## 2018-04-14 RX ADMIN — HEPARIN SODIUM 750 UNIT(S)/HR: 5000 INJECTION INTRAVENOUS; SUBCUTANEOUS at 16:33

## 2018-04-14 RX ADMIN — Medication 200 MILLIGRAM(S): at 13:13

## 2018-04-14 RX ADMIN — Medication 3 MILLILITER(S): at 07:52

## 2018-04-14 RX ADMIN — HEPARIN SODIUM 750 UNIT(S)/HR: 5000 INJECTION INTRAVENOUS; SUBCUTANEOUS at 22:58

## 2018-04-14 RX ADMIN — HEPARIN SODIUM 0 UNIT(S)/HR: 5000 INJECTION INTRAVENOUS; SUBCUTANEOUS at 15:36

## 2018-04-14 RX ADMIN — Medication 20 MILLIGRAM(S): at 13:12

## 2018-04-14 RX ADMIN — HEPARIN SODIUM 1000 UNIT(S)/HR: 5000 INJECTION INTRAVENOUS; SUBCUTANEOUS at 08:57

## 2018-04-14 RX ADMIN — Medication 25 MILLIGRAM(S): at 06:07

## 2018-04-14 NOTE — PROGRESS NOTE ADULT - ASSESSMENT
A 85 y/o female w PMH COPD, HTN/HLD, peptic ulcer presents for progressively worsening dyspnea. She was seen by her pulmonologist and CXR was negative then referred to cardiology. ST & TTE negative, and was referred to GI.  She was found to be anemic w/ hgb of 8, which is not normal for her.  She has noticed some melena 1-2 episodes over the past month. Her last colonoscopy was 2 years ago and was informed of "polyps." She is s/p 2 units pRBC with H/H improvement noted of 9.3/32.6 today. Pending endoscopy but pt in acute respiratory failure, saturating ~85% on 3LNC, and improved with ventimask. GI team would like to stabilize her respiratory status prior to procedure. Pt currently on low dose heparin gtt for new left soleal vein DVT and RUL focal PE found on admission. Will need to transition her to oral AC, possibly with warfarin, after endoscopy and hematology's evaluation.     EGD on 4-13-18 by Dr Vizcarra  PLAN:   ::Continue UFH, titrate per aPTT  will start oral anticoagulation once pt is stable and no other procedures planned.  ::CBC/BMP  ::Monitor for s/s bleeding  ::Encourage ambulation  ::Venodyne contraindicated in left lower extremity    Will continue to follow.

## 2018-04-14 NOTE — PROGRESS NOTE ADULT - SUBJECTIVE AND OBJECTIVE BOX
HPI: This is a 83 y/o female w PMH COPD, CPAP at night, HTN, has been experiencing progressively dyspnea over past several weeks.  She went to her pulmonologist, CXR was neg, she referred to Cardio.  After work up w/ ST, TTE was not very revealing, she was referred to GI.  She was found on anemic w/ Hg of 8 on cbc, which is not normal for her.  She has noticed some melena 1-2 episodes over the past month.  She denies any hematochezia, hematemesis. She endorses a feeling of fullness after she eats a small amount.  She's also been having pain across her upper abdomen, just below her diaphragm.  She cannot tell me if this pain is related to eating.  She does have a good appetite but just gets full fast.  Gdtr at bedside notes that she doesn't eat as much.  Pt denies any nausea.  Her last colonoscopy was 2 years ago.  She endorsed h/o gastric ulcers.     Currently, pt is dyspneic w/ conversation, she's satting 85% on 3LNC, changed to ventimask, now at 95%.  She is feeling very anxious and requesting xanax- which she takes prn at home 1/2 of 0.25mg dose. 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 She continues to report of SOB. She denies prior VTE, CVA, MI, no blood clotting disorder. She is a former smoker, has been under stress recently. She reports of being an active caregiver to her  with Parkinson's disease, but now has HHA and decreased her mobility. Informed pt of possible oral anticoagulation with warfarin and she agrees to therapy, but will need to have endoscopy done first. Pending IVC filter placement tomorrow. Discussed with primary RN and no active bleeding noted today. Her H/H today 9.3/32.6 after s/p 2 units pRBC.    Patient is a 84y old  Female who presents with a chief complaint of sob, anemia (06 Apr 2018 17:10)    Consulted by Dr. Sinha for VTE prophylaxis, risk stratification, and anticoagulation management.    PAST MEDICAL & SURGICAL HISTORY:  COPD (chronic obstructive pulmonary disease)  Hypertension  Peptic Ulcer  Hyperlipemia  S/P Cataract Extraction: right eye 5/27/10    BMI: 30.6    CrCL: 69.88    IMPROVE VTE Risk Score: 5    IMPROVE Bleeding Risk Score: 5.5    4/9: Patient seen at bedside with Dr. Kramer- explained will hold off on IVC filter as DVT is in small vein and no obvious bleeding at this time. Remain on UFH. Patient reports some improvement in breathing- but still with RUDD and O2 nc.  4/10: seen at bedside, some RUDD on exertion O 2 in place  4/11: seen at bedside, for Upper endo tomorrow, coumadin still on hold but discussed with pt foods to avoid/ vit k diet  4/13: Patient seen at bedside- on isolation- still with difficulty breathing- RUDD. Endo on hold until respiratory status improves. Solumedrol/theophylline on board per pulmon. Continuing UFH as IVC filter not necessary due to stable- no bleeding.   4-14-18 Pt seen at bedside on [facility]. no changes. pt oob in chair states she stands uo and walks in room every so often.  States sheis still SOB  on ambulation.     Falls Risk:   High (  )  Mod (  )  Low ( X )  FAMILY HISTORY:  No pertinent family history in first degree relatives    Denies any personal or familial history of clotting or bleeding disorders.    Allergies    No Known Allergies    Intolerances    REVIEW OF SYSTEMS    (  )Fever	     (  )Constipation	( X )SOB			(  )Headache	(  )Dysuria  (  )Chills	     (  )Melena	(  )Dyspnea present on exertion    (  )Dizziness                    (  )Polyuria  (  )Nausea	     (  )Hematochezia	(  )Cough		                    (  )Syncope   	(  )Hematuria  (  )Vomiting    (  )Chest Pain	(  )Wheezing		(  )Weakness  (  )Diarrhea     (  )Palpitations	(  )Anorexia		(  )Myalgia    All other review of systems negative: Yes    PHYSICAL EXAM:    Constitutional: Appears Well    Neurological: A& O x 3    Skin: Warm    Respiratory and Thorax: increased effort; Breath sounds: decreased with b/l rhonchi- non-prod moist cough   	  Cardiovascular: S1, S2, regular, NMBR	    Gastrointestinal: BS + x 4Q, nontender	    Genitourinary:  Bladder nondistended, nontender    Musculoskeletal:   General Right:   no muscle/joint tenderness,   normal tone, no joint swelling,   ROM: full	    General Left:   no muscle/joint tenderness,   normal tone, no joint swelling,   ROM: full    Lower extrems:   Right: no calf tenderness              negative spencer's sign               + pedal pulses    Left:   no calf tenderness              negative spencer's sign               + pedal pulses                                    10.3   12.16 )-----------( 326      ( 14 Apr 2018 06:34 )             35.4       04-13    140  |  100  |  28<H>  ----------------------------<  131<H>  4.9   |  32<H>  |  0.58    Ca    9.0      13 Apr 2018 05:47        PTT - ( 14 Apr 2018 06:34 )  PTT:33.0 sec                     9.5    15.80 )-----------( 329      ( 13 Apr 2018 05:47 )             33.0       04-13    140  |  100  |  28<H>  ----------------------------<  131<H>  4.9   |  32<H>  |  0.58    Ca    9.0      13 Apr 2018 05:47      Vital Signs Last 24 Hrs  T(C): 36.7 (04-14-18 @ 10:40), Max: 36.9 (04-13-18 @ 17:51)  T(F): 98 (04-14-18 @ 10:40), Max: 98.5 (04-13-18 @ 17:51)  HR: 58 (04-14-18 @ 14:19) (55 - 73)  BP: 131/44 (04-14-18 @ 10:40) (114/46 - 133/59)  BP(mean): --  RR: 18 (04-14-18 @ 10:42) (18 - 18)  SpO2: 95% (04-14-18 @ 14:19) (93% - 98%)    MEDICATIONS  (STANDING):  ALBUTerol/ipratropium for Nebulization 3 milliLiter(s) Nebulizer every 6 hours  amLODIPine   Tablet 5 milliGRAM(s) Oral daily  docusate sodium 100 milliGRAM(s) Oral two times a day  guaiFENesin    Syrup 200 milliGRAM(s) Oral every 6 hours  heparin  Infusion.  Unit(s)/Hr IV Continuous <Continuous>  hydrochlorothiazide 25 milliGRAM(s) Oral daily  lactobacillus acidophilus 1 Tablet(s) Oral three times a day with meals  losartan 50 milliGRAM(s) Oral daily  methylPREDNISolone sodium succinate Injectable 20 milliGRAM(s) IV Push every 8 hours  montelukast 10 milliGRAM(s) Oral daily  pantoprazole  Injectable 40 milliGRAM(s) IV Push every 12 hours  senna 2 Tablet(s) Oral at bedtime  theophylline SR Tablet 200 milliGRAM(s) Oral daily        IMAGING:  US DPLX LWR EXT VEINS COMPL BI:  04/07/2018    FINDINGS:  RIGHT:  There is normal compressibility of the common femoral, femoral, and popliteal veins.  Visualized posterior tibial veins are within normal limits. Doppler examination shows normal spontaneous and phasic flow. Right popliteal cyst.    LEFT: There is normal compressibility of the common femoral, femoral, and popliteal veins. Thrombosis of the left soleal vein without extension into the popliteal vein. Doppler examination shows normal spontaneous and phasic flow. Left popliteal fossa cyst.    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 No sonographic evidence of acute deep venous thrombosis in the femoropopliteal systems bilaterally.     EXAM:  CT ANGIO CHEST PE PROTOCOL IC:  04/07/2018    IMPRESSION: Small focal pulmonary embolus in a posterior right upper lobe segmental vessel.    EGD:4-13-18  Normal esophagus and stomach  Bulging with superficial ulceration with small amount of adjacent blood in the second portion of the duodenum -- favor submucosal or extrinsic ulceration mass.  Biopsies deferred due to acute desaturation and anticoagulation    DVT Prophylaxis:  LMWH                   (  )  Heparin SQ           (  )  Coumadin             (  )  Xarelto                  (  )  Eliquis                   (  )  Venodynes           (  )  Ambulation          (X )  UFH                       ( X )  Contraindicated  (  )

## 2018-04-14 NOTE — CONSULT NOTE ADULT - ASSESSMENT
Duodenal ulcerated submucosal mass. Plan review endoscopy findings with GI. Consider MRI to more clearly localze location and extent of lesion. Will follow.

## 2018-04-14 NOTE — PROGRESS NOTE ADULT - ASSESSMENT
PROBLEMS:    ESBL UTI  RUL PE & DVT  Acute respiratory failure with hypoxia  acute excerbation of COPD  Symptomatic anemia- s/p transfusion- duodenal mass  Gastrointestinal hemorrhage with melena  Essential hypertension    PLAN;    pulmonary improving.  po theophylline   iv heparin  IV solumedrols 20mg q8hr  AEROSLS  SUPPORTIVE CARE  DVT PROPHYLASIX

## 2018-04-14 NOTE — CONSULT NOTE ADULT - SUBJECTIVE AND OBJECTIVE BOX
84 F w/PMH COPD, CPAP at night, HTN admitted 4/6 with experiencing progressively dyspnea over past several weeks.  She went to her pulmonologist, CXR was neg, she referred to Cardio.  After work up w/ ST, TTE was not very revealing, she was referred to GI.  She was found to be anemic w/ Hg of 8 on cbc, She has noticed some melena 1-2 episodes over the past month.  She endorsed h/o gastric ulcers. She denies any hematochezia, hematemesis. She endorses a feeling of fullness after she eats a small amount.  She's also been having pain across her upper abdomen, just below her diaphragm. Her last colonoscopy was 2 years ago.  Endoscopy showed ulcerated submucosal mass in duodenum    PSH: as above    Meds: per reconciliation sheet, noted below    MEDICATIONS  (STANDING):  ALBUTerol/ipratropium for Nebulization 3 milliLiter(s) Nebulizer every 6 hours  amLODIPine   Tablet 5 milliGRAM(s) Oral daily  azithromycin  IVPB 500 milliGRAM(s) IV Intermittent every 24 hours  azithromycin  IVPB      guaiFENesin    Syrup 200 milliGRAM(s) Oral every 6 hours  heparin  Infusion.  Unit(s)/Hr (10 mL/Hr) IV Continuous <Continuous>  hydrochlorothiazide 25 milliGRAM(s) Oral daily  losartan 50 milliGRAM(s) Oral daily  meropenem  IVPB      meropenem  IVPB 1000 milliGRAM(s) IV Intermittent once  meropenem  IVPB 1000 milliGRAM(s) IV Intermittent every 8 hours  methylPREDNISolone sodium succinate Injectable 20 milliGRAM(s) IV Push daily  montelukast 10 milliGRAM(s) Oral dailypantoprazole Infusion 8 mG/Hr (10 mL/Hr) IV Continuous <Continuous>  sodium ferric gluconate complex IVPB 125 milliGRAM(s) IV Intermittent dailyAllergies    No Known Allergies    Intolerances      Social: no smoking, no alcohol, no illegal drugs; no recent travel, no exposure to TB    Family history:  No pertinent family history in first degree relatives      ROS: the patient denies fever, no chills, no HA, no dizziness, no sore throat, no blurry vision, no CP, no palpitations, no abdominal pain, no diarrhea, no N/V, no leg pain, no claudication, no rash, no joint aches, no rectal pain or bleeding, no night sweatsPE:  Constitutional: frail looking  HEENT: NC/AT, EOMI, PERRLA  Neck: supple  Back: no tenderness  Respiratory: decreased breath sounds  Cardiovascular: S1S2 regular, no murmurs  Abdomen: soft, not tender, not distended, positive BS  Genitourinary: deferred  Rectal: deferred  Musculoskeletal: no muscle tenderness, no joint swelling or tenderness  Extremities: no pedal edema  Neurological: no focal deficits  Skin: no rashes< from: Upper Endoscopy (04.13.18 @ 15:49) >  Normal esophagus.    IMPRESS                      - Normal stomach.    IMPRESS                      -Protrusion into the second portion of the duodenum     IMPRESS                      with an overlying 1 cm ulcer was noted in the second     IMPRESS                      portion of the duodenum. There was a small amount of     IMPRESS     adjacent fresh blood but no active bleeding. The     IMPRESS                      findings suggested a submucosal ulcer extrinsic     IMPRESS                      ulcerating mass.    < end of copied text >

## 2018-04-14 NOTE — PROGRESS NOTE ADULT - SUBJECTIVE AND OBJECTIVE BOX
Subjective:  Patient is a 84y old  Female who presents with a chief complaint of sob, anemia      HPI:     84 F w/PMH COPD, CPAP at night, HTN, presents on 4/6/18 with progressively worse dyspnea over past several weeks.  She went to her pulmonologist, CXR was neg, she referred to Cardio.  After work up w/ ST, TTE was not very revealing, she was referred to GI.  She was found on anemic w/ Hg of 8 on cbc, which is not normal for her.  She has noticed some melena 1-2 episodes over the past month.  She denies any hematochezia, hematemesis. She endorses a feeling of fullness after she eats a small amount.  She's also been having pain across her upper abdomen, just below her diaphragm.  She cannot tell me if this pain is related to eating.  She does have a good appetite but just gets full fast.  Gdtr at bedside notes that she doesn't eat as much.  Pt denies any nausea.  Her last colonoscopy was 2 years ago.  She endorsed h/o gastric ulcers.     In ED  pt is dyspneic w/ conversation, she's satting 85% on 3LNC, changed to ventimask, now at 95%.  She is feeling very anxious and requesting xanax- which she takes prn at home 1/2 of 0.25mg dose.     4/7/18 - Patient seen and examined at bedside earlier today, dyspneic with minimal exertion, even talking, but reports slight improvement of the dyspnea   4/8/18 - events noted, transferred to tele for o2 monitoring due to desaturation , reports improvement of the dyspnea, cough, 1 BM brown overnight  tele- o2 sats to 85%, episodes of bigemeny   4/9 - tele O2 sats on O2 wnl, reports improvement of dyspnea and cough since yesterday, no BM, denies abd pain, feels constipated  4/10 - dyspnea slightly worse today, cough productive, denies CP, + brown BM, no blood, denies abd pain  4/11 - dyspnea slightly better on higher dose of IV steroids, + productive cough, overall better  4/12 - dyspnea better, afebrile, denies chest pain, abdominal pain, no events overnight POC discussed   4/13- pt seen and examined, dyspnea and cough better, afebrile, cleared for EGD by cardiology and pulmonology, no new complains  Review of system- Rest of the review of system are negative except mentioned in HPI  04/14/18: patient seen and examined. S/P EGD yesterday: duodenal superficial ulcer. Patient denies any new complaints. Discussed with patient in length regarding management plan.         Vital Signs Last 24 Hrs  T(C): 36.7 (14 Apr 2018 10:40), Max: 36.9 (13 Apr 2018 17:51)  T(F): 98 (14 Apr 2018 10:40), Max: 98.5 (13 Apr 2018 17:51)  HR: 58 (14 Apr 2018 14:19) (55 - 73)  BP: 131/44 (14 Apr 2018 10:40) (114/46 - 133/59)  BP(mean): --  RR: 18 (14 Apr 2018 10:42) (18 - 18)  SpO2: 95% (14 Apr 2018 14:19) (93% - 98%)        PHYSICAL EXAM:      GENERAL: NAD  NERVOUS SYSTEM:  Alert & Oriented X3, non- focal exam,  HEAD:  Atraumatic, Normocephalic  EYES: EOMI, PERRLA, conjunctiva and sclera clear  HEENT: Moist mucous membranes  NECK: Supple, No JVD  CHEST/LUNG: BS decreased bilaterally; No rales, no rhonchi, +  wheezing,  HEART: Regular rate and rhythm; No murmurs, rubs, or gallops  ABDOMEN: Soft, Nontender, Nondistended; Bowel sounds present  GENITOURINARY- Voiding, no suprapubic tenderness  EXTREMITIES:  2+ Peripheral Pulses, No clubbing, cyanosis, or edema  MUSCULOSKELETAL:- No muscle tenderness, Muscle tone normal, No joint tenderness, no Joint swelling, Joint range of motion-normal  SKIN-no rash, no lesion          LABS:                                        10.3   12.16 )-----------( 326      ( 14 Apr 2018 06:34 )             35.4     13 Apr 2018 05:47    140    |  100    |  28     ----------------------------<  131    4.9     |  32     |  0.58     Ca    9.0        13 Apr 2018 05:47        PTT - ( 14 Apr 2018 06:34 )  PTT:33.0 sec  CAPILLARY BLOOD GLUCOSE            RADIOLOGY & ADDITIONAL TESTS:    < from: US Duplex Venous Lower Ext Complete, Bilateral (04.07.18 @ 17:25) >  IMPRESSION:     Left soleal vein thrombosis without extension into the popliteal vein.    No sonographic evidence of acute deep venous thrombosis in the   femoropopliteal systems bilaterally.     < end of copied text >  < from: CT Angio Chest PE Protocol w/ IV Cont (04.07.18 @ 16:53) >  Lungs, Airways and Pleura: Central tracheobronchial tree is grossly   patent. No endobronchial lesions. Small to mild bilateral pleural   effusions right greater than left with bibasilar compressive atelectasis.   No pneumothorax. No discrete pulmonary nodules. Subsegmental atelectasis   in the lingula.    Heart and Great Vessels: Atherosclerotic changes of the aorta and   coronary vasculature. Study is mildly limited secondary to heterogeneous   opacification of the distal segmental and subsegmental pulmonary vessels.   Focal filling defect within the mid to distal posterior right upper lobe   segmental vessel compatible with a focal pulmonary embolus.. No other   evidence of acute pulmonary embolismwithin the limits of the study.. No   aortic aneurysm. No definite evidence of aortic dissection. Heart is   normal in size. No pericardial effusion.    Mediastinum and Lanette: Pretracheal and right hilar lymph nodes measuring   up to 1.3 cm.    Neck and Chest Wall: within normal limits    Bones: Degenerative changes and osteopenia. Mild to moderate compression   deformities of T6-T8 chronic in appearance. Compression deformity of L1   appears chronic in nature.    Upper Abdomen:  Left lateral midpole renal cyst. Elevation of the right   hemidiaphragm.    IMPRESSION:         Small focal pulmonary embolus in a posterior right upper lobe segmental   vessel.    < end of copied text >    < from: Xray Chest 1 View AP/PA. (04.06.18 @ 10:47) >  Heart magnified by projection, unchanged. Atherosclerotic aorta.   Symmetrically hyperinflated lungs. No focal consolidation or pleural   effusion. Old fracture right seventh posterior rib.    Impression: Hyperinflated lungs. No active infiltrates.    < end of copied text >          MEDICATIONS:      MEDICATIONS  (STANDING):  ALBUTerol/ipratropium for Nebulization 3 milliLiter(s) Nebulizer every 6 hours  amLODIPine   Tablet 5 milliGRAM(s) Oral daily  docusate sodium 100 milliGRAM(s) Oral two times a day  guaiFENesin    Syrup 200 milliGRAM(s) Oral every 6 hours  heparin  Infusion.  Unit(s)/Hr (10 mL/Hr) IV Continuous <Continuous>  hydrochlorothiazide 25 milliGRAM(s) Oral daily  lactobacillus acidophilus 1 Tablet(s) Oral three times a day with meals  losartan 50 milliGRAM(s) Oral daily  methylPREDNISolone sodium succinate Injectable 20 milliGRAM(s) IV Push every 8 hours  montelukast 10 milliGRAM(s) Oral daily  pantoprazole  Injectable 40 milliGRAM(s) IV Push every 12 hours  senna 2 Tablet(s) Oral at bedtime  theophylline SR Tablet 200 milliGRAM(s) Oral daily    MEDICATIONS  (PRN):  heparin  Injectable 5000 Unit(s) IV Push every 6 hours PRN For aPTT less than 40  ondansetron Injectable 4 milliGRAM(s) IV Push every 6 hours PRN Nausea

## 2018-04-14 NOTE — PROGRESS NOTE ADULT - SUBJECTIVE AND OBJECTIVE BOX
Subjective:    pat s/p EGD possible duodenal mass, no respiratory distress.    Home Medications:  amLODIPine 5 mg oral tablet: 1 tab(s) orally once a day (06 Apr 2018 17:25)  Anoro Ellipta 62.5 mcg-25 mcg/inh inhalation powder: 1 puff(s) inhaled once a day (06 Apr 2018 17:25)  calcium (as carbonate) 600 mg oral tablet: 2 tab(s) orally once a day (06 Apr 2018 17:25)  hydroCHLOROthiazide 25 mg oral tablet: 1 tab(s) orally once a day (06 Apr 2018 17:25)  losartan 50 mg oral tablet: 1 tab(s) orally once a day (06 Apr 2018 17:25)  montelukast 10 mg oral tablet: 1 tab(s) orally once a day (06 Apr 2018 17:25)  predniSONE 20 mg oral tablet: 1 tab(s) orally once a day    ***COURSE COMPLETED*** (06 Apr 2018 17:25)  ProAir HFA 90 mcg/inh inhalation aerosol: 2 puff(s) inhaled 4 times a day, As Needed (06 Apr 2018 17:25)  Vitamin D3 2000 intl units oral tablet: 1 tab(s) orally once a day (06 Apr 2018 17:25)  Xanax 0.25 mg oral tablet: 0.5 tab(s) orally once a day, As Needed for anxiety  (06 Apr 2018 17:25)    MEDICATIONS  (STANDING):  ALBUTerol/ipratropium for Nebulization 3 milliLiter(s) Nebulizer every 6 hours  amLODIPine   Tablet 5 milliGRAM(s) Oral daily  docusate sodium 100 milliGRAM(s) Oral two times a day  guaiFENesin    Syrup 200 milliGRAM(s) Oral every 6 hours  heparin  Infusion.  Unit(s)/Hr (10 mL/Hr) IV Continuous <Continuous>  hydrochlorothiazide 25 milliGRAM(s) Oral daily  lactobacillus acidophilus 1 Tablet(s) Oral three times a day with meals  losartan 50 milliGRAM(s) Oral daily  methylPREDNISolone sodium succinate Injectable 20 milliGRAM(s) IV Push every 8 hours  montelukast 10 milliGRAM(s) Oral daily  pantoprazole  Injectable 40 milliGRAM(s) IV Push every 12 hours  senna 2 Tablet(s) Oral at bedtime  theophylline SR Tablet 200 milliGRAM(s) Oral daily    MEDICATIONS  (PRN):  heparin  Injectable 5000 Unit(s) IV Push every 6 hours PRN For aPTT less than 40  ondansetron Injectable 4 milliGRAM(s) IV Push every 6 hours PRN Nausea      Allergies    No Known Allergies    Intolerances        Vital Signs Last 24 Hrs  T(C): 36.7 (14 Apr 2018 10:40), Max: 36.9 (13 Apr 2018 17:51)  T(F): 98 (14 Apr 2018 10:40), Max: 98.5 (13 Apr 2018 17:51)  HR: 73 (14 Apr 2018 10:40) (55 - 73)  BP: 131/44 (14 Apr 2018 10:40) (114/46 - 133/59)  BP(mean): --  RR: 18 (14 Apr 2018 10:42) (18 - 18)  SpO2: 98% (14 Apr 2018 10:42) (93% - 98%)      PHYSICAL EXAMINATION:    NECK:  Supple. No lymphadenopathy. Jugular venous pressure not elevated. Carotids equal.   HEART:   The cardiac impulse has a normal quality. Reg., Nl S1 and S2.  There are no murmurs, rubs or gallops noted  CHEST:  Chest crackles to auscultation. Normal respiratory effort.  ABDOMEN:  Soft and nontender.   EXTREMITIES:  There is no edema.       LABS:                        10.3   12.16 )-----------( 326      ( 14 Apr 2018 06:34 )             35.4     04-13    140  |  100  |  28<H>  ----------------------------<  131<H>  4.9   |  32<H>  |  0.58    Ca    9.0      13 Apr 2018 05:47      PTT - ( 14 Apr 2018 06:34 )  PTT:33.0 sec

## 2018-04-14 NOTE — PROGRESS NOTE ADULT - ASSESSMENT
84 F w/PMH COPD, CPAP at night, HTN, presents on 4/6/18 with progressively dyspnea over past several weeks.  Found w/ anemia, +melena  Currently, pt is dyspneic w/ conversation, she's satting 85% on 3LNC, changed to ventimask, now at 95%.  She is feeling very anxious and requesting xanax- which she takes prn at home 1/2 of 0.25mg dose.      Hg 8.1- receiving 2 units PRBC      * Acute respiratory failure with hypoxia,  multifactorial:  * Acute COPD exacerbation, slow improvement  * Sleep apnea   - monitor sats, titrate O2 to keep sats>90%  - IV steroids--> taper   - s/p Azithromycin  - on theophyline 200 daily  - nebs, mucolytics  - pulmonary follow up appreciated  - CPAP qhs and incentive spirometry    * Left DVT soleal vein  * PE RUL segmental vessel  - high risk of GI bleed would benefit from IVC filter   - started on IV low range heparin  - Dr. Mendoza consult - r/o hypercoagulability state as o/p, Ct abd to r/o malignancies, coumadin will be better option for AC  - O2 monitoring  -interventional cardiology consult Dr. Kramer for possible IVC filter placement    - 2 d echo - EF 65%  - cardiology input appretiated  *  Symptomatic anemia. , stable  Acute blood loss anemia  - no active bleeding  - s/p 2 units of PRBC  - H/H q12h--> q6h while on heparin drip  * Leukocytosis, multifactorial  * ESBL UTI  * Steroids induced  -S/P on Imipenem   - isolation  * Melena suspected subacute Upper GI bleed  - S/P EGD  - c/w PPI BID-->  - monitor H/H and transfuse as needed.  if Hb <8  - Ct abd/pelv - to r/o malignancies given unprovoked VTE   *  Essential hypertension.    bp stable,   c/w home meds HCTZ, losartan, amlodipine  monitor and titrate meds as indicated.   * Anxiety  - c/w benzo prn  * Prophylactic measure. Plan: SCDs- no chem d/t GIB.

## 2018-04-15 LAB
APTT BLD: 68.9 SEC — HIGH (ref 27.5–37.4)
HCT VFR BLD CALC: 33.3 % — LOW (ref 34.5–45)
HGB BLD-MCNC: 9.8 G/DL — LOW (ref 11.5–15.5)
MCHC RBC-ENTMCNC: 24 PG — LOW (ref 27–34)
MCHC RBC-ENTMCNC: 29.4 GM/DL — LOW (ref 32–36)
MCV RBC AUTO: 81.4 FL — SIGNIFICANT CHANGE UP (ref 80–100)
NRBC # BLD: 0 /100 WBCS — SIGNIFICANT CHANGE UP (ref 0–0)
PLATELET # BLD AUTO: 316 K/UL — SIGNIFICANT CHANGE UP (ref 150–400)
RBC # BLD: 4.09 M/UL — SIGNIFICANT CHANGE UP (ref 3.8–5.2)
RBC # FLD: 22.1 % — HIGH (ref 10.3–14.5)
WBC # BLD: 14.55 K/UL — HIGH (ref 3.8–10.5)
WBC # FLD AUTO: 14.55 K/UL — HIGH (ref 3.8–10.5)

## 2018-04-15 PROCEDURE — 99233 SBSQ HOSP IP/OBS HIGH 50: CPT

## 2018-04-15 RX ORDER — ALPRAZOLAM 0.25 MG
0.12 TABLET ORAL AT BEDTIME
Qty: 0 | Refills: 0 | Status: DISCONTINUED | OUTPATIENT
Start: 2018-04-15 | End: 2018-04-22

## 2018-04-15 RX ADMIN — Medication 3 MILLILITER(S): at 20:13

## 2018-04-15 RX ADMIN — Medication 20 MILLIGRAM(S): at 05:01

## 2018-04-15 RX ADMIN — Medication 1 TABLET(S): at 13:08

## 2018-04-15 RX ADMIN — Medication 0.12 MILLIGRAM(S): at 23:00

## 2018-04-15 RX ADMIN — Medication 200 MILLIGRAM(S): at 17:07

## 2018-04-15 RX ADMIN — Medication 200 MILLIGRAM(S): at 13:09

## 2018-04-15 RX ADMIN — Medication 20 MILLIGRAM(S): at 13:09

## 2018-04-15 RX ADMIN — Medication 25 MILLIGRAM(S): at 05:01

## 2018-04-15 RX ADMIN — Medication 3 MILLILITER(S): at 02:37

## 2018-04-15 RX ADMIN — LOSARTAN POTASSIUM 50 MILLIGRAM(S): 100 TABLET, FILM COATED ORAL at 05:01

## 2018-04-15 RX ADMIN — Medication 200 MILLIGRAM(S): at 13:08

## 2018-04-15 RX ADMIN — Medication 200 MILLIGRAM(S): at 23:04

## 2018-04-15 RX ADMIN — HEPARIN SODIUM 750 UNIT(S)/HR: 5000 INJECTION INTRAVENOUS; SUBCUTANEOUS at 06:18

## 2018-04-15 RX ADMIN — Medication 1 TABLET(S): at 17:07

## 2018-04-15 RX ADMIN — Medication 200 MILLIGRAM(S): at 05:02

## 2018-04-15 RX ADMIN — PANTOPRAZOLE SODIUM 40 MILLIGRAM(S): 20 TABLET, DELAYED RELEASE ORAL at 17:07

## 2018-04-15 RX ADMIN — AMLODIPINE BESYLATE 5 MILLIGRAM(S): 2.5 TABLET ORAL at 05:01

## 2018-04-15 RX ADMIN — Medication 1 TABLET(S): at 09:22

## 2018-04-15 RX ADMIN — Medication 3 MILLILITER(S): at 15:57

## 2018-04-15 RX ADMIN — PANTOPRAZOLE SODIUM 40 MILLIGRAM(S): 20 TABLET, DELAYED RELEASE ORAL at 05:02

## 2018-04-15 RX ADMIN — Medication 20 MILLIGRAM(S): at 21:45

## 2018-04-15 RX ADMIN — MONTELUKAST 10 MILLIGRAM(S): 4 TABLET, CHEWABLE ORAL at 13:08

## 2018-04-15 NOTE — PROGRESS NOTE ADULT - SUBJECTIVE AND OBJECTIVE BOX
Patient is a 84y old  Female who presents with a chief complaint of sob, anemia (06 Apr 2018 17:10)      Subective:  Feels good, no bleeding. Breathing still a little short.    PAST MEDICAL & SURGICAL HISTORY:  COPD (chronic obstructive pulmonary disease)  Hypertension  Peptic Ulcer  Hyperlipemia  S/P Cataract Extraction: right eye 5/27/10      MEDICATIONS  (STANDING):  ALBUTerol/ipratropium for Nebulization 3 milliLiter(s) Nebulizer every 6 hours  amLODIPine   Tablet 5 milliGRAM(s) Oral daily  docusate sodium 100 milliGRAM(s) Oral two times a day  guaiFENesin    Syrup 200 milliGRAM(s) Oral every 6 hours  heparin  Infusion.  Unit(s)/Hr (10 mL/Hr) IV Continuous <Continuous>  hydrochlorothiazide 25 milliGRAM(s) Oral daily  lactobacillus acidophilus 1 Tablet(s) Oral three times a day with meals  losartan 50 milliGRAM(s) Oral daily  methylPREDNISolone sodium succinate Injectable 20 milliGRAM(s) IV Push every 8 hours  montelukast 10 milliGRAM(s) Oral daily  pantoprazole  Injectable 40 milliGRAM(s) IV Push every 12 hours  senna 2 Tablet(s) Oral at bedtime  theophylline SR Tablet 200 milliGRAM(s) Oral daily    MEDICATIONS  (PRN):  heparin  Injectable 5000 Unit(s) IV Push every 6 hours PRN For aPTT less than 40  ondansetron Injectable 4 milliGRAM(s) IV Push every 6 hours PRN Nausea      REVIEW OF SYSTEMS:    RESPIRATORY: No shortness of breath  CARDIOVASCULAR: No chest pain  All other review of systems is negative unless indicated above.    Vital Signs Last 24 Hrs  T(C): 36.9 (15 Apr 2018 11:39), Max: 36.9 (14 Apr 2018 17:06)  T(F): 98.5 (15 Apr 2018 11:39), Max: 98.5 (15 Apr 2018 11:39)  HR: 79 (15 Apr 2018 11:39) (58 - 79)  BP: 141/55 (15 Apr 2018 11:39) (114/87 - 141/55)  BP(mean): --  RR: 18 (15 Apr 2018 11:39) (18 - 18)  SpO2: 92% (15 Apr 2018 11:39) (92% - 98%)    PHYSICAL EXAM:    Constitutional: NAD, well-developed  Respiratory: CTAB  Cardiovascular: S1 and S2, RRR  Gastrointestinal: BS+, soft, NT/ND  Extremities: No peripheral edema  Psychiatric: Normal mood, normal affect    LABS:                        9.8    14.55 )-----------( 316      ( 15 Apr 2018 05:45 )             33.3           PTT - ( 15 Apr 2018 05:45 )  PTT:68.9 sec      RADIOLOGY & ADDITIONAL STUDIES:

## 2018-04-15 NOTE — PROGRESS NOTE ADULT - ASSESSMENT
A 85 y/o female w PMH COPD, HTN/HLD, peptic ulcer presents for progressively worsening dyspnea. She was seen by her pulmonologist and CXR was negative then referred to cardiology. ST & TTE negative, and was referred to GI.  She was found to be anemic w/ hgb of 8, which is not normal for her.  She has noticed some melena 1-2 episodes over the past month. Her last colonoscopy was 2 years ago and was informed of "polyps." She is s/p 2 units pRBC with H/H improvement noted of 9.3/32.6 today. Pending endoscopy but pt in acute respiratory failure, saturating ~85% on 3LNC, and improved with ventimask. GI team would like to stabilize her respiratory status prior to procedure. Pt currently on low dose heparin gtt for new left soleal vein DVT and RUL focal PE found on admission. Will need to transition her to oral AC, possibly with warfarin, after endoscopy and hematology's evaluation.     EGD on 4-13-18 by Dr Vizcarra  PLAN:   ::Continue UFH, titrate per aPTT  will start oral anticoagulation once pt is stable and no other procedures planned. possible IVC  filter placement tomorrow.   ::CBC/BMP  ::Monitor for s/s bleeding  ::Encourage ambulation  ::Venodyne contraindicated in left lower extremity    Will continue to follow.

## 2018-04-15 NOTE — PROGRESS NOTE ADULT - ASSESSMENT
84 F w/PMH COPD, CPAP at night, HTN, presents on 4/6/18 with progressively dyspnea over past several weeks.  Found w/ anemia, +melena  Currently, pt is dyspneic w/ conversation, she's satting 85% on 3LNC, changed to ventimask, now at 95%.  She is feeling very anxious and requesting xanax- which she takes prn at home 1/2 of 0.25mg dose.          * Acute respiratory failure with hypoxia,  multifactorial:  * Acute COPD exacerbation, slow improvement  * Sleep apnea   - monitor sats, titrate O2 to keep sats>90%  - IV steroids--> taper   - s/p Azithromycin  - on theophyline 200 daily  - nebs, mucolytics  - pulmonary follow up appreciated  - CPAP qhs and incentive spirometry    * Left DVT soleal vein  * PE RUL segmental vessel  - high risk of GI bleed would benefit from IVC filter   - on IV low range heparin  - Dr. Mendoza consult - r/o hypercoagulability state as o/p, Ct abd to r/o malignancies, coumadin will be better option for AC  - O2 monitoring  -interventional cardiology consult Dr. Karmer for possible IVC filter placement    - 2 d echo - EF 65%  - cardiology input appretiated    *  Symptomatic anemia. , stable  Acute blood loss anemia  - no active bleeding  - s/p 2 units of PRBC  - H/H q12h--> q6h while on heparin drip    * Leukocytosis, multifactorial  * ESBL UTI  * Steroids induced  -S/P on Imipenem     * Melena due to subacute Upper GI bleed  - S/P EGD: duodenal superficial ulcer, suspected submucosal tumor  -Dr Norton consult appreciated. Patient is very reluctant for any surgical interventions.   - c/w PPI BID-->  - monitor H/H and transfuse as needed.  if Hb <8      *  Essential hypertension.    bp stable,   c/w home meds HCTZ, losartan, amlodipine  monitor and titrate meds as indicated.     * Anxiety  - c/w benzo prn  * Prophylactic measure. Plan: SCDs- no chem d/t GIB.

## 2018-04-15 NOTE — PROGRESS NOTE ADULT - SUBJECTIVE AND OBJECTIVE BOX
HPI: This is a 83 y/o female w PMH COPD, CPAP at night, HTN, has been experiencing progressively dyspnea over past several weeks.  She went to her pulmonologist, CXR was neg, she referred to Cardio.  After work up w/ ST, TTE was not very revealing, she was referred to GI.  She was found on anemic w/ Hg of 8 on cbc, which is not normal for her.  She has noticed some melena 1-2 episodes over the past month.  She denies any hematochezia, hematemesis. She endorses a feeling of fullness after she eats a small amount.  She's also been having pain across her upper abdomen, just below her diaphragm.  She cannot tell me if this pain is related to eating.  She does have a good appetite but just gets full fast.  Gdtr at bedside notes that she doesn't eat as much.  Pt denies any nausea.  Her last colonoscopy was 2 years ago.  She endorsed h/o gastric ulcers.     Currently, pt is dyspneic w/ conversation, she's satting 85% on 3LNC, changed to ventimask, now at 95%.  She is feeling very anxious and requesting xanax- which she takes prn at home 1/2 of 0.25mg dose. 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 She continues to report of SOB. She denies prior VTE, CVA, MI, no blood clotting disorder. She is a former smoker, has been under stress recently. She reports of being an active caregiver to her  with Parkinson's disease, but now has HHA and decreased her mobility. Informed pt of possible oral anticoagulation with warfarin and she agrees to therapy, but will need to have endoscopy done first. Pending IVC filter placement tomorrow. Discussed with primary RN and no active bleeding noted today. Her H/H today 9.3/32.6 after s/p 2 units pRBC.    Patient is a 84y old  Female who presents with a chief complaint of sob, anemia (06 Apr 2018 17:10)    Consulted by Dr. Sinha for VTE prophylaxis, risk stratification, and anticoagulation management.    PAST MEDICAL & SURGICAL HISTORY:  COPD (chronic obstructive pulmonary disease)  Hypertension  Peptic Ulcer  Hyperlipemia  S/P Cataract Extraction: right eye 5/27/10    BMI: 30.6    CrCL: 69.88    IMPROVE VTE Risk Score: 5    IMPROVE Bleeding Risk Score: 5.5    4/9: Patient seen at bedside with Dr. Kramer- explained will hold off on IVC filter as DVT is in small vein and no obvious bleeding at this time. Remain on UFH. Patient reports some improvement in breathing- but still with RUDD and O2 nc.  4/10: seen at bedside, some RUDD on exertion O 2 in place  4/11: seen at bedside, for Upper endo tomorrow, coumadin still on hold but discussed with pt foods to avoid/ vit k diet  4/13: Patient seen at bedside- on isolation- still with difficulty breathing- RUDD. Endo on hold until respiratory status improves. Solumedrol/theophylline on board per pulmon. Continuing UFH as IVC filter not necessary due to stable- no bleeding.   4-14-18 Pt seen at bedside on [facility]. no changes. pt oob in chair states she stands uo and walks in room every so often.  States she is still SOB  on ambulation.   4-15-18 Pt seen at bedside on 3 [facility].  States her breathing is the same.  States seh may have an IVC  filter placed tomorrow and then she wants to go home.  Pt informed she may need Rehab, lives with  who has Parkinson's.   discussed her anticoagulation with ufh and the need for oral anticoagulation (coumadin).  Pt conerned about frequent testing.   Falls Risk:   High (  )  Mod (  )  Low ( X )  FAMILY HISTORY:  No pertinent family history in first degree relatives    Denies any personal or familial history of clotting or bleeding disorders.    Allergies    No Known Allergies    Intolerances    REVIEW OF SYSTEMS    (  )Fever	     (  )Constipation	( X )SOB			(  )Headache	(  )Dysuria  (  )Chills	     (  )Melena	(  )Dyspnea present on exertion    (  )Dizziness                    (  )Polyuria  (  )Nausea	     (  )Hematochezia	(  )Cough		                    (  )Syncope   	(  )Hematuria  (  )Vomiting    (  )Chest Pain	(  )Wheezing		(  )Weakness  (  )Diarrhea     (  )Palpitations	(  )Anorexia		(  )Myalgia    All other review of systems negative: Yes    PHYSICAL EXAM:    Constitutional: Appears Well    Neurological: A& O x 3    Skin: Warm    Respiratory and Thorax: increased effort; Breath sounds: decreased with b/l rhonchi- non-prod moist cough   	  Cardiovascular: S1, S2, regular, NMBR	    Gastrointestinal: BS + x 4Q, nontender	    Genitourinary:  Bladder nondistended, nontender    Musculoskeletal:   General Right:   no muscle/joint tenderness,   normal tone, no joint swelling,   ROM: full	    General Left:   no muscle/joint tenderness,   normal tone, no joint swelling,   ROM: full    Lower extrems:   Right: no calf tenderness              negative spencer's sign               + pedal pulses    Left:   no calf tenderness              negative spencer's sign               + pedal pulses                                   9.8    14.55 )-----------( 316      ( 15 Apr 2018 05:45 )             33.3       PTT - ( 15 Apr 2018 05:45 )  PTT:68.9 sec                     10.3   12.16 )-----------( 326      ( 14 Apr 2018 06:34 )             35.4       04-13    140  |  100  |  28<H>  ----------------------------<  131<H>  4.9   |  32<H>  |  0.58    Ca    9.0      13 Apr 2018 05:47        PTT - ( 14 Apr 2018 06:34 )  PTT:33.0 sec                     9.5    15.80 )-----------( 329      ( 13 Apr 2018 05:47 )             33.0       04-13    140  |  100  |  28<H>  ----------------------------<  131<H>  4.9   |  32<H>  |  0.58    Ca    9.0      13 Apr 2018 05:47      Vital Signs Last 24 Hrs  T(C): 36.9 (04-15-18 @ 11:39), Max: 36.9 (04-14-18 @ 17:06)  T(F): 98.5 (04-15-18 @ 11:39), Max: 98.5 (04-15-18 @ 11:39)  HR: 79 (04-15-18 @ 11:39) (62 - 79)  BP: 141/55 (04-15-18 @ 11:39) (114/87 - 141/55)  BP(mean): --  RR: 18 (04-15-18 @ 11:39) (18 - 18)  SpO2: 92% (04-15-18 @ 11:39) (92% - 98%)    MEDICATIONS  (STANDING):  ALBUTerol/ipratropium for Nebulization 3 milliLiter(s) Nebulizer every 6 hours  amLODIPine   Tablet 5 milliGRAM(s) Oral daily  docusate sodium 100 milliGRAM(s) Oral two times a day  guaiFENesin    Syrup 200 milliGRAM(s) Oral every 6 hours  heparin  Infusion.  Unit(s)/Hr IV Continuous <Continuous>  hydrochlorothiazide 25 milliGRAM(s) Oral daily  lactobacillus acidophilus 1 Tablet(s) Oral three times a day with meals  losartan 50 milliGRAM(s) Oral daily  methylPREDNISolone sodium succinate Injectable 20 milliGRAM(s) IV Push every 8 hours  montelukast 10 milliGRAM(s) Oral daily  pantoprazole  Injectable 40 milliGRAM(s) IV Push every 12 hours  senna 2 Tablet(s) Oral at bedtime  theophylline SR Tablet 200 milliGRAM(s) Oral daily        IMAGING:  US DPLX LWR EXT VEINS COMPL BI:  04/07/2018    FINDINGS:  RIGHT:  There is normal compressibility of the common femoral, femoral, and popliteal veins.  Visualized posterior tibial veins are within normal limits. Doppler examination shows normal spontaneous and phasic flow. Right popliteal cyst.    LEFT: There is normal compressibility of the common femoral, femoral, and popliteal veins. Thrombosis of the left soleal vein without extension into the popliteal vein. Doppler examination shows normal spontaneous and phasic flow. Left popliteal fossa cyst.    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 No sonographic evidence of acute deep venous thrombosis in the femoropopliteal systems bilaterally.     EXAM:  CT ANGIO CHEST PE PROTOCOL IC:  04/07/2018    IMPRESSION: Small focal pulmonary embolus in a posterior right upper lobe segmental vessel.    EGD:4-13-18  Normal esophagus and stomach  Bulging with superficial ulceration with small amount of adjacent blood in the second portion of the duodenum -- favor submucosal or extrinsic ulceration mass.  Biopsies deferred due to acute desaturation and anticoagulation    DVT Prophylaxis:  LMWH                   (  )  Heparin SQ           (  )  Coumadin             (  )  Xarelto                  (  )  Eliquis                   (  )  Venodynes           (  )  Ambulation          (X )  UFH                       ( X )  Contraindicated  (  )

## 2018-04-15 NOTE — PROGRESS NOTE ADULT - ASSESSMENT
PROBLEMS:    ESBL UTI  RUL PE & DVT  Acute respiratory failure with hypoxia  acute excerbation of COPD  Symptomatic anemia- s/p transfusion- duodenal mass  Gastrointestinal hemorrhage with melena  Essential hypertension    PLAN;    serum ayana level  pulmonary improving.  po theophylline   iv heparin  IV solumedrols 20mg q8hr  AEROSLS  SUPPORTIVE CARE  DVT PROPHYLASIX

## 2018-04-15 NOTE — PROGRESS NOTE ADULT - ASSESSMENT
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 Norton but I think patient wants to go home after filter  May be reasonable to continue anticoag but certainly a high risk of worsening bleeding etc.

## 2018-04-15 NOTE — PROGRESS NOTE ADULT - SUBJECTIVE AND OBJECTIVE BOX
Subjective:  Patient is a 84y old  Female who presents with a chief complaint of sob, anemia      HPI:     84 F w/PMH COPD, CPAP at night, HTN, presents on 4/6/18 with progressively worse dyspnea over past several weeks.  She went to her pulmonologist, CXR was neg, she referred to Cardio.  After work up w/ ST, TTE was not very revealing, she was referred to GI.  She was found on anemic w/ Hg of 8 on cbc, which is not normal for her.  She has noticed some melena 1-2 episodes over the past month.  She denies any hematochezia, hematemesis. She endorses a feeling of fullness after she eats a small amount.  She's also been having pain across her upper abdomen, just below her diaphragm.  She cannot tell me if this pain is related to eating.  She does have a good appetite but just gets full fast.  Gdtr at bedside notes that she doesn't eat as much.  Pt denies any nausea.  Her last colonoscopy was 2 years ago.  She endorsed h/o gastric ulcers.     In ED  pt is dyspneic w/ conversation, she's satting 85% on 3LNC, changed to ventimask, now at 95%.  She is feeling very anxious and requesting xanax- which she takes prn at home 1/2 of 0.25mg dose.     4/7/18 - Patient seen and examined at bedside earlier today, dyspneic with minimal exertion, even talking, but reports slight improvement of the dyspnea   4/8/18 - events noted, transferred to tele for o2 monitoring due to desaturation , reports improvement of the dyspnea, cough, 1 BM brown overnight  tele- o2 sats to 85%, episodes of bigemeny   4/9 - tele O2 sats on O2 wnl, reports improvement of dyspnea and cough since yesterday, no BM, denies abd pain, feels constipated  4/10 - dyspnea slightly worse today, cough productive, denies CP, + brown BM, no blood, denies abd pain  4/11 - dyspnea slightly better on higher dose of IV steroids, + productive cough, overall better  4/12 - dyspnea better, afebrile, denies chest pain, abdominal pain, no events overnight POC discussed   4/13- pt seen and examined, dyspnea and cough better, afebrile, cleared for EGD by cardiology and pulmonology, no new complains  Review of system- Rest of the review of system are negative except mentioned in HPI  04/14/18: patient seen and examined. S/P EGD yesterday: duodenal superficial ulcer. Patient denies any new complaints. Discussed with patient in length regarding management plan.  04/15/18: Patient seen and examined. No hematemesis or melena. Patient is reluctant for any surgical interventions except IVC filter.          Vital Signs Last 24 Hrs  T(C): 36.9 (15 Apr 2018 11:39), Max: 36.9 (14 Apr 2018 17:06)  T(F): 98.5 (15 Apr 2018 11:39), Max: 98.5 (15 Apr 2018 11:39)  HR: 79 (15 Apr 2018 11:39) (58 - 79)  BP: 141/55 (15 Apr 2018 11:39) (114/87 - 141/55)  BP(mean): --  RR: 18 (15 Apr 2018 11:39) (18 - 18)  SpO2: 92% (15 Apr 2018 11:39) (92% - 98%)        PHYSICAL EXAM:      GENERAL: NAD  NERVOUS SYSTEM:  Alert & Oriented X3, non- focal exam,  HEAD:  Atraumatic, Normocephalic  EYES: EOMI, PERRLA, conjunctiva and sclera clear  HEENT: Moist mucous membranes  NECK: Supple, No JVD  CHEST/LUNG: BS decreased bilaterally; No rales, no rhonchi, +  wheezing,  HEART: Regular rate and rhythm; No murmurs, rubs, or gallops  ABDOMEN: Soft, Nontender, Nondistended; Bowel sounds present  GENITOURINARY- Voiding, no suprapubic tenderness  EXTREMITIES:  2+ Peripheral Pulses, No clubbing, cyanosis, or edema  MUSCULOSKELETAL:- No muscle tenderness, Muscle tone normal, No joint tenderness, no Joint swelling, Joint range of motion-normal  SKIN-no rash, no lesion          LABS:                                                         9.8    14.55 )-----------( 316      ( 15 Apr 2018 05:45 )             33.3             PTT - ( 15 Apr 2018 05:45 )  PTT:68.9 sec  CAPILLARY BLOOD GLUCOSE    RADIOLOGY & ADDITIONAL TESTS:    < from: US Duplex Venous Lower Ext Complete, Bilateral (04.07.18 @ 17:25) >  IMPRESSION:     Left soleal vein thrombosis without extension into the popliteal vein.    No sonographic evidence of acute deep venous thrombosis in the   femoropopliteal systems bilaterally.     < end of copied text >  < from: CT Angio Chest PE Protocol w/ IV Cont (04.07.18 @ 16:53) >  Lungs, Airways and Pleura: Central tracheobronchial tree is grossly   patent. No endobronchial lesions. Small to mild bilateral pleural   effusions right greater than left with bibasilar compressive atelectasis.   No pneumothorax. No discrete pulmonary nodules. Subsegmental atelectasis   in the lingula.    Heart and Great Vessels: Atherosclerotic changes of the aorta and   coronary vasculature. Study is mildly limited secondary to heterogeneous   opacification of the distal segmental and subsegmental pulmonary vessels.   Focal filling defect within the mid to distal posterior right upper lobe   segmental vessel compatible with a focal pulmonary embolus.. No other   evidence of acute pulmonary embolismwithin the limits of the study.. No   aortic aneurysm. No definite evidence of aortic dissection. Heart is   normal in size. No pericardial effusion.    Mediastinum and Lanette: Pretracheal and right hilar lymph nodes measuring   up to 1.3 cm.    Neck and Chest Wall: within normal limits    Bones: Degenerative changes and osteopenia. Mild to moderate compression   deformities of T6-T8 chronic in appearance. Compression deformity of L1   appears chronic in nature.    Upper Abdomen:  Left lateral midpole renal cyst. Elevation of the right   hemidiaphragm.    IMPRESSION:         Small focal pulmonary embolus in a posterior right upper lobe segmental   vessel.    < end of copied text >    < from: Xray Chest 1 View AP/PA. (04.06.18 @ 10:47) >  Heart magnified by projection, unchanged. Atherosclerotic aorta.   Symmetrically hyperinflated lungs. No focal consolidation or pleural   effusion. Old fracture right seventh posterior rib.    Impression: Hyperinflated lungs. No active infiltrates.    < end of copied text >          MEDICATIONS:      MEDICATIONS  (STANDING):  ALBUTerol/ipratropium for Nebulization 3 milliLiter(s) Nebulizer every 6 hours  amLODIPine   Tablet 5 milliGRAM(s) Oral daily  docusate sodium 100 milliGRAM(s) Oral two times a day  guaiFENesin    Syrup 200 milliGRAM(s) Oral every 6 hours  heparin  Infusion.  Unit(s)/Hr (10 mL/Hr) IV Continuous <Continuous>  hydrochlorothiazide 25 milliGRAM(s) Oral daily  lactobacillus acidophilus 1 Tablet(s) Oral three times a day with meals  losartan 50 milliGRAM(s) Oral daily  methylPREDNISolone sodium succinate Injectable 20 milliGRAM(s) IV Push every 8 hours  montelukast 10 milliGRAM(s) Oral daily  pantoprazole  Injectable 40 milliGRAM(s) IV Push every 12 hours  senna 2 Tablet(s) Oral at bedtime  theophylline SR Tablet 200 milliGRAM(s) Oral daily    MEDICATIONS  (PRN):  heparin  Injectable 5000 Unit(s) IV Push every 6 hours PRN For aPTT less than 40  ondansetron Injectable 4 milliGRAM(s) IV Push every 6 hours PRN Nausea

## 2018-04-15 NOTE — PROGRESS NOTE ADULT - SUBJECTIVE AND OBJECTIVE BOX
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    Home Medications:  amLODIPine 5 mg oral tablet: 1 tab(s) orally once a day (06 Apr 2018 17:25)  Anoro Ellipta 62.5 mcg-25 mcg/inh inhalation powder: 1 puff(s) inhaled once a day (06 Apr 2018 17:25)  calcium (as carbonate) 600 mg oral tablet: 2 tab(s) orally once a day (06 Apr 2018 17:25)  hydroCHLOROthiazide 25 mg oral tablet: 1 tab(s) orally once a day (06 Apr 2018 17:25)  losartan 50 mg oral tablet: 1 tab(s) orally once a day (06 Apr 2018 17:25)  montelukast 10 mg oral tablet: 1 tab(s) orally once a day (06 Apr 2018 17:25)  predniSONE 20 mg oral tablet: 1 tab(s) orally once a day    ***COURSE COMPLETED*** (06 Apr 2018 17:25)  ProAir HFA 90 mcg/inh inhalation aerosol: 2 puff(s) inhaled 4 times a day, As Needed (06 Apr 2018 17:25)  Vitamin D3 2000 intl units oral tablet: 1 tab(s) orally once a day (06 Apr 2018 17:25)  Xanax 0.25 mg oral tablet: 0.5 tab(s) orally once a day, As Needed for anxiety  (06 Apr 2018 17:25)    MEDICATIONS  (STANDING):  ALBUTerol/ipratropium for Nebulization 3 milliLiter(s) Nebulizer every 6 hours  amLODIPine   Tablet 5 milliGRAM(s) Oral daily  docusate sodium 100 milliGRAM(s) Oral two times a day  guaiFENesin    Syrup 200 milliGRAM(s) Oral every 6 hours  heparin  Infusion.  Unit(s)/Hr (10 mL/Hr) IV Continuous <Continuous>  hydrochlorothiazide 25 milliGRAM(s) Oral daily  lactobacillus acidophilus 1 Tablet(s) Oral three times a day with meals  losartan 50 milliGRAM(s) Oral daily  methylPREDNISolone sodium succinate Injectable 20 milliGRAM(s) IV Push every 8 hours  montelukast 10 milliGRAM(s) Oral daily  pantoprazole  Injectable 40 milliGRAM(s) IV Push every 12 hours  senna 2 Tablet(s) Oral at bedtime  theophylline SR Tablet 200 milliGRAM(s) Oral daily    MEDICATIONS  (PRN):  heparin  Injectable 5000 Unit(s) IV Push every 6 hours PRN For aPTT less than 40  ondansetron Injectable 4 milliGRAM(s) IV Push every 6 hours PRN Nausea      Allergies    No Known Allergies    Intolerances        Vital Signs Last 24 Hrs  T(C): 36.9 (15 Apr 2018 11:39), Max: 36.9 (14 Apr 2018 17:06)  T(F): 98.5 (15 Apr 2018 11:39), Max: 98.5 (15 Apr 2018 11:39)  HR: 79 (15 Apr 2018 11:39) (62 - 79)  BP: 141/55 (15 Apr 2018 11:39) (114/87 - 141/55)  BP(mean): --  RR: 18 (15 Apr 2018 11:39) (18 - 18)  SpO2: 92% (15 Apr 2018 11:39) (92% - 98%)      PHYSICAL EXAMINATION:    NECK:  Supple. No lymphadenopathy. Jugular venous pressure not elevated. Carotids equal.   HEART:   The cardiac impulse has a normal quality. Reg., Nl S1 and S2.  There are no murmurs, rubs or gallops noted  CHEST:  Chest crackles to auscultation. Normal respiratory effort.  ABDOMEN:  Soft and nontender.   EXTREMITIES:  There is no edema.       LABS:                        9.8    14.55 )-----------( 316      ( 15 Apr 2018 05:45 )             33.3           PTT - ( 15 Apr 2018 05:45 )  PTT:68.9 sec

## 2018-04-16 LAB
ANION GAP SERPL CALC-SCNC: 5 MMOL/L — SIGNIFICANT CHANGE UP (ref 5–17)
APTT BLD: 49 SEC — HIGH (ref 27.5–37.4)
APTT BLD: 60.4 SEC — HIGH (ref 27.5–37.4)
APTT BLD: 64.2 SEC — HIGH (ref 27.5–37.4)
BUN SERPL-MCNC: 40 MG/DL — HIGH (ref 7–23)
CALCIUM SERPL-MCNC: 8.8 MG/DL — SIGNIFICANT CHANGE UP (ref 8.5–10.1)
CHLORIDE SERPL-SCNC: 102 MMOL/L — SIGNIFICANT CHANGE UP (ref 96–108)
CO2 SERPL-SCNC: 33 MMOL/L — HIGH (ref 22–31)
CREAT SERPL-MCNC: 0.73 MG/DL — SIGNIFICANT CHANGE UP (ref 0.5–1.3)
GLUCOSE SERPL-MCNC: 120 MG/DL — HIGH (ref 70–99)
HCT VFR BLD CALC: 35.9 % — SIGNIFICANT CHANGE UP (ref 34.5–45)
HGB BLD-MCNC: 10.4 G/DL — LOW (ref 11.5–15.5)
INR BLD: 1.04 RATIO — SIGNIFICANT CHANGE UP (ref 0.88–1.16)
MCHC RBC-ENTMCNC: 23.9 PG — LOW (ref 27–34)
MCHC RBC-ENTMCNC: 29 GM/DL — LOW (ref 32–36)
MCV RBC AUTO: 82.5 FL — SIGNIFICANT CHANGE UP (ref 80–100)
NRBC # BLD: 0 /100 WBCS — SIGNIFICANT CHANGE UP (ref 0–0)
PLATELET # BLD AUTO: 319 K/UL — SIGNIFICANT CHANGE UP (ref 150–400)
POTASSIUM SERPL-MCNC: 5 MMOL/L — SIGNIFICANT CHANGE UP (ref 3.5–5.3)
POTASSIUM SERPL-SCNC: 5 MMOL/L — SIGNIFICANT CHANGE UP (ref 3.5–5.3)
PROTHROM AB SERPL-ACNC: 11.2 SEC — SIGNIFICANT CHANGE UP (ref 9.8–12.7)
RBC # BLD: 4.35 M/UL — SIGNIFICANT CHANGE UP (ref 3.8–5.2)
RBC # FLD: 22.7 % — HIGH (ref 10.3–14.5)
SODIUM SERPL-SCNC: 140 MMOL/L — SIGNIFICANT CHANGE UP (ref 135–145)
THEOPHYLLINE SERPL-MCNC: 4 UG/ML — LOW (ref 10–20)
WBC # BLD: 14.23 K/UL — HIGH (ref 3.8–10.5)
WBC # FLD AUTO: 14.23 K/UL — HIGH (ref 3.8–10.5)

## 2018-04-16 PROCEDURE — 99233 SBSQ HOSP IP/OBS HIGH 50: CPT

## 2018-04-16 RX ORDER — WARFARIN SODIUM 2.5 MG/1
3 TABLET ORAL ONCE
Qty: 0 | Refills: 0 | Status: COMPLETED | OUTPATIENT
Start: 2018-04-16 | End: 2018-04-16

## 2018-04-16 RX ADMIN — Medication 20 MILLIGRAM(S): at 05:27

## 2018-04-16 RX ADMIN — Medication 3 MILLILITER(S): at 13:30

## 2018-04-16 RX ADMIN — MONTELUKAST 10 MILLIGRAM(S): 4 TABLET, CHEWABLE ORAL at 21:08

## 2018-04-16 RX ADMIN — SENNA PLUS 2 TABLET(S): 8.6 TABLET ORAL at 21:08

## 2018-04-16 RX ADMIN — Medication 1 TABLET(S): at 11:46

## 2018-04-16 RX ADMIN — Medication 1 TABLET(S): at 18:52

## 2018-04-16 RX ADMIN — HEPARIN SODIUM 900 UNIT(S)/HR: 5000 INJECTION INTRAVENOUS; SUBCUTANEOUS at 16:24

## 2018-04-16 RX ADMIN — AMLODIPINE BESYLATE 5 MILLIGRAM(S): 2.5 TABLET ORAL at 05:28

## 2018-04-16 RX ADMIN — Medication 20 MILLIGRAM(S): at 21:08

## 2018-04-16 RX ADMIN — HEPARIN SODIUM 900 UNIT(S)/HR: 5000 INJECTION INTRAVENOUS; SUBCUTANEOUS at 23:26

## 2018-04-16 RX ADMIN — Medication 0.12 MILLIGRAM(S): at 23:27

## 2018-04-16 RX ADMIN — Medication 200 MILLIGRAM(S): at 11:48

## 2018-04-16 RX ADMIN — LOSARTAN POTASSIUM 50 MILLIGRAM(S): 100 TABLET, FILM COATED ORAL at 05:28

## 2018-04-16 RX ADMIN — Medication 3 MILLILITER(S): at 08:12

## 2018-04-16 RX ADMIN — Medication 200 MILLIGRAM(S): at 05:28

## 2018-04-16 RX ADMIN — Medication 25 MILLIGRAM(S): at 05:28

## 2018-04-16 RX ADMIN — Medication 200 MILLIGRAM(S): at 23:27

## 2018-04-16 RX ADMIN — PANTOPRAZOLE SODIUM 40 MILLIGRAM(S): 20 TABLET, DELAYED RELEASE ORAL at 18:53

## 2018-04-16 RX ADMIN — Medication 1 TABLET(S): at 09:22

## 2018-04-16 RX ADMIN — WARFARIN SODIUM 3 MILLIGRAM(S): 2.5 TABLET ORAL at 21:08

## 2018-04-16 RX ADMIN — Medication 3 MILLILITER(S): at 02:20

## 2018-04-16 RX ADMIN — Medication 100 MILLIGRAM(S): at 05:28

## 2018-04-16 RX ADMIN — HEPARIN SODIUM 900 UNIT(S)/HR: 5000 INJECTION INTRAVENOUS; SUBCUTANEOUS at 09:19

## 2018-04-16 RX ADMIN — Medication 200 MILLIGRAM(S): at 11:46

## 2018-04-16 RX ADMIN — PANTOPRAZOLE SODIUM 40 MILLIGRAM(S): 20 TABLET, DELAYED RELEASE ORAL at 05:27

## 2018-04-16 RX ADMIN — Medication 100 MILLIGRAM(S): at 18:54

## 2018-04-16 RX ADMIN — Medication 3 MILLILITER(S): at 20:48

## 2018-04-16 RX ADMIN — Medication 200 MILLIGRAM(S): at 18:54

## 2018-04-16 RX ADMIN — Medication 20 MILLIGRAM(S): at 15:45

## 2018-04-16 NOTE — PROGRESS NOTE ADULT - ASSESSMENT
84 F w/PMH COPD, CPAP at night, HTN, presents on 4/6/18 with progressively dyspnea over past several weeks.  Found w/ anemia, +melena  Currently, pt is dyspneic w/ conversation, she's satting 85% on 3LNC, changed to ventimask, now at 95%.  She is feeling very anxious and requesting xanax- which she takes prn at home 1/2 of 0.25mg dose.          * Acute respiratory failure with hypoxia,  multifactorial:  * Acute COPD exacerbation, slow improvement  * Sleep apnea   Acute status improved  - IV steroids--> taper   - s/p Azithromycin  - on theophyline 200 daily  - nebs, mucolytics  - pulmonary follow up appreciated  - CPAP qhs and incentive spirometry    * Left DVT soleal vein  * PE RUL segmental vessel  - S/P IVC filter today  - on IV low range heparin, start low dose coumadin.  -AC eval  - 2 d echo - EF 65%      *  Symptomatic anemia. , stable  Acute blood loss anemia  - no active bleeding  - s/p 2 units of PRBC  - H/H q12h--> q6h while on heparin drip    * Leukocytosis, multifactorial  * ESBL UTI  * Steroids induced  -S/P on Imipenem     * Melena due to subacute Upper GI bleed  - S/P EGD: duodenal superficial ulcer, suspected submucosal tumor  -Dr Norton consult appreciated. Patient is very reluctant for any surgical interventions.   - c/w PPI BID-->  - monitor H/H and transfuse as needed.  if Hb <8      *  Essential hypertension.    bp stable,   c/w home meds HCTZ, losartan, amlodipine  monitor and titrate meds as indicated.     * Anxiety  - c/w benzo prn  * Prophylactic measure. Plan: SCDs- no chem d/t GIB.

## 2018-04-16 NOTE — PROGRESS NOTE ADULT - SUBJECTIVE AND OBJECTIVE BOX
HPI: This is a 83 y/o female w PMH COPD, CPAP at night, HTN, has been experiencing progressively dyspnea over past several weeks.  She went to her pulmonologist, CXR was neg, she referred to Cardio.  After work up w/ ST, TTE was not very revealing, she was referred to GI.  She was found on anemic w/ Hg of 8 on cbc, which is not normal for her.  She has noticed some melena 1-2 episodes over the past month.  She denies any hematochezia, hematemesis. She endorses a feeling of fullness after she eats a small amount.  She's also been having pain across her upper abdomen, just below her diaphragm.  She cannot tell me if this pain is related to eating.  She does have a good appetite but just gets full fast.  Gdtr at bedside notes that she doesn't eat as much.  Pt denies any nausea.  Her last colonoscopy was 2 years ago.  She endorsed h/o gastric ulcers.     Currently, pt is dyspneic w/ conversation, she's satting 85% on 3LNC, changed to ventimask, now at 95%.  She is feeling very anxious and requesting xanax- which she takes prn at home 1/2 of 0.25mg dose. 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 She continues to report of SOB. She denies prior VTE, CVA, MI, no blood clotting disorder. She is a former smoker, has been under stress recently. She reports of being an active caregiver to her  with Parkinson's disease, but now has HHA and decreased her mobility. Informed pt of possible oral anticoagulation with warfarin and she agrees to therapy, but will need to have endoscopy done first. Pending IVC filter placement tomorrow. Discussed with primary RN and no active bleeding noted today. Her H/H today 9.3/32.6 after s/p 2 units pRBC.    Patient is a 84y old  Female who presents with a chief complaint of sob, anemia (06 Apr 2018 17:10)    Consulted by Dr. Sinha for VTE prophylaxis, risk stratification, and anticoagulation management.    PAST MEDICAL & SURGICAL HISTORY:  COPD (chronic obstructive pulmonary disease)  Hypertension  Peptic Ulcer  Hyperlipemia  S/P Cataract Extraction: right eye 5/27/10    BMI: 30.6    CrCL: 69.88    IMPROVE VTE Risk Score: 5    IMPROVE Bleeding Risk Score: 5.5    4/9: Patient seen at bedside with Dr. Kramer- explained will hold off on IVC filter as DVT is in small vein and no obvious bleeding at this time. Remain on UFH. Patient reports some improvement in breathing- but still with RUDD and O2 nc.  4/10: seen at bedside, some RUDD on exertion O 2 in place  4/11: seen at bedside, for Upper endo tomorrow, coumadin still on hold but discussed with pt foods to avoid/ vit k diet  4/13: Patient seen at bedside- on isolation- still with difficulty breathing- RUDD. Endo on hold until respiratory status improves. Solumedrol/theophylline on board per pulmon. Continuing UFH as IVC filter not necessary due to stable- no bleeding.   4-14-18 Pt seen at bedside on [facility]. no changes. pt oob in chair states she stands uo and walks in room every so often.  States she is still SOB  on ambulation.   4-15-18 Pt seen at bedside on [facility].  States her breathing is the same.  States seh may have an IVC  filter placed tomorrow and then she wants to go home.  Pt informed she may need Rehab, lives with  who has Parkinson's.   discussed her anticoagulation with ufh and the need for oral anticoagulation (coumadin).  Pt conerned about frequent testing.   4-16-18 pt seen at bedside on [facility].  s/p IVC FILTER TODAY.  Pt states she is not sure if she will start on oral anticoagulation now.  I noted Dr Mendoza started pt on coumadin today.   Falls Risk:   High (  )  Mod (  )  Low ( X )  FAMILY HISTORY:  No pertinent family history in first degree relatives    Denies any personal or familial history of clotting or bleeding disorders.    Allergies    No Known Allergies    Intolerances    REVIEW OF SYSTEMS    (  )Fever	     (  )Constipation	( X )SOB			(  )Headache	(  )Dysuria  (  )Chills	     (  )Melena	(  )Dyspnea present on exertion    (  )Dizziness                    (  )Polyuria  (  )Nausea	     (  )Hematochezia	(  )Cough		                    (  )Syncope   	(  )Hematuria  (  )Vomiting    (  )Chest Pain	(  )Wheezing		(  )Weakness  (  )Diarrhea     (  )Palpitations	(  )Anorexia		(  )Myalgia    All other review of systems negative: Yes    PHYSICAL EXAM:    Constitutional: Appears Well    Neurological: A& O x 3    Skin: Warm    Respiratory and Thorax: increased effort; Breath sounds: decreased with b/l rhonchi- non-prod moist cough   	  Cardiovascular: S1, S2, regular, NMBR	    Gastrointestinal: BS + x 4Q, nontender	    Genitourinary:  Bladder nondistended, nontender    Musculoskeletal:   General Right:   no muscle/joint tenderness,   normal tone, no joint swelling,   ROM: full	    General Left:   no muscle/joint tenderness,   normal tone, no joint swelling,   ROM: full    Lower extrems:   Right: no calf tenderness              negative spencer's sign               + pedal pulses    Left:   no calf tenderness              negative spencer's sign               + pedal pulses                                     10.4   14.23 )-----------( 319      ( 16 Apr 2018 06:34 )             35.9       04-16    140  |  102  |  40<H>  ----------------------------<  120<H>  5.0   |  33<H>  |  0.73    Ca    8.8      16 Apr 2018 06:34        PT/INR - ( 16 Apr 2018 15:20 )   PT: 11.2 sec;   INR: 1.04 ratio         PTT - ( 16 Apr 2018 15:20 )  PTT:64.2 sec                    9.8    14.55 )-----------( 316      ( 15 Apr 2018 05:45 )             33.3       PTT - ( 15 Apr 2018 05:45 )  PTT:68.9 sec                     10.3   12.16 )-----------( 326      ( 14 Apr 2018 06:34 )             35.4       04-13    140  |  100  |  28<H>  ----------------------------<  131<H>  4.9   |  32<H>  |  0.58    Ca    9.0      13 Apr 2018 05:47        PTT - ( 14 Apr 2018 06:34 )  PTT:33.0 sec                     9.5    15.80 )-----------( 329      ( 13 Apr 2018 05:47 )             33.0       04-13    140  |  100  |  28<H>  ----------------------------<  131<H>  4.9   |  32<H>  |  0.58    Ca    9.0      13 Apr 2018 05:47      MEDICATIONS  (STANDING):  ALBUTerol/ipratropium for Nebulization 3 milliLiter(s) Nebulizer every 6 hours  amLODIPine   Tablet 5 milliGRAM(s) Oral daily  docusate sodium 100 milliGRAM(s) Oral two times a day  guaiFENesin    Syrup 200 milliGRAM(s) Oral every 6 hours  heparin  Infusion.  Unit(s)/Hr IV Continuous <Continuous>  hydrochlorothiazide 25 milliGRAM(s) Oral daily  lactobacillus acidophilus 1 Tablet(s) Oral three times a day with meals  losartan 50 milliGRAM(s) Oral daily  methylPREDNISolone sodium succinate Injectable 20 milliGRAM(s) IV Push every 8 hours  montelukast 10 milliGRAM(s) Oral daily  pantoprazole  Injectable 40 milliGRAM(s) IV Push every 12 hours  senna 2 Tablet(s) Oral at bedtime  theophylline SR Tablet 200 milliGRAM(s) Oral daily  warfarin 3 milliGRAM(s) Oral once        IMAGING:  US DPLX LWR EXT VEINS COMPL BI:  04/07/2018    FINDINGS:  RIGHT:  There is normal compressibility of the common femoral, femoral, and popliteal veins.  Visualized posterior tibial veins are within normal limits. Doppler examination shows normal spontaneous and phasic flow. Right popliteal cyst.    LEFT: There is normal compressibility of the common femoral, femoral, and popliteal veins. Thrombosis of the left soleal vein without extension into the popliteal vein. Doppler examination shows normal spontaneous and phasic flow. Left popliteal fossa cyst.    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 No sonographic evidence of acute deep venous thrombosis in the femoropopliteal systems bilaterally.     EXAM:  CT ANGIO CHEST PE PROTOCOL IC:  04/07/2018    IMPRESSION: Small focal pulmonary embolus in a posterior right upper lobe segmental vessel.    EGD:4-13-18  Normal esophagus and stomach  Bulging with superficial ulceration with small amount of adjacent blood in the second portion of the duodenum -- favor submucosal or extrinsic ulceration mass.  Biopsies deferred due to acute desaturation and anticoagulation    DVT Prophylaxis:  LMWH                   (  )  Heparin SQ           (  )  Coumadin             (x )  Xarelto                  (  )  Eliquis                   (  )  Venodynes           (  )  Ambulation          (X )  UFH                       ( X )  Contraindicated  (  )

## 2018-04-16 NOTE — PROGRESS NOTE ADULT - ASSESSMENT
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

## 2018-04-16 NOTE — PROGRESS NOTE ADULT - SUBJECTIVE AND OBJECTIVE BOX
Cardiology Progress Note    HPI: 84 F w/PMH COPD, CPAP at night, HTN, has been experiencing progressively dyspnea over past several weeks.  She went to her pulmonologist, CXR was neg, she referred to Cardio.  After work up w/ ST, TTE was not very revealing, she was referred to GI.  She was found on anemic w/ Hg of 8 on cbc, which is not normal for her.  She has noticed some melena 1-2 episodes over the past month.  She denies any hematochezia, hematemesis. She endorses a feeling of fullness after she eats a small amount.  She's also been having pain across her upper abdomen, just below her diaphragm.  She cannot tell me if this pain is related to eating.  She does have a good appetite but just gets full fast.  Gdtr at bedside notes that she doesn't eat as much.  Pt denies any nausea.  Her last colonoscopy was 2 years ago.  She endorsed h/o gastric ulcers. Pt is dyspneic w/ conversation, she's satting 85% on 3LNC, changed to ventimask, now at 95%.  She is feeling very anxious and requesting xanax- which she takes prn at home 1/2 of 0.25mg dose. (2018 17:10)    - No CP. +SOB. SR on tele. Feels tired. No events last pm.     - Feels slightly better today. +SOB, no CP. Awaiting IVC filter today.     4/10- Discussed case with interventional cardiology- holding off on IVC filter for now. No active GI bleed now. +SOB- mildly improved. No CP.     - No CP, SOB mildly improved. No fevers. No events last pm.    - Still awaiting EGD. Now with LE edema. No CP, SOB improved.     - Chart reviewed. S/p EGD- c/w GIST tumor. No active bleeding. No CP, SOB improved.     PAST MEDICAL & SURGICAL HISTORY:  COPD (chronic obstructive pulmonary disease)  Hypertension  Peptic Ulcer  Hyperlipemia  S/P Cataract Extraction: right eye 5/27/10    Allergies  No Known Allergies    SOCIAL HISTORY: Denies tobacco, etoh abuse or illicit drug use    FAMILY HISTORY: No pertinent family history in first degree relatives    MEDICATIONS  (STANDING):  ALBUTerol/ipratropium for Nebulization 3 milliLiter(s) Nebulizer every 6 hours  amLODIPine   Tablet 5 milliGRAM(s) Oral daily  azithromycin  IVPB 500 milliGRAM(s) IV Intermittent every 24 hours  azithromycin  IVPB      guaiFENesin    Syrup 200 milliGRAM(s) Oral every 6 hours  heparin  Infusion.  Unit(s)/Hr (10 mL/Hr) IV Continuous <Continuous>  hydrochlorothiazide 25 milliGRAM(s) Oral daily  losartan 50 milliGRAM(s) Oral daily  methylPREDNISolone sodium succinate Injectable 40 milliGRAM(s) IV Push every 12 hours  montelukast 10 milliGRAM(s) Oral daily  pantoprazole Infusion 8 mG/Hr (10 mL/Hr) IV Continuous <Continuous>    MEDICATIONS  (PRN):  ALPRAZolam 0.125 milliGRAM(s) Oral daily PRN anxiety  heparin  Injectable 5000 Unit(s) IV Push every 6 hours PRN For aPTT less than 40  ondansetron Injectable 4 milliGRAM(s) IV Push every 6 hours PRN Nausea      Vital Signs Last 24 Hrs  T(C): 36.4 (2018 05:42), Max: 37.2 (2018 11:55)  T(F): 97.5 (2018 05:42), Max: 99 (2018 11:55)  HR: 53 (2018 05:42) (50 - 88)  BP: 110/55 (2018 05:42) (110/55 - 131/65)  BP(mean): --  RR: 18 (2018 05:42) (18 - 22)  SpO2: 94% (2018 05:42) (85% - 95%)    REVIEW OF SYSTEMS:    CONSTITUTIONAL:  As per HPI.  HEENT:  Eyes:  No diplopia or blurred vision. ENT:  No earache, sore throat or runny nose.  CARDIOVASCULAR:  No pressure, squeezing, strangling, tightness, heaviness or aching about the chest, neck, axilla or epigastrium.  RESPIRATORY:  No cough, shortness of breath, PND or orthopnea.  GASTROINTESTINAL:  No nausea, vomiting or diarrhea.  GENITOURINARY:  No dysuria, frequency or urgency.  MUSCULOSKELETAL:  As per HPI.  SKIN:  No change in skin, hair or nails.  NEUROLOGIC:  No paresthesias, fasciculations, seizures or weakness.    PHYSICAL EXAMINATION:    GENERAL APPEARANCE:  Pt. is not currently dyspneic, in no distress. Pt. is alert, oriented, and pleasant.  HEENT:  Pupils are normal and react normally. No icterus. Mucous membranes well colored.  NECK:  Supple. No lymphadenopathy. Jugular venous pressure not elevated. Carotids equal.   HEART:   The cardiac impulse has a normal quality. There are no murmurs, rubs or gallops noted  CHEST:  Chest is clear to auscultation. Normal respiratory effort.  ABDOMEN:  Soft and nontender.   EXTREMITIES:  1-2+ LE edema.    I&O's Summary      LABS:                        9.3    18.67 )-----------( 351      ( 2018 06:03 )             32.6     04-08    141  |  104  |  25<H>  ----------------------------<  140<H>  4.3   |  31  |  0.79    Ca    9.0      2018 06:03    TPro  6.8  /  Alb  2.9<L>  /  TBili  0.4  /  DBili  x   /  AST  14<L>  /  ALT  20  /  AlkPhos  77  04-06    LIVER FUNCTIONS - ( 2018 12:29 )  Alb: 2.9 g/dL / Pro: 6.8 gm/dL / ALK PHOS: 77 U/L / ALT: 20 U/L / AST: 14 U/L / GGT: x           PT/INR - ( 2018 12:29 )   PT: 11.8 sec;   INR: 1.09 ratio         PTT - ( 2018 02:41 )  PTT:43.3 sec  CARDIAC MARKERS ( 2018 16:07 )  <0.015 ng/mL / x     / x     / x     / x      CARDIAC MARKERS ( 2018 12:29 )  <0.015 ng/mL / x     / x     / x     / x        Urinalysis Basic - ( 2018 16:57 )    Color: Yellow / Appearance: Clear / S.015 / pH: x  Gluc: x / Ketone: Negative  / Bili: Negative / Urobili: Negative mg/dL   Blood: x / Protein: 15 mg/dL / Nitrite: Positive   Leuk Esterase: Small / RBC: 0-2 /HPF / WBC 5-8 /HPF   Sq Epi: x / Non Sq Epi: Occasional / Bacteria: Many    EKG: < from: 12 Lead ECG (18 @ 09:44) >  Sinus rhythm with Premature supraventricular complexes and with occasional Premature ventricular complexes  Possible Left atrial enlargement  Nonspecific ST abnormality  Abnormal ECG    TELEMETRY: SR    CARDIAC TESTS: < from: Transthoracic Echocardiogram (18 @ 10:22) >   Fibrocalcific changes noted to the mitral valve leaflets with preserved   leaflet excursion.   EA reversal of the mitral inflow consistent with reduced compliance of   the   left ventricle.   Fibrocalcific changes noted to the Aortic valve leaflets with preserved   leaflet excursion.   Mild aortic annular calcification is noted.   Mild (1+) tricuspid valve regurgitation is present.   Trace pulmonic valvular regurgitation is present.   Normal appearing left atrium.   The left ventricle is normal in size, wall thickness, wall motion and   contractility.   Estimated left ventricular ejection fraction is 65-70 %.   The right atrium appears mildly dilated.   Normal appearing right ventricle structure and function.   The IVC is dilated but collapsing with inspiration.   No evidence of pericardial effusion.   No evidence of pleural effusion.    < end of copied text >      RADIOLOGY & ADDITIONAL STUDIES: < from: US Duplex Venous Lower Ext Complete, Bilateral (18 @ 17:25) >    Left soleal vein thrombosis without extension into the popliteal vein.    No sonographic evidence of acute deep venous thrombosis in the   femoropopliteal systems bilaterally.     < from: CT Angio Chest PE Protocol w/ IV Cont (18 @ 16:53) >    Small focal pulmonary embolus in a posterior right upper lobe segmental   vessel.        ASSESSMENT & PLAN:

## 2018-04-16 NOTE — PROGRESS NOTE ADULT - SUBJECTIVE AND OBJECTIVE BOX
Subjective:    pat no new complaint, lying in bed, still iv heparin, d/w DR torres about putting GFF.    Home Medications:  amLODIPine 5 mg oral tablet: 1 tab(s) orally once a day (06 Apr 2018 17:25)  Anoro Ellipta 62.5 mcg-25 mcg/inh inhalation powder: 1 puff(s) inhaled once a day (06 Apr 2018 17:25)  calcium (as carbonate) 600 mg oral tablet: 2 tab(s) orally once a day (06 Apr 2018 17:25)  hydroCHLOROthiazide 25 mg oral tablet: 1 tab(s) orally once a day (06 Apr 2018 17:25)  losartan 50 mg oral tablet: 1 tab(s) orally once a day (06 Apr 2018 17:25)  montelukast 10 mg oral tablet: 1 tab(s) orally once a day (06 Apr 2018 17:25)  predniSONE 20 mg oral tablet: 1 tab(s) orally once a day    ***COURSE COMPLETED*** (06 Apr 2018 17:25)  ProAir HFA 90 mcg/inh inhalation aerosol: 2 puff(s) inhaled 4 times a day, As Needed (06 Apr 2018 17:25)  Vitamin D3 2000 intl units oral tablet: 1 tab(s) orally once a day (06 Apr 2018 17:25)  Xanax 0.25 mg oral tablet: 0.5 tab(s) orally once a day, As Needed for anxiety  (06 Apr 2018 17:25)    MEDICATIONS  (STANDING):  ALBUTerol/ipratropium for Nebulization 3 milliLiter(s) Nebulizer every 6 hours  amLODIPine   Tablet 5 milliGRAM(s) Oral daily  docusate sodium 100 milliGRAM(s) Oral two times a day  guaiFENesin    Syrup 200 milliGRAM(s) Oral every 6 hours  heparin  Infusion.  Unit(s)/Hr (10 mL/Hr) IV Continuous <Continuous>  hydrochlorothiazide 25 milliGRAM(s) Oral daily  lactobacillus acidophilus 1 Tablet(s) Oral three times a day with meals  losartan 50 milliGRAM(s) Oral daily  methylPREDNISolone sodium succinate Injectable 20 milliGRAM(s) IV Push every 8 hours  montelukast 10 milliGRAM(s) Oral daily  pantoprazole  Injectable 40 milliGRAM(s) IV Push every 12 hours  senna 2 Tablet(s) Oral at bedtime  theophylline SR Tablet 200 milliGRAM(s) Oral daily    MEDICATIONS  (PRN):  ALPRAZolam 0.125 milliGRAM(s) Oral at bedtime PRN anxiety  heparin  Injectable 5000 Unit(s) IV Push every 6 hours PRN For aPTT less than 40  ondansetron Injectable 4 milliGRAM(s) IV Push every 6 hours PRN Nausea      Allergies    No Known Allergies    Intolerances        Vital Signs Last 24 Hrs  T(C): 36.5 (16 Apr 2018 05:12), Max: 37.3 (15 Apr 2018 16:59)  T(F): 97.7 (16 Apr 2018 05:12), Max: 99.1 (15 Apr 2018 16:59)  HR: 88 (16 Apr 2018 08:15) (56 - 89)  BP: 133/47 (16 Apr 2018 05:12) (130/52 - 141/55)  BP(mean): --  RR: 19 (16 Apr 2018 05:12) (17 - 19)  SpO2: 99% (16 Apr 2018 05:12) (92% - 99%)      PHYSICAL EXAMINATION:    NECK:  Supple. No lymphadenopathy. Jugular venous pressure not elevated. Carotids equal.   HEART:   The cardiac impulse has a normal quality. Reg., Nl S1 and S2.  There are no murmurs, rubs or gallops noted  CHEST:  Chest rhonchi to auscultation. Normal respiratory effort.  ABDOMEN:  Soft and nontender.   EXTREMITIES:  There is no edema.       LABS:                        10.4   14.23 )-----------( 319      ( 16 Apr 2018 06:34 )             35.9     04-16    140  |  102  |  40<H>  ----------------------------<  120<H>  5.0   |  33<H>  |  0.73    Ca    8.8      16 Apr 2018 06:34      PTT - ( 16 Apr 2018 06:34 )  PTT:49.0 sec

## 2018-04-16 NOTE — PROGRESS NOTE ADULT - ASSESSMENT
84 F w/PMH COPD, CPAP at night, HTN, presents on 4/6/18 with progressively dyspnea over past several weeks.  Found w/ anemia, +melena. Pt also with small PE, L soleal vein thrombosis.     1. GI bleed- GI following. Transfuse to keep Hgb > 8. EGD c/w GIST tumor? Trend H/H- stable today in 9-10 range. Continue PPI. Mgmt as per GI. No active bleeding now. Pt stable for EGD from cardiac standpoint.    2. PE- small RUL PE- Also with L soleal vein thrombosis. Pt tolerating IV heparin for many days. Will need LTA with coumadin as well. IVC filter on hold for now as pt is tolerating IV heparin and has no active bleeding. Heme following. 2Decho- normal LV and RV fxn. Hypercoagulable w/u as per Heme.      3. Essential hypertension- stable, continue medical mgmt.     4. DVT proph- SCDs. Replete lytes as needed.     5. COPD- cont steroid taper, nebs, inhalers- will have EGD when breathing improves.     6. LE edema- improved. Lasix prn.     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 Will need further testing, w/u at a tertiary care center.

## 2018-04-16 NOTE — PROGRESS NOTE ADULT - SUBJECTIVE AND OBJECTIVE BOX
Subjective:  Patient is a 84y old  Female who presents with a chief complaint of sob, anemia      HPI:     84 F w/PMH COPD, CPAP at night, HTN, presents on 4/6/18 with progressively worse dyspnea over past several weeks.  She went to her pulmonologist, CXR was neg, she referred to Cardio.  After work up w/ ST, TTE was not very revealing, she was referred to GI.  She was found on anemic w/ Hg of 8 on cbc, which is not normal for her.  She has noticed some melena 1-2 episodes over the past month.  She denies any hematochezia, hematemesis. She endorses a feeling of fullness after she eats a small amount.  She's also been having pain across her upper abdomen, just below her diaphragm.  She cannot tell me if this pain is related to eating.  She does have a good appetite but just gets full fast.  Gdtr at bedside notes that she doesn't eat as much.  Pt denies any nausea.  Her last colonoscopy was 2 years ago.  She endorsed h/o gastric ulcers.     In ED  pt is dyspneic w/ conversation, she's satting 85% on 3LNC, changed to ventimask, now at 95%.  She is feeling very anxious and requesting xanax- which she takes prn at home 1/2 of 0.25mg dose.     4/7/18 - Patient seen and examined at bedside earlier today, dyspneic with minimal exertion, even talking, but reports slight improvement of the dyspnea   4/8/18 - events noted, transferred to tele for o2 monitoring due to desaturation , reports improvement of the dyspnea, cough, 1 BM brown overnight  tele- o2 sats to 85%, episodes of bigemeny   4/9 - tele O2 sats on O2 wnl, reports improvement of dyspnea and cough since yesterday, no BM, denies abd pain, feels constipated  4/10 - dyspnea slightly worse today, cough productive, denies CP, + brown BM, no blood, denies abd pain  4/11 - dyspnea slightly better on higher dose of IV steroids, + productive cough, overall better  4/12 - dyspnea better, afebrile, denies chest pain, abdominal pain, no events overnight POC discussed   4/13- pt seen and examined, dyspnea and cough better, afebrile, cleared for EGD by cardiology and pulmonology, no new complains  Review of system- Rest of the review of system are negative except mentioned in HPI  04/14/18: patient seen and examined. S/P EGD yesterday: duodenal superficial ulcer. Patient denies any new complaints. Discussed with patient in length regarding management plan.  04/15/18: Patient seen and examined. No hematemesis or melena. Patient is reluctant for any surgical interventions except IVC filter.    04/16/18: Patient seen and examined. No new complaints. IVC filter today. Discussed with patient in length regarding management and d/c plan. Discussed with Dr Kramer.         Vital Signs Last 24 Hrs  T(C): 36.6 (16 Apr 2018 11:45), Max: 37.3 (15 Apr 2018 16:59)  T(F): 97.9 (16 Apr 2018 11:45), Max: 99.1 (15 Apr 2018 16:59)  HR: 62 (16 Apr 2018 13:32) (56 - 89)  BP: 138/72 (16 Apr 2018 11:45) (130/52 - 138/72)  BP(mean): --  RR: 18 (16 Apr 2018 11:45) (17 - 19)  SpO2: 99% (16 Apr 2018 05:12) (95% - 99%)        PHYSICAL EXAM:      GENERAL: NAD  NERVOUS SYSTEM:  Alert & Oriented X3, non- focal exam,  HEAD:  Atraumatic, Normocephalic  EYES: EOMI, PERRLA, conjunctiva and sclera clear  HEENT: Moist mucous membranes  NECK: Supple, No JVD  CHEST/LUNG: BS decreased bilaterally; No rales, no rhonchi, +  wheezing,  HEART: Regular rate and rhythm; No murmurs, rubs, or gallops  ABDOMEN: Soft, Nontender, Nondistended; Bowel sounds present  GENITOURINARY- Voiding, no suprapubic tenderness  EXTREMITIES:  2+ Peripheral Pulses, No clubbing, cyanosis, or edema  MUSCULOSKELETAL:- No muscle tenderness, Muscle tone normal, No joint tenderness, no Joint swelling, Joint range of motion-normal  SKIN-no rash, no lesion          LABS:                                                                         10.4   14.23 )-----------( 319      ( 16 Apr 2018 06:34 )             35.9     16 Apr 2018 06:34    140    |  102    |  40     ----------------------------<  120    5.0     |  33     |  0.73     Ca    8.8        16 Apr 2018 06:34        PTT - ( 16 Apr 2018 06:34 )  PTT:49.0 sec  CAPILLARY BLOOD GLUCOSE        RADIOLOGY & ADDITIONAL TESTS:    < from: US Duplex Venous Lower Ext Complete, Bilateral (04.07.18 @ 17:25) >  IMPRESSION:     Left soleal vein thrombosis without extension into the popliteal vein.    No sonographic evidence of acute deep venous thrombosis in the   femoropopliteal systems bilaterally.     < end of copied text >  < from: CT Angio Chest PE Protocol w/ IV Cont (04.07.18 @ 16:53) >  Lungs, Airways and Pleura: Central tracheobronchial tree is grossly   patent. No endobronchial lesions. Small to mild bilateral pleural   effusions right greater than left with bibasilar compressive atelectasis.   No pneumothorax. No discrete pulmonary nodules. Subsegmental atelectasis   in the lingula.    Heart and Great Vessels: Atherosclerotic changes of the aorta and   coronary vasculature. Study is mildly limited secondary to heterogeneous   opacification of the distal segmental and subsegmental pulmonary vessels.   Focal filling defect within the mid to distal posterior right upper lobe   segmental vessel compatible with a focal pulmonary embolus.. No other   evidence of acute pulmonary embolismwithin the limits of the study.. No   aortic aneurysm. No definite evidence of aortic dissection. Heart is   normal in size. No pericardial effusion.    Mediastinum and Lanette: Pretracheal and right hilar lymph nodes measuring   up to 1.3 cm.    Neck and Chest Wall: within normal limits    Bones: Degenerative changes and osteopenia. Mild to moderate compression   deformities of T6-T8 chronic in appearance. Compression deformity of L1   appears chronic in nature.    Upper Abdomen:  Left lateral midpole renal cyst. Elevation of the right   hemidiaphragm.    IMPRESSION:         Small focal pulmonary embolus in a posterior right upper lobe segmental   vessel.    < end of copied text >    < from: Xray Chest 1 View AP/PA. (04.06.18 @ 10:47) >  Heart magnified by projection, unchanged. Atherosclerotic aorta.   Symmetrically hyperinflated lungs. No focal consolidation or pleural   effusion. Old fracture right seventh posterior rib.    Impression: Hyperinflated lungs. No active infiltrates.    < end of copied text >          MEDICATIONS:      MEDICATIONS  (STANDING):  ALBUTerol/ipratropium for Nebulization 3 milliLiter(s) Nebulizer every 6 hours  amLODIPine   Tablet 5 milliGRAM(s) Oral daily  docusate sodium 100 milliGRAM(s) Oral two times a day  guaiFENesin    Syrup 200 milliGRAM(s) Oral every 6 hours  heparin  Infusion.  Unit(s)/Hr (10 mL/Hr) IV Continuous <Continuous>  hydrochlorothiazide 25 milliGRAM(s) Oral daily  lactobacillus acidophilus 1 Tablet(s) Oral three times a day with meals  losartan 50 milliGRAM(s) Oral daily  methylPREDNISolone sodium succinate Injectable 20 milliGRAM(s) IV Push every 8 hours  montelukast 10 milliGRAM(s) Oral daily  pantoprazole  Injectable 40 milliGRAM(s) IV Push every 12 hours  senna 2 Tablet(s) Oral at bedtime  theophylline SR Tablet 200 milliGRAM(s) Oral daily    MEDICATIONS  (PRN):  heparin  Injectable 5000 Unit(s) IV Push every 6 hours PRN For aPTT less than 40  ondansetron Injectable 4 milliGRAM(s) IV Push every 6 hours PRN Nausea

## 2018-04-16 NOTE — PROGRESS NOTE ADULT - SUBJECTIVE AND OBJECTIVE BOX
Patient is a 84y old  Female who presents with a chief complaint of sob, anemia (06 Apr 2018 17:10)      Subective:  Feels good. No bleeding.    PAST MEDICAL & SURGICAL HISTORY:  COPD (chronic obstructive pulmonary disease)  Hypertension  Peptic Ulcer  Hyperlipemia  S/P Cataract Extraction: right eye 5/27/10      MEDICATIONS  (STANDING):  ALBUTerol/ipratropium for Nebulization 3 milliLiter(s) Nebulizer every 6 hours  amLODIPine   Tablet 5 milliGRAM(s) Oral daily  docusate sodium 100 milliGRAM(s) Oral two times a day  guaiFENesin    Syrup 200 milliGRAM(s) Oral every 6 hours  heparin  Infusion.  Unit(s)/Hr (10 mL/Hr) IV Continuous <Continuous>  hydrochlorothiazide 25 milliGRAM(s) Oral daily  lactobacillus acidophilus 1 Tablet(s) Oral three times a day with meals  losartan 50 milliGRAM(s) Oral daily  methylPREDNISolone sodium succinate Injectable 20 milliGRAM(s) IV Push every 8 hours  montelukast 10 milliGRAM(s) Oral daily  pantoprazole  Injectable 40 milliGRAM(s) IV Push every 12 hours  senna 2 Tablet(s) Oral at bedtime  theophylline SR Tablet 200 milliGRAM(s) Oral daily  warfarin 3 milliGRAM(s) Oral once    MEDICATIONS  (PRN):  ALPRAZolam 0.125 milliGRAM(s) Oral at bedtime PRN anxiety  heparin  Injectable 5000 Unit(s) IV Push every 6 hours PRN For aPTT less than 40  ondansetron Injectable 4 milliGRAM(s) IV Push every 6 hours PRN Nausea      REVIEW OF SYSTEMS:    RESPIRATORY: No shortness of breath  CARDIOVASCULAR: No chest pain  All other review of systems is negative unless indicated above.    Vital Signs Last 24 Hrs  T(C): 36.8 (16 Apr 2018 17:02), Max: 36.8 (16 Apr 2018 17:02)  T(F): 98.3 (16 Apr 2018 17:02), Max: 98.3 (16 Apr 2018 17:02)  HR: 62 (16 Apr 2018 17:02) (56 - 89)  BP: 144/55 (16 Apr 2018 17:02) (133/47 - 144/55)  BP(mean): --  RR: 18 (16 Apr 2018 11:45) (18 - 19)  SpO2: 95% (16 Apr 2018 17:02) (95% - 99%)    PHYSICAL EXAM:    Constitutional: NAD, chronically ill appearing  Respiratory: CTAB  Cardiovascular: S1 and S2, RRR  Gastrointestinal: BS+, soft, NT/ND  Extremities: No peripheral edema  Psychiatric: Normal mood, normal affect    LABS:                        10.4   14.23 )-----------( 319      ( 16 Apr 2018 06:34 )             35.9     04-16    140  |  102  |  40<H>  ----------------------------<  120<H>  5.0   |  33<H>  |  0.73    Ca    8.8      16 Apr 2018 06:34      PT/INR - ( 16 Apr 2018 15:20 )   PT: 11.2 sec;   INR: 1.04 ratio         PTT - ( 16 Apr 2018 15:20 )  PTT:64.2 sec      RADIOLOGY & ADDITIONAL STUDIES:

## 2018-04-16 NOTE — PROGRESS NOTE ADULT - ASSESSMENT
PROBLEMS:    ESBL UTI  RUL PE & DVT  Acute respiratory failure with hypoxia  acute excerbation of COPD  Symptomatic anemia- s/p transfusion- duodenal mass  Gastrointestinal hemorrhage with melena  Essential hypertension    PLAN;    GFF today  serum ayana level 4  pulmonary improving.  po theophylline   iv heparin  IV solumedrols 20mg q8hr  AEROSLS  SUPPORTIVE CARE  DVT PROPHYLASIX

## 2018-04-16 NOTE — PROGRESS NOTE ADULT - ASSESSMENT
A 83 y/o female w PMH COPD, HTN/HLD, peptic ulcer presents for progressively worsening dyspnea. She was seen by her pulmonologist and CXR was negative then referred to cardiology. ST & TTE negative, and was referred to GI.  She was found to be anemic w/ hgb of 8, which is not normal for her.  She has noticed some melena 1-2 episodes over the past month. Her last colonoscopy was 2 years ago and was informed of "polyps." She is s/p 2 units pRBC with H/H improvement noted of 9.3/32.6 today. Pending endoscopy but pt in acute respiratory failure, saturating ~85% on 3LNC, and improved with ventimask. GI team would like to stabilize her respiratory status prior to procedure. Pt currently on low dose heparin gtt for new left soleal vein DVT and RUL focal PE found on admission. Will need to transition her to oral AC, possibly with warfarin, after endoscopy and hematology's evaluation.     EGD on 4-13-18 by Dr Vizcarra  4-16-18 s/p IVC filter placed.   PLAN:   ::Continue UFH, titrate per aPTT   : will start oral anticoagulation  at 3mg po daily over lap x 5 days with ufh.   ::CBC/BMP, PT/INR  ::Monitor for s/s bleeding  ::Encourage ambulation  ::Venodyne contraindicated in left lower extremity    Will continue to follow.

## 2018-04-17 LAB
ANION GAP SERPL CALC-SCNC: 7 MMOL/L — SIGNIFICANT CHANGE UP (ref 5–17)
APTT BLD: 64.6 SEC — HIGH (ref 27.5–37.4)
BUN SERPL-MCNC: 29 MG/DL — HIGH (ref 7–23)
CALCIUM SERPL-MCNC: 9.3 MG/DL — SIGNIFICANT CHANGE UP (ref 8.5–10.1)
CHLORIDE SERPL-SCNC: 98 MMOL/L — SIGNIFICANT CHANGE UP (ref 96–108)
CO2 SERPL-SCNC: 32 MMOL/L — HIGH (ref 22–31)
CREAT SERPL-MCNC: 0.7 MG/DL — SIGNIFICANT CHANGE UP (ref 0.5–1.3)
GLUCOSE SERPL-MCNC: 135 MG/DL — HIGH (ref 70–99)
HCT VFR BLD CALC: 35.8 % — SIGNIFICANT CHANGE UP (ref 34.5–45)
HGB BLD-MCNC: 10.5 G/DL — LOW (ref 11.5–15.5)
INR BLD: 1.04 RATIO — SIGNIFICANT CHANGE UP (ref 0.88–1.16)
MCHC RBC-ENTMCNC: 24.1 PG — LOW (ref 27–34)
MCHC RBC-ENTMCNC: 29.3 GM/DL — LOW (ref 32–36)
MCV RBC AUTO: 82.3 FL — SIGNIFICANT CHANGE UP (ref 80–100)
NRBC # BLD: 0 /100 WBCS — SIGNIFICANT CHANGE UP (ref 0–0)
PLATELET # BLD AUTO: 297 K/UL — SIGNIFICANT CHANGE UP (ref 150–400)
POTASSIUM SERPL-MCNC: 4.8 MMOL/L — SIGNIFICANT CHANGE UP (ref 3.5–5.3)
POTASSIUM SERPL-SCNC: 4.8 MMOL/L — SIGNIFICANT CHANGE UP (ref 3.5–5.3)
PROTHROM AB SERPL-ACNC: 11.2 SEC — SIGNIFICANT CHANGE UP (ref 9.8–12.7)
RBC # BLD: 4.35 M/UL — SIGNIFICANT CHANGE UP (ref 3.8–5.2)
RBC # FLD: 23.1 % — HIGH (ref 10.3–14.5)
SODIUM SERPL-SCNC: 137 MMOL/L — SIGNIFICANT CHANGE UP (ref 135–145)
WBC # BLD: 11.75 K/UL — HIGH (ref 3.8–10.5)
WBC # FLD AUTO: 11.75 K/UL — HIGH (ref 3.8–10.5)

## 2018-04-17 PROCEDURE — 99233 SBSQ HOSP IP/OBS HIGH 50: CPT

## 2018-04-17 RX ORDER — WARFARIN SODIUM 2.5 MG/1
5 TABLET ORAL DAILY
Qty: 0 | Refills: 0 | Status: DISCONTINUED | OUTPATIENT
Start: 2018-04-17 | End: 2018-04-18

## 2018-04-17 RX ADMIN — Medication 100 MILLIGRAM(S): at 05:26

## 2018-04-17 RX ADMIN — Medication 3 MILLILITER(S): at 01:43

## 2018-04-17 RX ADMIN — Medication 3 MILLILITER(S): at 09:17

## 2018-04-17 RX ADMIN — Medication 20 MILLIGRAM(S): at 14:20

## 2018-04-17 RX ADMIN — HEPARIN SODIUM 900 UNIT(S)/HR: 5000 INJECTION INTRAVENOUS; SUBCUTANEOUS at 07:50

## 2018-04-17 RX ADMIN — PANTOPRAZOLE SODIUM 40 MILLIGRAM(S): 20 TABLET, DELAYED RELEASE ORAL at 18:03

## 2018-04-17 RX ADMIN — Medication 25 MILLIGRAM(S): at 05:26

## 2018-04-17 RX ADMIN — MONTELUKAST 10 MILLIGRAM(S): 4 TABLET, CHEWABLE ORAL at 21:19

## 2018-04-17 RX ADMIN — PANTOPRAZOLE SODIUM 40 MILLIGRAM(S): 20 TABLET, DELAYED RELEASE ORAL at 05:25

## 2018-04-17 RX ADMIN — Medication 1 TABLET(S): at 07:58

## 2018-04-17 RX ADMIN — AMLODIPINE BESYLATE 5 MILLIGRAM(S): 2.5 TABLET ORAL at 05:26

## 2018-04-17 RX ADMIN — Medication 200 MILLIGRAM(S): at 12:19

## 2018-04-17 RX ADMIN — Medication 100 MILLIGRAM(S): at 18:03

## 2018-04-17 RX ADMIN — WARFARIN SODIUM 5 MILLIGRAM(S): 2.5 TABLET ORAL at 21:19

## 2018-04-17 RX ADMIN — Medication 1 TABLET(S): at 18:03

## 2018-04-17 RX ADMIN — Medication 0.12 MILLIGRAM(S): at 23:56

## 2018-04-17 RX ADMIN — Medication 20 MILLIGRAM(S): at 05:25

## 2018-04-17 RX ADMIN — Medication 1 TABLET(S): at 12:19

## 2018-04-17 RX ADMIN — Medication 3 MILLILITER(S): at 21:37

## 2018-04-17 RX ADMIN — SENNA PLUS 2 TABLET(S): 8.6 TABLET ORAL at 21:19

## 2018-04-17 RX ADMIN — Medication 3 MILLILITER(S): at 13:16

## 2018-04-17 RX ADMIN — Medication 200 MILLIGRAM(S): at 18:03

## 2018-04-17 RX ADMIN — Medication 200 MILLIGRAM(S): at 23:58

## 2018-04-17 RX ADMIN — Medication 200 MILLIGRAM(S): at 12:20

## 2018-04-17 RX ADMIN — Medication 200 MILLIGRAM(S): at 05:26

## 2018-04-17 RX ADMIN — LOSARTAN POTASSIUM 50 MILLIGRAM(S): 100 TABLET, FILM COATED ORAL at 05:26

## 2018-04-17 RX ADMIN — Medication 20 MILLIGRAM(S): at 21:19

## 2018-04-17 NOTE — PROGRESS NOTE ADULT - ASSESSMENT
A 85 y/o female w PMH COPD, HTN/HLD, peptic ulcer presents for progressively worsening dyspnea. She was seen by her pulmonologist and CXR was negative then referred to cardiology. ST & TTE negative, and was referred to GI.  She was found to be anemic w/ hgb of 8, which is not normal for her.  She has noticed some melena 1-2 episodes over the past month. Her last colonoscopy was 2 years ago and was informed of "polyps." She is s/p 2 units pRBC with H/H improvement noted of 9.3/32.6 today. Pending endoscopy but pt in acute respiratory failure, saturating ~85% on 3LNC, and improved with ventimask. GI team would like to stabilize her respiratory status prior to procedure. Pt currently on low dose heparin gtt for new left soleal vein DVT and RUL focal PE found on admission. Will need to transition her to oral AC, possibly with warfarin, after endoscopy and hematology's evaluation.     EGD on 4-13-18 by Dr Vizcarra  4-16-18 s/p IVC filter placed.   PLAN:   ::Continue UFH, titrate per aPTT   :increase coumadin to 5mg po daily over lap x 5 days with ufh. day 2/5  ::CBC/BMP, PT/INR  ::Monitor for s/s bleeding  ::Encourage ambulation  ::Venodyne contraindicated in left lower extremity    Will continue to follow.

## 2018-04-17 NOTE — PROGRESS NOTE ADULT - ASSESSMENT
PROBLEMS:    ESBL UTI  RUL PE & DVT s/p GFF  Acute respiratory failure with hypoxia  acute excerbation of COPD  Symptomatic anemia- s/p transfusion- duodenal mass  Gastrointestinal hemorrhage with melena  Essential hypertension    PLAN;    discharge planning  iv heparin  IV solumedrols 20mg q8hr  AEROSLS  SUPPORTIVE CARE  DVT PROPHYLASIX

## 2018-04-17 NOTE — PROGRESS NOTE ADULT - SUBJECTIVE AND OBJECTIVE BOX
Cardiology Progress Note    HPI: 84 F w/PMH COPD, CPAP at night, HTN, has been experiencing progressively dyspnea over past several weeks.  She went to her pulmonologist, CXR was neg, she referred to Cardio.  After work up w/ ST, TTE was not very revealing, she was referred to GI.  She was found on anemic w/ Hg of 8 on cbc, which is not normal for her.  She has noticed some melena 1-2 episodes over the past month.  She denies any hematochezia, hematemesis. She endorses a feeling of fullness after she eats a small amount.  She's also been having pain across her upper abdomen, just below her diaphragm.  She cannot tell me if this pain is related to eating.  She does have a good appetite but just gets full fast.  Gdtr at bedside notes that she doesn't eat as much.  Pt denies any nausea.  Her last colonoscopy was 2 years ago.  She endorsed h/o gastric ulcers. Pt is dyspneic w/ conversation, she's satting 85% on 3LNC, changed to ventimask, now at 95%.  She is feeling very anxious and requesting xanax- which she takes prn at home 1/2 of 0.25mg dose. (2018 17:10)    - No CP. +SOB. SR on tele. Feels tired. No events last pm.     - Feels slightly better today. +SOB, no CP. Awaiting IVC filter today.     4/10- Discussed case with interventional cardiology- holding off on IVC filter for now. No active GI bleed now. +SOB- mildly improved. No CP.     - No CP, SOB mildly improved. No fevers. No events last pm.    - Still awaiting EGD. Now with LE edema. No CP, SOB improved.     - Chart reviewed. S/p EGD- c/w GIST tumor. No active bleeding. No CP, SOB improved.     - SOB stable, no CP. Pt now s/p IVC filter yesterday.     PAST MEDICAL & SURGICAL HISTORY:  COPD (chronic obstructive pulmonary disease)  Hypertension  Peptic Ulcer  Hyperlipemia  S/P Cataract Extraction: right eye 5/27/10    Allergies  No Known Allergies    SOCIAL HISTORY: Denies tobacco, etoh abuse or illicit drug use    FAMILY HISTORY: No pertinent family history in first degree relatives    MEDICATIONS  (STANDING):  ALBUTerol/ipratropium for Nebulization 3 milliLiter(s) Nebulizer every 6 hours  amLODIPine   Tablet 5 milliGRAM(s) Oral daily  azithromycin  IVPB 500 milliGRAM(s) IV Intermittent every 24 hours  azithromycin  IVPB      guaiFENesin    Syrup 200 milliGRAM(s) Oral every 6 hours  heparin  Infusion.  Unit(s)/Hr (10 mL/Hr) IV Continuous <Continuous>  hydrochlorothiazide 25 milliGRAM(s) Oral daily  losartan 50 milliGRAM(s) Oral daily  methylPREDNISolone sodium succinate Injectable 40 milliGRAM(s) IV Push every 12 hours  montelukast 10 milliGRAM(s) Oral daily  pantoprazole Infusion 8 mG/Hr (10 mL/Hr) IV Continuous <Continuous>    MEDICATIONS  (PRN):  ALPRAZolam 0.125 milliGRAM(s) Oral daily PRN anxiety  heparin  Injectable 5000 Unit(s) IV Push every 6 hours PRN For aPTT less than 40  ondansetron Injectable 4 milliGRAM(s) IV Push every 6 hours PRN Nausea      Vital Signs Last 24 Hrs  T(C): 36.4 (2018 05:42), Max: 37.2 (2018 11:55)  T(F): 97.5 (2018 05:42), Max: 99 (2018 11:55)  HR: 53 (2018 05:42) (50 - 88)  BP: 110/55 (2018 05:42) (110/55 - 131/65)  BP(mean): --  RR: 18 (2018 05:42) (18 - 22)  SpO2: 94% (2018 05:42) (85% - 95%)    REVIEW OF SYSTEMS:    CONSTITUTIONAL:  As per HPI.  HEENT:  Eyes:  No diplopia or blurred vision. ENT:  No earache, sore throat or runny nose.  CARDIOVASCULAR:  No pressure, squeezing, strangling, tightness, heaviness or aching about the chest, neck, axilla or epigastrium.  RESPIRATORY:  No cough, shortness of breath, PND or orthopnea.  GASTROINTESTINAL:  No nausea, vomiting or diarrhea.  GENITOURINARY:  No dysuria, frequency or urgency.  MUSCULOSKELETAL:  As per HPI.  SKIN:  No change in skin, hair or nails.  NEUROLOGIC:  No paresthesias, fasciculations, seizures or weakness.    PHYSICAL EXAMINATION:    GENERAL APPEARANCE:  Pt. is not currently dyspneic, in no distress. Pt. is alert, oriented, and pleasant.  HEENT:  Pupils are normal and react normally. No icterus. Mucous membranes well colored.  NECK:  Supple. No lymphadenopathy. Jugular venous pressure not elevated. Carotids equal.   HEART:   The cardiac impulse has a normal quality. There are no murmurs, rubs or gallops noted  CHEST:  Chest is clear to auscultation. Normal respiratory effort.  ABDOMEN:  Soft and nontender.   EXTREMITIES:  1-2+ LE edema.    I&O's Summary      LABS:                        9.3    18.67 )-----------( 351      ( 2018 06:03 )             32.6     04-08    141  |  104  |  25<H>  ----------------------------<  140<H>  4.3   |  31  |  0.79    Ca    9.0      2018 06:03    TPro  6.8  /  Alb  2.9<L>  /  TBili  0.4  /  DBili  x   /  AST  14<L>  /  ALT  20  /  AlkPhos  77  04-06    LIVER FUNCTIONS - ( 2018 12:29 )  Alb: 2.9 g/dL / Pro: 6.8 gm/dL / ALK PHOS: 77 U/L / ALT: 20 U/L / AST: 14 U/L / GGT: x           PT/INR - ( 2018 12:29 )   PT: 11.8 sec;   INR: 1.09 ratio         PTT - ( 2018 02:41 )  PTT:43.3 sec  CARDIAC MARKERS ( 2018 16:07 )  <0.015 ng/mL / x     / x     / x     / x      CARDIAC MARKERS ( 2018 12:29 )  <0.015 ng/mL / x     / x     / x     / x        Urinalysis Basic - ( 2018 16:57 )    Color: Yellow / Appearance: Clear / S.015 / pH: x  Gluc: x / Ketone: Negative  / Bili: Negative / Urobili: Negative mg/dL   Blood: x / Protein: 15 mg/dL / Nitrite: Positive   Leuk Esterase: Small / RBC: 0-2 /HPF / WBC 5-8 /HPF   Sq Epi: x / Non Sq Epi: Occasional / Bacteria: Many    EKG: < from: 12 Lead ECG (18 @ 09:44) >  Sinus rhythm with Premature supraventricular complexes and with occasional Premature ventricular complexes  Possible Left atrial enlargement  Nonspecific ST abnormality  Abnormal ECG    TELEMETRY: SR    CARDIAC TESTS: < from: Transthoracic Echocardiogram (18 @ 10:22) >   Fibrocalcific changes noted to the mitral valve leaflets with preserved   leaflet excursion.   EA reversal of the mitral inflow consistent with reduced compliance of   the   left ventricle.   Fibrocalcific changes noted to the Aortic valve leaflets with preserved   leaflet excursion.   Mild aortic annular calcification is noted.   Mild (1+) tricuspid valve regurgitation is present.   Trace pulmonic valvular regurgitation is present.   Normal appearing left atrium.   The left ventricle is normal in size, wall thickness, wall motion and   contractility.   Estimated left ventricular ejection fraction is 65-70 %.   The right atrium appears mildly dilated.   Normal appearing right ventricle structure and function.   The IVC is dilated but collapsing with inspiration.   No evidence of pericardial effusion.   No evidence of pleural effusion.    < end of copied text >      RADIOLOGY & ADDITIONAL STUDIES: < from: US Duplex Venous Lower Ext Complete, Bilateral (18 @ 17:25) >    Left soleal vein thrombosis without extension into the popliteal vein.    No sonographic evidence of acute deep venous thrombosis in the   femoropopliteal systems bilaterally.     < from: CT Angio Chest PE Protocol w/ IV Cont (18 @ 16:53) >    Small focal pulmonary embolus in a posterior right upper lobe segmental   vessel.        ASSESSMENT & PLAN:

## 2018-04-17 NOTE — PROGRESS NOTE ADULT - SUBJECTIVE AND OBJECTIVE BOX
Subjective:  Patient is a 84y old  Female who presents with a chief complaint of sob, anemia      HPI:     84 F w/PMH COPD, CPAP at night, HTN, presents on 4/6/18 with progressively worse dyspnea over past several weeks.  She went to her pulmonologist, CXR was neg, she referred to Cardio.  After work up w/ ST, TTE was not very revealing, she was referred to GI.  She was found on anemic w/ Hg of 8 on cbc, which is not normal for her.  She has noticed some melena 1-2 episodes over the past month.  She denies any hematochezia, hematemesis. She endorses a feeling of fullness after she eats a small amount.  She's also been having pain across her upper abdomen, just below her diaphragm.  She cannot tell me if this pain is related to eating.  She does have a good appetite but just gets full fast.  Gdtr at bedside notes that she doesn't eat as much.  Pt denies any nausea.  Her last colonoscopy was 2 years ago.  She endorsed h/o gastric ulcers.     In ED  pt is dyspneic w/ conversation, she's satting 85% on 3LNC, changed to ventimask, now at 95%.  She is feeling very anxious and requesting xanax- which she takes prn at home 1/2 of 0.25mg dose.     4/7/18 - Patient seen and examined at bedside earlier today, dyspneic with minimal exertion, even talking, but reports slight improvement of the dyspnea   4/8/18 - events noted, transferred to tele for o2 monitoring due to desaturation , reports improvement of the dyspnea, cough, 1 BM brown overnight  tele- o2 sats to 85%, episodes of bigemeny   4/9 - tele O2 sats on O2 wnl, reports improvement of dyspnea and cough since yesterday, no BM, denies abd pain, feels constipated  4/10 - dyspnea slightly worse today, cough productive, denies CP, + brown BM, no blood, denies abd pain  4/11 - dyspnea slightly better on higher dose of IV steroids, + productive cough, overall better  4/12 - dyspnea better, afebrile, denies chest pain, abdominal pain, no events overnight POC discussed   4/13- pt seen and examined, dyspnea and cough better, afebrile, cleared for EGD by cardiology and pulmonology, no new complains  Review of system- Rest of the review of system are negative except mentioned in HPI  04/14/18: patient seen and examined. S/P EGD yesterday: duodenal superficial ulcer. Patient denies any new complaints. Discussed with patient in length regarding management plan.  04/15/18: Patient seen and examined. No hematemesis or melena. Patient is reluctant for any surgical interventions except IVC filter.    04/16/18: Patient seen and examined. No new complaints. IVC filter today. Discussed with patient in length regarding management and d/c plan. Discussed with Dr Kramer.  04/17/18: Patient seen and examined. No melena, tolerating coumadin. SOB much improved.          Vital Signs Last 24 Hrs  T(C): 36.3 (17 Apr 2018 11:49), Max: 36.8 (16 Apr 2018 17:02)  T(F): 97.4 (17 Apr 2018 11:49), Max: 98.3 (16 Apr 2018 17:02)  HR: 64 (17 Apr 2018 13:16) (56 - 71)  BP: 122/48 (17 Apr 2018 11:49) (122/48 - 144/55)  BP(mean): --  RR: 18 (17 Apr 2018 11:49) (18 - 18)  SpO2: 100% (17 Apr 2018 11:49) (93% - 100%)        PHYSICAL EXAM:      GENERAL: NAD  NERVOUS SYSTEM:  Alert & Oriented X3, non- focal exam,  HEAD:  Atraumatic, Normocephalic  EYES: EOMI, PERRLA, conjunctiva and sclera clear  HEENT: Moist mucous membranes  NECK: Supple, No JVD  CHEST/LUNG: BS decreased bilaterally; No rales, no rhonchi, +  wheezing,  HEART: Regular rate and rhythm; No murmurs, rubs, or gallops  ABDOMEN: Soft, Nontender, Nondistended; Bowel sounds present  GENITOURINARY- Voiding, no suprapubic tenderness  EXTREMITIES:  2+ Peripheral Pulses, No clubbing, cyanosis, or edema  MUSCULOSKELETAL:- No muscle tenderness, Muscle tone normal, No joint tenderness, no Joint swelling, Joint range of motion-normal  SKIN-no rash, no lesion          LABS:                                       10.5   11.75 )-----------( 297      ( 17 Apr 2018 06:31 )             35.8     17 Apr 2018 06:31    137    |  98     |  29     ----------------------------<  135    4.8     |  32     |  0.70     Ca    9.3        17 Apr 2018 06:31        PT/INR - ( 17 Apr 2018 06:31 )   PT: 11.2 sec;   INR: 1.04 ratio         PTT - ( 17 Apr 2018 06:31 )  PTT:64.6 sec  CAPILLARY BLOOD GLUCOSE                                                           < from: US Duplex Venous Lower Ext Complete, Bilateral (04.07.18 @ 17:25) >  IMPRESSION:     Left soleal vein thrombosis without extension into the popliteal vein.    No sonographic evidence of acute deep venous thrombosis in the   femoropopliteal systems bilaterally.     < end of copied text >  < from: CT Angio Chest PE Protocol w/ IV Cont (04.07.18 @ 16:53) >  Lungs, Airways and Pleura: Central tracheobronchial tree is grossly   patent. No endobronchial lesions. Small to mild bilateral pleural   effusions right greater than left with bibasilar compressive atelectasis.   No pneumothorax. No discrete pulmonary nodules. Subsegmental atelectasis   in the lingula.    Heart and Great Vessels: Atherosclerotic changes of the aorta and   coronary vasculature. Study is mildly limited secondary to heterogeneous   opacification of the distal segmental and subsegmental pulmonary vessels.   Focal filling defect within the mid to distal posterior right upper lobe   segmental vessel compatible with a focal pulmonary embolus.. No other   evidence of acute pulmonary embolismwithin the limits of the study.. No   aortic aneurysm. No definite evidence of aortic dissection. Heart is   normal in size. No pericardial effusion.    Mediastinum and Lanette: Pretracheal and right hilar lymph nodes measuring   up to 1.3 cm.    Neck and Chest Wall: within normal limits    Bones: Degenerative changes and osteopenia. Mild to moderate compression   deformities of T6-T8 chronic in appearance. Compression deformity of L1   appears chronic in nature.    Upper Abdomen:  Left lateral midpole renal cyst. Elevation of the right   hemidiaphragm.    IMPRESSION:         Small focal pulmonary embolus in a posterior right upper lobe segmental   vessel.    < end of copied text >    < from: Xray Chest 1 View AP/PA. (04.06.18 @ 10:47) >  Heart magnified by projection, unchanged. Atherosclerotic aorta.   Symmetrically hyperinflated lungs. No focal consolidation or pleural   effusion. Old fracture right seventh posterior rib.    Impression: Hyperinflated lungs. No active infiltrates.    < end of copied text >          MEDICATIONS:      MEDICATIONS  (STANDING):  ALBUTerol/ipratropium for Nebulization 3 milliLiter(s) Nebulizer every 6 hours  amLODIPine   Tablet 5 milliGRAM(s) Oral daily  docusate sodium 100 milliGRAM(s) Oral two times a day  guaiFENesin    Syrup 200 milliGRAM(s) Oral every 6 hours  heparin  Infusion.  Unit(s)/Hr (10 mL/Hr) IV Continuous <Continuous>  hydrochlorothiazide 25 milliGRAM(s) Oral daily  lactobacillus acidophilus 1 Tablet(s) Oral three times a day with meals  losartan 50 milliGRAM(s) Oral daily  methylPREDNISolone sodium succinate Injectable 20 milliGRAM(s) IV Push every 8 hours  montelukast 10 milliGRAM(s) Oral daily  pantoprazole  Injectable 40 milliGRAM(s) IV Push every 12 hours  senna 2 Tablet(s) Oral at bedtime  theophylline SR Tablet 200 milliGRAM(s) Oral daily  warfarin 5 milliGRAM(s) Oral daily    MEDICATIONS  (PRN):  ALPRAZolam 0.125 milliGRAM(s) Oral at bedtime PRN anxiety  heparin  Injectable 5000 Unit(s) IV Push every 6 hours PRN For aPTT less than 40  ondansetron Injectable 4 milliGRAM(s) IV Push every 6 hours PRN Nausea

## 2018-04-17 NOTE — PROGRESS NOTE ADULT - ASSESSMENT
84 F w/PMH COPD, CPAP at night, HTN, presents on 4/6/18 with progressively dyspnea over past several weeks.  Found w/ anemia, +melena. Pt also with small PE, L soleal vein thrombosis.     1. GI bleed- GI following. Transfuse to keep Hgb > 8. EGD c/w GIST tumor? Trend H/H- stable today in 9-10 range. Continue PPI. Mgmt as per GI. No active bleeding now. Pt stable for EGD from cardiac standpoint.    2. PE- small RUL PE- Also with L soleal vein thrombosis. Pt tolerating IV heparin for many days. Will need LTA with coumadin as well. IVC placed yesterday as pt has GI bleed, high likelihood of rebleeding and has PE/DVT. Heme following. 2Decho- normal LV and RV fxn. Hypercoagulable w/u as per Heme.      3. Essential hypertension- stable, continue medical mgmt.     4. DVT proph, replete lytes as needed.     5. COPD- cont steroid taper, nebs, inhalers.     6. LE edema- improved. Lasix prn.     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 Will need further testing, w/u at a tertiary care center. Pt to f/u with Dr. Vizcarra or Dr. Glanzmann.

## 2018-04-17 NOTE — PROGRESS NOTE ADULT - SUBJECTIVE AND OBJECTIVE BOX
Subjective:    pat s/p GFF, better, no new complaint.    Home Medications:  amLODIPine 5 mg oral tablet: 1 tab(s) orally once a day (06 Apr 2018 17:25)  Anoro Ellipta 62.5 mcg-25 mcg/inh inhalation powder: 1 puff(s) inhaled once a day (06 Apr 2018 17:25)  calcium (as carbonate) 600 mg oral tablet: 2 tab(s) orally once a day (06 Apr 2018 17:25)  hydroCHLOROthiazide 25 mg oral tablet: 1 tab(s) orally once a day (06 Apr 2018 17:25)  losartan 50 mg oral tablet: 1 tab(s) orally once a day (06 Apr 2018 17:25)  montelukast 10 mg oral tablet: 1 tab(s) orally once a day (06 Apr 2018 17:25)  predniSONE 20 mg oral tablet: 1 tab(s) orally once a day    ***COURSE COMPLETED*** (06 Apr 2018 17:25)  ProAir HFA 90 mcg/inh inhalation aerosol: 2 puff(s) inhaled 4 times a day, As Needed (06 Apr 2018 17:25)  Vitamin D3 2000 intl units oral tablet: 1 tab(s) orally once a day (06 Apr 2018 17:25)  Xanax 0.25 mg oral tablet: 0.5 tab(s) orally once a day, As Needed for anxiety  (06 Apr 2018 17:25)    MEDICATIONS  (STANDING):  ALBUTerol/ipratropium for Nebulization 3 milliLiter(s) Nebulizer every 6 hours  amLODIPine   Tablet 5 milliGRAM(s) Oral daily  docusate sodium 100 milliGRAM(s) Oral two times a day  guaiFENesin    Syrup 200 milliGRAM(s) Oral every 6 hours  heparin  Infusion.  Unit(s)/Hr (10 mL/Hr) IV Continuous <Continuous>  hydrochlorothiazide 25 milliGRAM(s) Oral daily  lactobacillus acidophilus 1 Tablet(s) Oral three times a day with meals  losartan 50 milliGRAM(s) Oral daily  methylPREDNISolone sodium succinate Injectable 20 milliGRAM(s) IV Push every 8 hours  montelukast 10 milliGRAM(s) Oral daily  pantoprazole  Injectable 40 milliGRAM(s) IV Push every 12 hours  senna 2 Tablet(s) Oral at bedtime  theophylline SR Tablet 200 milliGRAM(s) Oral daily    MEDICATIONS  (PRN):  ALPRAZolam 0.125 milliGRAM(s) Oral at bedtime PRN anxiety  heparin  Injectable 5000 Unit(s) IV Push every 6 hours PRN For aPTT less than 40  ondansetron Injectable 4 milliGRAM(s) IV Push every 6 hours PRN Nausea      Allergies    No Known Allergies    Intolerances        Vital Signs Last 24 Hrs  T(C): 36.8 (17 Apr 2018 05:06), Max: 36.8 (16 Apr 2018 17:02)  T(F): 98.3 (17 Apr 2018 05:06), Max: 98.3 (16 Apr 2018 17:02)  HR: 60 (17 Apr 2018 09:18) (56 - 66)  BP: 127/49 (17 Apr 2018 05:06) (127/49 - 144/55)  BP(mean): --  RR: 18 (16 Apr 2018 11:45) (18 - 18)  SpO2: 97% (17 Apr 2018 05:06) (93% - 97%)      PHYSICAL EXAMINATION:    NECK:  Supple. No lymphadenopathy. Jugular venous pressure not elevated. Carotids equal.   HEART:   The cardiac impulse has a normal quality. Reg., Nl S1 and S2.  There are no murmurs, rubs or gallops noted  CHEST:  Chest crackles to auscultation. Normal respiratory effort.  ABDOMEN:  Soft and nontender.   EXTREMITIES:  There is no edema.       LABS:                        10.5   11.75 )-----------( 297      ( 17 Apr 2018 06:31 )             35.8     04-17    137  |  98  |  29<H>  ----------------------------<  135<H>  4.8   |  32<H>  |  0.70    Ca    9.3      17 Apr 2018 06:31      PT/INR - ( 17 Apr 2018 06:31 )   PT: 11.2 sec;   INR: 1.04 ratio         PTT - ( 17 Apr 2018 06:31 )  PTT:64.6 sec

## 2018-04-17 NOTE — PROGRESS NOTE ADULT - SUBJECTIVE AND OBJECTIVE BOX
HPI: This is a 85 y/o female w PMH COPD, CPAP at night, HTN, has been experiencing progressively dyspnea over past several weeks.  She went to her pulmonologist, CXR was neg, she referred to Cardio.  After work up w/ ST, TTE was not very revealing, she was referred to GI.  She was found on anemic w/ Hg of 8 on cbc, which is not normal for her.  She has noticed some melena 1-2 episodes over the past month.  She denies any hematochezia, hematemesis. She endorses a feeling of fullness after she eats a small amount.  She's also been having pain across her upper abdomen, just below her diaphragm.  She cannot tell me if this pain is related to eating.  She does have a good appetite but just gets full fast.  Gdtr at bedside notes that she doesn't eat as much.  Pt denies any nausea.  Her last colonoscopy was 2 years ago.  She endorsed h/o gastric ulcers.     Currently, pt is dyspneic w/ conversation, she's satting 85% on 3LNC, changed to ventimask, now at 95%.  She is feeling very anxious and requesting xanax- which she takes prn at home 1/2 of 0.25mg dose. 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 She continues to report of SOB. She denies prior VTE, CVA, MI, no blood clotting disorder. She is a former smoker, has been under stress recently. She reports of being an active caregiver to her  with Parkinson's disease, but now has HHA and decreased her mobility. Informed pt of possible oral anticoagulation with warfarin and she agrees to therapy, but will need to have endoscopy done first. Pending IVC filter placement tomorrow. Discussed with primary RN and no active bleeding noted today. Her H/H today 9.3/32.6 after s/p 2 units pRBC.    Patient is a 84y old  Female who presents with a chief complaint of sob, anemia (06 Apr 2018 17:10)    Consulted by Dr. Sinha for VTE prophylaxis, risk stratification, and anticoagulation management.    PAST MEDICAL & SURGICAL HISTORY:  COPD (chronic obstructive pulmonary disease)  Hypertension  Peptic Ulcer  Hyperlipemia  S/P Cataract Extraction: right eye 5/27/10    BMI: 30.6    CrCL: 69.88    IMPROVE VTE Risk Score: 5    IMPROVE Bleeding Risk Score: 5.5    4/9: Patient seen at bedside with Dr. Kramer- explained will hold off on IVC filter as DVT is in small vein and no obvious bleeding at this time. Remain on UFH. Patient reports some improvement in breathing- but still with RUDD and O2 nc.  4/10: seen at bedside, some RUDD on exertion O 2 in place  4/11: seen at bedside, for Upper endo tomorrow, coumadin still on hold but discussed with pt foods to avoid/ vit k diet  4/13: Patient seen at bedside- on isolation- still with difficulty breathing- RUDD. Endo on hold until respiratory status improves. Solumedrol/theophylline on board per pulmon. Continuing UFH as IVC filter not necessary due to stable- no bleeding.   4-14-18 Pt seen at bedside on 3East. no changes. pt oob in chair states she stands uo and walks in room every so often.  States she is still SOB  on ambulation.   4-15-18 Pt seen at bedside on 3 east.  States her breathing is the same.  States seh may have an IVC  filter placed tomorrow and then she wants to go home.  Pt informed she may need Rehab, lives with  who has Parkinson's.   discussed her anticoagulation with ufh and the need for oral anticoagulation (coumadin).  Pt concerned about frequent testing.   4-16-18 pt seen at bedside on 3east.  s/p IVC FILTER TODAY.  Pt states she is not sure if she will start on oral anticoagulation now.  I noted Dr Mendoza started pt on coumadin today.   4-17-16  Pt seen at bedside oob in chair.  Discussed her anticoagulation with coumadin and benefits discussed.  Literature provided questions answered will reinforce as needed.   Falls Risk:   High (  )  Mod (  )  Low ( X )  FAMILY HISTORY:  No pertinent family history in first degree relatives    Denies any personal or familial history of clotting or bleeding disorders.    Allergies    No Known Allergies    Intolerances    REVIEW OF SYSTEMS    (  )Fever	     (  )Constipation	( X )SOB			(  )Headache	(  )Dysuria  (  )Chills	     (  )Melena	(  )Dyspnea present on exertion    (  )Dizziness                    (  )Polyuria  (  )Nausea	     (  )Hematochezia	(  )Cough		                    (  )Syncope   	(  )Hematuria  (  )Vomiting    (  )Chest Pain	(  )Wheezing		(  )Weakness  (  )Diarrhea     (  )Palpitations	(  )Anorexia		(  )Myalgia    All other review of systems negative: Yes    PHYSICAL EXAM:    Constitutional: Appears Well    Neurological: A& O x 3    Skin: Warm    Respiratory and Thorax: increased effort; Breath sounds: decreased with b/l rhonchi- non-prod moist cough   	  Cardiovascular: S1, S2, regular, NMBR	    Gastrointestinal: BS + x 4Q, nontender	    Genitourinary:  Bladder nondistended, nontender    Musculoskeletal:   General Right:   no muscle/joint tenderness,   normal tone, no joint swelling,   ROM: full	    General Left:   no muscle/joint tenderness,   normal tone, no joint swelling,   ROM: full    Lower extrems:   Right: no calf tenderness              negative spencer's sign               + pedal pulses    Left:   no calf tenderness              negative spencer's sign               + pedal pulses                         10.5   11.75 )-----------( 297      ( 17 Apr 2018 06:31 )             35.8       04-17    137  |  98  |  29<H>  ----------------------------<  135<H>  4.8   |  32<H>  |  0.70    Ca    9.3      17 Apr 2018 06:31       PTT - ( 17 Apr 2018 06:31 )  PTT:64.6 sec                                  10.4   14.23 )-----------( 319      ( 16 Apr 2018 06:34 )             35.9       04-16    140  |  102  |  40<H>  ----------------------------<  120<H>  5.0   |  33<H>  |  0.73    Ca    8.8      16 Apr 2018 06:34        PT/INR - ( 17 Apr 2018 06:31 )   PT: 11.2 sec;   INR: 1.04 ratio   PT/INR - ( 16 Apr 2018 15:20 )   PT: 11.2 sec;   INR: 1.04 ratio         PTT - ( 16 Apr 2018 15:20 )  PTT:64.2 sec                    9.8    14.55 )-----------( 316      ( 15 Apr 2018 05:45 )             33.3       PTT - ( 15 Apr 2018 05:45 )  PTT:68.9 sec                     10.3   12.16 )-----------( 326      ( 14 Apr 2018 06:34 )             35.4         MEDICATIONS  (STANDING):  ALBUTerol/ipratropium for Nebulization 3 milliLiter(s) Nebulizer every 6 hours  amLODIPine   Tablet 5 milliGRAM(s) Oral daily  docusate sodium 100 milliGRAM(s) Oral two times a day  guaiFENesin    Syrup 200 milliGRAM(s) Oral every 6 hours  heparin  Infusion.  Unit(s)/Hr IV Continuous <Continuous>  hydrochlorothiazide 25 milliGRAM(s) Oral daily  lactobacillus acidophilus 1 Tablet(s) Oral three times a day with meals  losartan 50 milliGRAM(s) Oral daily  methylPREDNISolone sodium succinate Injectable 20 milliGRAM(s) IV Push every 8 hours  montelukast 10 milliGRAM(s) Oral daily  pantoprazole  Injectable 40 milliGRAM(s) IV Push every 12 hours  senna 2 Tablet(s) Oral at bedtime  theophylline SR Tablet 200 milliGRAM(s) Oral daily  warfarin 5 milliGRAM(s) Oral daily      IMAGING:  US DPLX LWR EXT VEINS COMPL BI:  04/07/2018    FINDINGS:  RIGHT:  There is normal compressibility of the common femoral, femoral, and popliteal veins.  Visualized posterior tibial veins are within normal limits. Doppler examination shows normal spontaneous and phasic flow. Right popliteal cyst.    LEFT: There is normal compressibility of the common femoral, femoral, and popliteal veins. Thrombosis of the left soleal vein without extension into the popliteal vein. Doppler examination shows normal spontaneous and phasic flow. Left popliteal fossa cyst.    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 No sonographic evidence of acute deep venous thrombosis in the femoropopliteal systems bilaterally.     EXAM:  CT ANGIO CHEST PE PROTOCOL IC:  04/07/2018    IMPRESSION: Small focal pulmonary embolus in a posterior right upper lobe segmental vessel.    EGD:4-13-18  Normal esophagus and stomach  Bulging with superficial ulceration with small amount of adjacent blood in the second portion of the duodenum -- favor submucosal or extrinsic ulceration mass.  Biopsies deferred due to acute desaturation and anticoagulation    DVT Prophylaxis:  LMWH                   (  )  Heparin SQ           (  )  Coumadin             (x )  Xarelto                  (  )  Eliquis                   (  )  Venodynes           (  )  Ambulation          (X )  UFH                       ( X )  Contraindicated  (  )

## 2018-04-17 NOTE — PROGRESS NOTE ADULT - ASSESSMENT
84 F w/PMH COPD, CPAP at night, HTN, presents on 4/6/18 with progressively dyspnea over past several weeks.  Found w/ anemia, +melena  Currently, pt is dyspneic w/ conversation, she's satting 85% on 3LNC, changed to ventimask, now at 95%.  She is feeling very anxious and requesting xanax- which she takes prn at home 1/2 of 0.25mg dose.          * Acute respiratory failure with hypoxia,  multifactorial:  * Acute COPD exacerbation, slow improvement  * Sleep apnea   Acute status improved  - IV steroids--> taper   - s/p Azithromycin  - on theophyline 200 daily  - nebs, mucolytics  - pulmonary follow up appreciated  - CPAP qhs and incentive spirometry    * Left DVT soleal vein  * PE RUL segmental vessel  - S/P IVC filter yesterday  - on IV low range heparin, started low dose coumadin.  -AC follow up appreciated  - 2 d echo - EF 65%      *  Symptomatic anemia. , stable  Acute blood loss anemia  - no active bleeding  - s/p 2 units of PRBC  - H/H q12h--> q6h while on heparin drip    * Leukocytosis, multifactorial, resolving  * ESBL UTI  * Steroids induced  -S/P on Imipenem     * Melena due to subacute Upper GI bleed  - S/P EGD: duodenal superficial ulcer, suspected submucosal tumor  -Dr Norton consult appreciated. Patient is very reluctant for any surgical interventions.   - c/w PPI BID-->  - monitor H/H and transfuse as needed.  if Hb <8      *  Essential hypertension.    bp stable,   c/w home meds HCTZ, losartan, amlodipine  monitor and titrate meds as indicated.     * Anxiety  - c/w benzo prn

## 2018-04-18 LAB
ANION GAP SERPL CALC-SCNC: 6 MMOL/L — SIGNIFICANT CHANGE UP (ref 5–17)
APTT BLD: 103.2 SEC — HIGH (ref 27.5–37.4)
APTT BLD: 50.9 SEC — HIGH (ref 27.5–37.4)
APTT BLD: 75.6 SEC — HIGH (ref 27.5–37.4)
BUN SERPL-MCNC: 33 MG/DL — HIGH (ref 7–23)
CALCIUM SERPL-MCNC: 9 MG/DL — SIGNIFICANT CHANGE UP (ref 8.5–10.1)
CHLORIDE SERPL-SCNC: 99 MMOL/L — SIGNIFICANT CHANGE UP (ref 96–108)
CO2 SERPL-SCNC: 32 MMOL/L — HIGH (ref 22–31)
CREAT SERPL-MCNC: 0.57 MG/DL — SIGNIFICANT CHANGE UP (ref 0.5–1.3)
GLUCOSE SERPL-MCNC: 129 MG/DL — HIGH (ref 70–99)
HCT VFR BLD CALC: 35.8 % — SIGNIFICANT CHANGE UP (ref 34.5–45)
HGB BLD-MCNC: 10.5 G/DL — LOW (ref 11.5–15.5)
INR BLD: 1.02 RATIO — SIGNIFICANT CHANGE UP (ref 0.88–1.16)
MCHC RBC-ENTMCNC: 24.4 PG — LOW (ref 27–34)
MCHC RBC-ENTMCNC: 29.3 GM/DL — LOW (ref 32–36)
MCV RBC AUTO: 83.3 FL — SIGNIFICANT CHANGE UP (ref 80–100)
NRBC # BLD: 0 /100 WBCS — SIGNIFICANT CHANGE UP (ref 0–0)
PLATELET # BLD AUTO: 280 K/UL — SIGNIFICANT CHANGE UP (ref 150–400)
POTASSIUM SERPL-MCNC: 4.7 MMOL/L — SIGNIFICANT CHANGE UP (ref 3.5–5.3)
POTASSIUM SERPL-SCNC: 4.7 MMOL/L — SIGNIFICANT CHANGE UP (ref 3.5–5.3)
PROTHROM AB SERPL-ACNC: 11 SEC — SIGNIFICANT CHANGE UP (ref 9.8–12.7)
RBC # BLD: 4.3 M/UL — SIGNIFICANT CHANGE UP (ref 3.8–5.2)
RBC # FLD: 23.9 % — HIGH (ref 10.3–14.5)
SODIUM SERPL-SCNC: 137 MMOL/L — SIGNIFICANT CHANGE UP (ref 135–145)
WBC # BLD: 18.07 K/UL — HIGH (ref 3.8–10.5)
WBC # FLD AUTO: 18.07 K/UL — HIGH (ref 3.8–10.5)

## 2018-04-18 PROCEDURE — 99233 SBSQ HOSP IP/OBS HIGH 50: CPT

## 2018-04-18 RX ORDER — WARFARIN SODIUM 2.5 MG/1
7.5 TABLET ORAL DAILY
Qty: 0 | Refills: 0 | Status: DISCONTINUED | OUTPATIENT
Start: 2018-04-18 | End: 2018-04-20

## 2018-04-18 RX ORDER — THEOPHYLLINE ANHYDROUS 200 MG
200 TABLET, EXTENDED RELEASE 12 HR ORAL DAILY
Qty: 0 | Refills: 0 | Status: DISCONTINUED | OUTPATIENT
Start: 2018-04-18 | End: 2018-04-19

## 2018-04-18 RX ADMIN — Medication 3 MILLILITER(S): at 09:04

## 2018-04-18 RX ADMIN — Medication 1 TABLET(S): at 11:27

## 2018-04-18 RX ADMIN — Medication 20 MILLIGRAM(S): at 06:44

## 2018-04-18 RX ADMIN — Medication 1 TABLET(S): at 17:30

## 2018-04-18 RX ADMIN — Medication 25 MILLIGRAM(S): at 06:43

## 2018-04-18 RX ADMIN — LOSARTAN POTASSIUM 50 MILLIGRAM(S): 100 TABLET, FILM COATED ORAL at 06:44

## 2018-04-18 RX ADMIN — Medication 1 TABLET(S): at 08:31

## 2018-04-18 RX ADMIN — Medication 200 MILLIGRAM(S): at 17:30

## 2018-04-18 RX ADMIN — Medication 200 MILLIGRAM(S): at 23:09

## 2018-04-18 RX ADMIN — SENNA PLUS 2 TABLET(S): 8.6 TABLET ORAL at 22:25

## 2018-04-18 RX ADMIN — Medication 3 MILLILITER(S): at 19:50

## 2018-04-18 RX ADMIN — Medication 20 MILLIGRAM(S): at 17:30

## 2018-04-18 RX ADMIN — Medication 200 MILLIGRAM(S): at 12:03

## 2018-04-18 RX ADMIN — Medication 100 MILLIGRAM(S): at 17:30

## 2018-04-18 RX ADMIN — PANTOPRAZOLE SODIUM 40 MILLIGRAM(S): 20 TABLET, DELAYED RELEASE ORAL at 06:44

## 2018-04-18 RX ADMIN — Medication 100 MILLIGRAM(S): at 06:44

## 2018-04-18 RX ADMIN — HEPARIN SODIUM 1050 UNIT(S)/HR: 5000 INJECTION INTRAVENOUS; SUBCUTANEOUS at 08:28

## 2018-04-18 RX ADMIN — HEPARIN SODIUM 0 UNIT(S)/HR: 5000 INJECTION INTRAVENOUS; SUBCUTANEOUS at 16:04

## 2018-04-18 RX ADMIN — Medication 200 MILLIGRAM(S): at 11:26

## 2018-04-18 RX ADMIN — MONTELUKAST 10 MILLIGRAM(S): 4 TABLET, CHEWABLE ORAL at 11:26

## 2018-04-18 RX ADMIN — Medication 3 MILLILITER(S): at 13:26

## 2018-04-18 RX ADMIN — WARFARIN SODIUM 7.5 MILLIGRAM(S): 2.5 TABLET ORAL at 22:25

## 2018-04-18 RX ADMIN — HEPARIN SODIUM 800 UNIT(S)/HR: 5000 INJECTION INTRAVENOUS; SUBCUTANEOUS at 17:26

## 2018-04-18 RX ADMIN — Medication 200 MILLIGRAM(S): at 06:43

## 2018-04-18 RX ADMIN — AMLODIPINE BESYLATE 5 MILLIGRAM(S): 2.5 TABLET ORAL at 06:44

## 2018-04-18 RX ADMIN — Medication 0.12 MILLIGRAM(S): at 23:09

## 2018-04-18 RX ADMIN — PANTOPRAZOLE SODIUM 40 MILLIGRAM(S): 20 TABLET, DELAYED RELEASE ORAL at 17:30

## 2018-04-18 RX ADMIN — Medication 3 MILLILITER(S): at 01:22

## 2018-04-18 NOTE — PROGRESS NOTE ADULT - SUBJECTIVE AND OBJECTIVE BOX
Subjective:  Patient is a 84y old  Female who presents with a chief complaint of sob, anemia      HPI:     84 F w/PMH COPD, CPAP at night, HTN, presents on 4/6/18 with progressively worse dyspnea over past several weeks.  She went to her pulmonologist, CXR was neg, she referred to Cardio.  After work up w/ ST, TTE was not very revealing, she was referred to GI.  She was found on anemic w/ Hg of 8 on cbc, which is not normal for her.  She has noticed some melena 1-2 episodes over the past month.  She denies any hematochezia, hematemesis. She endorses a feeling of fullness after she eats a small amount.  She's also been having pain across her upper abdomen, just below her diaphragm.  She cannot tell me if this pain is related to eating.  She does have a good appetite but just gets full fast.  Gdtr at bedside notes that she doesn't eat as much.  Pt denies any nausea.  Her last colonoscopy was 2 years ago.  She endorsed h/o gastric ulcers.   Pt was found to be dyspneic with acute respiratory failure.  Over her hospital course pt had EGD for anemia which showed duodenal superficial ulcer and possible submucosal tumor for which she does not which for further intervention.  Her hospital course was complicated by DVT and PE s/p IVC filter, currently on coumadin with heparin bridging.  She is being watched closely for GI bleeding given high risk of bleeding. Pt is also on IV steroid taper for COPD exacerbation.    4/18: Pt is status quo, comfortable, HD stable. Tolerating heparin gtt.  Hb stable.     REVIEW OF SYSTEMS: All other review of systems is negative unless indicated above.    Vital Signs Last 24 Hrs  T(C): 36.4 (18 Apr 2018 11:22), Max: 36.7 (18 Apr 2018 05:21)  T(F): 97.5 (18 Apr 2018 11:22), Max: 98.1 (18 Apr 2018 05:21)  HR: 66 (18 Apr 2018 13:53) (57 - 80)  BP: 128/41 (18 Apr 2018 11:22) (113/48 - 130/56)  BP(mean): --  RR: 18 (18 Apr 2018 11:22) (18 - 18)  SpO2: 97% (18 Apr 2018 13:53) (96% - 99%)      PHYSICAL EXAM:      GENERAL: NAD  NERVOUS SYSTEM:  Alert & Oriented X3, non- focal exam,  HEAD:  Atraumatic, Normocephalic  EYES: EOMI, PERRLA, conjunctiva and sclera clear  HEENT: Moist mucous membranes  NECK: Supple, No JVD  CHEST/LUNG: BS decreased bilaterally; No rales, no rhonchi, +  wheezing - improving  HEART: Regular rate and rhythm; No murmurs, rubs, or gallops  ABDOMEN: Soft, Nontender, Nondistended; Bowel sounds present  GENITOURINARY- Voiding, no suprapubic tenderness  EXTREMITIES:  2+ Peripheral Pulses, No clubbing, cyanosis, or edema  MUSCULOSKELETAL:- No muscle tenderness, Muscle tone normal, No joint tenderness, no Joint swelling, Joint range of motion-normal  SKIN-no rash, no lesion      MEDICATIONS  (STANDING):  ALBUTerol/ipratropium for Nebulization 3 milliLiter(s) Nebulizer every 6 hours  amLODIPine   Tablet 5 milliGRAM(s) Oral daily  docusate sodium 100 milliGRAM(s) Oral two times a day  guaiFENesin    Syrup 200 milliGRAM(s) Oral every 6 hours  heparin  Infusion.  Unit(s)/Hr (10 mL/Hr) IV Continuous <Continuous>  hydrochlorothiazide 25 milliGRAM(s) Oral daily  lactobacillus acidophilus 1 Tablet(s) Oral three times a day with meals  losartan 50 milliGRAM(s) Oral daily  methylPREDNISolone sodium succinate Injectable 20 milliGRAM(s) IV Push every 12 hours  montelukast 10 milliGRAM(s) Oral daily  pantoprazole  Injectable 40 milliGRAM(s) IV Push every 12 hours  senna 2 Tablet(s) Oral at bedtime  theophylline SR Capsule 200 milliGRAM(s) Oral daily  warfarin 7.5 milliGRAM(s) Oral daily    MEDICATIONS  (PRN):  ALPRAZolam 0.125 milliGRAM(s) Oral at bedtime PRN anxiety  heparin  Injectable 5000 Unit(s) IV Push every 6 hours PRN For aPTT less than 40  ondansetron Injectable 4 milliGRAM(s) IV Push every 6 hours PRN Nausea                              10.5   18.07 )-----------( 280      ( 18 Apr 2018 05:30 )             35.8     04-18    137  |  99  |  33<H>  ----------------------------<  129<H>  4.7   |  32<H>  |  0.57    Ca    9.0      18 Apr 2018 05:30      CAPILLARY BLOOD GLUCOSE          PT/INR - ( 18 Apr 2018 05:30 )   PT: 11.0 sec;   INR: 1.02 ratio         PTT - ( 18 Apr 2018 05:30 )  PTT:50.9 sec        Assessment and Plan: 84 F w/PMH COPD, CPAP at night, HTN, presents on 4/6/18 with progressively dyspnea over past several weeks, found w/ anemia, GI bleed, DVT/PE.         Acute respiratory failure with hypoxia: multifactorial - symptomatic anemia vs COPD exacerbation/TUTU vs PE: SLOWLY IMPROVING  - monitor on tele  - CT Angio Chest --> Small focal pulmonary embolus in a posterior right upper lobe segmental vessel.  - IV steroid taper per pulm  - s/p Azithromycin  - on theophyline 200 daily  - nebs, mucolytics  - CPAP qhs and incentive spirometry  - coumadin with heparin bridge per anticoagulation services    Left DVT soleal vein  - S/P IVC filter yesterday  - coumadin with heparin bridge per anticoagulation services  - 2 d echo - EF 65%    Acute blood loss anemia secondary to subacute upper GI bleed  - no active bleeding  - s/p 2 units of PRBC  - S/P EGD: duodenal superficial ulcer, suspected submucosal tumor  - Dr Norton consult appreciated. Patient is very reluctant for any surgical interventions.   - c/w PPI  - monitor CBC while on anticoagulation    Leukocytosis: Likely steroid induced  -monitor with tapering steroids    ESBL UTI  -S/P on Imipenem     Essential hypertension: STABLE  -c/w home meds HCTZ, losartan, amlodipine    Anxiety  - c/w benzo prn    Attending Statement:  40 minutes spent on total encounter; more than 50% of the visit was spent counseling and/or coordinating care by the attending physician.

## 2018-04-18 NOTE — PROGRESS NOTE ADULT - ASSESSMENT
PROBLEMS:    ESBL UTI  RUL PE & DVT s/p GFF  Acute respiratory failure with hypoxia  acute excerbation of COPD  Symptomatic anemia- s/p transfusion- duodenal mass  Gastrointestinal hemorrhage with melena  Essential hypertension    PLAN;    discharge planning  iv heparin coumadin  taper IV solumedrols 20mg q12hr  AEROSLS  SUPPORTIVE CARE  DVT PROPHYLASIX

## 2018-04-18 NOTE — PHYSICAL THERAPY INITIAL EVALUATION ADULT - MODALITIES TREATMENT COMMENTS
pt left seated in recliner post Eval; chair alarm donned; O2 3L/min nc, IV, HM in place; callbell in reach; pt instructed not to get up alone; call nursing for assist; madison well; denied pain; isolation maintained; RN Shellie made aware pt OOB

## 2018-04-18 NOTE — PROGRESS NOTE ADULT - SUBJECTIVE AND OBJECTIVE BOX
HPI: This is a 85 y/o female w PMH COPD, CPAP at night, HTN, has been experiencing progressively dyspnea over past several weeks.  She went to her pulmonologist, CXR was neg, she referred to Cardio.  After work up w/ ST, TTE was not very revealing, she was referred to GI.  She was found on anemic w/ Hg of 8 on cbc, which is not normal for her.  She has noticed some melena 1-2 episodes over the past month.  She denies any hematochezia, hematemesis. She endorses a feeling of fullness after she eats a small amount.  She's also been having pain across her upper abdomen, just below her diaphragm.  She cannot tell me if this pain is related to eating.  She does have a good appetite but just gets full fast.  Gdtr at bedside notes that she doesn't eat as much.  Pt denies any nausea.  Her last colonoscopy was 2 years ago.  She endorsed h/o gastric ulcers. Currently, pt is dyspneic w/ conversation, she's satting 85% on 3LNC, changed to ventimask, now at 95%.  She is feeling very anxious and requesting xanax- which she takes prn at home 1/2 of 0.25mg dose. 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 She continues to report of SOB. She denies prior VTE, CVA, MI, no blood clotting disorder. She is a former smoker, has been under stress recently. She reports of being an active caregiver to her  with Parkinson's disease, but now has HHA and decreased her mobility. Informed pt of possible oral anticoagulation with warfarin and she agrees to therapy, but will need to have endoscopy done first. Pending IVC filter placement tomorrow. Discussed with primary RN and no active bleeding noted today. Her H/H today 9.3/32.6 after s/p 2 units pRBC.  4/9: Patient seen at bedside with Dr. Kramer- explained will hold off on IVC filter as DVT is in small vein and no obvious bleeding at this time. Remain on UFH. Patient reports some improvement in breathing- but still with RUDD and O2 nc.  4/10: seen at bedside, some RUDD on exertion O 2 in place  4/11: seen at bedside, for Upper endo tomorrow, coumadin still on hold but discussed with pt foods to avoid/ vit k diet  4/13: Patient seen at bedside- on isolation- still with difficulty breathing- RUDD. Endo on hold until respiratory status improves. Solumedrol/theophylline on board per pulmon. Continuing UFH as IVC filter not necessary due to stable- no bleeding.   4-14-18 Pt seen at bedside on [facility]. no changes. pt oob in chair states she stands uo and walks in room every so often.  States she is still SOB  on ambulation.   4-15-18 Pt seen at bedside on 3 east.  States her breathing is the same.  States seh may have an IVC  filter placed tomorrow and then she wants to go home.  Pt informed she may need Rehab, lives with  who has Parkinson's.   discussed her anticoagulation with ufh and the need for oral anticoagulation (coumadin).  Pt concerned about frequent testing.   4-16-18 pt seen at bedside on 3[facility].  s/p IVC FILTER TODAY.  Pt states she is not sure if she will start on oral anticoagulation now.  I noted Dr Mendoza started pt on coumadin today.   4-17-16  Pt seen at bedside oob in chair.  Discussed her anticoagulation with coumadin and benefits discussed.  Literature provided questions answered will reinforce as needed.   Patient is a 84y old  Female who presents with a chief complaint of sob, anemia (06 Apr 2018 17:10)  4/18/18: Pt seen at bedside with her son, contact isolation in place urine ESBL. Reviewed INR result of 1.02 with patient. Subtherapeutic at day 3 of heparin gtt bridging. She reports of no active bleeding, and stable H/H 10.5/35.8. Informed her of increased dose tonight. Re-educated pt on diet modification of Vit K greens.     Consulted by Dr. Sinha for VTE prophylaxis, risk stratification, and anticoagulation management.    PAST MEDICAL & SURGICAL HISTORY:  COPD (chronic obstructive pulmonary disease)  Hypertension  Peptic Ulcer  Hyperlipemia  S/P Cataract Extraction: right eye 5/27/10    BMI: 30.6    CrCL: 69.88    IMPROVE VTE Risk Score: 5    IMPROVE Bleeding Risk Score: 5.5    Falls Risk:   High (  )  Mod (  )  Low ( X )    FAMILY HISTORY:  No pertinent family history in first degree relatives    Denies any personal or familial history of clotting or bleeding disorders.    Allergies    No Known Allergies    Intolerances    REVIEW OF SYSTEMS    (  )Fever	     (  )Constipation	( X )SOB			(  )Headache	(  )Dysuria  (  )Chills	     (  )Melena	(  )Dyspnea present on exertion    (  )Dizziness                    (  )Polyuria  (  )Nausea	     (  )Hematochezia	(  )Cough		                    (  )Syncope   	(  )Hematuria  (  )Vomiting    (  )Chest Pain	(  )Wheezing		(  )Weakness  (  )Diarrhea     (  )Palpitations	(  )Anorexia		(  )Myalgia    All other review of systems negative: Yes    PHYSICAL EXAM:    Constitutional: Appears Well    Neurological: A& O x 3    Skin: Warm    Respiratory and Thorax: increased effort; Breath sounds: decreased with b/l rhonchi- non-prod moist cough   	  Cardiovascular: S1, S2, regular, NMBR	    Gastrointestinal: BS + x 4Q, nontender	    Genitourinary:  Bladder nondistended, nontender    Musculoskeletal:   General Right:   no muscle/joint tenderness,   normal tone, no joint swelling,   ROM: full	    General Left:   no muscle/joint tenderness,   normal tone, no joint swelling,   ROM: full    Lower extrems:   Right: no calf tenderness              negative spencer's sign               + pedal pulses    Left:   no calf tenderness              negative spencer's sign               + pedal pulses                          10.5   18.07 )-----------( 280      ( 18 Apr 2018 05:30 )             35.8       04-18    137  |  99  |  33<H>  ----------------------------<  129<H>  4.7   |  32<H>  |  0.57    Ca    9.0      18 Apr 2018 05:30      PT/INR - ( 18 Apr 2018 05:30 )   PT: 11.0 sec;   INR: 1.02 ratio  PT/INR - ( 17 Apr 2018 06:31 )   PT: 11.2 sec;   INR: 1.04 ratio   PT/INR - ( 16 Apr 2018 15:20 )   PT: 11.2 sec;   INR: 1.04 ratio      PTT - ( 18 Apr 2018 05:30 )  PTT:50.9 sec    PTT - ( 17 Apr 2018 06:31 )  PTT:64.6 sec                        10.5   11.75 )-----------( 297      ( 17 Apr 2018 06:31 )             35.8       04-17    137  |  98  |  29<H>  ----------------------------<  135<H>  4.8   |  32<H>  |  0.70    Ca    9.3      17 Apr 2018 06:31    Vital Signs Last 24 Hrs  T(C): 36.4 (04-18-18 @ 11:22), Max: 36.7 (04-18-18 @ 05:21)  T(F): 97.5 (04-18-18 @ 11:22), Max: 98.1 (04-18-18 @ 05:21)  HR: 80 (04-18-18 @ 11:22) (57 - 80)  BP: 128/41 (04-18-18 @ 11:22) (113/48 - 130/56)  BP(mean): --  RR: 18 (04-18-18 @ 11:22) (18 - 18)  SpO2: 97% (04-18-18 @ 11:22) (96% - 100%)    MEDICATIONS  (STANDING):  ALBUTerol/ipratropium for Nebulization 3 milliLiter(s) Nebulizer every 6 hours  amLODIPine   Tablet 5 milliGRAM(s) Oral daily  docusate sodium 100 milliGRAM(s) Oral two times a day  guaiFENesin    Syrup 200 milliGRAM(s) Oral every 6 hours  heparin  Infusion.  Unit(s)/Hr (10 mL/Hr) IV Continuous <Continuous>  hydrochlorothiazide 25 milliGRAM(s) Oral daily  lactobacillus acidophilus 1 Tablet(s) Oral three times a day with meals  losartan 50 milliGRAM(s) Oral daily  methylPREDNISolone sodium succinate Injectable 20 milliGRAM(s) IV Push every 12 hours  montelukast 10 milliGRAM(s) Oral daily  pantoprazole  Injectable 40 milliGRAM(s) IV Push every 12 hours  senna 2 Tablet(s) Oral at bedtime  theophylline SR Capsule 200 milliGRAM(s) Oral daily  warfarin 7.5 milliGRAM(s) Oral daily    IMAGING:  US DPLX LWR EXT VEINS COMPL BI:  04/07/2018    FINDINGS:  RIGHT:  There is normal compressibility of the common femoral, femoral, and popliteal veins.  Visualized posterior tibial veins are within normal limits. Doppler examination shows normal spontaneous and phasic flow. Right popliteal cyst.    LEFT: There is normal compressibility of the common femoral, femoral, and popliteal veins. Thrombosis of the left soleal vein without extension into the popliteal vein. Doppler examination shows normal spontaneous and phasic flow. Left popliteal fossa cyst.    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 No sonographic evidence of acute deep venous thrombosis in the femoropopliteal systems bilaterally.     EXAM:  CT ANGIO CHEST PE PROTOCOL IC:  04/07/2018    IMPRESSION: Small focal pulmonary embolus in a posterior right upper lobe segmental vessel.    EGD:4-13-18  Normal esophagus and stomach  Bulging with superficial ulceration with small amount of adjacent blood in the second portion of the duodenum -- favor submucosal or extrinsic ulceration mass.  Biopsies deferred due to acute desaturation and anticoagulation    DVT Prophylaxis:  LMWH                   (  )  Heparin SQ           (  )  Coumadin             (x )  Xarelto                  (  )  Eliquis                   (  )  Venodynes           (  )  Ambulation          (X )  UFH                       ( X )  Contraindicated  (  )

## 2018-04-18 NOTE — PROGRESS NOTE ADULT - SUBJECTIVE AND OBJECTIVE BOX
Cardiology Progress Note    HPI: 84 F w/PMH COPD, CPAP at night, HTN, has been experiencing progressively dyspnea over past several weeks.  She went to her pulmonologist, CXR was neg, she referred to Cardio.  After work up w/ ST, TTE was not very revealing, she was referred to GI.  She was found on anemic w/ Hg of 8 on cbc, which is not normal for her.  She has noticed some melena 1-2 episodes over the past month.  She denies any hematochezia, hematemesis. She endorses a feeling of fullness after she eats a small amount.  She's also been having pain across her upper abdomen, just below her diaphragm.  She cannot tell me if this pain is related to eating.  She does have a good appetite but just gets full fast.  Gdtr at bedside notes that she doesn't eat as much.  Pt denies any nausea.  Her last colonoscopy was 2 years ago.  She endorsed h/o gastric ulcers. Pt is dyspneic w/ conversation, she's satting 85% on 3LNC, changed to ventimask, now at 95%.  She is feeling very anxious and requesting xanax- which she takes prn at home 1/2 of 0.25mg dose. (2018 17:10)    - No CP. +SOB. SR on tele. Feels tired. No events last pm.     - Feels slightly better today. +SOB, no CP. Awaiting IVC filter today.     4/10- Discussed case with interventional cardiology- holding off on IVC filter for now. No active GI bleed now. +SOB- mildly improved. No CP.     - No CP, SOB mildly improved. No fevers. No events last pm.    - Still awaiting EGD. Now with LE edema. No CP, SOB improved.     - Chart reviewed. S/p EGD- c/w GIST tumor. No active bleeding. No CP, SOB improved.     - SOB stable, no CP. Pt now s/p IVC filter yesterday.     - Mild cough, no CP. No events last pm. Feels weak.     PAST MEDICAL & SURGICAL HISTORY:  COPD (chronic obstructive pulmonary disease)  Hypertension  Peptic Ulcer  Hyperlipemia  S/P Cataract Extraction: right eye 5/27/10    Allergies  No Known Allergies    SOCIAL HISTORY: Denies tobacco, etoh abuse or illicit drug use    FAMILY HISTORY: No pertinent family history in first degree relatives    MEDICATIONS  (STANDING):  ALBUTerol/ipratropium for Nebulization 3 milliLiter(s) Nebulizer every 6 hours  amLODIPine   Tablet 5 milliGRAM(s) Oral daily  azithromycin  IVPB 500 milliGRAM(s) IV Intermittent every 24 hours  azithromycin  IVPB      guaiFENesin    Syrup 200 milliGRAM(s) Oral every 6 hours  heparin  Infusion.  Unit(s)/Hr (10 mL/Hr) IV Continuous <Continuous>  hydrochlorothiazide 25 milliGRAM(s) Oral daily  losartan 50 milliGRAM(s) Oral daily  methylPREDNISolone sodium succinate Injectable 40 milliGRAM(s) IV Push every 12 hours  montelukast 10 milliGRAM(s) Oral daily  pantoprazole Infusion 8 mG/Hr (10 mL/Hr) IV Continuous <Continuous>    MEDICATIONS  (PRN):  ALPRAZolam 0.125 milliGRAM(s) Oral daily PRN anxiety  heparin  Injectable 5000 Unit(s) IV Push every 6 hours PRN For aPTT less than 40  ondansetron Injectable 4 milliGRAM(s) IV Push every 6 hours PRN Nausea      Vital Signs Last 24 Hrs  T(C): 36.4 (2018 05:42), Max: 37.2 (2018 11:55)  T(F): 97.5 (2018 05:42), Max: 99 (2018 11:55)  HR: 53 (2018 05:42) (50 - 88)  BP: 110/55 (2018 05:42) (110/55 - 131/65)  BP(mean): --  RR: 18 (2018 05:42) (18 - 22)  SpO2: 94% (2018 05:42) (85% - 95%)    REVIEW OF SYSTEMS:    CONSTITUTIONAL:  As per HPI.  HEENT:  Eyes:  No diplopia or blurred vision. ENT:  No earache, sore throat or runny nose.  CARDIOVASCULAR:  No pressure, squeezing, strangling, tightness, heaviness or aching about the chest, neck, axilla or epigastrium.  RESPIRATORY:  No cough, shortness of breath, PND or orthopnea.  GASTROINTESTINAL:  No nausea, vomiting or diarrhea.  GENITOURINARY:  No dysuria, frequency or urgency.  MUSCULOSKELETAL:  As per HPI.  SKIN:  No change in skin, hair or nails.  NEUROLOGIC:  No paresthesias, fasciculations, seizures or weakness.    PHYSICAL EXAMINATION:    GENERAL APPEARANCE:  Pt. is not currently dyspneic, in no distress. Pt. is alert, oriented, and pleasant.  HEENT:  Pupils are normal and react normally. No icterus. Mucous membranes well colored.  NECK:  Supple. No lymphadenopathy. Jugular venous pressure not elevated. Carotids equal.   HEART:   The cardiac impulse has a normal quality. There are no murmurs, rubs or gallops noted  CHEST:  Chest is clear to auscultation. Normal respiratory effort.  ABDOMEN:  Soft and nontender.   EXTREMITIES:  1-2+ LE edema.    I&O's Summary      LABS:                        9.3    18.67 )-----------( 351      ( 2018 06:03 )             32.6     04-08    141  |  104  |  25<H>  ----------------------------<  140<H>  4.3   |  31  |  0.79    Ca    9.0      2018 06:03    TPro  6.8  /  Alb  2.9<L>  /  TBili  0.4  /  DBili  x   /  AST  14<L>  /  ALT  20  /  AlkPhos  77  04-06    LIVER FUNCTIONS - ( 2018 12:29 )  Alb: 2.9 g/dL / Pro: 6.8 gm/dL / ALK PHOS: 77 U/L / ALT: 20 U/L / AST: 14 U/L / GGT: x           PT/INR - ( 2018 12:29 )   PT: 11.8 sec;   INR: 1.09 ratio         PTT - ( 2018 02:41 )  PTT:43.3 sec  CARDIAC MARKERS ( 2018 16:07 )  <0.015 ng/mL / x     / x     / x     / x      CARDIAC MARKERS ( 2018 12:29 )  <0.015 ng/mL / x     / x     / x     / x        Urinalysis Basic - ( 2018 16:57 )    Color: Yellow / Appearance: Clear / S.015 / pH: x  Gluc: x / Ketone: Negative  / Bili: Negative / Urobili: Negative mg/dL   Blood: x / Protein: 15 mg/dL / Nitrite: Positive   Leuk Esterase: Small / RBC: 0-2 /HPF / WBC 5-8 /HPF   Sq Epi: x / Non Sq Epi: Occasional / Bacteria: Many    EKG: < from: 12 Lead ECG (18 @ 09:44) >  Sinus rhythm with Premature supraventricular complexes and with occasional Premature ventricular complexes  Possible Left atrial enlargement  Nonspecific ST abnormality  Abnormal ECG    TELEMETRY: SR    CARDIAC TESTS: < from: Transthoracic Echocardiogram (18 @ 10:22) >   Fibrocalcific changes noted to the mitral valve leaflets with preserved   leaflet excursion.   EA reversal of the mitral inflow consistent with reduced compliance of   the   left ventricle.   Fibrocalcific changes noted to the Aortic valve leaflets with preserved   leaflet excursion.   Mild aortic annular calcification is noted.   Mild (1+) tricuspid valve regurgitation is present.   Trace pulmonic valvular regurgitation is present.   Normal appearing left atrium.   The left ventricle is normal in size, wall thickness, wall motion and   contractility.   Estimated left ventricular ejection fraction is 65-70 %.   The right atrium appears mildly dilated.   Normal appearing right ventricle structure and function.   The IVC is dilated but collapsing with inspiration.   No evidence of pericardial effusion.   No evidence of pleural effusion.    < end of copied text >      RADIOLOGY & ADDITIONAL STUDIES: < from: US Duplex Venous Lower Ext Complete, Bilateral (18 @ 17:25) >    Left soleal vein thrombosis without extension into the popliteal vein.    No sonographic evidence of acute deep venous thrombosis in the   femoropopliteal systems bilaterally.     < from: CT Angio Chest PE Protocol w/ IV Cont (18 @ 16:53) >    Small focal pulmonary embolus in a posterior right upper lobe segmental   vessel.        ASSESSMENT & PLAN:

## 2018-04-18 NOTE — PROGRESS NOTE ADULT - SUBJECTIVE AND OBJECTIVE BOX
Subjective:    pat better, less dysneic, on iv heparin & coumadin.    Home Medications:  amLODIPine 5 mg oral tablet: 1 tab(s) orally once a day (06 Apr 2018 17:25)  Anoro Ellipta 62.5 mcg-25 mcg/inh inhalation powder: 1 puff(s) inhaled once a day (06 Apr 2018 17:25)  calcium (as carbonate) 600 mg oral tablet: 2 tab(s) orally once a day (06 Apr 2018 17:25)  hydroCHLOROthiazide 25 mg oral tablet: 1 tab(s) orally once a day (06 Apr 2018 17:25)  losartan 50 mg oral tablet: 1 tab(s) orally once a day (06 Apr 2018 17:25)  montelukast 10 mg oral tablet: 1 tab(s) orally once a day (06 Apr 2018 17:25)  predniSONE 20 mg oral tablet: 1 tab(s) orally once a day    ***COURSE COMPLETED*** (06 Apr 2018 17:25)  ProAir HFA 90 mcg/inh inhalation aerosol: 2 puff(s) inhaled 4 times a day, As Needed (06 Apr 2018 17:25)  Vitamin D3 2000 intl units oral tablet: 1 tab(s) orally once a day (06 Apr 2018 17:25)  Xanax 0.25 mg oral tablet: 0.5 tab(s) orally once a day, As Needed for anxiety  (06 Apr 2018 17:25)    MEDICATIONS  (STANDING):  ALBUTerol/ipratropium for Nebulization 3 milliLiter(s) Nebulizer every 6 hours  amLODIPine   Tablet 5 milliGRAM(s) Oral daily  docusate sodium 100 milliGRAM(s) Oral two times a day  guaiFENesin    Syrup 200 milliGRAM(s) Oral every 6 hours  heparin  Infusion.  Unit(s)/Hr (10 mL/Hr) IV Continuous <Continuous>  hydrochlorothiazide 25 milliGRAM(s) Oral daily  lactobacillus acidophilus 1 Tablet(s) Oral three times a day with meals  losartan 50 milliGRAM(s) Oral daily  methylPREDNISolone sodium succinate Injectable 20 milliGRAM(s) IV Push every 8 hours  montelukast 10 milliGRAM(s) Oral daily  pantoprazole  Injectable 40 milliGRAM(s) IV Push every 12 hours  senna 2 Tablet(s) Oral at bedtime  theophylline SR Tablet 200 milliGRAM(s) Oral daily  warfarin 5 milliGRAM(s) Oral daily    MEDICATIONS  (PRN):  ALPRAZolam 0.125 milliGRAM(s) Oral at bedtime PRN anxiety  heparin  Injectable 5000 Unit(s) IV Push every 6 hours PRN For aPTT less than 40  ondansetron Injectable 4 milliGRAM(s) IV Push every 6 hours PRN Nausea      Allergies    No Known Allergies    Intolerances        Vital Signs Last 24 Hrs  T(C): 36.7 (18 Apr 2018 05:21), Max: 36.7 (18 Apr 2018 05:21)  T(F): 98.1 (18 Apr 2018 05:21), Max: 98.1 (18 Apr 2018 05:21)  HR: 66 (18 Apr 2018 09:36) (57 - 72)  BP: 130/56 (18 Apr 2018 05:21) (113/48 - 130/56)  BP(mean): --  RR: 18 (17 Apr 2018 11:49) (18 - 18)  SpO2: 96% (18 Apr 2018 09:36) (96% - 100%)      PHYSICAL EXAMINATION:    NECK:  Supple. No lymphadenopathy. Jugular venous pressure not elevated. Carotids equal.   HEART:   The cardiac impulse has a normal quality. Reg., Nl S1 and S2.  There are no murmurs, rubs or gallops noted  CHEST:  Chest crackles to auscultation. Normal respiratory effort.  ABDOMEN:  Soft and nontender.   EXTREMITIES:  There is no edema.       LABS:                        10.5   18.07 )-----------( 280      ( 18 Apr 2018 05:30 )             35.8     04-18    137  |  99  |  33<H>  ----------------------------<  129<H>  4.7   |  32<H>  |  0.57    Ca    9.0      18 Apr 2018 05:30      PT/INR - ( 18 Apr 2018 05:30 )   PT: 11.0 sec;   INR: 1.02 ratio         PTT - ( 18 Apr 2018 05:30 )  PTT:50.9 sec

## 2018-04-18 NOTE — PROGRESS NOTE ADULT - ASSESSMENT
84 F w/PMH COPD, CPAP at night, HTN, presents on 4/6/18 with progressively dyspnea over past several weeks.  Found w/ anemia, +melena. Pt also with small PE, L soleal vein thrombosis.     1. GI bleed- GI following. Transfuse to keep Hgb > 8. EGD c/w GIST tumor? Trend H/H- stable today in 9-10 range. Continue PPI. Mgmt as per GI. No active bleeding now. Pt stable for EGD from cardiac standpoint.    2. PE- small RUL PE- Also with L soleal vein thrombosis. Pt tolerating IV heparin for many days. Now on coumadin as well- aim for INR 2-2.5. IVC placed as pt has GI bleed, high likelihood of rebleeding and has PE/DVT. Heme following. 2Decho- normal LV and RV fxn. Hypercoagulable w/u as per Heme.      3. Essential hypertension- stable, continue medical mgmt.     4. DVT proph, replete lytes as needed.     5. COPD- cont steroid taper, nebs, inhalers. Pulm following.    6. LE edema- worsened in setting of steroid use. Would start lasix 20mg daily.      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 Will need further testing, w/u at a tertiary care center. Pt to f/u with Dr. Vizcarra or Dr. Glanzmann.

## 2018-04-18 NOTE — PROGRESS NOTE ADULT - ASSESSMENT
A 83 y/o female w PMH COPD, HTN/HLD, peptic ulcer presents for progressively worsening dyspnea. Pt found to have acute left soleal vein DVT and RUL focal PE so started on heparin gtt and now bridging to oral AC with warfarin. She is s/p IVC filter placement on 4/16/18 and s/p EGD on 4/13/18. Improving H/H.     PLAN:   ::Continue UFH, titrate per aPTT   :increase coumadin to 7.5mg po daily over lap x 5 days with UFH (Day 3/5)  ::CBC/BMP, PT/INR  ::Monitor for s/s bleeding  ::Encourage ambulation  ::Venodyne contraindicated in left lower extremity    Will continue to follow.

## 2018-04-19 LAB
ANION GAP SERPL CALC-SCNC: 6 MMOL/L — SIGNIFICANT CHANGE UP (ref 5–17)
APTT BLD: 154.8 SEC — CRITICAL HIGH (ref 27.5–37.4)
APTT BLD: 32.1 SEC — SIGNIFICANT CHANGE UP (ref 27.5–37.4)
APTT BLD: 68.9 SEC — HIGH (ref 27.5–37.4)
BUN SERPL-MCNC: 31 MG/DL — HIGH (ref 7–23)
CALCIUM SERPL-MCNC: 8.9 MG/DL — SIGNIFICANT CHANGE UP (ref 8.5–10.1)
CHLORIDE SERPL-SCNC: 100 MMOL/L — SIGNIFICANT CHANGE UP (ref 96–108)
CO2 SERPL-SCNC: 34 MMOL/L — HIGH (ref 22–31)
CREAT SERPL-MCNC: 0.74 MG/DL — SIGNIFICANT CHANGE UP (ref 0.5–1.3)
GLUCOSE SERPL-MCNC: 86 MG/DL — SIGNIFICANT CHANGE UP (ref 70–99)
HCT VFR BLD CALC: 35.3 % — SIGNIFICANT CHANGE UP (ref 34.5–45)
HGB BLD-MCNC: 10 G/DL — LOW (ref 11.5–15.5)
INR BLD: 1.29 RATIO — HIGH (ref 0.88–1.16)
MCHC RBC-ENTMCNC: 24 PG — LOW (ref 27–34)
MCHC RBC-ENTMCNC: 28.3 GM/DL — LOW (ref 32–36)
MCV RBC AUTO: 84.9 FL — SIGNIFICANT CHANGE UP (ref 80–100)
NRBC # BLD: 0 /100 WBCS — SIGNIFICANT CHANGE UP (ref 0–0)
PLATELET # BLD AUTO: 255 K/UL — SIGNIFICANT CHANGE UP (ref 150–400)
POTASSIUM SERPL-MCNC: 4.6 MMOL/L — SIGNIFICANT CHANGE UP (ref 3.5–5.3)
POTASSIUM SERPL-SCNC: 4.6 MMOL/L — SIGNIFICANT CHANGE UP (ref 3.5–5.3)
PROTHROM AB SERPL-ACNC: 14 SEC — HIGH (ref 9.8–12.7)
RBC # BLD: 4.16 M/UL — SIGNIFICANT CHANGE UP (ref 3.8–5.2)
RBC # FLD: 24.3 % — HIGH (ref 10.3–14.5)
SODIUM SERPL-SCNC: 140 MMOL/L — SIGNIFICANT CHANGE UP (ref 135–145)
WBC # BLD: 16.18 K/UL — HIGH (ref 3.8–10.5)
WBC # FLD AUTO: 16.18 K/UL — HIGH (ref 3.8–10.5)

## 2018-04-19 PROCEDURE — 99233 SBSQ HOSP IP/OBS HIGH 50: CPT

## 2018-04-19 RX ORDER — LOSARTAN POTASSIUM 100 MG/1
100 TABLET, FILM COATED ORAL DAILY
Qty: 0 | Refills: 0 | Status: DISCONTINUED | OUTPATIENT
Start: 2018-04-20 | End: 2018-04-23

## 2018-04-19 RX ADMIN — HEPARIN SODIUM 700 UNIT(S)/HR: 5000 INJECTION INTRAVENOUS; SUBCUTANEOUS at 16:37

## 2018-04-19 RX ADMIN — Medication 1 TABLET(S): at 18:08

## 2018-04-19 RX ADMIN — Medication 1 TABLET(S): at 08:44

## 2018-04-19 RX ADMIN — AMLODIPINE BESYLATE 5 MILLIGRAM(S): 2.5 TABLET ORAL at 07:00

## 2018-04-19 RX ADMIN — HEPARIN SODIUM 5000 UNIT(S): 5000 INJECTION INTRAVENOUS; SUBCUTANEOUS at 09:11

## 2018-04-19 RX ADMIN — Medication 1 TABLET(S): at 12:46

## 2018-04-19 RX ADMIN — Medication 100 MILLIGRAM(S): at 07:00

## 2018-04-19 RX ADMIN — HEPARIN SODIUM 700 UNIT(S)/HR: 5000 INJECTION INTRAVENOUS; SUBCUTANEOUS at 23:15

## 2018-04-19 RX ADMIN — Medication 3 MILLILITER(S): at 13:40

## 2018-04-19 RX ADMIN — Medication 25 MILLIGRAM(S): at 07:00

## 2018-04-19 RX ADMIN — Medication 3 MILLILITER(S): at 02:04

## 2018-04-19 RX ADMIN — Medication 20 MILLIGRAM(S): at 07:00

## 2018-04-19 RX ADMIN — Medication 200 MILLIGRAM(S): at 12:45

## 2018-04-19 RX ADMIN — Medication 3 MILLILITER(S): at 07:31

## 2018-04-19 RX ADMIN — MONTELUKAST 10 MILLIGRAM(S): 4 TABLET, CHEWABLE ORAL at 21:30

## 2018-04-19 RX ADMIN — Medication 200 MILLIGRAM(S): at 18:48

## 2018-04-19 RX ADMIN — Medication 20 MILLIGRAM(S): at 18:07

## 2018-04-19 RX ADMIN — Medication 100 MILLIGRAM(S): at 18:08

## 2018-04-19 RX ADMIN — LOSARTAN POTASSIUM 50 MILLIGRAM(S): 100 TABLET, FILM COATED ORAL at 07:00

## 2018-04-19 RX ADMIN — WARFARIN SODIUM 7.5 MILLIGRAM(S): 2.5 TABLET ORAL at 21:30

## 2018-04-19 RX ADMIN — PANTOPRAZOLE SODIUM 40 MILLIGRAM(S): 20 TABLET, DELAYED RELEASE ORAL at 07:00

## 2018-04-19 RX ADMIN — HEPARIN SODIUM 700 UNIT(S)/HR: 5000 INJECTION INTRAVENOUS; SUBCUTANEOUS at 00:27

## 2018-04-19 RX ADMIN — HEPARIN SODIUM 0 UNIT(S)/HR: 5000 INJECTION INTRAVENOUS; SUBCUTANEOUS at 15:35

## 2018-04-19 RX ADMIN — PANTOPRAZOLE SODIUM 40 MILLIGRAM(S): 20 TABLET, DELAYED RELEASE ORAL at 18:08

## 2018-04-19 RX ADMIN — HEPARIN SODIUM 950 UNIT(S)/HR: 5000 INJECTION INTRAVENOUS; SUBCUTANEOUS at 08:40

## 2018-04-19 RX ADMIN — Medication 200 MILLIGRAM(S): at 23:16

## 2018-04-19 RX ADMIN — Medication 0.12 MILLIGRAM(S): at 23:16

## 2018-04-19 NOTE — PROGRESS NOTE ADULT - ASSESSMENT
PROBLEMS:    ESBL UTI  RUL PE & DVT s/p GFF  Acute respiratory failure with hypoxia  acute excerbation of COPD  Symptomatic anemia- s/p transfusion- duodenal mass  Gastrointestinal hemorrhage with melena  Essential hypertension    PLAN;    decd theophylline  iv heparin coumadin  taper IV solumedrols 20mg q12hr  AEROSLS  SUPPORTIVE CARE  DVT PROPHYLASIX

## 2018-04-19 NOTE — PROGRESS NOTE ADULT - SUBJECTIVE AND OBJECTIVE BOX
Subjective:    pat better, anxious about taking theophylline, on tapering steroids.    Home Medications:  amLODIPine 5 mg oral tablet: 1 tab(s) orally once a day (06 Apr 2018 17:25)  Anoro Ellipta 62.5 mcg-25 mcg/inh inhalation powder: 1 puff(s) inhaled once a day (06 Apr 2018 17:25)  calcium (as carbonate) 600 mg oral tablet: 2 tab(s) orally once a day (06 Apr 2018 17:25)  hydroCHLOROthiazide 25 mg oral tablet: 1 tab(s) orally once a day (06 Apr 2018 17:25)  losartan 50 mg oral tablet: 1 tab(s) orally once a day (06 Apr 2018 17:25)  montelukast 10 mg oral tablet: 1 tab(s) orally once a day (06 Apr 2018 17:25)  predniSONE 20 mg oral tablet: 1 tab(s) orally once a day    ***COURSE COMPLETED*** (06 Apr 2018 17:25)  ProAir HFA 90 mcg/inh inhalation aerosol: 2 puff(s) inhaled 4 times a day, As Needed (06 Apr 2018 17:25)  Vitamin D3 2000 intl units oral tablet: 1 tab(s) orally once a day (06 Apr 2018 17:25)  Xanax 0.25 mg oral tablet: 0.5 tab(s) orally once a day, As Needed for anxiety  (06 Apr 2018 17:25)    MEDICATIONS  (STANDING):  ALBUTerol/ipratropium for Nebulization 3 milliLiter(s) Nebulizer every 6 hours  amLODIPine   Tablet 5 milliGRAM(s) Oral daily  docusate sodium 100 milliGRAM(s) Oral two times a day  guaiFENesin    Syrup 200 milliGRAM(s) Oral every 6 hours  heparin  Infusion.  Unit(s)/Hr (10 mL/Hr) IV Continuous <Continuous>  hydrochlorothiazide 25 milliGRAM(s) Oral daily  lactobacillus acidophilus 1 Tablet(s) Oral three times a day with meals  losartan 50 milliGRAM(s) Oral daily  methylPREDNISolone sodium succinate Injectable 20 milliGRAM(s) IV Push every 12 hours  montelukast 10 milliGRAM(s) Oral daily  pantoprazole  Injectable 40 milliGRAM(s) IV Push every 12 hours  senna 2 Tablet(s) Oral at bedtime  theophylline SR Capsule 200 milliGRAM(s) Oral daily  warfarin 7.5 milliGRAM(s) Oral daily    MEDICATIONS  (PRN):  ALPRAZolam 0.125 milliGRAM(s) Oral at bedtime PRN anxiety  heparin  Injectable 5000 Unit(s) IV Push every 6 hours PRN For aPTT less than 40  ondansetron Injectable 4 milliGRAM(s) IV Push every 6 hours PRN Nausea      Allergies    No Known Allergies    Intolerances        Vital Signs Last 24 Hrs  T(C): 36.6 (19 Apr 2018 05:10), Max: 36.8 (18 Apr 2018 17:10)  T(F): 97.9 (19 Apr 2018 05:10), Max: 98.2 (18 Apr 2018 17:10)  HR: 63 (19 Apr 2018 07:31) (57 - 80)  BP: 128/50 (19 Apr 2018 05:10) (126/62 - 128/50)  BP(mean): --  RR: 18 (18 Apr 2018 11:22) (18 - 18)  SpO2: 99% (19 Apr 2018 05:10) (97% - 99%)      PHYSICAL EXAMINATION:    NECK:  Supple. No lymphadenopathy. Jugular venous pressure not elevated. Carotids equal.   HEART:   The cardiac impulse has a normal quality. Reg., Nl S1 and S2.  There are no murmurs, rubs or gallops noted  CHEST:  Chest crackles to auscultation. Normal respiratory effort.  ABDOMEN:  Soft and nontender.   EXTREMITIES:  There is no edema.       LABS:                        10.0   16.18 )-----------( 255      ( 19 Apr 2018 05:54 )             35.3     04-19    140  |  100  |  31<H>  ----------------------------<  86  4.6   |  34<H>  |  0.74    Ca    8.9      19 Apr 2018 05:54      PT/INR - ( 19 Apr 2018 05:54 )   PT: 14.0 sec;   INR: 1.29 ratio         PTT - ( 19 Apr 2018 05:54 )  PTT:32.1 sec

## 2018-04-19 NOTE — PROGRESS NOTE ADULT - SUBJECTIVE AND OBJECTIVE BOX
HPI: This is a 83 y/o female w PMH COPD, CPAP at night, HTN, has been experiencing progressively dyspnea over past several weeks.  She went to her pulmonologist, CXR was neg, she referred to Cardio.  After work up w/ ST, TTE was not very revealing, she was referred to GI.  She was found on anemic w/ Hg of 8 on cbc, which is not normal for her.  She has noticed some melena 1-2 episodes over the past month.  She denies any hematochezia, hematemesis. She endorses a feeling of fullness after she eats a small amount.  She's also been having pain across her upper abdomen, just below her diaphragm.  She cannot tell me if this pain is related to eating.  She does have a good appetite but just gets full fast.  Gdtr at bedside notes that she doesn't eat as much.  Pt denies any nausea.  Her last colonoscopy was 2 years ago.  She endorsed h/o gastric ulcers. Currently, pt is dyspneic w/ conversation, she's satting 85% on 3LNC, changed to ventimask, now at 95%.  She is feeling very anxious and requesting xanax- which she takes prn at home 1/2 of 0.25mg dose. 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 She continues to report of SOB. She denies prior VTE, CVA, MI, no blood clotting disorder. She is a former smoker, has been under stress recently. She reports of being an active caregiver to her  with Parkinson's disease, but now has HHA and decreased her mobility. Informed pt of possible oral anticoagulation with warfarin and she agrees to therapy, but will need to have endoscopy done first. Pending IVC filter placement tomorrow. Discussed with primary RN and no active bleeding noted today. Her H/H today 9.3/32.6 after s/p 2 units pRBC.  4/9: Patient seen at bedside with Dr. Kramer- explained will hold off on IVC filter as DVT is in small vein and no obvious bleeding at this time. Remain on UFH. Patient reports some improvement in breathing- but still with RUDD and O2 nc.  4/10: seen at bedside, some RUDD on exertion O 2 in place  4/11: seen at bedside, for Upper endo tomorrow, coumadin still on hold but discussed with pt foods to avoid/ vit k diet  4/13: Patient seen at bedside- on isolation- still with difficulty breathing- RUDD. Endo on hold until respiratory status improves. Solumedrol/theophylline on board per pulmon. Continuing UFH as IVC filter not necessary due to stable- no bleeding.   4-14-18 Pt seen at bedside on [facility]. no changes. pt oob in chair states she stands uo and walks in room every so often.  States she is still SOB  on ambulation.   4-15-18 Pt seen at bedside on 3 east.  States her breathing is the same.  States seh may have an IVC  filter placed tomorrow and then she wants to go home.  Pt informed she may need Rehab, lives with  who has Parkinson's.   discussed her anticoagulation with ufh and the need for oral anticoagulation (coumadin).  Pt concerned about frequent testing.   4-16-18 pt seen at bedside on 3[facility].  s/p IVC FILTER TODAY.  Pt states she is not sure if she will start on oral anticoagulation now.  I noted Dr Mendoza started pt on coumadin today.   4-17-16  Pt seen at bedside oob in chair.  Discussed her anticoagulation with coumadin and benefits discussed.  Literature provided questions answered will reinforce as needed.   Patient is a 84y old  Female who presents with a chief complaint of sob, anemia (06 Apr 2018 17:10)  4/18/18: Pt seen at bedside with her son, contact isolation in place urine ESBL. Reviewed INR result of 1.02 with patient. Subtherapeutic at day 3 of heparin gtt bridging. She reports of no active bleeding, and stable H/H 10.5/35.8. Informed her of increased dose tonight. Re-educated pt on diet modification of Vit K greens.   4/19 seen at bedside discussed INR, vit green diet, no concerns  Consulted by Dr. Sinha for VTE prophylaxis, risk stratification, and anticoagulation management.    PAST MEDICAL & SURGICAL HISTORY:  COPD (chronic obstructive pulmonary disease)  Hypertension  Peptic Ulcer  Hyperlipemia  S/P Cataract Extraction: right eye 5/27/10    BMI: 30.6    CrCL: 69.88    IMPROVE VTE Risk Score: 5    IMPROVE Bleeding Risk Score: 5.5    Falls Risk:   High (  )  Mod (  )  Low ( X )    FAMILY HISTORY:  No pertinent family history in first degree relatives    Denies any personal or familial history of clotting or bleeding disorders.    Allergies    No Known Allergies    Intolerances    REVIEW OF SYSTEMS    (  )Fever	     (  )Constipation	( X )SOB			(  )Headache	(  )Dysuria  (  )Chills	     (  )Melena	(  )Dyspnea present on exertion    (  )Dizziness                    (  )Polyuria  (  )Nausea	     (  )Hematochezia	(  )Cough		                    (  )Syncope   	(  )Hematuria  (  )Vomiting    (  )Chest Pain	(  )Wheezing		(  )Weakness  (  )Diarrhea     (  )Palpitations	(  )Anorexia		(  )Myalgia    All other review of systems negative: Yes    PHYSICAL EXAM:    Constitutional: Appears Well    Neurological: A& O x 3    Skin: Warm    Respiratory and Thorax: increased effort; Breath sounds: decreased with b/l rhonchi- non-prod moist cough   	  Cardiovascular: S1, S2, regular, NMBR	    Gastrointestinal: BS + x 4Q, nontender	    Genitourinary:  Bladder nondistended, nontender    Musculoskeletal:   General Right:   no muscle/joint tenderness,   normal tone, no joint swelling,   ROM: full	    General Left:   no muscle/joint tenderness,   normal tone, no joint swelling,   ROM: full    Lower extrems:   Right: no calf tenderness              negative spencer's sign               + pedal pulses    Left:   no calf tenderness              negative spencer's sign               + pedal pulses                        10.0   16.18 )-----------( 255      ( 19 Apr 2018 05:54 )             35.3       04-19    140  |  100  |  31<H>  ----------------------------<  86  4.6   |  34<H>  |  0.74    Ca    8.9      19 Apr 2018 05:54       PTT - ( 19 Apr 2018 05:54 )  PTT:32.1 sec                        10.5   18.07 )-----------( 280      ( 18 Apr 2018 05:30 )             35.8       04-18    137  |  99  |  33<H>  ----------------------------<  129<H>  4.7   |  32<H>  |  0.57    Ca    9.0      18 Apr 2018 05:30        PT/INR - ( 19 Apr 2018 05:54 )   PT: 14.0 sec;   INR: 1.29 ratio        PT/INR - ( 18 Apr 2018 05:30 )   PT: 11.0 sec;   INR: 1.02 ratio  PT/INR - ( 17 Apr 2018 06:31 )   PT: 11.2 sec;   INR: 1.04 ratio   PT/INR - ( 16 Apr 2018 15:20 )   PT: 11.2 sec;   INR: 1.04 ratio      PTT - ( 18 Apr 2018 05:30 )  PTT:50.9 sec    PTT - ( 17 Apr 2018 06:31 )  PTT:64.6 sec                        10.5   11.75 )-----------( 297      ( 17 Apr 2018 06:31 )             35.8       04-17    137  |  98  |  29<H>  ----------------------------<  135<H>  4.8   |  32<H>  |  0.70    Ca    9.3      17 Apr 2018 06:31    MEDICATIONS  (STANDING):  ALBUTerol/ipratropium for Nebulization 3 milliLiter(s) Nebulizer every 6 hours  docusate sodium 100 milliGRAM(s) Oral two times a day  guaiFENesin    Syrup 200 milliGRAM(s) Oral every 6 hours  heparin  Infusion.  Unit(s)/Hr IV Continuous <Continuous>  hydrochlorothiazide 25 milliGRAM(s) Oral daily  lactobacillus acidophilus 1 Tablet(s) Oral three times a day with meals  methylPREDNISolone sodium succinate Injectable 20 milliGRAM(s) IV Push every 12 hours  montelukast 10 milliGRAM(s) Oral daily  pantoprazole  Injectable 40 milliGRAM(s) IV Push every 12 hours  senna 2 Tablet(s) Oral at bedtime  warfarin 7.5 milliGRAM(s) Oral daily    IMAGING:  US DPLX LWR EXT VEINS COMPL BI:  04/07/2018    FINDINGS:  RIGHT:  There is normal compressibility of the common femoral, femoral, and popliteal veins.  Visualized posterior tibial veins are within normal limits. Doppler examination shows normal spontaneous and phasic flow. Right popliteal cyst.    LEFT: There is normal compressibility of the common femoral, femoral, and popliteal veins. Thrombosis of the left soleal vein without extension into the popliteal vein. Doppler examination shows normal spontaneous and phasic flow. Left popliteal fossa cyst.    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 No sonographic evidence of acute deep venous thrombosis in the femoropopliteal systems bilaterally.     EXAM:  CT ANGIO CHEST PE PROTOCOL IC:  04/07/2018    IMPRESSION: Small focal pulmonary embolus in a posterior right upper lobe segmental vessel.    EGD:4-13-18  Normal esophagus and stomach  Bulging with superficial ulceration with small amount of adjacent blood in the second portion of the duodenum -- favor submucosal or extrinsic ulceration mass.  Biopsies deferred due to acute desaturation and anticoagulation    DVT Prophylaxis:  LMWH                   (  )  Heparin SQ           (  )  Coumadin             (x )  Xarelto                  (  )  Eliquis                   (  )  Venodynes           (  )  Ambulation          (X )  UFH                       ( X )  Contraindicated  (  )

## 2018-04-19 NOTE — PROGRESS NOTE ADULT - ASSESSMENT
A 83 y/o female w PMH COPD, HTN/HLD, peptic ulcer presents for progressively worsening dyspnea. Pt found to have acute left soleal vein DVT and RUL focal PE so started on heparin gtt and now bridging to oral AC with warfarin. She is s/p IVC filter placement on 4/16/18 and s/p EGD on 4/13/18. Improving H/H.     PLAN:   ::Continue UFH, titrate per aPTT   : coumadin to 7.5mg po daily over lap x 5 days with UFH (Day 4/5), If level still subtherapeutic will inc to 10 mg, plan is to inc INR slowly due to recent  GIB  ::CBC/BMP, PT/INR  ::Monitor for s/s bleeding  ::Encourage ambulation  ::Venodyne contraindicated in left lower extremity    Will continue to follow.

## 2018-04-19 NOTE — PROGRESS NOTE ADULT - SUBJECTIVE AND OBJECTIVE BOX
Subjective:  Patient is a 84y old  Female who presents with a chief complaint of sob, anemia      HPI:     84 F w/PMH COPD, CPAP at night, HTN, presents on 4/6/18 with progressively worse dyspnea over past several weeks.  She went to her pulmonologist, CXR was neg, she referred to Cardio.  After work up w/ ST, TTE was not very revealing, she was referred to GI.  She was found on anemic w/ Hg of 8 on cbc, which is not normal for her.  She has noticed some melena 1-2 episodes over the past month.  She denies any hematochezia, hematemesis. She endorses a feeling of fullness after she eats a small amount.  She's also been having pain across her upper abdomen, just below her diaphragm.  She cannot tell me if this pain is related to eating.  She does have a good appetite but just gets full fast.  Gdtr at bedside notes that she doesn't eat as much.  Pt denies any nausea.  Her last colonoscopy was 2 years ago.  She endorsed h/o gastric ulcers.   Pt was found to be dyspneic with acute respiratory failure.  Over her hospital course pt had EGD for anemia which showed duodenal superficial ulcer and possible submucosal tumor for which she does not which for further intervention.  Her hospital course was complicated by DVT and PE s/p IVC filter, currently on coumadin with heparin bridging.  She is being watched closely for GI bleeding given high risk of bleeding. Pt is also on IV steroid taper for COPD exacerbation.    4/18: Pt is status quo, comfortable, HD stable. Tolerating heparin gtt.  Hb stable.   4/19: Pt stating she has swelling in her legs but otherwise breathing/SOB is improved.  Pt is awaiting therapeutic INR.    REVIEW OF SYSTEMS: All other review of systems is negative unless indicated above.    Vital Signs Last 24 Hrs  T(C): 36.6 (19 Apr 2018 10:59), Max: 36.8 (18 Apr 2018 17:10)  T(F): 97.9 (19 Apr 2018 10:59), Max: 98.2 (18 Apr 2018 17:10)  HR: 76 (19 Apr 2018 10:59) (57 - 76)  BP: 125/50 (19 Apr 2018 10:59) (125/50 - 128/50)  BP(mean): --  RR: 18 (19 Apr 2018 10:59) (18 - 18)  SpO2: 98% (19 Apr 2018 10:59) (97% - 99%)      PHYSICAL EXAM:      GENERAL: NAD  NERVOUS SYSTEM:  Alert & Oriented X3, non- focal exam,  HEAD:  Atraumatic, Normocephalic  EYES: EOMI, PERRLA, conjunctiva and sclera clear  HEENT: Moist mucous membranes  NECK: Supple, No JVD  CHEST/LUNG: BS decreased bilaterally; No rales, no rhonchi, +  wheezing --> improving  HEART: Regular rate and rhythm; No murmurs, rubs, or gallops  ABDOMEN: Soft, Nontender, Nondistended; Bowel sounds present  GENITOURINARY- Voiding, no suprapubic tenderness  EXTREMITIES:  2+ Peripheral Pulses, + 1-2 edema LE b/l  MUSCULOSKELETAL:- No muscle tenderness, Muscle tone normal, No joint tenderness, no Joint swelling, Joint range of motion-normal  SKIN-no rash, no lesion    MEDICATIONS  (STANDING):  ALBUTerol/ipratropium for Nebulization 3 milliLiter(s) Nebulizer every 6 hours  docusate sodium 100 milliGRAM(s) Oral two times a day  guaiFENesin    Syrup 200 milliGRAM(s) Oral every 6 hours  heparin  Infusion.  Unit(s)/Hr (10 mL/Hr) IV Continuous <Continuous>  hydrochlorothiazide 25 milliGRAM(s) Oral daily  lactobacillus acidophilus 1 Tablet(s) Oral three times a day with meals  methylPREDNISolone sodium succinate Injectable 20 milliGRAM(s) IV Push every 12 hours  montelukast 10 milliGRAM(s) Oral daily  pantoprazole  Injectable 40 milliGRAM(s) IV Push every 12 hours  senna 2 Tablet(s) Oral at bedtime  warfarin 7.5 milliGRAM(s) Oral daily    MEDICATIONS  (PRN):  ALPRAZolam 0.125 milliGRAM(s) Oral at bedtime PRN anxiety  heparin  Injectable 5000 Unit(s) IV Push every 6 hours PRN For aPTT less than 40  ondansetron Injectable 4 milliGRAM(s) IV Push every 6 hours PRN Nausea                              10.0   16.18 )-----------( 255      ( 19 Apr 2018 05:54 )             35.3     04-19    140  |  100  |  31<H>  ----------------------------<  86  4.6   |  34<H>  |  0.74    Ca    8.9      19 Apr 2018 05:54      CAPILLARY BLOOD GLUCOSE          PT/INR - ( 19 Apr 2018 05:54 )   PT: 14.0 sec;   INR: 1.29 ratio         PTT - ( 19 Apr 2018 05:54 )  PTT:32.1 sec        Assessment and Plan: 84 F w/PMH COPD, CPAP at night, HTN, presents on 4/6/18 with progressively dyspnea over past several weeks, found w/ anemia, GI bleed, DVT/PE.         Acute respiratory failure with hypoxia: multifactorial - symptomatic anemia/COPD exacerbation/TUTU/PE: SLOWLY IMPROVING  - monitor on tele  - CT Angio Chest --> Small focal pulmonary embolus in a posterior right upper lobe segmental vessel.  - IV steroid taper per pulm  - s/p Azithromycin  - s/p theophyline   - nebs, mucolytics  - CPAP qhs and incentive spirometry  - coumadin with heparin bridge per anticoagulation services    Left DVT soleal vein  - S/P IVC filter yesterday  - coumadin with heparin bridge per anticoagulation services  - 2 d echo - EF 65%    Acute blood loss anemia secondary to subacute upper GI bleed  - no active bleeding at this time  - s/p 2 units of PRBC  - S/P EGD: bleed secondary to duodenal superficial ulcer, suspected submucosal tumor  - Dr Norton consult appreciated. Patient is very reluctant for any surgical interventions.   - c/w PPI  - monitor CBC while on anticoagulation    Leukocytosis: Likely steroid induced  -monitor with tapering steroids    ESBL UTI  -S/P on Imipenem     Essential hypertension: STABLE  -c/w HCTZ  -Stop amlodipine secondary to leg edema  -increase losartan from 50mg to 100mg starting tomorrow    Anxiety  - c/w benzo prn    Dispo:  -Awaiting therapeutic INR.  monitor for bleeding.     Attending Statement:  40 minutes spent on total encounter; more than 50% of the visit was spent counseling and/or coordinating care by the attending physician.

## 2018-04-20 ENCOUNTER — TRANSCRIPTION ENCOUNTER (OUTPATIENT)
Age: 83
End: 2018-04-20

## 2018-04-20 LAB
APTT BLD: 59.9 SEC — HIGH (ref 27.5–37.4)
HCT VFR BLD CALC: 34.8 % — SIGNIFICANT CHANGE UP (ref 34.5–45)
HGB BLD-MCNC: 10.3 G/DL — LOW (ref 11.5–15.5)
INR BLD: 2.23 RATIO — HIGH (ref 0.88–1.16)
INR BLD: 2.79 RATIO — HIGH (ref 0.88–1.16)
MCHC RBC-ENTMCNC: 24.6 PG — LOW (ref 27–34)
MCHC RBC-ENTMCNC: 29.6 GM/DL — LOW (ref 32–36)
MCV RBC AUTO: 83.3 FL — SIGNIFICANT CHANGE UP (ref 80–100)
NRBC # BLD: 0 /100 WBCS — SIGNIFICANT CHANGE UP (ref 0–0)
PLATELET # BLD AUTO: 245 K/UL — SIGNIFICANT CHANGE UP (ref 150–400)
PROTHROM AB SERPL-ACNC: 24.5 SEC — HIGH (ref 9.8–12.7)
PROTHROM AB SERPL-ACNC: 30.8 SEC — HIGH (ref 9.8–12.7)
RBC # BLD: 4.18 M/UL — SIGNIFICANT CHANGE UP (ref 3.8–5.2)
RBC # FLD: 24.3 % — HIGH (ref 10.3–14.5)
WBC # BLD: 14.15 K/UL — HIGH (ref 3.8–10.5)
WBC # FLD AUTO: 14.15 K/UL — HIGH (ref 3.8–10.5)

## 2018-04-20 RX ORDER — WARFARIN SODIUM 2.5 MG/1
3 TABLET ORAL DAILY
Qty: 0 | Refills: 0 | Status: DISCONTINUED | OUTPATIENT
Start: 2018-04-20 | End: 2018-04-21

## 2018-04-20 RX ORDER — WARFARIN SODIUM 2.5 MG/1
5 TABLET ORAL DAILY
Qty: 0 | Refills: 0 | Status: DISCONTINUED | OUTPATIENT
Start: 2018-04-20 | End: 2018-04-20

## 2018-04-20 RX ADMIN — Medication 200 MILLIGRAM(S): at 05:59

## 2018-04-20 RX ADMIN — WARFARIN SODIUM 3 MILLIGRAM(S): 2.5 TABLET ORAL at 23:07

## 2018-04-20 RX ADMIN — Medication 20 MILLIGRAM(S): at 17:16

## 2018-04-20 RX ADMIN — Medication 100 MILLIGRAM(S): at 17:16

## 2018-04-20 RX ADMIN — Medication 3 MILLILITER(S): at 10:56

## 2018-04-20 RX ADMIN — Medication 0.12 MILLIGRAM(S): at 23:54

## 2018-04-20 RX ADMIN — Medication 3 MILLILITER(S): at 02:21

## 2018-04-20 RX ADMIN — Medication 20 MILLIGRAM(S): at 05:56

## 2018-04-20 RX ADMIN — Medication 1 TABLET(S): at 17:16

## 2018-04-20 RX ADMIN — PANTOPRAZOLE SODIUM 40 MILLIGRAM(S): 20 TABLET, DELAYED RELEASE ORAL at 05:57

## 2018-04-20 RX ADMIN — LOSARTAN POTASSIUM 100 MILLIGRAM(S): 100 TABLET, FILM COATED ORAL at 05:59

## 2018-04-20 RX ADMIN — Medication 200 MILLIGRAM(S): at 23:55

## 2018-04-20 RX ADMIN — Medication 200 MILLIGRAM(S): at 11:04

## 2018-04-20 RX ADMIN — Medication 1 TABLET(S): at 08:43

## 2018-04-20 RX ADMIN — Medication 1 TABLET(S): at 11:05

## 2018-04-20 RX ADMIN — Medication 3 MILLILITER(S): at 20:19

## 2018-04-20 RX ADMIN — HEPARIN SODIUM 700 UNIT(S)/HR: 5000 INJECTION INTRAVENOUS; SUBCUTANEOUS at 06:15

## 2018-04-20 RX ADMIN — PANTOPRAZOLE SODIUM 40 MILLIGRAM(S): 20 TABLET, DELAYED RELEASE ORAL at 17:16

## 2018-04-20 RX ADMIN — MONTELUKAST 10 MILLIGRAM(S): 4 TABLET, CHEWABLE ORAL at 11:05

## 2018-04-20 RX ADMIN — Medication 200 MILLIGRAM(S): at 18:41

## 2018-04-20 RX ADMIN — SENNA PLUS 2 TABLET(S): 8.6 TABLET ORAL at 23:07

## 2018-04-20 RX ADMIN — Medication 25 MILLIGRAM(S): at 05:59

## 2018-04-20 RX ADMIN — Medication 200 MILLIGRAM(S): at 18:55

## 2018-04-20 RX ADMIN — Medication 3 MILLILITER(S): at 15:06

## 2018-04-20 NOTE — DISCHARGE NOTE ADULT - MEDICATION SUMMARY - MEDICATIONS TO STOP TAKING
I will STOP taking the medications listed below when I get home from the hospital:    amLODIPine 5 mg oral tablet  -- 1 tab(s) by mouth once a day    calcium (as carbonate) 600 mg oral tablet  -- 2 tab(s) by mouth once a day

## 2018-04-20 NOTE — DISCHARGE NOTE ADULT - MEDICATION SUMMARY - MEDICATIONS TO CHANGE
I will SWITCH the dose or number of times a day I take the medications listed below when I get home from the hospital:    predniSONE 20 mg oral tablet  -- 1 tab(s) by mouth once a day    ***COURSE COMPLETED***    losartan 50 mg oral tablet  -- 1 tab(s) by mouth once a day

## 2018-04-20 NOTE — DISCHARGE NOTE ADULT - HOME CARE AGENCY
[facility] phone  for RN/PT evaluations, eval for HHA. Dr Baker's office to arrange [facility]PE[facility] labs for INR monitoring for coumadin dosing. [facility] phone  for RN/PT evaluations, eval for HHA. Dr Baker's office to arrange [facility]PE[facility] labs for INR monitoring for coumadin dosing.  Pt will be followed by the Transitional Care Management Team, Angela Funes .[phone].

## 2018-04-20 NOTE — DISCHARGE NOTE ADULT - HOSPITAL COURSE
Subjective:  Patient is a 84y old  Female who presents with a chief complaint of sob, anemia      HPI:   84 F w/PMH COPD, CPAP at night, HTN, presents on 4/6/18 with progressively worse dyspnea over past several weeks.  She went to her pulmonologist, CXR was neg, she referred to Cardio.  After work up w/ ST, TTE was not very revealing, she was referred to GI.  She was found on anemic w/ Hg of 8 on cbc, which is not normal for her.  She has noticed some melena 1-2 episodes over the past month.  She denies any hematochezia, hematemesis. She endorses a feeling of fullness after she eats a small amount.  She's also been having pain across her upper abdomen, just below her diaphragm.  She cannot tell me if this pain is related to eating.  She does have a good appetite but just gets full fast.  Gdtr at bedside notes that she doesn't eat as much.  Pt denies any nausea.  Her last colonoscopy was 2 years ago.  She endorsed h/o gastric ulcers.   Pt was found to be dyspneic with acute respiratory failure.  Over her hospital course pt had EGD for anemia which showed duodenal superficial ulcer and possible submucosal tumor for which she does not which for further intervention.  Her hospital course was complicated by DVT and PE s/p IVC filter, currently on coumadin with heparin bridging.  She is being watched closely for GI bleeding given high risk of bleeding. Pt is also on IV steroid taper for COPD exacerbation. Pt undergone heparin bridge protocol.  Today she is therapeutic x 2 days.  Her INR is greater than 3 so she was given instructions to hold for next two days.  She will recheck INR on Monday.  Her norvasc was held secondary to leg swelling and her losartan was increased to 100mg qd.  She is HD stable, breathing better.  She no longer qualifies for home O2.   She will need close outpatient follow up for her anemia, duodenal ulcer/possible malignancy with Dr. Vizcarra.  She will follow up with her pulmonologist and cardiologist for ongoing care.       REVIEW OF SYSTEMS: All other review of systems is negative unless indicated above.    Vital Signs Last 24 Hrs  T(C): 36.1 (21 Apr 2018 10:39), Max: 36.4 (21 Apr 2018 05:07)  T(F): 97 (21 Apr 2018 10:39), Max: 97.5 (21 Apr 2018 05:07)  HR: 77 (21 Apr 2018 10:39) (70 - 77)  BP: 122/63 (21 Apr 2018 10:39) (122/63 - 138/51)  RR: 18 (21 Apr 2018 10:39) (18 - 19)  SpO2: 95% (21 Apr 2018 10:39) (93% - 95%)    PHYSICAL EXAM:  GENERAL: NAD  NERVOUS SYSTEM:  Alert & Oriented X3, non- focal exam,  HEAD:  Atraumatic, Normocephalic  EYES: EOMI, PERRLA, conjunctiva and sclera clear  HEENT: Moist mucous membranes  NECK: Supple, No JVD  CHEST/LUNG: BS decreased bilaterally; No rales, no rhonchi, wheezing --> improving  HEART: Regular rate and rhythm; No murmurs, rubs, or gallops  ABDOMEN: Soft, Nontender, Nondistended; Bowel sounds present  GENITOURINARY- Voiding, no suprapubic tenderness  EXTREMITIES:  2+ Peripheral Pulses, + 1-2 edema LE b/l  MUSCULOSKELETAL:- No muscle tenderness, Muscle tone normal, No joint tenderness, no Joint swelling, Joint range of motion-normal  SKIN-no rash, no lesion    meds/labs: reviewed    Assessment and Plan:   84 F w/PMH COPD, CPAP at night, HTN, presents on 4/6/18 with progressively dyspnea over past several weeks, found w/ anemia, GI bleed, DVT/PE.       Acute respiratory failure with hypoxia: multifactorial - symptomatic anemia/COPD exacerbation/TUTU/PE: SLOWLY IMPROVING  - CT Angio Chest --> Small focal pulmonary embolus in a posterior right upper lobe segmental vessel.  - IV steroid tapered to PO prednisone  - s/p Azithromycin, s/p theophyline  - CPAP qhs and incentive spirometry  - Pt does not qualify for home O2.  She is improving.  She will continue anticoagulation for PE as described.  She will follow up with her outpatient providers for ongoing care.     Left DVT soleal vein / RUL PE S/P IVC filter this adm on 4/16. Completed heparin bridge protocol today. Pt with elevated INR.  She will hold 4/21 and 4/22.  INR check Monday. Resume coumadin Monday. 2 d echo - EF 65%. Follow up with PCP for ongoing management.    Acute blood loss anemia secondary to subacute upper GI bleed  - no active bleeding at this time s/p 2 units of PRBC.  - S/P EGD: bleed secondary to duodenal superficial ulcer, suspected submucosal tumor  - Dr Norton consult appreciated. Patient is very reluctant for any surgical interventions.   - c/w PPI    Leukocytosis: Likely steroid induced: Improving with tapering of steroids.    ESBL UTI: S/P on Imipenem     Essential hypertension: STABLE. c/w HCTZ. Stopped amlodipine secondary to leg edema. Increased losartan from 50mg to 100mg  4/20. This should be her new outpatient regimen.     Anxiety: c/w benzo prn    Attending Statement: 40 minutes spent on total encounter; more than 50% of the visit was spent counseling and/or coordinating care by the attending physician. Subjective:  Patient is a 84y old  Female who presents with a chief complaint of sob, anemia      HPI:   84 F w/PMH COPD, CPAP at night, HTN, presents on 4/6/18 with progressively worse dyspnea over past several weeks.  She went to her pulmonologist, CXR was neg, she referred to Cardio.  After work up w/ ST, TTE was not very revealing, she was referred to GI.  She was found on anemic w/ Hg of 8 on cbc, which is not normal for her.  She has noticed some melena 1-2 episodes over the past month.  She denies any hematochezia, hematemesis. She endorses a feeling of fullness after she eats a small amount.  She's also been having pain across her upper abdomen, just below her diaphragm.  She cannot tell me if this pain is related to eating.  She does have a good appetite but just gets full fast.  Gdtr at bedside notes that she doesn't eat as much.  Pt denies any nausea.  Her last colonoscopy was 2 years ago.  She endorsed h/o gastric ulcers.   Pt was found to be dyspneic with acute respiratory failure.  Over her hospital course pt had EGD for anemia which showed duodenal superficial ulcer and possible submucosal tumor for which she does not which for further intervention.  Her hospital course was complicated by DVT and PE s/p IVC filter, currently on coumadin with heparin bridging.  She is being watched closely for GI bleeding given high risk of bleeding. Pt is also on IV steroid taper for COPD exacerbation. Pt undergone heparin bridge protocol.  Today she is therapeutic x 2 days.  Her INR is greater than 3 so she was given instructions to hold for next two days.   Her norvasc was held secondary to leg swelling and her losartan was increased to 100mg qd.  She is HD stable, breathing better.   She will need close outpatient follow up for her anemia, duodenal ulcer/possible malignancy with Dr. Vizcarra.  She will follow up with her pulmonologist and cardiologist for ongoing care.  INR 1.87.  Will given 7.5mg coumadin today and have her recheck INR tomorrow.  Pt qualifies for home O2 as she desaturates to < 88% on RA during ambulation.  This data is from 4/22.       REVIEW OF SYSTEMS: All other review of systems is negative unless indicated above.    Vital Signs Last 24 Hrs  T(C): 36.1 (21 Apr 2018 10:39), Max: 36.4 (21 Apr 2018 05:07)  T(F): 97 (21 Apr 2018 10:39), Max: 97.5 (21 Apr 2018 05:07)  HR: 77 (21 Apr 2018 10:39) (70 - 77)  BP: 122/63 (21 Apr 2018 10:39) (122/63 - 138/51)  RR: 18 (21 Apr 2018 10:39) (18 - 19)  SpO2: 95% (21 Apr 2018 10:39) (93% - 95%)    PHYSICAL EXAM:  GENERAL: NAD  NERVOUS SYSTEM:  Alert & Oriented X3, non- focal exam,  HEAD:  Atraumatic, Normocephalic  EYES: EOMI, PERRLA, conjunctiva and sclera clear  HEENT: Moist mucous membranes  NECK: Supple, No JVD  CHEST/LUNG: BS decreased bilaterally; No rales, no rhonchi, wheezing --> improving  HEART: Regular rate and rhythm; No murmurs, rubs, or gallops  ABDOMEN: Soft, Nontender, Nondistended; Bowel sounds present  GENITOURINARY- Voiding, no suprapubic tenderness  EXTREMITIES:  2+ Peripheral Pulses, + 1-2 edema LE b/l  MUSCULOSKELETAL:- No muscle tenderness, Muscle tone normal, No joint tenderness, no Joint swelling, Joint range of motion-normal  SKIN-no rash, no lesion    meds/labs: reviewed    Assessment and Plan:   84 F w/PMH COPD, CPAP at night, HTN, presents on 4/6/18 with progressively dyspnea over past several weeks, found w/ anemia, GI bleed, DVT/PE.       Acute respiratory failure with hypoxia: multifactorial - symptomatic anemia/COPD exacerbation/TUTU/PE: SLOWLY IMPROVING  - CT Angio Chest --> Small focal pulmonary embolus in a posterior right upper lobe segmental vessel.  - IV steroid tapered to PO prednisone  - s/p Azithromycin, s/p theophyline  - CPAP qhs and incentive spirometry  -  She will continue anticoagulation for PE as described.  She will follow up with her outpatient providers for ongoing care.  Pt qualifies for home O2.     Left DVT soleal vein / RUL PE S/P IVC filter this adm on 4/16. Completed heparin bridge protocol today.  Resume coumadin and check INR tomorrow. 2 d echo - EF 65%. Follow up with PCP for ongoing management.    Acute blood loss anemia secondary to subacute upper GI bleed  - no active bleeding at this time s/p 2 units of PRBC.  - S/P EGD: bleed secondary to duodenal superficial ulcer, suspected submucosal tumor  - Dr Norton consult appreciated. Patient is very reluctant for any surgical interventions.   - c/w PPI    Leukocytosis: Likely steroid induced: Improving with tapering of steroids.    ESBL UTI: S/P on Imipenem     Essential hypertension: STABLE. c/w HCTZ. Stopped amlodipine secondary to leg edema. Increased losartan from 50mg to 100mg  4/20. This should be her new outpatient regimen.     Anxiety: c/w benzo prn    Attending Statement: 40 minutes spent on total encounter; more than 50% of the visit was spent counseling and/or coordinating care by the attending physician.

## 2018-04-20 NOTE — DISCHARGE NOTE ADULT - MEDICATION SUMMARY - MEDICATIONS TO TAKE
I will START or STAY ON the medications listed below when I get home from the hospital:    predniSONE 10 mg oral tablet  -- 3 tab(s) by mouth once a day x 3 days then  2 tab daily x 3 days then  1 tab daily x 3 days then stop  -- It is very important that you take or use this exactly as directed.  Do not skip doses or discontinue unless directed by your doctor.  Obtain medical advice before taking any non-prescription drugs as some may affect the action of this medication.  Take with food or milk.    -- Indication: For COPD (chronic obstructive pulmonary disease)    losartan 100 mg oral tablet  -- 1 tab(s) by mouth once a day  -- Indication: For hypertension    warfarin 3 mg oral tablet  -- 1 tab(s) by mouth once a day. Do not take sat 4/21 or sun 4/22.  Resume Monday once INR checked.  -- Indication: For DVT of lower limb, acute    Xanax 0.25 mg oral tablet  -- 0.5 tab(s) by mouth once a day, As Needed for anxiety   -- Indication: For Anxiety    Anoro Ellipta 62.5 mcg-25 mcg/inh inhalation powder  -- 1 puff(s) inhaled once a day  -- Indication: For COPD (chronic obstructive pulmonary disease)    ProAir HFA 90 mcg/inh inhalation aerosol  -- 2 puff(s) inhaled 4 times a day, As Needed  -- Indication: For COPD (chronic obstructive pulmonary disease)    hydroCHLOROthiazide 25 mg oral tablet  -- 1 tab(s) by mouth once a day  -- Indication: For hypertension    montelukast 10 mg oral tablet  -- 1 tab(s) by mouth once a day  -- Indication: For COPD (chronic obstructive pulmonary disease)    pantoprazole 40 mg oral delayed release tablet  -- 1 tab(s) by mouth once a day (before a meal)  -- Indication: For Duodenal ulcer    Vitamin D3 2000 intl units oral tablet  -- 1 tab(s) by mouth once a day  -- Indication: For vitamin d I will START or STAY ON the medications listed below when I get home from the hospital:    predniSONE 10 mg oral tablet  -- 3 tab(s) by mouth once a day x 3 days then  2 tab daily x 3 days then  1 tab daily x 3 days then stop  -- It is very important that you take or use this exactly as directed.  Do not skip doses or discontinue unless directed by your doctor.  Obtain medical advice before taking any non-prescription drugs as some may affect the action of this medication.  Take with food or milk.    -- Indication: For COPD (chronic obstructive pulmonary disease)    losartan 100 mg oral tablet  -- 1 tab(s) by mouth once a day  -- Indication: For hypertension    Xanax 0.25 mg oral tablet  -- 0.5 tab(s) by mouth once a day, As Needed for anxiety   -- Indication: For Anxiety    Anoro Ellipta 62.5 mcg-25 mcg/inh inhalation powder  -- 1 puff(s) inhaled once a day  -- Indication: For COPD (chronic obstructive pulmonary disease)    ProAir HFA 90 mcg/inh inhalation aerosol  -- 2 puff(s) inhaled 4 times a day, As Needed  -- Indication: For COPD (chronic obstructive pulmonary disease)    hydroCHLOROthiazide 25 mg oral tablet  -- 1 tab(s) by mouth once a day  -- Indication: For hypertension    montelukast 10 mg oral tablet  -- 1 tab(s) by mouth once a day  -- Indication: For COPD (chronic obstructive pulmonary disease)    pantoprazole 40 mg oral delayed release tablet  -- 1 tab(s) by mouth once a day (before a meal)  -- Indication: For Duodenal ulcer    Vitamin D3 2000 intl units oral tablet  -- 1 tab(s) by mouth once a day  -- Indication: For vitamin d

## 2018-04-20 NOTE — DISCHARGE NOTE ADULT - CARE PLAN
Principal Discharge DX:	Acute respiratory failure with hypoxia  Goal:	RESOLVING  Assessment and plan of treatment:	Take meds as prescribed.  Follow up PCP/pulmonologist and other outpatient providers.  Secondary Diagnosis:	Chronic obstructive pulmonary disease with acute exacerbation  Goal:	prevent exacerbation  Assessment and plan of treatment:	Take meds as prescribed.  Take tapering steroid as prescribed.  Follow up with your pulmonary doctor.  Secondary Diagnosis:	DVT of lower limb, acute  Goal:	Take coumadin as prescribed.  Assessment and plan of treatment:	Hold coumadin 4/21 and 4/22.  Have your INR checked Monday and restart depending on those results.  An IVC filter was placed to prevent further clots in the lung.  This should be managed by your pcp.  This might be able to come out in the future.  Secondary Diagnosis:	Essential hypertension  Goal:	BP control.  Assessment and plan of treatment:	Stop norvasc.  Losartan increased from 50mg to 100mg daily (sent to your pharmacy)  continue HCTZ  Secondary Diagnosis:	Pulmonary emboli  Goal:	Take coumadin as prescribed.  Assessment and plan of treatment:	Hold coumadin 4/21 and 4/22.  Have your INR checked Monday and restart depending on those results.  You will need at least 3-6 months of treatment  Secondary Diagnosis:	Symptomatic anemia  Goal:	Prevent bleeding.  Assessment and plan of treatment:	Follow up closely with your pcp.  Bleeding secondary to duodenal ulcer with possible malignancy.  Follow up with Dr. Vizcarra.  Take daily protonix for acid suppression.

## 2018-04-20 NOTE — PROGRESS NOTE ADULT - SUBJECTIVE AND OBJECTIVE BOX
HPI: This is a 85 y/o female w PMH COPD, CPAP at night, HTN, has been experiencing progressively dyspnea over past several weeks.  She went to her pulmonologist, CXR was neg, she referred to Cardio.  After work up w/ ST, TTE was not very revealing, she was referred to GI.  She was found on anemic w/ Hg of 8 on cbc, which is not normal for her.  She has noticed some melena 1-2 episodes over the past month.  She denies any hematochezia, hematemesis. She endorses a feeling of fullness after she eats a small amount.  She's also been having pain across her upper abdomen, just below her diaphragm.  She cannot tell me if this pain is related to eating.  She does have a good appetite but just gets full fast.  Gdtr at bedside notes that she doesn't eat as much.  Pt denies any nausea.  Her last colonoscopy was 2 years ago.  She endorsed h/o gastric ulcers. Currently, pt is dyspneic w/ conversation, she's satting 85% on 3LNC, changed to ventimask, now at 95%.  She is feeling very anxious and requesting xanax- which she takes prn at home 1/2 of 0.25mg dose. 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 She continues to report of SOB. She denies prior VTE, CVA, MI, no blood clotting disorder. She is a former smoker, has been under stress recently. She reports of being an active caregiver to her  with Parkinson's disease, but now has HHA and decreased her mobility. Informed pt of possible oral anticoagulation with warfarin and she agrees to therapy, but will need to have endoscopy done first. Pending IVC filter placement tomorrow. Discussed with primary RN and no active bleeding noted today. Her H/H today 9.3/32.6 after s/p 2 units pRBC.  4/9: Patient seen at bedside with Dr. Kramer- explained will hold off on IVC filter as DVT is in small vein and no obvious bleeding at this time. Remain on UFH. Patient reports some improvement in breathing- but still with RUDD and O2 nc.  4/10: seen at bedside, some RUDD on exertion O 2 in place  4/11: seen at bedside, for Upper endo tomorrow, coumadin still on hold but discussed with pt foods to avoid/ vit k diet  4/13: Patient seen at bedside- on isolation- still with difficulty breathing- RUDD. Endo on hold until respiratory status improves. Solumedrol/theophylline on board per pulmon. Continuing UFH as IVC filter not necessary due to stable- no bleeding.   4-14-18 Pt seen at bedside on 3East. no changes. pt oob in chair states she stands uo and walks in room every so often.  States she is still SOB  on ambulation.   4-15-18 Pt seen at bedside on 3 east.  States her breathing is the same.  States seh may have an IVC  filter placed tomorrow and then she wants to go home.  Pt informed she may need Rehab, lives with  who has Parkinson's.   discussed her anticoagulation with ufh and the need for oral anticoagulation (coumadin).  Pt concerned about frequent testing.   4-16-18 pt seen at bedside on 3east.  s/p IVC FILTER TODAY.  Pt states she is not sure if she will start on oral anticoagulation now.  I noted Dr Mendoza started pt on coumadin today.   4-17-16  Pt seen at bedside oob in chair.  Discussed her anticoagulation with coumadin and benefits discussed.  Literature provided questions answered will reinforce as needed.   Patient is a 84y old  Female who presents with a chief complaint of sob, anemia (06 Apr 2018 17:10)  4/18/18: Pt seen at bedside with her son, contact isolation in place urine ESBL. Reviewed INR result of 1.02 with patient. Subtherapeutic at day 3 of heparin gtt bridging. She reports of no active bleeding, and stable H/H 10.5/35.8. Informed her of increased dose tonight. Re-educated pt on diet modification of Vit K greens.   4/19 seen at bedside discussed INR, vit green diet, no concerns    4/20:  seen at bedside, D/W pt INR therapeutic  Consulted by Dr. Sinha for VTE prophylaxis, risk stratification, and anticoagulation management.    PAST MEDICAL & SURGICAL HISTORY:  COPD (chronic obstructive pulmonary disease)  Hypertension  Peptic Ulcer  Hyperlipemia  S/P Cataract Extraction: right eye 5/27/10    BMI: 30.6    CrCL: 69.88    IMPROVE VTE Risk Score: 5    IMPROVE Bleeding Risk Score: 5.5    Falls Risk:   High (  )  Mod (  )  Low ( X )    FAMILY HISTORY:  No pertinent family history in first degree relatives    Denies any personal or familial history of clotting or bleeding disorders.    Allergies    No Known Allergies    Intolerances    REVIEW OF SYSTEMS    (  )Fever	     (  )Constipation	( X )SOB			(  )Headache	(  )Dysuria  (  )Chills	     (  )Melena	(  )Dyspnea present on exertion    (  )Dizziness                    (  )Polyuria  (  )Nausea	     (  )Hematochezia	(  )Cough		                    (  )Syncope   	(  )Hematuria  (  )Vomiting    (  )Chest Pain	(  )Wheezing		(  )Weakness  (  )Diarrhea     (  )Palpitations	(  )Anorexia		(  )Myalgia    All other review of systems negative: Yes    PHYSICAL EXAM:    Constitutional: Appears Well    Neurological: A& O x 3    Skin: Warm    Respiratory and Thorax: increased effort; Breath sounds: decreased with b/l rhonchi- non-prod moist cough   	  Cardiovascular: S1, S2, regular, NMBR	    Gastrointestinal: BS + x 4Q, nontender	    Genitourinary:  Bladder nondistended, nontender    Musculoskeletal:   General Right:   no muscle/joint tenderness,   normal tone, no joint swelling,   ROM: full	    General Left:   no muscle/joint tenderness,   normal tone, no joint swelling,   ROM: full    Lower extrems:   Right: no calf tenderness              negative spencer's sign               + pedal pulses    Left:   no calf tenderness              negative spencer's sign               + pedal pulses                            10.3   14.15 )-----------( 245      ( 20 Apr 2018 05:36 )             34.8       04-19    140  |  100  |  31<H>  ----------------------------<  86  4.6   |  34<H>  |  0.74    Ca    8.9      19 Apr 2018 05:54           PTT - ( 20 Apr 2018 05:36 )  PTT:59.9 sec                        10.0   16.18 )-----------( 255      ( 19 Apr 2018 05:54 )             35.3       04-19    140  |  100  |  31<H>  ----------------------------<  86  4.6   |  34<H>  |  0.74    Ca    8.9      19 Apr 2018 05:54       PTT - ( 19 Apr 2018 05:54 )  PTT:32.1 sec                        10.5   18.07 )-----------( 280      ( 18 Apr 2018 05:30 )             35.8       04-18    137  |  99  |  33<H>  ----------------------------<  129<H>  4.7   |  32<H>  |  0.57    Ca    9.0      18 Apr 2018 05:30          PT/INR - ( 20 Apr 2018 05:36 )   PT: 24.5 sec;   INR: 2.23 ratio    PT/INR - ( 19 Apr 2018 05:54 )   PT: 14.0 sec;   INR: 1.29 ratio        PT/INR - ( 18 Apr 2018 05:30 )   PT: 11.0 sec;   INR: 1.02 ratio  PT/INR - ( 17 Apr 2018 06:31 )   PT: 11.2 sec;   INR: 1.04 ratio   PT/INR - ( 16 Apr 2018 15:20 )   PT: 11.2 sec;   INR: 1.04 ratio      PTT - ( 18 Apr 2018 05:30 )  PTT:50.9 sec    PTT - ( 17 Apr 2018 06:31 )  PTT:64.6 sec                        10.5   11.75 )-----------( 297      ( 17 Apr 2018 06:31 )             35.8       04-17    137  |  98  |  29<H>  ----------------------------<  135<H>  4.8   |  32<H>  |  0.70    Ca    9.3      17 Apr 2018 06:31    MEDICATIONS  (STANDING):  ALBUTerol/ipratropium for Nebulization 3 milliLiter(s) Nebulizer every 6 hours  docusate sodium 100 milliGRAM(s) Oral two times a day  guaiFENesin    Syrup 200 milliGRAM(s) Oral every 6 hours  heparin  Infusion.  Unit(s)/Hr IV Continuous <Continuous>  hydrochlorothiazide 25 milliGRAM(s) Oral daily  lactobacillus acidophilus 1 Tablet(s) Oral three times a day with meals  methylPREDNISolone sodium succinate Injectable 20 milliGRAM(s) IV Push every 12 hours  montelukast 10 milliGRAM(s) Oral daily  pantoprazole  Injectable 40 milliGRAM(s) IV Push every 12 hours  senna 2 Tablet(s) Oral at bedtime  warfarin 7.5 milliGRAM(s) Oral daily    IMAGING:  US DPLX LWR EXT VEINS COMPL BI:  04/07/2018    FINDINGS:  RIGHT:  There is normal compressibility of the common femoral, femoral, and popliteal veins.  Visualized posterior tibial veins are within normal limits. Doppler examination shows normal spontaneous and phasic flow. Right popliteal cyst.    LEFT: There is normal compressibility of the common femoral, femoral, and popliteal veins. Thrombosis of the left soleal vein without extension into the popliteal vein. Doppler examination shows normal spontaneous and phasic flow. Left popliteal fossa cyst.    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 No sonographic evidence of acute deep venous thrombosis in the femoropopliteal systems bilaterally.     EXAM:  CT ANGIO CHEST PE PROTOCOL IC:  04/07/2018    IMPRESSION: Small focal pulmonary embolus in a posterior right upper lobe segmental vessel.    EGD:4-13-18  Normal esophagus and stomach  Bulging with superficial ulceration with small amount of adjacent blood in the second portion of the duodenum -- favor submucosal or extrinsic ulceration mass.  Biopsies deferred due to acute desaturation and anticoagulation    DVT Prophylaxis:  LMWH                   (  )  Heparin SQ           (  )  Coumadin             (x )  Xarelto                  (  )  Eliquis                   (  )  Venodynes           (  )  Ambulation          (X )  UFH                       ( X )  Contraindicated  (  )

## 2018-04-20 NOTE — PROGRESS NOTE ADULT - ASSESSMENT
Assessment and Plan:   84 F w/PMH COPD, CPAP at night, HTN, presents on 4/6/18 with progressively dyspnea over past several weeks, found w/ anemia, GI bleed, DVT/PE.       Acute respiratory failure with hypoxia: multifactorial - symptomatic anemia/COPD exacerbation/TUTU/PE: SLOWLY IMPROVING  - monitor on tele  - CT Angio Chest --> Small focal pulmonary embolus in a posterior right upper lobe segmental vessel.  - IV steroid tapered to PO prednisone  - s/p Azithromycin  - s/p theophyline   - nebs, mucolytics  - CPAP qhs and incentive spirometry  - coumadin with heparin bridge per anticoagulation services  - O2 sat walk test for possible home O2    Left DVT soleal vein / RUL PE  - S/P IVC filter this adm on 4/16  - coumadin with heparin bridge per anticoagulation services  ** first therapeutic INR today; will await for confirmatory therapeutic INR prior to dc  - 2 d echo - EF 65%    Acute blood loss anemia secondary to subacute upper GI bleed  - no active bleeding at this time  - s/p 2 units of PRBC  - S/P EGD: bleed secondary to duodenal superficial ulcer, suspected submucosal tumor  - Dr Norton consult appreciated. Patient is very reluctant for any surgical interventions.   - c/w PPI  - monitor CBC while on anticoagulation    Leukocytosis: Likely steroid induced  -monitor with tapering steroids    ESBL UTI  -S/P on Imipenem     Essential hypertension: STABLE  -c/w HCTZ  -Stopped amlodipine secondary to leg edema  -increased losartan from 50mg to 100mg  4/20    Anxiety  - c/w benzo prn    Dispo:  -Awaiting confirmatory (2nd consecutive) therapeutic INR.   - Monitor for bleeding.   - aim for discharge in am if INR remains therapeutic x 2 days Assessment and Plan:   84 F w/PMH COPD, CPAP at night, HTN, presents on 4/6/18 with progressively dyspnea over past several weeks, found w/ anemia, GI bleed, DVT/PE.       Acute respiratory failure with hypoxia: multifactorial - symptomatic anemia/COPD exacerbation/TUTU/PE: SLOWLY IMPROVING  - monitor on tele  - CT Angio Chest --> Small focal pulmonary embolus in a posterior right upper lobe segmental vessel.  - IV steroid tapered to PO prednisone  - s/p Azithromycin  - s/p theophyline   - nebs, mucolytics  - CPAP qhs and incentive spirometry  - coumadin with heparin bridge per anticoagulation services  - O2 sat walk test for possible home O2    Left DVT soleal vein / RUL PE  - S/P IVC filter this adm on 4/16  - coumadin with heparin bridge per anticoagulation services  ** first therapeutic INR today; will await for confirmatory therapeutic INR prior to dc  - 2 d echo - EF 65%    Acute blood loss anemia secondary to subacute upper GI bleed  - no active bleeding at this time  - s/p 2 units of PRBC  - S/P EGD: bleed secondary to duodenal superficial ulcer, suspected submucosal tumor  - Dr Norton consult appreciated. Patient is very reluctant for any surgical interventions.   - c/w PPI  - monitor CBC while on anticoagulation    Leukocytosis: Likely steroid induced  -monitor with tapering steroids    ESBL UTI  -S/P on Imipenem     Essential hypertension: STABLE  -c/w HCTZ  -Stopped amlodipine secondary to leg edema  -increased losartan from 50mg to 100mg  4/20    Anxiety  - c/w benzo prn    Dispo:  -Awaiting confirmatory (2nd consecutive) therapeutic INR.   - Monitor for bleeding.   - aim for discharge in am if INR remains therapeutic x 2 days    Attending Statement:  40 minutes spent on total encounter; more than 50% of the visit was spent counseling and/or coordinating care by the attending physician.  Patient seen and examined with NP Luis Armando.  Agree with physical exam and assessment and plan.

## 2018-04-20 NOTE — PROGRESS NOTE ADULT - ASSESSMENT
84 F w/PMH COPD, CPAP at night, HTN, presents on 4/6/18 with progressively dyspnea over past several weeks.  Found w/ anemia, +melena. Pt also with small PE, L soleal vein thrombosis.     1. GI bleed- GI following. Transfuse to keep Hgb > 8. EGD c/w GIST tumor? Today hgb 10.3. Continue PPI. Mgmt as per GI. No active bleeding now.     2. PE- small RUL PE- Also with L soleal vein thrombosis. Pt tolerating IV heparin for many days. Now on coumadin as well- aim for INR 2-2.5. IVC placed as pt has GI bleed, high likelihood of rebleeding and has PE/DVT. Heme following. 2Decho- normal LV and RV fxn. Hypercoagulable w/u as per Heme. Can d/c IV heparin today.     3. Essential hypertension- stable, continue medical mgmt.     4. DVT proph, replete lytes as needed.     5. COPD- cont steroid taper, nebs, inhalers. Pulm followin. Breathing improved but will need home O2.    6. LE edema- worsened in setting of steroid use. Would start lasix 20mg daily.      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 Will need further testing, w/u at a tertiary care center. Pt to f/u with Dr. Vizcarra or Dr. Glanzmann.

## 2018-04-20 NOTE — DISCHARGE NOTE ADULT - PATIENT PORTAL LINK FT
You can access the [facility] Patient Portal, offered by [facility], by registering with the following website: [email or web address]

## 2018-04-20 NOTE — PROGRESS NOTE ADULT - ASSESSMENT
A 85 y/o female w PMH COPD, HTN/HLD, peptic ulcer presents for progressively worsening dyspnea. Pt found to have acute left soleal vein DVT and RUL focal PE so started on heparin gtt and now bridging to oral AC with warfarin. She is s/p IVC filter placement on 4/16/18 and s/p EGD on 4/13/18. Improving H/H.     PLAN:   ::Continue UFH, titrate per aPTT x 1 more therapeutic day  : coumadin to 3mg po tonight, INR from 1.29----->2.23 in 24 hours, had total of 23 mgs in 5 days over lap x 5 days with UFH (Day 5/5),   when pt is d/c home will need to have homedraws, requesting Dr Baker to follow up,    to set up   ::CBC/BMP, PT/INR  ::Monitor for s/s bleeding  ::Encourage ambulation  ::Venodyne contraindicated in left lower extremity    Will continue to follow.

## 2018-04-20 NOTE — PROGRESS NOTE ADULT - SUBJECTIVE AND OBJECTIVE BOX
Subjective:  Patient is a 84y old  Female who presents with a chief complaint of sob, anemia      HPI:     84 F w/PMH COPD, CPAP at night, HTN, presents on 4/6/18 with progressively worse dyspnea over past several weeks.  She went to her pulmonologist, CXR was neg, she referred to Cardio.  After work up w/ ST, TTE was not very revealing, she was referred to GI.  She was found on anemic w/ Hg of 8 on cbc, which is not normal for her.  She has noticed some melena 1-2 episodes over the past month.  She denies any hematochezia, hematemesis. She endorses a feeling of fullness after she eats a small amount.  She's also been having pain across her upper abdomen, just below her diaphragm.  She cannot tell me if this pain is related to eating.  She does have a good appetite but just gets full fast.  Gdtr at bedside notes that she doesn't eat as much.  Pt denies any nausea.  Her last colonoscopy was 2 years ago.  She endorsed h/o gastric ulcers.   Pt was found to be dyspneic with acute respiratory failure.  Over her hospital course pt had EGD for anemia which showed duodenal superficial ulcer and possible submucosal tumor for which she does not which for further intervention.  Her hospital course was complicated by DVT and PE s/p IVC filter, currently on coumadin with heparin bridging.  She is being watched closely for GI bleeding given high risk of bleeding. Pt is also on IV steroid taper for COPD exacerbation.    4/18: Pt is status quo, comfortable, HD stable. Tolerating heparin gtt.  Hb stable.   4/19: Pt stating she has swelling in her legs but otherwise breathing/SOB is improved.  Pt is awaiting therapeutic INR.  4/20: feels she is getting better, still with leg swelling, no issues overnight    REVIEW OF SYSTEMS: All other review of systems is negative unless indicated above.    Vital Signs Last 24 Hrs  T(C): 36.3 (20 Apr 2018 11:28), Max: 36.7 (20 Apr 2018 05:08)  T(F): 97.4 (20 Apr 2018 11:28), Max: 98 (20 Apr 2018 05:08)  HR: 60 (20 Apr 2018 11:28) (58 - 78)  BP: 115/37 (20 Apr 2018 11:28) (115/37 - 128/70)  BP(mean): --  RR: 18 (20 Apr 2018 11:28) (18 - 18)  SpO2: 91% (20 Apr 2018 11:28) (91% - 99%)      PHYSICAL EXAM:      GENERAL: NAD  NERVOUS SYSTEM:  Alert & Oriented X3, non- focal exam,  HEAD:  Atraumatic, Normocephalic  EYES: EOMI, PERRLA, conjunctiva and sclera clear  HEENT: Moist mucous membranes  NECK: Supple, No JVD  CHEST/LUNG: BS decreased bilaterally; No rales, no rhonchi, wheezing --> improving  HEART: Regular rate and rhythm; No murmurs, rubs, or gallops  ABDOMEN: Soft, Nontender, Nondistended; Bowel sounds present  GENITOURINARY- Voiding, no suprapubic tenderness  EXTREMITIES:  2+ Peripheral Pulses, + 1-2 edema LE b/l  MUSCULOSKELETAL:- No muscle tenderness, Muscle tone normal, No joint tenderness, no Joint swelling, Joint range of motion-normal  SKIN-no rash, no lesion    MEDICATIONS  (STANDING):  ALBUTerol/ipratropium for Nebulization 3 milliLiter(s) Nebulizer every 6 hours  docusate sodium 100 milliGRAM(s) Oral two times a day  guaiFENesin    Syrup 200 milliGRAM(s) Oral every 6 hours  heparin  Infusion.  Unit(s)/Hr (10 mL/Hr) IV Continuous <Continuous>  hydrochlorothiazide 25 milliGRAM(s) Oral daily  lactobacillus acidophilus 1 Tablet(s) Oral three times a day with meals  losartan 100 milliGRAM(s) Oral daily  montelukast 10 milliGRAM(s) Oral daily  pantoprazole  Injectable 40 milliGRAM(s) IV Push every 12 hours  predniSONE   Tablet 20 milliGRAM(s) Oral every 12 hours  senna 2 Tablet(s) Oral at bedtime  warfarin 5 milliGRAM(s) Oral daily    MEDICATIONS  (PRN):  ALPRAZolam 0.125 milliGRAM(s) Oral at bedtime PRN anxiety  heparin  Injectable 5000 Unit(s) IV Push every 6 hours PRN For aPTT less than 40  ondansetron Injectable 4 milliGRAM(s) IV Push every 6 hours PRN Nausea    LABS  4-20             10.3   14.15 )-----------( 245      ( 20 Apr 2018 05:36 )             34.8     04-19    140  |  100  |  31<H>  ----------------------------<  86  4.6   |  34<H>  |  0.74    Ca    8.9      19 Apr 2018 05:54    PT/INR - ( 20 Apr 2018 05:36 )   PT: 24.5 sec;   INR: 2.23 ratio    PTT - ( 20 Apr 2018 05:36 )  PTT:59.9 sec      4-19                      10.0   16.18 )-----------( 255      ( 19 Apr 2018 05:54 )             35.3     04-19    140  |  100  |  31<H>  ----------------------------<  86  4.6   |  34<H>  |  0.74    Ca    8.9      19 Apr 2018 05:54    CAPILLARY BLOOD GLUCOSE  PT/INR - ( 19 Apr 2018 05:54 )   PT: 14.0 sec;   INR: 1.29 ratio       PTT - ( 19 Apr 2018 05:54 )  PTT:32.1 sec    RADIOLOGY / NUCLEAR STUDIES    < from: CT Angio Chest PE Protocol w/ IV Cont (04.07.18 @ 16:53) >    Small focal pulmonary embolus in a posterior right upper lobe segmental   vessel.    < end of copied text >

## 2018-04-20 NOTE — DISCHARGE NOTE ADULT - CARE PROVIDERS DIRECT ADDRESSES
,DirectAddress_Unknown,DirectAddress_Unknown,[email or web address]

## 2018-04-20 NOTE — DISCHARGE NOTE ADULT - PLAN OF CARE
RESOLVING Take meds as prescribed.  Follow up PCP/pulmonologist and other outpatient providers. prevent exacerbation Take meds as prescribed.  Take tapering steroid as prescribed.  Follow up with your pulmonary doctor. Take coumadin as prescribed. Hold coumadin 4/21 and 4/22.  Have your INR checked Monday and restart depending on those results.  An IVC filter was placed to prevent further clots in the lung.  This should be managed by your pcp.  This might be able to come out in the future. BP control. Stop norvasc.  Losartan increased from 50mg to 100mg daily (sent to your pharmacy)  continue HCTZ Hold coumadin 4/21 and 4/22.  Have your INR checked Monday and restart depending on those results.  You will need at least 3-6 months of treatment Prevent bleeding. Follow up closely with your pcp.  Bleeding secondary to duodenal ulcer with possible malignancy.  Follow up with Dr. Vizcarra.  Take daily protonix for acid suppression.

## 2018-04-20 NOTE — PROGRESS NOTE ADULT - SUBJECTIVE AND OBJECTIVE BOX
Subjective:    pat better, sitting in chair.    Home Medications:  amLODIPine 5 mg oral tablet: 1 tab(s) orally once a day (06 Apr 2018 17:25)  Anoro Ellipta 62.5 mcg-25 mcg/inh inhalation powder: 1 puff(s) inhaled once a day (06 Apr 2018 17:25)  calcium (as carbonate) 600 mg oral tablet: 2 tab(s) orally once a day (06 Apr 2018 17:25)  hydroCHLOROthiazide 25 mg oral tablet: 1 tab(s) orally once a day (06 Apr 2018 17:25)  losartan 50 mg oral tablet: 1 tab(s) orally once a day (06 Apr 2018 17:25)  montelukast 10 mg oral tablet: 1 tab(s) orally once a day (06 Apr 2018 17:25)  predniSONE 20 mg oral tablet: 1 tab(s) orally once a day    ***COURSE COMPLETED*** (06 Apr 2018 17:25)  ProAir HFA 90 mcg/inh inhalation aerosol: 2 puff(s) inhaled 4 times a day, As Needed (06 Apr 2018 17:25)  Vitamin D3 2000 intl units oral tablet: 1 tab(s) orally once a day (06 Apr 2018 17:25)  Xanax 0.25 mg oral tablet: 0.5 tab(s) orally once a day, As Needed for anxiety  (06 Apr 2018 17:25)    MEDICATIONS  (STANDING):  ALBUTerol/ipratropium for Nebulization 3 milliLiter(s) Nebulizer every 6 hours  docusate sodium 100 milliGRAM(s) Oral two times a day  guaiFENesin    Syrup 200 milliGRAM(s) Oral every 6 hours  heparin  Infusion.  Unit(s)/Hr (10 mL/Hr) IV Continuous <Continuous>  hydrochlorothiazide 25 milliGRAM(s) Oral daily  lactobacillus acidophilus 1 Tablet(s) Oral three times a day with meals  losartan 100 milliGRAM(s) Oral daily  methylPREDNISolone sodium succinate Injectable 20 milliGRAM(s) IV Push every 12 hours  montelukast 10 milliGRAM(s) Oral daily  pantoprazole  Injectable 40 milliGRAM(s) IV Push every 12 hours  senna 2 Tablet(s) Oral at bedtime    MEDICATIONS  (PRN):  ALPRAZolam 0.125 milliGRAM(s) Oral at bedtime PRN anxiety  heparin  Injectable 5000 Unit(s) IV Push every 6 hours PRN For aPTT less than 40  ondansetron Injectable 4 milliGRAM(s) IV Push every 6 hours PRN Nausea      Allergies    No Known Allergies    Intolerances        Vital Signs Last 24 Hrs  T(C): 36.7 (20 Apr 2018 05:08), Max: 36.7 (20 Apr 2018 05:08)  T(F): 98 (20 Apr 2018 05:08), Max: 98 (20 Apr 2018 05:08)  HR: 58 (20 Apr 2018 05:08) (58 - 78)  BP: 128/70 (20 Apr 2018 05:08) (125/50 - 128/70)  BP(mean): --  RR: 18 (19 Apr 2018 10:59) (18 - 18)  SpO2: 99% (20 Apr 2018 05:08) (98% - 99%)      PHYSICAL EXAMINATION:    NECK:  Supple. No lymphadenopathy. Jugular venous pressure not elevated. Carotids equal.   HEART:   The cardiac impulse has a normal quality. Reg., Nl S1 and S2.  There are no murmurs, rubs or gallops noted  CHEST:  Chest is clear to auscultation. Normal respiratory effort.  ABDOMEN:  Soft and nontender.   EXTREMITIES:  There is no edema.       LABS:                        10.3   14.15 )-----------( 245      ( 20 Apr 2018 05:36 )             34.8     04-19    140  |  100  |  31<H>  ----------------------------<  86  4.6   |  34<H>  |  0.74    Ca    8.9      19 Apr 2018 05:54      PT/INR - ( 20 Apr 2018 05:36 )   PT: 24.5 sec;   INR: 2.23 ratio         PTT - ( 20 Apr 2018 05:36 )  PTT:59.9 sec

## 2018-04-20 NOTE — DISCHARGE NOTE ADULT - CARE PROVIDER_API CALL
Bj Baker), Critical Care Medicine; Internal Medicine; Pulmonary Disease; Sleep Medicine  [address] [place] [address]  Phone: [phone]  Fax: [phone]    Aquiles Vizcarra), Gastroenterology; Internal Medicine  [address] [facility] [address]  [place] [address]  Phone: [phone]  Fax: [phone]    Elvis Hsieh), Cardiovascular Disease; Nuclear Cardiology  [address] [place] [address]  Phone: [phone]  Fax: [phone]

## 2018-04-20 NOTE — PROGRESS NOTE ADULT - SUBJECTIVE AND OBJECTIVE BOX
Cardiology Progress Note    HPI: 84 F w/PMH COPD, CPAP at night, HTN, has been experiencing progressively dyspnea over past several weeks.  She went to her pulmonologist, CXR was neg, she referred to Cardio.  After work up w/ ST, TTE was not very revealing, she was referred to GI.  She was found on anemic w/ Hg of 8 on cbc, which is not normal for her.  She has noticed some melena 1-2 episodes over the past month.  She denies any hematochezia, hematemesis. She endorses a feeling of fullness after she eats a small amount.  She's also been having pain across her upper abdomen, just below her diaphragm.  She cannot tell me if this pain is related to eating.  She does have a good appetite but just gets full fast.  Gdtr at bedside notes that she doesn't eat as much.  Pt denies any nausea.  Her last colonoscopy was 2 years ago.  She endorsed h/o gastric ulcers. Pt is dyspneic w/ conversation, she's satting 85% on 3LNC, changed to ventimask, now at 95%.  She is feeling very anxious and requesting xanax- which she takes prn at home 1/2 of 0.25mg dose. (2018 17:10)    - No CP. +SOB. SR on tele. Feels tired. No events last pm.     - Feels slightly better today. +SOB, no CP. Awaiting IVC filter today.     4/10- Discussed case with interventional cardiology- holding off on IVC filter for now. No active GI bleed now. +SOB- mildly improved. No CP.     - No CP, SOB mildly improved. No fevers. No events last pm.    - Still awaiting EGD. Now with LE edema. No CP, SOB improved.     - Chart reviewed. S/p EGD- c/w GIST tumor. No active bleeding. No CP, SOB improved.     - SOB stable, no CP. Pt now s/p IVC filter yesterday.     - Mild cough, no CP. No events last pm. Feels weak.     - Chart reviewed. No events last pm. No CP, SOB stable.     PAST MEDICAL & SURGICAL HISTORY:  COPD (chronic obstructive pulmonary disease)  Hypertension  Peptic Ulcer  Hyperlipemia  S/P Cataract Extraction: right eye 5/27/10    Allergies  No Known Allergies    SOCIAL HISTORY: Denies tobacco, etoh abuse or illicit drug use    FAMILY HISTORY: No pertinent family history in first degree relatives    MEDICATIONS  (STANDING):  ALBUTerol/ipratropium for Nebulization 3 milliLiter(s) Nebulizer every 6 hours  amLODIPine   Tablet 5 milliGRAM(s) Oral daily  azithromycin  IVPB 500 milliGRAM(s) IV Intermittent every 24 hours  azithromycin  IVPB      guaiFENesin    Syrup 200 milliGRAM(s) Oral every 6 hours  heparin  Infusion.  Unit(s)/Hr (10 mL/Hr) IV Continuous <Continuous>  hydrochlorothiazide 25 milliGRAM(s) Oral daily  losartan 50 milliGRAM(s) Oral daily  methylPREDNISolone sodium succinate Injectable 40 milliGRAM(s) IV Push every 12 hours  montelukast 10 milliGRAM(s) Oral daily  pantoprazole Infusion 8 mG/Hr (10 mL/Hr) IV Continuous <Continuous>    MEDICATIONS  (PRN):  ALPRAZolam 0.125 milliGRAM(s) Oral daily PRN anxiety  heparin  Injectable 5000 Unit(s) IV Push every 6 hours PRN For aPTT less than 40  ondansetron Injectable 4 milliGRAM(s) IV Push every 6 hours PRN Nausea      Vital Signs Last 24 Hrs  T(C): 36.4 (2018 05:42), Max: 37.2 (2018 11:55)  T(F): 97.5 (2018 05:42), Max: 99 (2018 11:55)  HR: 53 (2018 05:42) (50 - 88)  BP: 110/55 (2018 05:42) (110/55 - 131/65)  BP(mean): --  RR: 18 (2018 05:42) (18 - 22)  SpO2: 94% (2018 05:42) (85% - 95%)    REVIEW OF SYSTEMS:    CONSTITUTIONAL:  As per HPI.  HEENT:  Eyes:  No diplopia or blurred vision. ENT:  No earache, sore throat or runny nose.  CARDIOVASCULAR:  No pressure, squeezing, strangling, tightness, heaviness or aching about the chest, neck, axilla or epigastrium.  RESPIRATORY:  No cough, shortness of breath, PND or orthopnea.  GASTROINTESTINAL:  No nausea, vomiting or diarrhea.  GENITOURINARY:  No dysuria, frequency or urgency.  MUSCULOSKELETAL:  As per HPI.  SKIN:  No change in skin, hair or nails.  NEUROLOGIC:  No paresthesias, fasciculations, seizures or weakness.    PHYSICAL EXAMINATION:    GENERAL APPEARANCE:  Pt. is not currently dyspneic, in no distress. Pt. is alert, oriented, and pleasant.  HEENT:  Pupils are normal and react normally. No icterus. Mucous membranes well colored.  NECK:  Supple. No lymphadenopathy. Jugular venous pressure not elevated. Carotids equal.   HEART:   The cardiac impulse has a normal quality. There are no murmurs, rubs or gallops noted  CHEST:  Chest is clear to auscultation. Normal respiratory effort.  ABDOMEN:  Soft and nontender.   EXTREMITIES:  1-2+ LE edema.    I&O's Summary      LABS:                        9.3    18.67 )-----------( 351      ( 2018 06:03 )             32.6     04-08    141  |  104  |  25<H>  ----------------------------<  140<H>  4.3   |  31  |  0.79    Ca    9.0      2018 06:03    TPro  6.8  /  Alb  2.9<L>  /  TBili  0.4  /  DBili  x   /  AST  14<L>  /  ALT  20  /  AlkPhos  77  04-06    LIVER FUNCTIONS - ( 2018 12:29 )  Alb: 2.9 g/dL / Pro: 6.8 gm/dL / ALK PHOS: 77 U/L / ALT: 20 U/L / AST: 14 U/L / GGT: x           PT/INR - ( 2018 12:29 )   PT: 11.8 sec;   INR: 1.09 ratio         PTT - ( 2018 02:41 )  PTT:43.3 sec  CARDIAC MARKERS ( 2018 16:07 )  <0.015 ng/mL / x     / x     / x     / x      CARDIAC MARKERS ( 2018 12:29 )  <0.015 ng/mL / x     / x     / x     / x        Urinalysis Basic - ( 2018 16:57 )    Color: Yellow / Appearance: Clear / S.015 / pH: x  Gluc: x / Ketone: Negative  / Bili: Negative / Urobili: Negative mg/dL   Blood: x / Protein: 15 mg/dL / Nitrite: Positive   Leuk Esterase: Small / RBC: 0-2 /HPF / WBC 5-8 /HPF   Sq Epi: x / Non Sq Epi: Occasional / Bacteria: Many    EKG: < from: 12 Lead ECG (18 @ 09:44) >  Sinus rhythm with Premature supraventricular complexes and with occasional Premature ventricular complexes  Possible Left atrial enlargement  Nonspecific ST abnormality  Abnormal ECG    TELEMETRY: SR    CARDIAC TESTS: < from: Transthoracic Echocardiogram (18 @ 10:22) >   Fibrocalcific changes noted to the mitral valve leaflets with preserved   leaflet excursion.   EA reversal of the mitral inflow consistent with reduced compliance of   the   left ventricle.   Fibrocalcific changes noted to the Aortic valve leaflets with preserved   leaflet excursion.   Mild aortic annular calcification is noted.   Mild (1+) tricuspid valve regurgitation is present.   Trace pulmonic valvular regurgitation is present.   Normal appearing left atrium.   The left ventricle is normal in size, wall thickness, wall motion and   contractility.   Estimated left ventricular ejection fraction is 65-70 %.   The right atrium appears mildly dilated.   Normal appearing right ventricle structure and function.   The IVC is dilated but collapsing with inspiration.   No evidence of pericardial effusion.   No evidence of pleural effusion.    < end of copied text >      RADIOLOGY & ADDITIONAL STUDIES: < from: US Duplex Venous Lower Ext Complete, Bilateral (18 @ 17:25) >    Left soleal vein thrombosis without extension into the popliteal vein.    No sonographic evidence of acute deep venous thrombosis in the   femoropopliteal systems bilaterally.     < from: CT Angio Chest PE Protocol w/ IV Cont (18 @ 16:53) >    Small focal pulmonary embolus in a posterior right upper lobe segmental   vessel.        ASSESSMENT & PLAN:

## 2018-04-20 NOTE — DISCHARGE NOTE ADULT - SECONDARY DIAGNOSIS.
Chronic obstructive pulmonary disease with acute exacerbation DVT of lower limb, acute Essential hypertension Pulmonary emboli Symptomatic anemia

## 2018-04-20 NOTE — PROGRESS NOTE ADULT - ASSESSMENT
PROBLEMS:    ESBL UTI  RUL PE & DVT s/p GFF  Acute respiratory failure with hypoxia  acute excerbation of COPD  Symptomatic anemia- s/p transfusion- duodenal mass  Gastrointestinal hemorrhage with melena  Essential hypertension    PLAN;    decd theophylline  inr 2.23  change to po prednisone  AEROSLS  SUPPORTIVE CARE  DVT PROPHYLASIX

## 2018-04-21 LAB
APTT BLD: 34.3 SEC — SIGNIFICANT CHANGE UP (ref 27.5–37.4)
APTT BLD: 39.4 SEC — HIGH (ref 27.5–37.4)
HCT VFR BLD CALC: 36.7 % — SIGNIFICANT CHANGE UP (ref 34.5–45)
HGB BLD-MCNC: 10.8 G/DL — LOW (ref 11.5–15.5)
INR BLD: 3.37 RATIO — HIGH (ref 0.88–1.16)
MCHC RBC-ENTMCNC: 24.3 PG — LOW (ref 27–34)
MCHC RBC-ENTMCNC: 29.4 GM/DL — LOW (ref 32–36)
MCV RBC AUTO: 82.7 FL — SIGNIFICANT CHANGE UP (ref 80–100)
NRBC # BLD: 0 /100 WBCS — SIGNIFICANT CHANGE UP (ref 0–0)
PLATELET # BLD AUTO: 246 K/UL — SIGNIFICANT CHANGE UP (ref 150–400)
PROTHROM AB SERPL-ACNC: 37.3 SEC — HIGH (ref 9.8–12.7)
RBC # BLD: 4.44 M/UL — SIGNIFICANT CHANGE UP (ref 3.8–5.2)
RBC # FLD: 24.8 % — HIGH (ref 10.3–14.5)
WBC # BLD: 13.91 K/UL — HIGH (ref 3.8–10.5)
WBC # FLD AUTO: 13.91 K/UL — HIGH (ref 3.8–10.5)

## 2018-04-21 PROCEDURE — 99233 SBSQ HOSP IP/OBS HIGH 50: CPT

## 2018-04-21 RX ORDER — PANTOPRAZOLE SODIUM 20 MG/1
40 TABLET, DELAYED RELEASE ORAL
Qty: 0 | Refills: 0 | Status: DISCONTINUED | OUTPATIENT
Start: 2018-04-22 | End: 2018-04-23

## 2018-04-21 RX ORDER — CALCIUM CARBONATE 500(1250)
2 TABLET ORAL
Qty: 0 | Refills: 0 | COMMUNITY

## 2018-04-21 RX ORDER — LOSARTAN POTASSIUM 100 MG/1
1 TABLET, FILM COATED ORAL
Qty: 30 | Refills: 0 | OUTPATIENT
Start: 2018-04-21

## 2018-04-21 RX ORDER — LOSARTAN POTASSIUM 100 MG/1
1 TABLET, FILM COATED ORAL
Qty: 0 | Refills: 0 | COMMUNITY

## 2018-04-21 RX ORDER — WARFARIN SODIUM 2.5 MG/1
1 TABLET ORAL
Qty: 14 | Refills: 0 | OUTPATIENT
Start: 2018-04-21

## 2018-04-21 RX ORDER — WARFARIN SODIUM 2.5 MG/1
1 TABLET ORAL
Qty: 0 | Refills: 0 | COMMUNITY
Start: 2018-04-21

## 2018-04-21 RX ORDER — POLYETHYLENE GLYCOL 3350 17 G/17G
17 POWDER, FOR SOLUTION ORAL DAILY
Qty: 0 | Refills: 0 | Status: DISCONTINUED | OUTPATIENT
Start: 2018-04-21 | End: 2018-04-23

## 2018-04-21 RX ORDER — AMLODIPINE BESYLATE 2.5 MG/1
1 TABLET ORAL
Qty: 0 | Refills: 0 | COMMUNITY

## 2018-04-21 RX ORDER — PANTOPRAZOLE SODIUM 20 MG/1
1 TABLET, DELAYED RELEASE ORAL
Qty: 30 | Refills: 0 | OUTPATIENT
Start: 2018-04-21

## 2018-04-21 RX ADMIN — PANTOPRAZOLE SODIUM 40 MILLIGRAM(S): 20 TABLET, DELAYED RELEASE ORAL at 06:33

## 2018-04-21 RX ADMIN — LOSARTAN POTASSIUM 100 MILLIGRAM(S): 100 TABLET, FILM COATED ORAL at 06:32

## 2018-04-21 RX ADMIN — POLYETHYLENE GLYCOL 3350 17 GRAM(S): 17 POWDER, FOR SOLUTION ORAL at 18:39

## 2018-04-21 RX ADMIN — Medication 3 MILLILITER(S): at 13:23

## 2018-04-21 RX ADMIN — Medication 200 MILLIGRAM(S): at 11:36

## 2018-04-21 RX ADMIN — Medication 25 MILLIGRAM(S): at 06:33

## 2018-04-21 RX ADMIN — MONTELUKAST 10 MILLIGRAM(S): 4 TABLET, CHEWABLE ORAL at 11:37

## 2018-04-21 RX ADMIN — HEPARIN SODIUM 5000 UNIT(S): 5000 INJECTION INTRAVENOUS; SUBCUTANEOUS at 10:06

## 2018-04-21 RX ADMIN — Medication 3 MILLILITER(S): at 02:09

## 2018-04-21 RX ADMIN — Medication 3 MILLILITER(S): at 08:46

## 2018-04-21 RX ADMIN — Medication 0.12 MILLIGRAM(S): at 21:58

## 2018-04-21 RX ADMIN — Medication 100 MILLIGRAM(S): at 06:33

## 2018-04-21 RX ADMIN — Medication 20 MILLIGRAM(S): at 06:33

## 2018-04-21 RX ADMIN — SENNA PLUS 2 TABLET(S): 8.6 TABLET ORAL at 21:57

## 2018-04-21 RX ADMIN — HEPARIN SODIUM 950 UNIT(S)/HR: 5000 INJECTION INTRAVENOUS; SUBCUTANEOUS at 09:53

## 2018-04-21 RX ADMIN — Medication 1 TABLET(S): at 09:53

## 2018-04-21 RX ADMIN — Medication 100 MILLIGRAM(S): at 18:40

## 2018-04-21 RX ADMIN — Medication 200 MILLIGRAM(S): at 18:40

## 2018-04-21 RX ADMIN — Medication 200 MILLIGRAM(S): at 06:33

## 2018-04-21 NOTE — PROGRESS NOTE ADULT - ASSESSMENT
PROBLEMS:    RUL PE & DVT s/p GFF  Acute respiratory failure with hypoxia  acute excerbation of COPD  Symptomatic anemia- s/p transfusion- duodenal mass  Gastrointestinal hemorrhage with melena  Essential hypertension    PLAN;    monitor-inr   taper prednisone  AEROSLS  SUPPORTIVE CARE  DVT PROPHYLASIX

## 2018-04-21 NOTE — PROGRESS NOTE ADULT - SUBJECTIVE AND OBJECTIVE BOX
Subjective:    pat no new complaint, inr 3.34, waiting to be discharge.    Home Medications:  Anoro Ellipta 62.5 mcg-25 mcg/inh inhalation powder: 1 puff(s) inhaled once a day (06 Apr 2018 17:25)  hydroCHLOROthiazide 25 mg oral tablet: 1 tab(s) orally once a day (21 Apr 2018 13:27)  montelukast 10 mg oral tablet: 1 tab(s) orally once a day (06 Apr 2018 17:25)  ProAir HFA 90 mcg/inh inhalation aerosol: 2 puff(s) inhaled 4 times a day, As Needed (06 Apr 2018 17:25)  Vitamin D3 2000 intl units oral tablet: 1 tab(s) orally once a day (06 Apr 2018 17:25)  Xanax 0.25 mg oral tablet: 0.5 tab(s) orally once a day, As Needed for anxiety  (06 Apr 2018 17:25)    MEDICATIONS  (STANDING):  ALBUTerol/ipratropium for Nebulization 3 milliLiter(s) Nebulizer every 6 hours  docusate sodium 100 milliGRAM(s) Oral two times a day  guaiFENesin    Syrup 200 milliGRAM(s) Oral every 6 hours  hydrochlorothiazide 25 milliGRAM(s) Oral daily  losartan 100 milliGRAM(s) Oral daily  montelukast 10 milliGRAM(s) Oral daily  polyethylene glycol 3350 17 Gram(s) Oral daily  predniSONE   Tablet 20 milliGRAM(s) Oral every 12 hours  senna 2 Tablet(s) Oral at bedtime    MEDICATIONS  (PRN):  ALPRAZolam 0.125 milliGRAM(s) Oral at bedtime PRN anxiety  ondansetron Injectable 4 milliGRAM(s) IV Push every 6 hours PRN Nausea      Allergies    No Known Allergies    Intolerances        Vital Signs Last 24 Hrs  T(C): 36.7 (21 Apr 2018 16:56), Max: 36.7 (21 Apr 2018 16:56)  T(F): 98.1 (21 Apr 2018 16:56), Max: 98.1 (21 Apr 2018 16:56)  HR: 73 (21 Apr 2018 16:56) (70 - 79)  BP: 134/49 (21 Apr 2018 16:56) (122/63 - 134/49)  BP(mean): --  RR: 18 (21 Apr 2018 16:56) (18 - 19)  SpO2: 94% (21 Apr 2018 16:56) (94% - 95%)      PHYSICAL EXAMINATION:    NECK:  Supple. No lymphadenopathy. Jugular venous pressure not elevated. Carotids equal.   HEART:   The cardiac impulse has a normal quality. Reg., Nl S1 and S2.  There are no murmurs, rubs or gallops noted  CHEST:  Chest crackles to auscultation. Normal respiratory effort.  ABDOMEN:  Soft and nontender.   EXTREMITIES:  There is no edema.       LABS:                        10.8   13.91 )-----------( 246      ( 21 Apr 2018 05:40 )             36.7           PT/INR - ( 21 Apr 2018 05:40 )   PT: 37.3 sec;   INR: 3.37 ratio         PTT - ( 21 Apr 2018 16:10 )  PTT:34.3 sec

## 2018-04-21 NOTE — PROGRESS NOTE ADULT - ASSESSMENT
A 83 y/o female w PMH COPD, HTN/HLD, peptic ulcer presents for progressively worsening dyspnea. Pt found to have acute left soleal vein DVT and RUL focal PE so started on heparin gtt and now bridging to oral AC with warfarin. She is s/p IVC filter placement on 4/16/18 and s/p EGD on 4/13/18. Improving H/H.     PLAN:   ::Stop UFH day 5/5  ::Hold Coumadin today and tomorrow, brisk rise to 3.37  ::When pt is d/c home will need to have homedraws, requesting Dr Baker to follow up,    to set up  ::CBC/BMP, PT/INR  ::Monitor for s/s bleeding  ::Encourage ambulation  ::Venodyne contraindicated in left lower extremity    Will continue to follow.

## 2018-04-21 NOTE — PROGRESS NOTE ADULT - SUBJECTIVE AND OBJECTIVE BOX
HPI: This is a 85 y/o female w PMH COPD, CPAP at night, HTN, has been experiencing progressively dyspnea over past several weeks.  She went to her pulmonologist, CXR was neg, she referred to Cardio.  After work up w/ ST, TTE was not very revealing, she was referred to GI.  She was found on anemic w/ Hg of 8 on cbc, which is not normal for her.  She has noticed some melena 1-2 episodes over the past month.  She denies any hematochezia, hematemesis. She endorses a feeling of fullness after she eats a small amount.  She's also been having pain across her upper abdomen, just below her diaphragm.  She cannot tell me if this pain is related to eating.  She does have a good appetite but just gets full fast.  Gdtr at bedside notes that she doesn't eat as much.  Pt denies any nausea.  Her last colonoscopy was 2 years ago.  She endorsed h/o gastric ulcers. Currently, pt is dyspneic w/ conversation, she's satting 85% on 3LNC, changed to ventimask, now at 95%.  She is feeling very anxious and requesting xanax- which she takes prn at home 1/2 of 0.25mg dose. 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 She continues to report of SOB. She denies prior VTE, CVA, MI, no blood clotting disorder. She is a former smoker, has been under stress recently. She reports of being an active caregiver to her  with Parkinson's disease, but now has HHA and decreased her mobility. Informed pt of possible oral anticoagulation with warfarin and she agrees to therapy, but will need to have endoscopy done first. Pending IVC filter placement tomorrow. Discussed with primary RN and no active bleeding noted today. Her H/H today 9.3/32.6 after s/p 2 units pRBC.  4/9: Patient seen at bedside with Dr. Kramer- explained will hold off on IVC filter as DVT is in small vein and no obvious bleeding at this time. Remain on UFH. Patient reports some improvement in breathing- but still with RUDD and O2 nc.  4/10: seen at bedside, some RUDD on exertion O 2 in place  4/11: seen at bedside, for Upper endo tomorrow, coumadin still on hold but discussed with pt foods to avoid/ vit k diet  4/13: Patient seen at bedside- on isolation- still with difficulty breathing- RUDD. Endo on hold until respiratory status improves. Solumedrol/theophylline on board per pulmon. Continuing UFH as IVC filter not necessary due to stable- no bleeding.   4-14-18 Pt seen at bedside on 3East. no changes. pt oob in chair states she stands uo and walks in room every so often.  States she is still SOB  on ambulation.   4-15-18 Pt seen at bedside on 3 east.  States her breathing is the same.  States seh may have an IVC  filter placed tomorrow and then she wants to go home.  Pt informed she may need Rehab, lives with  who has Parkinson's.   discussed her anticoagulation with ufh and the need for oral anticoagulation (coumadin).  Pt concerned about frequent testing.   4-16-18 pt seen at bedside on 3east.  s/p IVC FILTER TODAY.  Pt states she is not sure if she will start on oral anticoagulation now.  I noted Dr Mendoza started pt on coumadin today.   4-17-16  Pt seen at bedside oob in chair.  Discussed her anticoagulation with coumadin and benefits discussed.  Literature provided questions answered will reinforce as needed.   Patient is a 84y old  Female who presents with a chief complaint of sob, anemia (06 Apr 2018 17:10)  4/18/18: Pt seen at bedside with her son, contact isolation in place urine ESBL. Reviewed INR result of 1.02 with patient. Subtherapeutic at day 3 of heparin gtt bridging. She reports of no active bleeding, and stable H/H 10.5/35.8. Informed her of increased dose tonight. Re-educated pt on diet modification of Vit K greens.   4/19 seen at bedside discussed INR, vit green diet, no concerns  4/20:  seen at bedside, D/W pt INR therapeutic  4/21: Patient seen at bedside with daughter. Discussed her possible dc to home today. INR 3.37 from 2.23 yesterday. Instructed patient to hold coumadin over weekend and resume 3mg daily Monday, Jose Guadalupe to draw INR Monday and Dr. Baker to follow. Patient and daughter verbalize understanding, daughter wrote instructions down.    Consulted by Dr. Sinha for VTE prophylaxis, risk stratification, and anticoagulation management.    PAST MEDICAL & SURGICAL HISTORY:  COPD (chronic obstructive pulmonary disease)  Hypertension  Peptic Ulcer  Hyperlipemia  S/P Cataract Extraction: right eye 5/27/10    BMI: 30.6    CrCL: 69.88    IMPROVE VTE Risk Score: 5    IMPROVE Bleeding Risk Score: 5.5    Falls Risk:   High (  )  Mod (  )  Low ( X )    FAMILY HISTORY:  No pertinent family history in first degree relatives    Denies any personal or familial history of clotting or bleeding disorders.    Allergies    No Known Allergies    Intolerances    REVIEW OF SYSTEMS    (  )Fever	     (  )Constipation	( X )SOB			(  )Headache	(  )Dysuria  (  )Chills	     (  )Melena	(  )Dyspnea present on exertion    (  )Dizziness                    (  )Polyuria  (  )Nausea	     (  )Hematochezia	(  )Cough		                    (  )Syncope   	(  )Hematuria  (  )Vomiting    (  )Chest Pain	(  )Wheezing		(  )Weakness  (  )Diarrhea     (  )Palpitations	(  )Anorexia		(  )Myalgia    All other review of systems negative: Yes    PHYSICAL EXAM:    Constitutional: Appears Well    Neurological: A& O x 3    Skin: Warm    Respiratory and Thorax: increased effort; Breath sounds: decreased with b/l rhonchi- non-prod moist cough   	  Cardiovascular: S1, S2, regular, NMBR	    Gastrointestinal: BS + x 4Q, nontender	    Genitourinary:  Bladder nondistended, nontender    Musculoskeletal:   General Right:   no muscle/joint tenderness,   normal tone, no joint swelling,   ROM: full	    General Left:   no muscle/joint tenderness,   normal tone, no joint swelling,   ROM: full    Lower extrems:   Right: no calf tenderness              negative spencer's sign               + pedal pulses    Left:   no calf tenderness              negative spencer's sign               + pedal pulses                            10.8   13.91 )-----------( 246      ( 21 Apr 2018 05:40 )             36.7         PT/INR - ( 21 Apr 2018 05:40 )   PT: 37.3 sec;   INR: 3.37 ratio         PTT - ( 21 Apr 2018 05:40 )  PTT:39.4 sec                        10.3   14.15 )-----------( 245      ( 20 Apr 2018 05:36 )             34.8       PT/INR - ( 21 Apr 2018 05:40 )   PT: 37.3 sec;   INR: 3.37 ratio    PT/INR - ( 20 Apr 2018 05:36 )   PT: 24.5 sec;   INR: 2.23 ratio    PT/INR - ( 19 Apr 2018 05:54 )   PT: 14.0 sec;   INR: 1.29 ratio    PT/INR - ( 18 Apr 2018 05:30 )   PT: 11.0 sec;   INR: 1.02 ratio  PT/INR - ( 17 Apr 2018 06:31 )   PT: 11.2 sec;   INR: 1.04 ratio   PT/INR - ( 16 Apr 2018 15:20 )   PT: 11.2 sec;   INR: 1.04 ratio               MEDICATIONS  (STANDING):  ALBUTerol/ipratropium for Nebulization 3 milliLiter(s) Nebulizer every 6 hours  docusate sodium 100 milliGRAM(s) Oral two times a day  guaiFENesin    Syrup 200 milliGRAM(s) Oral every 6 hours  heparin  Infusion.  Unit(s)/Hr IV Continuous <Continuous>  hydrochlorothiazide 25 milliGRAM(s) Oral daily  lactobacillus acidophilus 1 Tablet(s) Oral three times a day with meals  methylPREDNISolone sodium succinate Injectable 20 milliGRAM(s) IV Push every 12 hours  montelukast 10 milliGRAM(s) Oral daily  pantoprazole  Injectable 40 milliGRAM(s) IV Push every 12 hours  senna 2 Tablet(s) Oral at bedtime  warfarin 7.5 milliGRAM(s) Oral daily    IMAGING:  US DPLX LWR EXT VEINS COMPL BI:  04/07/2018    FINDINGS:  RIGHT:  There is normal compressibility of the common femoral, femoral, and popliteal veins.  Visualized posterior tibial veins are within normal limits. Doppler examination shows normal spontaneous and phasic flow. Right popliteal cyst.    LEFT: There is normal compressibility of the common femoral, femoral, and popliteal veins. Thrombosis of the left soleal vein without extension into the popliteal vein. Doppler examination shows normal spontaneous and phasic flow. Left popliteal fossa cyst.    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 No sonographic evidence of acute deep venous thrombosis in the femoropopliteal systems bilaterally.     EXAM:  CT ANGIO CHEST PE PROTOCOL IC:  04/07/2018    IMPRESSION: Small focal pulmonary embolus in a posterior right upper lobe segmental vessel.    EGD:4-13-18  Normal esophagus and stomach  Bulging with superficial ulceration with small amount of adjacent blood in the second portion of the duodenum -- favor submucosal or extrinsic ulceration mass.  Biopsies deferred due to acute desaturation and anticoagulation    DVT Prophylaxis:  LMWH                   (  )  Heparin SQ           (  )  Coumadin             (x )  Xarelto                  (  )  Eliquis                   (  )  Venodynes           (  )  Ambulation          (X )  UFH                       (  )  Contraindicated  (  )

## 2018-04-22 LAB
INR BLD: 3.07 RATIO — HIGH (ref 0.88–1.16)
PROTHROM AB SERPL-ACNC: 33.9 SEC — HIGH (ref 9.8–12.7)

## 2018-04-22 PROCEDURE — 99233 SBSQ HOSP IP/OBS HIGH 50: CPT

## 2018-04-22 PROCEDURE — 71045 X-RAY EXAM CHEST 1 VIEW: CPT | Mod: 26

## 2018-04-22 RX ADMIN — SENNA PLUS 2 TABLET(S): 8.6 TABLET ORAL at 21:53

## 2018-04-22 RX ADMIN — Medication 3 MILLILITER(S): at 19:22

## 2018-04-22 RX ADMIN — MONTELUKAST 10 MILLIGRAM(S): 4 TABLET, CHEWABLE ORAL at 13:46

## 2018-04-22 RX ADMIN — Medication 200 MILLIGRAM(S): at 17:05

## 2018-04-22 RX ADMIN — Medication 100 MILLIGRAM(S): at 17:05

## 2018-04-22 RX ADMIN — Medication 25 MILLIGRAM(S): at 06:39

## 2018-04-22 RX ADMIN — Medication 200 MILLIGRAM(S): at 13:46

## 2018-04-22 RX ADMIN — LOSARTAN POTASSIUM 100 MILLIGRAM(S): 100 TABLET, FILM COATED ORAL at 06:39

## 2018-04-22 RX ADMIN — PANTOPRAZOLE SODIUM 40 MILLIGRAM(S): 20 TABLET, DELAYED RELEASE ORAL at 06:39

## 2018-04-22 RX ADMIN — Medication 100 MILLIGRAM(S): at 06:38

## 2018-04-22 RX ADMIN — Medication 20 MILLIGRAM(S): at 06:39

## 2018-04-22 RX ADMIN — Medication 200 MILLIGRAM(S): at 06:39

## 2018-04-22 RX ADMIN — Medication 0.12 MILLIGRAM(S): at 22:57

## 2018-04-22 RX ADMIN — Medication 200 MILLIGRAM(S): at 22:57

## 2018-04-22 RX ADMIN — Medication 3 MILLILITER(S): at 09:17

## 2018-04-22 RX ADMIN — Medication 3 MILLILITER(S): at 14:14

## 2018-04-22 RX ADMIN — Medication 3 MILLILITER(S): at 01:34

## 2018-04-22 NOTE — PROGRESS NOTE ADULT - SUBJECTIVE AND OBJECTIVE BOX
Subjective:  Patient is a 84y old  Female who presents with a chief complaint of sob, anemia      HPI:     84 F w/PMH COPD, CPAP at night, HTN, presents on 4/6/18 with progressively worse dyspnea over past several weeks.  She went to her pulmonologist, CXR was neg, she referred to Cardio.  After work up w/ ST, TTE was not very revealing, she was referred to GI.  She was found on anemic w/ Hg of 8 on cbc, which is not normal for her.  She has noticed some melena 1-2 episodes over the past month.  She denies any hematochezia, hematemesis. She endorses a feeling of fullness after she eats a small amount.  She's also been having pain across her upper abdomen, just below her diaphragm.  She cannot tell me if this pain is related to eating.  She does have a good appetite but just gets full fast.  Gdtr at bedside notes that she doesn't eat as much.  Pt denies any nausea.  Her last colonoscopy was 2 years ago.  She endorsed h/o gastric ulcers.   Pt was found to be dyspneic with acute respiratory failure.  Over her hospital course pt had EGD for anemia which showed duodenal superficial ulcer and possible submucosal tumor for which she does not which for further intervention.  Her hospital course was complicated by DVT and PE s/p IVC filter, currently on coumadin with heparin bridging.  She is being watched closely for GI bleeding given high risk of bleeding. Pt is also on IV steroid taper for COPD exacerbation.    4/22: Remains stable. Pt to go home today with close outpatient follow up.  All her questions were re-addressed.    REVIEW OF SYSTEMS: All other review of systems is negative unless indicated above.    Vital Signs Last 24 Hrs  T(C): 36.7 (22 Apr 2018 05:09), Max: 36.7 (21 Apr 2018 16:56)  T(F): 98.1 (22 Apr 2018 05:09), Max: 98.1 (21 Apr 2018 16:56)  HR: 72 (22 Apr 2018 09:27) (63 - 79)  BP: 123/63 (22 Apr 2018 05:09) (123/63 - 134/49)  BP(mean): --  RR: 18 (21 Apr 2018 16:56) (18 - 18)  SpO2: 95% (22 Apr 2018 09:27) (94% - 98%)    PHYSICAL EXAM:      GENERAL: NAD  NERVOUS SYSTEM:  Alert & Oriented X3, non- focal exam,  HEAD:  Atraumatic, Normocephalic  EYES: EOMI, PERRLA, conjunctiva and sclera clear  HEENT: Moist mucous membranes  NECK: Supple, No JVD  CHEST/LUNG: BS decreased bilaterally; No rales, no rhonchi, wheezing --> improving  HEART: Regular rate and rhythm; No murmurs, rubs, or gallops  ABDOMEN: Soft, Nontender, Nondistended; Bowel sounds present  GENITOURINARY- Voiding, no suprapubic tenderness  EXTREMITIES:  2+ Peripheral Pulses, + 1-2 edema LE b/l  MUSCULOSKELETAL:- No muscle tenderness, Muscle tone normal, No joint tenderness, no Joint swelling, Joint range of motion-normal  SKIN-no rash, no lesion    MEDICATIONS  (STANDING):  ALBUTerol/ipratropium for Nebulization 3 milliLiter(s) Nebulizer every 6 hours  docusate sodium 100 milliGRAM(s) Oral two times a day  guaiFENesin    Syrup 200 milliGRAM(s) Oral every 6 hours  hydrochlorothiazide 25 milliGRAM(s) Oral daily  losartan 100 milliGRAM(s) Oral daily  montelukast 10 milliGRAM(s) Oral daily  pantoprazole    Tablet 40 milliGRAM(s) Oral before breakfast  polyethylene glycol 3350 17 Gram(s) Oral daily  predniSONE   Tablet 20 milliGRAM(s) Oral daily  senna 2 Tablet(s) Oral at bedtime    MEDICATIONS  (PRN):  ALPRAZolam 0.125 milliGRAM(s) Oral at bedtime PRN anxiety  ondansetron Injectable 4 milliGRAM(s) IV Push every 6 hours PRN Nausea                              10.8   13.91 )-----------( 246      ( 21 Apr 2018 05:40 )             36.7           CAPILLARY BLOOD GLUCOSE          PT/INR - ( 22 Apr 2018 06:10 )   PT: 33.9 sec;   INR: 3.07 ratio         PTT - ( 21 Apr 2018 16:10 )  PTT:34.3 sec    Assessment and Plan:   84 F w/PMH COPD, CPAP at night, HTN, presents on 4/6/18 with progressively dyspnea over past several weeks, found w/ anemia, GI bleed, DVT/PE.       Acute respiratory failure with hypoxia: multifactorial - symptomatic anemia/COPD exacerbation/TUTU/PE: SLOWLY IMPROVING  - CT Angio Chest --> Small focal pulmonary embolus in a posterior right upper lobe segmental vessel.  - IV steroid tapered to PO prednisone  - s/p Azithromycin, s/p theophyline  - CPAP qhs and incentive spirometry  - Pt does not qualify for home O2.  She is improving.  She will continue anticoagulation for PE as described.  She will follow up with her outpatient providers for ongoing care.     Left DVT soleal vein / RUL PE S/P IVC filter this adm on 4/16. Completed heparin bridge protocol today. Pt with elevated INR.  She will hold 4/21 and 4/22.  INR check Monday. Resume coumadin Monday. 2 d echo - EF 65%. Follow up with PCP for ongoing management.    Acute blood loss anemia secondary to subacute upper GI bleed  - no active bleeding at this time s/p 2 units of PRBC.  - S/P EGD: bleed secondary to duodenal superficial ulcer, suspected submucosal tumor  - Dr Norton consult appreciated. Patient is very reluctant for any surgical interventions.   - c/w PPI    Leukocytosis: Likely steroid induced: Improving with tapering of steroids.    ESBL UTI: S/P on Imipenem     Essential hypertension: STABLE. c/w HCTZ. Stopped amlodipine secondary to leg edema. Increased losartan from 50mg to 100mg  4/20. This should be her new outpatient regimen.     Anxiety: c/w benzo prn    Attending Statement: 40 minutes spent on total encounter; more than 50% of the visit was spent counseling and/or coordinating care by the attending physician.

## 2018-04-22 NOTE — PROGRESS NOTE ADULT - SUBJECTIVE AND OBJECTIVE BOX
HPI: This is a 85 y/o female w PMH COPD, CPAP at night, HTN, has been experiencing progressively dyspnea over past several weeks.  She went to her pulmonologist, CXR was neg, she referred to Cardio.  After work up w/ ST, TTE was not very revealing, she was referred to GI.  She was found on anemic w/ Hg of 8 on cbc, which is not normal for her.  She has noticed some melena 1-2 episodes over the past month.  She denies any hematochezia, hematemesis. She endorses a feeling of fullness after she eats a small amount.  She's also been having pain across her upper abdomen, just below her diaphragm.  She cannot tell me if this pain is related to eating.  She does have a good appetite but just gets full fast.  Gdtr at bedside notes that she doesn't eat as much.  Pt denies any nausea.  Her last colonoscopy was 2 years ago.  She endorsed h/o gastric ulcers. Currently, pt is dyspneic w/ conversation, she's satting 85% on 3LNC, changed to ventimask, now at 95%.  She is feeling very anxious and requesting xanax- which she takes prn at home 1/2 of 0.25mg dose. 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 She continues to report of SOB. She denies prior VTE, CVA, MI, no blood clotting disorder. She is a former smoker, has been under stress recently. She reports of being an active caregiver to her  with Parkinson's disease, but now has HHA and decreased her mobility. Informed pt of possible oral anticoagulation with warfarin and she agrees to therapy, but will need to have endoscopy done first. Pending IVC filter placement tomorrow. Discussed with primary RN and no active bleeding noted today. Her H/H today 9.3/32.6 after s/p 2 units pRBC.  4/9: Patient seen at bedside with Dr. Kramer- explained will hold off on IVC filter as DVT is in small vein and no obvious bleeding at this time. Remain on UFH. Patient reports some improvement in breathing- but still with RUDD and O2 nc.  4/10: seen at bedside, some RUDD on exertion O 2 in place  4/11: seen at bedside, for Upper endo tomorrow, coumadin still on hold but discussed with pt foods to avoid/ vit k diet  4/13: Patient seen at bedside- on isolation- still with difficulty breathing- RUDD. Endo on hold until respiratory status improves. Solumedrol/theophylline on board per pulmon. Continuing UFH as IVC filter not necessary due to stable- no bleeding.   4-14-18 Pt seen at bedside on [facility]. no changes. pt oob in chair states she stands uo and walks in room every so often.  States she is still SOB  on ambulation.   4-15-18 Pt seen at bedside on 3 east.  States her breathing is the same.  States seh may have an IVC  filter placed tomorrow and then she wants to go home.  Pt informed she may need Rehab, lives with  who has Parkinson's.   discussed her anticoagulation with ufh and the need for oral anticoagulation (coumadin).  Pt concerned about frequent testing.   4-16-18 pt seen at bedside on 3[facility].  s/p IVC FILTER TODAY.  Pt states she is not sure if she will start on oral anticoagulation now.  I noted Dr Mendoza started pt on coumadin today.   4-17-16  Pt seen at bedside oob in chair.  Discussed her anticoagulation with coumadin and benefits discussed.  Literature provided questions answered will reinforce as needed.   Patient is a 84y old  Female who presents with a chief complaint of sob, anemia (06 Apr 2018 17:10)  4/18/18: Pt seen at bedside with her son, contact isolation in place urine ESBL. Reviewed INR result of 1.02 with patient. Subtherapeutic at day 3 of heparin gtt bridging. She reports of no active bleeding, and stable H/H 10.5/35.8. Informed her of increased dose tonight. Re-educated pt on diet modification of Vit K greens.   4/19 seen at bedside discussed INR, vit green diet, no concerns  4/20:  seen at bedside, D/W pt INR therapeutic  4/21: Patient seen at bedside with daughter. Discussed her possible dc to home today. INR 3.37 from 2.23 yesterday. Instructed patient to hold coumadin over weekend and resume 3mg daily Monday, Jose Guadalupe to draw INR Monday and Dr. Baker to follow. Patient and daughter verbalize understanding, daughter wrote instructions down.  4/22: Patient seen at bedside- to be dc'ed to home today. INR still elevated and as counseled yesterday- to not take coumadin tonight and get INR checked tomorrow. Dr. Baker to follow INR.    Consulted by Dr. Sinha for VTE prophylaxis, risk stratification, and anticoagulation management.    PAST MEDICAL & SURGICAL HISTORY:  COPD (chronic obstructive pulmonary disease)  Hypertension  Peptic Ulcer  Hyperlipemia  S/P Cataract Extraction: right eye 5/27/10    BMI: 30.6    CrCL: 69.88    IMPROVE VTE Risk Score: 5    IMPROVE Bleeding Risk Score: 5.5    Falls Risk:   High (  )  Mod (  )  Low ( X )    FAMILY HISTORY:  No pertinent family history in first degree relatives    Denies any personal or familial history of clotting or bleeding disorders.    Allergies    No Known Allergies    Intolerances    REVIEW OF SYSTEMS    (  )Fever	     (  )Constipation	( X )SOB			(  )Headache	(  )Dysuria  (  )Chills	     (  )Melena	(  )Dyspnea present on exertion    (  )Dizziness                    (  )Polyuria  (  )Nausea	     (  )Hematochezia	(  )Cough		                    (  )Syncope   	(  )Hematuria  (  )Vomiting    (  )Chest Pain	(  )Wheezing		(  )Weakness  (  )Diarrhea     (  )Palpitations	(  )Anorexia		(  )Myalgia    All other review of systems negative: Yes    PHYSICAL EXAM:    Constitutional: Appears Well    Neurological: A& O x 3    Skin: Warm    Respiratory and Thorax: increased effort; Breath sounds: decreased with b/l rhonchi- non-prod moist cough   	  Cardiovascular: S1, S2, regular, NMBR	    Gastrointestinal: BS + x 4Q, nontender	    Genitourinary:  Bladder nondistended, nontender    Musculoskeletal:   General Right:   no muscle/joint tenderness,   normal tone, no joint swelling,   ROM: full	    General Left:   no muscle/joint tenderness,   normal tone, no joint swelling,   ROM: full    Lower extrems:   Right: no calf tenderness              negative spencer's sign               + pedal pulses    Left:   no calf tenderness              negative spencer's sign               + pedal pulses                          10.8   13.91 )-----------( 246      ( 21 Apr 2018 05:40 )             36.7               PT/INR - ( 22 Apr 2018 06:10 )   PT: 33.9 sec;   INR: 3.07 ratio    PT/INR - ( 21 Apr 2018 05:40 )   PT: 37.3 sec;   INR: 3.37 ratio    PT/INR - ( 20 Apr 2018 05:36 )   PT: 24.5 sec;   INR: 2.23 ratio    PT/INR - ( 19 Apr 2018 05:54 )   PT: 14.0 sec;   INR: 1.29 ratio    PT/INR - ( 18 Apr 2018 05:30 )   PT: 11.0 sec;   INR: 1.02 ratio  PT/INR - ( 17 Apr 2018 06:31 )   PT: 11.2 sec;   INR: 1.04 ratio   PT/INR - ( 16 Apr 2018 15:20 )   PT: 11.2 sec;   INR: 1.04 ratio               MEDICATIONS  (STANDING):  ALBUTerol/ipratropium for Nebulization 3 milliLiter(s) Nebulizer every 6 hours  docusate sodium 100 milliGRAM(s) Oral two times a day  guaiFENesin    Syrup 200 milliGRAM(s) Oral every 6 hours  hydrochlorothiazide 25 milliGRAM(s) Oral daily  losartan 100 milliGRAM(s) Oral daily  montelukast 10 milliGRAM(s) Oral daily  pantoprazole    Tablet 40 milliGRAM(s) Oral before breakfast  polyethylene glycol 3350 17 Gram(s) Oral daily  predniSONE   Tablet 20 milliGRAM(s) Oral daily  senna 2 Tablet(s) Oral at bedtime      IMAGING:  US DPLX LWR EXT VEINS COMPL BI:  04/07/2018    FINDINGS:  RIGHT:  There is normal compressibility of the common femoral, femoral, and popliteal veins.  Visualized posterior tibial veins are within normal limits. Doppler examination shows normal spontaneous and phasic flow. Right popliteal cyst.    LEFT: There is normal compressibility of the common femoral, femoral, and popliteal veins. Thrombosis of the left soleal vein without extension into the popliteal vein. Doppler examination shows normal spontaneous and phasic flow. Left popliteal fossa cyst.    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 No sonographic evidence of acute deep venous thrombosis in the femoropopliteal systems bilaterally.     EXAM:  CT ANGIO CHEST PE PROTOCOL IC:  04/07/2018    IMPRESSION: Small focal pulmonary embolus in a posterior right upper lobe segmental vessel.    EGD:4-13-18  Normal esophagus and stomach  Bulging with superficial ulceration with small amount of adjacent blood in the second portion of the duodenum -- favor submucosal or extrinsic ulceration mass.  Biopsies deferred due to acute desaturation and anticoagulation    DVT Prophylaxis:  LMWH                   (  )  Heparin SQ           (  )  Coumadin             (x )  Xarelto                  (  )  Eliquis                   (  )  Venodynes           (  )  Ambulation          (X )  UFH                       (  )  Contraindicated  (  )

## 2018-04-22 NOTE — PROGRESS NOTE ADULT - ASSESSMENT
A 85 y/o female w PMH COPD, HTN/HLD, peptic ulcer presents for progressively worsening dyspnea. Pt found to have acute left soleal vein DVT and RUL focal PE so started on heparin gtt and now bridging to oral AC with warfarin. She is s/p IVC filter placement on 4/16/18 and s/p EGD on 4/13/18. Improving H/H.     PLAN:   ::Hold Coumadin today again- INR 3.07  ::When pt is d/c home will need to have home-draws, requesting Dr. Baker to follow up,    to set up  ::CBC/BMP, PT/INR  ::Monitor for s/s bleeding  ::Encourage ambulation  ::Venodyne contraindicated in left lower extremity    Will continue to follow.

## 2018-04-23 VITALS
TEMPERATURE: 98 F | RESPIRATION RATE: 18 BRPM | OXYGEN SATURATION: 95 % | DIASTOLIC BLOOD PRESSURE: 57 MMHG | SYSTOLIC BLOOD PRESSURE: 117 MMHG | HEART RATE: 76 BPM

## 2018-04-23 LAB
INR BLD: 1.87 RATIO — HIGH (ref 0.88–1.16)
PROTHROM AB SERPL-ACNC: 20.5 SEC — HIGH (ref 9.8–12.7)

## 2018-04-23 RX ORDER — WARFARIN SODIUM 2.5 MG/1
7.5 TABLET ORAL DAILY
Qty: 0 | Refills: 0 | Status: DISCONTINUED | OUTPATIENT
Start: 2018-04-23 | End: 2018-04-23

## 2018-04-23 RX ORDER — WARFARIN SODIUM 2.5 MG/1
2 TABLET ORAL
Qty: 17 | Refills: 0 | OUTPATIENT
Start: 2018-04-23

## 2018-04-23 RX ADMIN — Medication 200 MILLIGRAM(S): at 10:45

## 2018-04-23 RX ADMIN — Medication 100 MILLIGRAM(S): at 06:44

## 2018-04-23 RX ADMIN — MONTELUKAST 10 MILLIGRAM(S): 4 TABLET, CHEWABLE ORAL at 10:45

## 2018-04-23 RX ADMIN — Medication 25 MILLIGRAM(S): at 06:44

## 2018-04-23 RX ADMIN — Medication 3 MILLILITER(S): at 10:08

## 2018-04-23 RX ADMIN — Medication 3 MILLILITER(S): at 01:22

## 2018-04-23 RX ADMIN — LOSARTAN POTASSIUM 100 MILLIGRAM(S): 100 TABLET, FILM COATED ORAL at 06:44

## 2018-04-23 RX ADMIN — PANTOPRAZOLE SODIUM 40 MILLIGRAM(S): 20 TABLET, DELAYED RELEASE ORAL at 06:46

## 2018-04-23 RX ADMIN — WARFARIN SODIUM 7.5 MILLIGRAM(S): 2.5 TABLET ORAL at 10:45

## 2018-04-23 RX ADMIN — Medication 200 MILLIGRAM(S): at 06:44

## 2018-04-23 RX ADMIN — Medication 20 MILLIGRAM(S): at 06:44

## 2018-04-23 NOTE — PROVIDER CONTACT NOTE (OTHER) - DATE AND TIME:
09-Apr-2018 09:46
09-Apr-2018 14:19
23-Apr-2018 12:47
23-Apr-2018 12:52
06-Apr-2018 21:54
06-Apr-2018 21:56
07-Apr-2018 04:25
07-Apr-2018 20:54
07-Apr-2018 20:59
07-Apr-2018 23:42
08-Apr-2018 11:33
07-Apr-2018 21:02

## 2018-04-23 NOTE — CHART NOTE - NSCHARTNOTEFT_GEN_A_CORE
Assessment: Nutrition follow up       Assessment and Plan- MD    1. GI bleed- GI following. Transfuse to keep Hgb > 8. EGD c/w GIST tumor? Last hgb 10.8. Continue PPI. Mgmt as per GI. No active bleeding now.     2. PE- small RUL PE- Also with L soleal vein thrombosis. Continue coumadin as well- aim for INR 2-2.5. IVC placed as pt has GI bleed, high likelihood of rebleeding and has PE/DVT. Heme following. 2Decho- normal LV and RV fxn. Hypercoagulable w/u as per Heme.      3. Essential hypertension- stable, continue medical mgmt.     4. DVT proph, replete lytes as needed.     5. COPD- cont steroid taper, nebs, inhalers. Pulm followin. Breathing improved but will need home O2.    6. LE edema- Worsened in setting of steroid use. Can start lasix 20mg daily.      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 Will need further testing, w/u at a tertiary care center. Pt to f/u with Dr. Vizcarra or Dr. Glanzmann.    8. OOB/ambulater/PT.    Pt continues to have a good consumption of meals ~%. Remains on coumadin with multiple questions on diet. All questions answered and RD contact information provided for any further intervention. Possible d/c today pt reports however no indication in chart notes. Skin intact with +1/+2 edema documented. Will continue with current POC.     Recommendations:    1) continue with regular diet however suggest initiating low sodium restriction secondary to edema/history.   2) obtain and monitor daily weights   3) monitor labs     Diet Presciption: Diet, NPO after Midnight:      NPO Start Date: 12-Apr-2018,   NPO Start Time: 23:59 (04-12-18 @ 10:03)  Diet, Regular (04-13-18 @ 17:03)      Wt Hx: No recent weights obtained  Height (cm): 165.1 (04-06-18 @ 09:39)  Weight (kg): 83.5 (04-06-18 @ 09:39)  BMI (kg/m2): 30.6 (04-06-18 @ 09:39)        Estimated Needs:   [ x] no change since previous assessment    · Nutrition Diagnostic Terminology #1: Weight  · Weight: Overweight/obesity  · Signs/Symptoms: BMI 30.6, Edema +1.  · Nutrition Intervention: Meals and Snack  · Meals and Snacks: General/healthful diet  · Goal/Expected Outcome: Intentional weight loss <25 BMI . No signs of edema      Nutrition Diagnosis is [x ] ongoing  [ ] resolved [ ] not applicable     Pertinent Medications: MEDICATIONS  (STANDING):  ALBUTerol/ipratropium for Nebulization 3 milliLiter(s) Nebulizer every 6 hours  docusate sodium 100 milliGRAM(s) Oral two times a day  guaiFENesin    Syrup 200 milliGRAM(s) Oral every 6 hours  hydrochlorothiazide 25 milliGRAM(s) Oral daily  losartan 100 milliGRAM(s) Oral daily  montelukast 10 milliGRAM(s) Oral daily  pantoprazole    Tablet 40 milliGRAM(s) Oral before breakfast  polyethylene glycol 3350 17 Gram(s) Oral daily  predniSONE   Tablet 20 milliGRAM(s) Oral daily  senna 2 Tablet(s) Oral at bedtime    MEDICATIONS  (PRN):  ondansetron Injectable 4 milliGRAM(s) IV Push every 6 hours PRN Nausea    Pertinent Labs:  4/19/18 BUN 31       Skin: drew score = 21  Edema +1/+2 noted          Monitoring and Evaluation:   [x] PO intake/Nutr support infusion [ x ] Tolerance to Nutr [ x ] weights [ x ] labs[ x ] follow up per protocol  [ ] other:

## 2018-04-23 NOTE — PROGRESS NOTE ADULT - SUBJECTIVE AND OBJECTIVE BOX
Cardiology Progress Note    HPI: 84 F w/PMH COPD, CPAP at night, HTN, has been experiencing progressively dyspnea over past several weeks.  She went to her pulmonologist, CXR was neg, she referred to Cardio.  After work up w/ ST, TTE was not very revealing, she was referred to GI.  She was found on anemic w/ Hg of 8 on cbc, which is not normal for her.  She has noticed some melena 1-2 episodes over the past month.  She denies any hematochezia, hematemesis. She endorses a feeling of fullness after she eats a small amount.  She's also been having pain across her upper abdomen, just below her diaphragm.  She cannot tell me if this pain is related to eating.  She does have a good appetite but just gets full fast.  Gdtr at bedside notes that she doesn't eat as much.  Pt denies any nausea.  Her last colonoscopy was 2 years ago.  She endorsed h/o gastric ulcers. Pt is dyspneic w/ conversation, she's satting 85% on 3LNC, changed to ventimask, now at 95%.  She is feeling very anxious and requesting xanax- which she takes prn at home 1/2 of 0.25mg dose. (2018 17:10)    - No CP. +SOB. SR on tele. Feels tired. No events last pm.     - Feels slightly better today. +SOB, no CP. Awaiting IVC filter today.     4/10- Discussed case with interventional cardiology- holding off on IVC filter for now. No active GI bleed now. +SOB- mildly improved. No CP.     - No CP, SOB mildly improved. No fevers. No events last pm.    - Still awaiting EGD. Now with LE edema. No CP, SOB improved.     - Chart reviewed. S/p EGD- c/w GIST tumor. No active bleeding. No CP, SOB improved.     - SOB stable, no CP. Pt now s/p IVC filter yesterday.     - Mild cough, no CP. No events last pm. Feels weak.     - Chart reviewed. No events last pm. No CP, SOB stable.     - Feeling better today. No CP. SOB stable. Will need home O2. No events last pm.     PAST MEDICAL & SURGICAL HISTORY:  COPD (chronic obstructive pulmonary disease)  Hypertension  Peptic Ulcer  Hyperlipemia  S/P Cataract Extraction: right eye 5/27/10    Allergies  No Known Allergies    SOCIAL HISTORY: Denies tobacco, etoh abuse or illicit drug use    FAMILY HISTORY: No pertinent family history in first degree relatives    MEDICATIONS  (STANDING):  ALBUTerol/ipratropium for Nebulization 3 milliLiter(s) Nebulizer every 6 hours  amLODIPine   Tablet 5 milliGRAM(s) Oral daily  azithromycin  IVPB 500 milliGRAM(s) IV Intermittent every 24 hours  azithromycin  IVPB      guaiFENesin    Syrup 200 milliGRAM(s) Oral every 6 hours  heparin  Infusion.  Unit(s)/Hr (10 mL/Hr) IV Continuous <Continuous>  hydrochlorothiazide 25 milliGRAM(s) Oral daily  losartan 50 milliGRAM(s) Oral daily  methylPREDNISolone sodium succinate Injectable 40 milliGRAM(s) IV Push every 12 hours  montelukast 10 milliGRAM(s) Oral daily  pantoprazole Infusion 8 mG/Hr (10 mL/Hr) IV Continuous <Continuous>    MEDICATIONS  (PRN):  ALPRAZolam 0.125 milliGRAM(s) Oral daily PRN anxiety  heparin  Injectable 5000 Unit(s) IV Push every 6 hours PRN For aPTT less than 40  ondansetron Injectable 4 milliGRAM(s) IV Push every 6 hours PRN Nausea      Vital Signs Last 24 Hrs  T(C): 36.4 (2018 05:42), Max: 37.2 (2018 11:55)  T(F): 97.5 (2018 05:42), Max: 99 (2018 11:55)  HR: 53 (2018 05:42) (50 - 88)  BP: 110/55 (2018 05:42) (110/55 - 131/65)  BP(mean): --  RR: 18 (2018 05:42) (18 - 22)  SpO2: 94% (2018 05:42) (85% - 95%)    REVIEW OF SYSTEMS:    CONSTITUTIONAL:  As per HPI.  HEENT:  Eyes:  No diplopia or blurred vision. ENT:  No earache, sore throat or runny nose.  CARDIOVASCULAR:  No pressure, squeezing, strangling, tightness, heaviness or aching about the chest, neck, axilla or epigastrium.  RESPIRATORY:  No cough, shortness of breath, PND or orthopnea.  GASTROINTESTINAL:  No nausea, vomiting or diarrhea.  GENITOURINARY:  No dysuria, frequency or urgency.  MUSCULOSKELETAL:  As per HPI.  SKIN:  No change in skin, hair or nails.  NEUROLOGIC:  No paresthesias, fasciculations, seizures or weakness.    PHYSICAL EXAMINATION:    GENERAL APPEARANCE:  Pt. is not currently dyspneic, in no distress. Pt. is alert, oriented, and pleasant.  HEENT:  Pupils are normal and react normally. No icterus. Mucous membranes well colored.  NECK:  Supple. No lymphadenopathy. Jugular venous pressure not elevated. Carotids equal.   HEART:   The cardiac impulse has a normal quality. There are no murmurs, rubs or gallops noted  CHEST:  Chest is clear to auscultation. Normal respiratory effort.  ABDOMEN:  Soft and nontender.   EXTREMITIES:  1-2+ LE edema.    I&O's Summary      LABS:                        9.3    18.67 )-----------( 351      ( 2018 06:03 )             32.6     04-08    141  |  104  |  25<H>  ----------------------------<  140<H>  4.3   |  31  |  0.79    Ca    9.0      2018 06:03    TPro  6.8  /  Alb  2.9<L>  /  TBili  0.4  /  DBili  x   /  AST  14<L>  /  ALT  20  /  AlkPhos  77  04-06    LIVER FUNCTIONS - ( 2018 12:29 )  Alb: 2.9 g/dL / Pro: 6.8 gm/dL / ALK PHOS: 77 U/L / ALT: 20 U/L / AST: 14 U/L / GGT: x           PT/INR - ( 2018 12:29 )   PT: 11.8 sec;   INR: 1.09 ratio         PTT - ( 2018 02:41 )  PTT:43.3 sec  CARDIAC MARKERS ( 2018 16:07 )  <0.015 ng/mL / x     / x     / x     / x      CARDIAC MARKERS ( 2018 12:29 )  <0.015 ng/mL / x     / x     / x     / x        Urinalysis Basic - ( 2018 16:57 )    Color: Yellow / Appearance: Clear / S.015 / pH: x  Gluc: x / Ketone: Negative  / Bili: Negative / Urobili: Negative mg/dL   Blood: x / Protein: 15 mg/dL / Nitrite: Positive   Leuk Esterase: Small / RBC: 0-2 /HPF / WBC 5-8 /HPF   Sq Epi: x / Non Sq Epi: Occasional / Bacteria: Many    EKG: < from: 12 Lead ECG (18 @ 09:44) >  Sinus rhythm with Premature supraventricular complexes and with occasional Premature ventricular complexes  Possible Left atrial enlargement  Nonspecific ST abnormality  Abnormal ECG    TELEMETRY: SR    CARDIAC TESTS: < from: Transthoracic Echocardiogram (18 @ 10:22) >   Fibrocalcific changes noted to the mitral valve leaflets with preserved   leaflet excursion.   EA reversal of the mitral inflow consistent with reduced compliance of   the   left ventricle.   Fibrocalcific changes noted to the Aortic valve leaflets with preserved   leaflet excursion.   Mild aortic annular calcification is noted.   Mild (1+) tricuspid valve regurgitation is present.   Trace pulmonic valvular regurgitation is present.   Normal appearing left atrium.   The left ventricle is normal in size, wall thickness, wall motion and   contractility.   Estimated left ventricular ejection fraction is 65-70 %.   The right atrium appears mildly dilated.   Normal appearing right ventricle structure and function.   The IVC is dilated but collapsing with inspiration.   No evidence of pericardial effusion.   No evidence of pleural effusion.    < end of copied text >      RADIOLOGY & ADDITIONAL STUDIES: < from: US Duplex Venous Lower Ext Complete, Bilateral (18 @ 17:25) >    Left soleal vein thrombosis without extension into the popliteal vein.    No sonographic evidence of acute deep venous thrombosis in the   femoropopliteal systems bilaterally.     < from: CT Angio Chest PE Protocol w/ IV Cont (18 @ 16:53) >    Small focal pulmonary embolus in a posterior right upper lobe segmental   vessel.        ASSESSMENT & PLAN:

## 2018-04-23 NOTE — PROVIDER CONTACT NOTE (OTHER) - SITUATION
Spoke with service will notify Dr. Kramer
[facility] WAS NOTIFIED. WILL RELAY APPOINTMENT ON D/C PAPER WORK.
SPOKE WITH JAMES FROM DR. HORAN'S OFFICE. MD WANTS PT TO COME IN ON WEDNESDAY AND SEE AARON MCLEOD. OK FOR WEDNESDAY.
Dr. Baker's answering service is aware of consult.
Dr. Taylor's answering service is aware of consult.
Left Message
Pts o2 sat 85% on 3L n/c and pt is SOB
Spoke with service will notify 
called dr jacobo service on consult.. dr abraham aware

## 2018-04-23 NOTE — PROGRESS NOTE ADULT - ASSESSMENT
PROBLEMS:    RUL PE & DVT s/p GFF  Acute respiratory failure with hypoxia  acute excerbation of COPD  Symptomatic anemia- s/p transfusion- duodenal mass  Gastrointestinal hemorrhage with melena  Essential hypertension    PLAN;    monitor-inr 1.87  prednisone 20mg daily  AEROSLS  SUPPORTIVE CARE  DVT PROPHYLASIX

## 2018-04-23 NOTE — PROGRESS NOTE ADULT - SUBJECTIVE AND OBJECTIVE BOX
Subjective:  Patient is a 84y old  Female who presents with a chief complaint of sob, anemia      HPI:     84 F w/PMH COPD, CPAP at night, HTN, presents on 4/6/18 with progressively worse dyspnea over past several weeks.  She went to her pulmonologist, CXR was neg, she referred to Cardio.  After work up w/ ST, TTE was not very revealing, she was referred to GI.  She was found on anemic w/ Hg of 8 on cbc, which is not normal for her.  She has noticed some melena 1-2 episodes over the past month.  She denies any hematochezia, hematemesis. She endorses a feeling of fullness after she eats a small amount.  She's also been having pain across her upper abdomen, just below her diaphragm.  She cannot tell me if this pain is related to eating.  She does have a good appetite but just gets full fast.  Gdtr at bedside notes that she doesn't eat as much.  Pt denies any nausea.  Her last colonoscopy was 2 years ago.  She endorsed h/o gastric ulcers.   Pt was found to be dyspneic with acute respiratory failure.  Over her hospital course pt had EGD for anemia which showed duodenal superficial ulcer and possible submucosal tumor for which she does not which for further intervention.  Her hospital course was complicated by DVT and PE s/p IVC filter, currently on coumadin with heparin bridging.  She is being watched closely for GI bleeding given high risk of bleeding. Pt is also on IV steroid taper for COPD exacerbation.    4/22: Remains stable. Pt to go home today with close outpatient follow up.  All her questions were re-addressed.  4/23: Discharge yesterday on hold due to persistent respiratory failure on room air during ambulation.  Specifically patient desaturated to < 88% on room air during ambulation.  She will need to be set up for home O2 prior to discharge.  Otherwise she offers no new symptoms and is comfortable.  Her medical plan otherwise remains the same.    REVIEW OF SYSTEMS: All other review of systems is negative unless indicated above.    Vital Signs Last 24 Hrs  T(C): 36.5 (23 Apr 2018 11:32), Max: 36.5 (23 Apr 2018 04:35)  T(F): 97.7 (23 Apr 2018 11:32), Max: 97.7 (23 Apr 2018 04:35)  HR: 76 (23 Apr 2018 11:32) (59 - 81)  BP: 117/57 (23 Apr 2018 11:32) (117/57 - 119/55)  RR: 18 (23 Apr 2018 11:32) (17 - 18)  SpO2: 95% (23 Apr 2018 11:32) (95% - 99%)    PHYSICAL EXAM:      GENERAL: NAD  NERVOUS SYSTEM:  Alert & Oriented X3, non- focal exam,  HEAD:  Atraumatic, Normocephalic  EYES: EOMI, PERRLA, conjunctiva and sclera clear  HEENT: Moist mucous membranes  NECK: Supple, No JVD  CHEST/LUNG: BS decreased bilaterally; No rales, no rhonchi, wheezing --> improving  HEART: Regular rate and rhythm; No murmurs, rubs, or gallops  ABDOMEN: Soft, Nontender, Nondistended; Bowel sounds present  GENITOURINARY- Voiding, no suprapubic tenderness  EXTREMITIES:  2+ Peripheral Pulses, + 1-2 edema LE b/l  MUSCULOSKELETAL:- No muscle tenderness, Muscle tone normal, No joint tenderness, no Joint swelling, Joint range of motion-normal  SKIN-no rash, no lesion    MEDICATIONS  (STANDING):  ALBUTerol/ipratropium for Nebulization 3 milliLiter(s) Nebulizer every 6 hours  docusate sodium 100 milliGRAM(s) Oral two times a day  guaiFENesin    Syrup 200 milliGRAM(s) Oral every 6 hours  hydrochlorothiazide 25 milliGRAM(s) Oral daily  losartan 100 milliGRAM(s) Oral daily  montelukast 10 milliGRAM(s) Oral daily  pantoprazole    Tablet 40 milliGRAM(s) Oral before breakfast  polyethylene glycol 3350 17 Gram(s) Oral daily  predniSONE   Tablet 20 milliGRAM(s) Oral daily  senna 2 Tablet(s) Oral at bedtime  warfarin 7.5 milliGRAM(s) Oral daily    MEDICATIONS  (PRN):  ondansetron Injectable 4 milliGRAM(s) IV Push every 6 hours PRN Nausea        CAPILLARY BLOOD GLUCOSE          PT/INR - ( 23 Apr 2018 06:00 )   PT: 20.5 sec;   INR: 1.87 ratio         PTT - ( 21 Apr 2018 16:10 )  PTT:34.3 sec    Assessment and Plan:   84 F w/PMH COPD, CPAP at night, HTN, presents on 4/6/18 with progressively dyspnea over past several weeks, found w/ anemia, GI bleed, DVT/PE.       Acute respiratory failure with hypoxia: multifactorial - symptomatic anemia/COPD exacerbation/TUTU/PE: SLOWLY IMPROVING  - CT Angio Chest --> Small focal pulmonary embolus in a posterior right upper lobe segmental vessel.  - IV steroid tapered to PO prednisone  - s/p Azithromycin, s/p theophyline  - CPAP qhs and incentive spirometry  - Pt now qualifies for home O2.  She is < 88% on room air during ambulation.  She is otherwise stable for discharge.      Left DVT soleal vein / RUL PE S/P IVC filter this adm on 4/16. Completed heparin bridge protocol. 2 d echo - EF 65%. Follow up with PCP for ongoing management.  INR check tomorrow. Will give coumadin today for INR 1.87.  To have INR checked tomorrow with Dr. Baker.     Acute blood loss anemia secondary to subacute upper GI bleed  - no active bleeding at this time s/p 2 units of PRBC.  - S/P EGD: bleed secondary to duodenal superficial ulcer, suspected submucosal tumor  - Dr Norton consult appreciated. Patient is very reluctant for any surgical interventions.   - c/w PPI    Leukocytosis: Likely steroid induced: Improving with tapering of steroids.    ESBL UTI: S/P on Imipenem     Essential hypertension: STABLE. c/w HCTZ. Stopped amlodipine secondary to leg edema. Increased losartan from 50mg to 100mg  4/20. This should be her new outpatient regimen.     Anxiety: c/w benzo prn    Attending Statement: 40 minutes spent on total encounter; more than 50% of the visit was spent counseling and/or coordinating care by the attending physician.

## 2018-04-23 NOTE — PROVIDER CONTACT NOTE (OTHER) - NAME OF MD/NP/PA/DO NOTIFIED:
DISCHARGE FOLLOW UP APT WITH DR. FARLEY ON 4/27 @2PM
DISCHARGE FOLLOW UP APT WITH DR. HORAN (PA) WED 4/25 @10AM
INFECTIOUS DISEASE, DR. WILCOX RECEIVING
SONI, SPOKE WITH HARSHA.
AARON Bernstein
Ananya Andrews
Dr. Murguia
Dr. Vizcarra
GI
Pulmonary
rosanne jacobo
Dr. Kramer

## 2018-04-23 NOTE — PROGRESS NOTE ADULT - PROVIDER SPECIALTY LIST ADULT
Anticoag Management
Cardiology
Gastroenterology
Hospitalist
Infectious Disease
Pulmonology
Anticoag Management
Anticoag Management
Hospitalist
Cardiology
Gastroenterology
Infectious Disease
Pulmonology
Pulmonology
Cardiology
Hospitalist

## 2018-04-23 NOTE — PROGRESS NOTE ADULT - ASSESSMENT
84 F w/PMH COPD, CPAP at night, HTN, presents on 4/6/18 with progressively dyspnea over past several weeks.  Found w/ anemia, +melena. Pt also with small PE, L soleal vein thrombosis.     1. GI bleed- GI following. Transfuse to keep Hgb > 8. EGD c/w GIST tumor? Last hgb 10.8. Continue PPI. Mgmt as per GI. No active bleeding now.     2. PE- small RUL PE- Also with L soleal vein thrombosis. Continue coumadin as well- aim for INR 2-2.5. IVC placed as pt has GI bleed, high likelihood of rebleeding and has PE/DVT. Heme following. 2Decho- normal LV and RV fxn. Hypercoagulable w/u as per Heme.      3. Essential hypertension- stable, continue medical mgmt.     4. DVT proph, replete lytes as needed.     5. COPD- cont steroid taper, nebs, inhalers. Pulm followin. Breathing improved but will need home O2.    6. LE edema- Worsened in setting of steroid use. Can start lasix 20mg daily.      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 Will need further testing, w/u at a tertiary care center. Pt to f/u with Dr. Vizcarra or Dr. Glanzmann.    8. OOB/ambulater/PT.

## 2018-04-23 NOTE — PROGRESS NOTE ADULT - SUBJECTIVE AND OBJECTIVE BOX
Subjective:    pat better, no new complaint.    Home Medications:  Anoro Ellipta 62.5 mcg-25 mcg/inh inhalation powder: 1 puff(s) inhaled once a day (06 Apr 2018 17:25)  hydroCHLOROthiazide 25 mg oral tablet: 1 tab(s) orally once a day (21 Apr 2018 13:27)  montelukast 10 mg oral tablet: 1 tab(s) orally once a day (06 Apr 2018 17:25)  ProAir HFA 90 mcg/inh inhalation aerosol: 2 puff(s) inhaled 4 times a day, As Needed (06 Apr 2018 17:25)  Vitamin D3 2000 intl units oral tablet: 1 tab(s) orally once a day (06 Apr 2018 17:25)  Xanax 0.25 mg oral tablet: 0.5 tab(s) orally once a day, As Needed for anxiety  (06 Apr 2018 17:25)    MEDICATIONS  (STANDING):  ALBUTerol/ipratropium for Nebulization 3 milliLiter(s) Nebulizer every 6 hours  docusate sodium 100 milliGRAM(s) Oral two times a day  guaiFENesin    Syrup 200 milliGRAM(s) Oral every 6 hours  hydrochlorothiazide 25 milliGRAM(s) Oral daily  losartan 100 milliGRAM(s) Oral daily  montelukast 10 milliGRAM(s) Oral daily  pantoprazole    Tablet 40 milliGRAM(s) Oral before breakfast  polyethylene glycol 3350 17 Gram(s) Oral daily  predniSONE   Tablet 20 milliGRAM(s) Oral daily  senna 2 Tablet(s) Oral at bedtime    MEDICATIONS  (PRN):  ondansetron Injectable 4 milliGRAM(s) IV Push every 6 hours PRN Nausea      Allergies    No Known Allergies    Intolerances        Vital Signs Last 24 Hrs  T(C): 36.5 (23 Apr 2018 04:35), Max: 36.5 (23 Apr 2018 04:35)  T(F): 97.7 (23 Apr 2018 04:35), Max: 97.7 (23 Apr 2018 04:35)  HR: 59 (23 Apr 2018 04:35) (59 - 86)  BP: 119/55 (23 Apr 2018 04:35) (117/45 - 119/55)  BP(mean): --  RR: 17 (23 Apr 2018 04:35) (17 - 20)  SpO2: 99% (23 Apr 2018 04:35) (84% - 99%)      PHYSICAL EXAMINATION:    NECK:  Supple. No lymphadenopathy. Jugular venous pressure not elevated. Carotids equal.   HEART:   The cardiac impulse has a normal quality. Reg., Nl S1 and S2.  There are no murmurs, rubs or gallops noted  CHEST:  Chest is clear to auscultation. Normal respiratory effort.  ABDOMEN:  Soft and nontender.   EXTREMITIES:  There is no edema.       LABS:          PT/INR - ( 23 Apr 2018 06:00 )   PT: 20.5 sec;   INR: 1.87 ratio         PTT - ( 21 Apr 2018 16:10 )  PTT:34.3 sec

## 2018-04-26 DIAGNOSIS — D50.9 IRON DEFICIENCY ANEMIA, UNSPECIFIED: ICD-10-CM

## 2018-04-26 DIAGNOSIS — B96.20 UNSPECIFIED ESCHERICHIA COLI [E. COLI] AS THE CAUSE OF DISEASES CLASSIFIED ELSEWHERE: ICD-10-CM

## 2018-04-26 DIAGNOSIS — F41.9 ANXIETY DISORDER, UNSPECIFIED: ICD-10-CM

## 2018-04-26 DIAGNOSIS — K26.4 CHRONIC OR UNSPECIFIED DUODENAL ULCER WITH HEMORRHAGE: ICD-10-CM

## 2018-04-26 DIAGNOSIS — J96.01 ACUTE RESPIRATORY FAILURE WITH HYPOXIA: ICD-10-CM

## 2018-04-26 DIAGNOSIS — T38.0X5A ADVERSE EFFECT OF GLUCOCORTICOIDS AND SYNTHETIC ANALOGUES, INITIAL ENCOUNTER: ICD-10-CM

## 2018-04-26 DIAGNOSIS — I10 ESSENTIAL (PRIMARY) HYPERTENSION: ICD-10-CM

## 2018-04-26 DIAGNOSIS — E78.5 HYPERLIPIDEMIA, UNSPECIFIED: ICD-10-CM

## 2018-04-26 DIAGNOSIS — D64.9 ANEMIA, UNSPECIFIED: ICD-10-CM

## 2018-04-26 DIAGNOSIS — G47.33 OBSTRUCTIVE SLEEP APNEA (ADULT) (PEDIATRIC): ICD-10-CM

## 2018-04-26 DIAGNOSIS — I26.99 OTHER PULMONARY EMBOLISM WITHOUT ACUTE COR PULMONALE: ICD-10-CM

## 2018-04-26 DIAGNOSIS — R19.00 INTRA-ABDOMINAL AND PELVIC SWELLING, MASS AND LUMP, UNSPECIFIED SITE: ICD-10-CM

## 2018-04-26 DIAGNOSIS — D62 ACUTE POSTHEMORRHAGIC ANEMIA: ICD-10-CM

## 2018-04-26 DIAGNOSIS — N39.0 URINARY TRACT INFECTION, SITE NOT SPECIFIED: ICD-10-CM

## 2018-04-26 DIAGNOSIS — D72.829 ELEVATED WHITE BLOOD CELL COUNT, UNSPECIFIED: ICD-10-CM

## 2018-04-26 DIAGNOSIS — J44.1 CHRONIC OBSTRUCTIVE PULMONARY DISEASE WITH (ACUTE) EXACERBATION: ICD-10-CM

## 2018-04-26 DIAGNOSIS — I82.432 ACUTE EMBOLISM AND THROMBOSIS OF LEFT POPLITEAL VEIN: ICD-10-CM

## 2018-07-20 ENCOUNTER — APPOINTMENT (OUTPATIENT)
Dept: NEUROLOGY | Facility: CLINIC | Age: 83
End: 2018-07-20
Payer: MEDICARE

## 2018-07-20 VITALS
DIASTOLIC BLOOD PRESSURE: 74 MMHG | HEIGHT: 65 IN | SYSTOLIC BLOOD PRESSURE: 142 MMHG | HEART RATE: 72 BPM | BODY MASS INDEX: 30.82 KG/M2 | WEIGHT: 185 LBS

## 2018-07-20 DIAGNOSIS — Z82.49 FAMILY HISTORY OF ISCHEMIC HEART DISEASE AND OTHER DISEASES OF THE CIRCULATORY SYSTEM: ICD-10-CM

## 2018-07-20 DIAGNOSIS — I26.99 OTHER PULMONARY EMBOLISM W/OUT ACUTE COR PULMONALE: ICD-10-CM

## 2018-07-20 DIAGNOSIS — D64.9 ANEMIA, UNSPECIFIED: ICD-10-CM

## 2018-07-20 DIAGNOSIS — I10 ESSENTIAL (PRIMARY) HYPERTENSION: ICD-10-CM

## 2018-07-20 DIAGNOSIS — G31.84 MILD COGNITIVE IMPAIRMENT, SO STATED: ICD-10-CM

## 2018-07-20 DIAGNOSIS — Z87.39 PERSONAL HISTORY OF OTHER DISEASES OF THE MUSCULOSKELETAL SYSTEM AND CONNECTIVE TISSUE: ICD-10-CM

## 2018-07-20 DIAGNOSIS — G47.30 SLEEP APNEA, UNSPECIFIED: ICD-10-CM

## 2018-07-20 DIAGNOSIS — Z78.9 OTHER SPECIFIED HEALTH STATUS: ICD-10-CM

## 2018-07-20 DIAGNOSIS — Z86.59 PERSONAL HISTORY OF OTHER MENTAL AND BEHAVIORAL DISORDERS: ICD-10-CM

## 2018-07-20 DIAGNOSIS — Z87.891 PERSONAL HISTORY OF NICOTINE DEPENDENCE: ICD-10-CM

## 2018-07-20 DIAGNOSIS — Z82.5 FAMILY HISTORY OF ASTHMA AND OTHER CHRONIC LOWER RESPIRATORY DISEASES: ICD-10-CM

## 2018-07-20 DIAGNOSIS — J44.9 CHRONIC OBSTRUCTIVE PULMONARY DISEASE, UNSPECIFIED: ICD-10-CM

## 2018-07-20 PROCEDURE — 99204 OFFICE O/P NEW MOD 45 MIN: CPT

## 2018-07-20 RX ORDER — LOSARTAN POTASSIUM 50 MG/1
50 TABLET, FILM COATED ORAL
Refills: 0 | Status: ACTIVE | COMMUNITY

## 2018-07-20 RX ORDER — HYDROCHLOROTHIAZIDE 25 MG/1
25 TABLET ORAL
Refills: 0 | Status: ACTIVE | COMMUNITY

## 2018-07-20 RX ORDER — ALBUTEROL SULFATE 90 UG/1
AEROSOL, METERED RESPIRATORY (INHALATION)
Refills: 0 | Status: ACTIVE | COMMUNITY

## 2018-07-20 RX ORDER — PANTOPRAZOLE 40 MG/1
40 TABLET, DELAYED RELEASE ORAL
Refills: 0 | Status: ACTIVE | COMMUNITY

## 2018-07-20 RX ORDER — WARFARIN 4 MG/1
TABLET ORAL
Refills: 0 | Status: ACTIVE | COMMUNITY

## 2018-07-20 RX ORDER — CHROMIUM 200 MCG
1000 TABLET ORAL
Refills: 0 | Status: ACTIVE | COMMUNITY

## 2018-09-04 ENCOUNTER — CHART COPY (OUTPATIENT)
Age: 83
End: 2018-09-04

## 2018-10-08 NOTE — ED PROVIDER NOTE - NORMAL, MLM
Subjective


Date of Service: 10/08/18


Interval History: 





Ms. Vasquez reports a black bowel movement this morning.  This was her first 

bowel movement in 4 days.  She has no abdominal pain. No nausea.  No appetite 

but feels very thirsty.   


Family History: Unchanged from Admission - liver/lung/lymphoma in her mom + 

thyoid cancer in her mom and sister aunt + breast ca


Social History: Unchanged from Admission - 2-3 cig daily but quit 9/18 no etoh 

walks indep comes from lives with 


Past Medical History: Unchanged from Admission - chornic lbp with 

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 

fibromyelgia hyperlipidemia htn ibs htn nancy  pshx r anxillary lump removed 1980 

benign s/p hysterectomy 1993, lbreast nodules resection with all benign path s/

p cholecystectomy 2009 s/p ablation of nerves in the lower back trauma to her 

left side of head ---> s/p staples





Objective


Active Medications: 








Sodium Chloride (Ns 0.9% 1000 Ml*)  1,000 mls @ 125 mls/hr IV PER RATE [facility]


   Last Admin: 10/08/18 08:27 Dose:  125 mls/hr


Lorazepam (Ativan Inj*)  0.5 mg IV PUSH Q4H PRN


   PRN Reason: ANXIETY


Metoclopramide HCl (Reglan Iv*)  10 mg IV Q6H PRN


   PRN Reason: NAUSEA/VOMITING


Metoprolol Tartrate (Lopressor Iv*)  2.5 mg IV BID [facility]


   Last Admin: 10/08/18 08:35 Dose:  Not Given


Ondansetron HCl (Zofran Inj*)  4 mg IV Q6H PRN


   PRN Reason: NAUSEA


Pantoprazole Sodium (Protonix Iv*)  40 mg IV BID [facility]








 Vital Signs - 8 hr











  10/08/18 10/08/18





  03:07 07:47


 


Temperature 97.7 F 97.4 F


 


Pulse Rate 101 106


 


Respiratory 16 18





Rate  


 


Blood Pressure 138/70 147/70





(mmHg)  


 


O2 Sat by Pulse 100 





Oximetry  











Oxygen Devices in Use Now: None


Appearance: alert, no distress


Eyes: No Scleral Icterus


Ears/Nose/Mouth/Throat: NL Teeth, Lips, Gums


Neck: NL Appearance and Movements; NL JVP


Respiratory: Symmetrical Chest Expansion and Respiratory Effort, Clear to 

Auscultation


Cardiovascular: NL Sounds; No Murmurs; No JVD, RRR


Abdominal: NL Sounds; No Tenderness; No Distention, No Hepatosplenomegaly


Lymphatic: No Cervical Adenopathy


Extremities: - - hands are both edematous 


Skin: No Rash or Ulcers


Neurological: Alert and Oriented x 3


Result Diagrams: 


 10/08/18 00:15





 10/07/18 18:27


Microbiology and Other Data: 


 Microbiology











 10/07/18 21:08 Stool Occult Blood (MIGUEL A) - Final





 Stool 


 


 10/07/18 20:48 Gastric Occult Blood - Final





 Gastric Fluid 














Assess/Plan/Problems-Billing


Assessment: 





Ms. Vasquez is a 63 year old lady with history of constipation-prone IBS who 

presentsed last night with nausea, vomiting, and coffee ground emesis.  





- Patient Problems


(1) Upper GI bleed


Current Visit: Yes   Status: Acute   Code(s): K92.2 - GASTROINTESTINAL 

HEMORRHAGE, UNSPECIFIED   SNOMED Code(s): 57123279


   Comment: she had been on mobic but hasn't taken it for a few weeks; no etoh


no history of liver disease 


GI consulted for possible EGD 


good IV access 


trend h/h today 


hemodyamically stable   





(2) Migraines


Current Visit: Yes   Status: Acute   Code(s): G43.909 - MIGRAINE, UNSP, NOT 

INTRACTABLE, WITHOUT STATUS MIGRAINOSUS   SNOMED Code(s): 66264076


   Comment: holding verapamil while npo   





(3) Hx of recurrent urinary tract infection


Current Visit: Yes   Status: Acute   Code(s): Z87.440 - PERSONAL HISTORY OF 

URINARY (TRACT) INFECTIONS   SNOMED Code(s): 922449773


   Comment: repeat ua is negative   





(4) Mood disorder


Current Visit: Yes   Status: Acute   Code(s): F39 - UNSPECIFIED MOOD [AFFECTIVE

] DISORDER   SNOMED Code(s): 16443384


   Comment: stable, pt currently npo but can resume once she is able to 

tolerate po millie all pertinent systems normal

## 2019-01-09 ENCOUNTER — EXTERNAL RECORD (OUTPATIENT)
Dept: HEALTH INFORMATION MANAGEMENT | Facility: OTHER | Age: 84
End: 2019-01-09

## 2019-01-15 ENCOUNTER — EXTERNAL RECORD (OUTPATIENT)
Dept: HEALTH INFORMATION MANAGEMENT | Facility: OTHER | Age: 84
End: 2019-01-15

## 2019-04-15 ENCOUNTER — EXTERNAL RECORD (OUTPATIENT)
Dept: HEALTH INFORMATION MANAGEMENT | Facility: OTHER | Age: 84
End: 2019-04-15

## 2019-04-29 ENCOUNTER — APPOINTMENT (OUTPATIENT)
Dept: NEUROLOGY | Facility: CLINIC | Age: 84
End: 2019-04-29

## 2019-07-16 ENCOUNTER — EXTERNAL RECORD (OUTPATIENT)
Dept: HEALTH INFORMATION MANAGEMENT | Facility: OTHER | Age: 84
End: 2019-07-16

## 2019-09-17 ENCOUNTER — EXTERNAL RECORD (OUTPATIENT)
Dept: HEALTH INFORMATION MANAGEMENT | Facility: OTHER | Age: 84
End: 2019-09-17

## 2019-12-17 ENCOUNTER — EXTERNAL RECORD (OUTPATIENT)
Dept: HEALTH INFORMATION MANAGEMENT | Facility: OTHER | Age: 84
End: 2019-12-17

## 2020-01-01 ENCOUNTER — EXTERNAL RECORD (OUTPATIENT)
Dept: HEALTH INFORMATION MANAGEMENT | Facility: OTHER | Age: 85
End: 2020-01-01

## 2020-03-13 ENCOUNTER — EXTERNAL RECORD (OUTPATIENT)
Dept: HEALTH INFORMATION MANAGEMENT | Facility: OTHER | Age: 85
End: 2020-03-13

## 2020-03-19 ENCOUNTER — EXTERNAL RECORD (OUTPATIENT)
Dept: HEALTH INFORMATION MANAGEMENT | Facility: OTHER | Age: 85
End: 2020-03-19

## 2020-08-13 ENCOUNTER — EXTERNAL RECORD (OUTPATIENT)
Dept: HEALTH INFORMATION MANAGEMENT | Facility: OTHER | Age: 85
End: 2020-08-13

## 2020-11-09 ENCOUNTER — OFFICE VISIT (OUTPATIENT)
Dept: INTERNAL MEDICINE | Age: 85
End: 2020-11-09

## 2020-11-09 DIAGNOSIS — L98.9 SKIN LESION: ICD-10-CM

## 2020-11-09 DIAGNOSIS — J96.11 CHRONIC RESPIRATORY FAILURE WITH HYPOXIA (CMD): ICD-10-CM

## 2020-11-09 DIAGNOSIS — I45.9 SKIPPED HEART BEATS: ICD-10-CM

## 2020-11-09 DIAGNOSIS — Z79.01 ANTICOAGULATION MONITORING, INR RANGE 2-3: ICD-10-CM

## 2020-11-09 DIAGNOSIS — I10 ESSENTIAL HYPERTENSION: ICD-10-CM

## 2020-11-09 DIAGNOSIS — J43.9 PULMONARY EMPHYSEMA, UNSPECIFIED EMPHYSEMA TYPE (CMD): ICD-10-CM

## 2020-11-09 DIAGNOSIS — R21 RASH: ICD-10-CM

## 2020-11-09 DIAGNOSIS — I26.99 OTHER PULMONARY EMBOLISM WITHOUT ACUTE COR PULMONALE, UNSPECIFIED CHRONICITY (CMD): ICD-10-CM

## 2020-11-09 DIAGNOSIS — Z76.89 ENCOUNTER TO ESTABLISH CARE: Primary | ICD-10-CM

## 2020-11-09 LAB
INR PPP: 2
INTERNATIONAL NORMALIZATION RATIO, POC: 2 (ref 2–3)

## 2020-11-09 PROCEDURE — 85610 PROTHROMBIN TIME: CPT | Performed by: INTERNAL MEDICINE

## 2020-11-09 PROCEDURE — 99204 OFFICE O/P NEW MOD 45 MIN: CPT | Performed by: INTERNAL MEDICINE

## 2020-11-09 RX ORDER — MONTELUKAST SODIUM 10 MG/1
10 TABLET ORAL DAILY
COMMUNITY
Start: 2020-10-23 | End: 2021-06-08

## 2020-11-09 RX ORDER — LOSARTAN POTASSIUM AND HYDROCHLOROTHIAZIDE 25; 100 MG/1; MG/1
1 TABLET ORAL DAILY
COMMUNITY
Start: 2020-10-23 | End: 2021-01-22 | Stop reason: SDUPTHER

## 2020-11-09 RX ORDER — FLUTICASONE FUROATE, UMECLIDINIUM BROMIDE AND VILANTEROL TRIFENATATE 100; 62.5; 25 UG/1; UG/1; UG/1
POWDER RESPIRATORY (INHALATION)
COMMUNITY
Start: 2020-09-14 | End: 2020-11-23 | Stop reason: SDUPTHER

## 2020-11-09 RX ORDER — THIAMINE MONONITRATE (VIT B1) 100 MG
100 TABLET ORAL DAILY
Status: ON HOLD | COMMUNITY
End: 2023-01-01 | Stop reason: HOSPADM

## 2020-11-09 RX ORDER — WARFARIN SODIUM 3 MG/1
3 TABLET ORAL DAILY
COMMUNITY
Start: 2020-11-23 | End: 2020-11-23 | Stop reason: SDUPTHER

## 2020-11-09 RX ORDER — ASCORBIC ACID 500 MG
500 TABLET ORAL DAILY
Status: ON HOLD | COMMUNITY
End: 2023-01-01 | Stop reason: HOSPADM

## 2020-11-09 RX ORDER — ALPRAZOLAM 0.25 MG/1
TABLET ORAL
COMMUNITY
Start: 2020-10-23 | End: 2021-04-09

## 2020-11-09 RX ORDER — WARFARIN SODIUM 1 MG/1
4 TABLET ORAL
Status: ON HOLD | COMMUNITY
Start: 2020-08-31 | End: 2021-01-13 | Stop reason: HOSPADM

## 2020-11-09 RX ORDER — LANOLIN ALCOHOL/MO/W.PET/CERES
1000 CREAM (GRAM) TOPICAL DAILY
Status: ON HOLD | COMMUNITY
End: 2023-01-01 | Stop reason: HOSPADM

## 2020-11-09 RX ORDER — ALBUTEROL SULFATE 90 UG/1
POWDER, METERED RESPIRATORY (INHALATION)
COMMUNITY
Start: 2020-09-29 | End: 2021-06-08

## 2020-11-09 RX ORDER — PANTOPRAZOLE SODIUM 40 MG/1
40 TABLET, DELAYED RELEASE ORAL DAILY
COMMUNITY
Start: 2020-10-23 | End: 2021-06-08

## 2020-11-09 RX ORDER — PNV NO.95/FERROUS FUM/FOLIC AC 28MG-0.8MG
325 TABLET ORAL 2 TIMES DAILY
Status: ON HOLD | COMMUNITY
Start: 2020-11-10 | End: 2023-01-01 | Stop reason: HOSPADM

## 2020-11-09 RX ORDER — OMEGA-3S/DHA/EPA/FISH OIL/D3 300MG-1000
10 CAPSULE ORAL DAILY
COMMUNITY
Start: 2020-11-10 | End: 2021-09-13 | Stop reason: ALTCHOICE

## 2020-11-09 RX ORDER — CLOTRIMAZOLE AND BETAMETHASONE DIPROPIONATE 10; .64 MG/G; MG/G
1 CREAM TOPICAL 2 TIMES DAILY
Qty: 30 G | Refills: 1 | Status: SHIPPED | OUTPATIENT
Start: 2020-11-09 | End: 2021-01-05 | Stop reason: ALTCHOICE

## 2020-11-09 SDOH — HEALTH STABILITY: MENTAL HEALTH: HOW OFTEN DO YOU HAVE A DRINK CONTAINING ALCOHOL?: 4 OR MORE TIMES A WEEK

## 2020-11-09 SDOH — HEALTH STABILITY: PHYSICAL HEALTH: ON AVERAGE, HOW MANY DAYS PER WEEK DO YOU ENGAGE IN MODERATE TO STRENUOUS EXERCISE (LIKE A BRISK WALK)?: 0 DAYS

## 2020-11-09 SDOH — HEALTH STABILITY: MENTAL HEALTH: HOW MANY STANDARD DRINKS CONTAINING ALCOHOL DO YOU HAVE ON A TYPICAL DAY?: 1 OR 2

## 2020-11-09 SDOH — HEALTH STABILITY: MENTAL HEALTH: HOW OFTEN DO YOU HAVE 6 OR MORE DRINKS ON ONE OCCASION?: NEVER

## 2020-11-09 SDOH — HEALTH STABILITY: MENTAL HEALTH
STRESS IS WHEN SOMEONE FEELS TENSE, NERVOUS, ANXIOUS, OR CAN'T SLEEP AT NIGHT BECAUSE THEIR MIND IS TROUBLED. HOW STRESSED ARE YOU?: VERY MUCH

## 2020-11-09 SDOH — HEALTH STABILITY: PHYSICAL HEALTH: ON AVERAGE, HOW MANY MINUTES DO YOU ENGAGE IN EXERCISE AT THIS LEVEL?: 0 MIN

## 2020-11-09 ASSESSMENT — PATIENT HEALTH QUESTIONNAIRE - PHQ9
CLINICAL INTERPRETATION OF PHQ9 SCORE: NO FURTHER SCREENING NEEDED
2. FEELING DOWN, DEPRESSED OR HOPELESS: NOT AT ALL
1. LITTLE INTEREST OR PLEASURE IN DOING THINGS: NOT AT ALL
CLINICAL INTERPRETATION OF PHQ2 SCORE: NO FURTHER SCREENING NEEDED
SUM OF ALL RESPONSES TO PHQ9 QUESTIONS 1 AND 2: 0
SUM OF ALL RESPONSES TO PHQ9 QUESTIONS 1 AND 2: 0

## 2020-11-09 ASSESSMENT — PAIN SCALES - GENERAL: PAINLEVEL: 0

## 2020-11-10 ENCOUNTER — TELEPHONE (OUTPATIENT)
Dept: CARDIOLOGY | Age: 85
End: 2020-11-10

## 2020-11-10 ENCOUNTER — TELEPHONE (OUTPATIENT)
Dept: INTERNAL MEDICINE | Age: 85
End: 2020-11-10

## 2020-11-10 DIAGNOSIS — J96.11 CHRONIC RESPIRATORY FAILURE WITH HYPOXIA (CMD): Primary | ICD-10-CM

## 2020-11-10 DIAGNOSIS — I26.99 OTHER PULMONARY EMBOLISM WITHOUT ACUTE COR PULMONALE, UNSPECIFIED CHRONICITY (CMD): ICD-10-CM

## 2020-11-10 DIAGNOSIS — J43.9 PULMONARY EMPHYSEMA, UNSPECIFIED EMPHYSEMA TYPE (CMD): ICD-10-CM

## 2020-11-10 LAB
ALBUMIN SERPL-MCNC: 3.3 G/DL (ref 3.6–5.1)
ALBUMIN/GLOB SERPL: 0.9 {RATIO} (ref 1–2.4)
ALP SERPL-CCNC: 100 UNITS/L (ref 45–117)
ALT SERPL-CCNC: 19 UNITS/L
ANION GAP SERPL CALC-SCNC: 10 MMOL/L (ref 10–20)
AST SERPL-CCNC: 21 UNITS/L
BASOPHILS # BLD: 0 K/MCL (ref 0–0.3)
BASOPHILS NFR BLD: 0 %
BILIRUB SERPL-MCNC: 0.5 MG/DL (ref 0.2–1)
BUN SERPL-MCNC: 24 MG/DL (ref 6–20)
BUN/CREAT SERPL: 33 (ref 7–25)
CALCIUM SERPL-MCNC: 9.4 MG/DL (ref 8.4–10.2)
CHLORIDE SERPL-SCNC: 106 MMOL/L (ref 98–107)
CO2 SERPL-SCNC: 31 MMOL/L (ref 21–32)
CREAT SERPL-MCNC: 0.73 MG/DL (ref 0.51–0.95)
DIFFERENTIAL METHOD BLD: ABNORMAL
EOSINOPHIL # BLD: 0.2 K/MCL (ref 0.1–0.5)
EOSINOPHIL NFR BLD: 2 %
ERYTHROCYTE [DISTWIDTH] IN BLOOD: 15.8 % (ref 11–15)
GLOBULIN SER-MCNC: 3.5 G/DL (ref 2–4)
GLUCOSE SERPL-MCNC: 123 MG/DL (ref 65–99)
HCT VFR BLD CALC: 42.7 % (ref 36–46.5)
HGB BLD-MCNC: 13 G/DL (ref 12–15.5)
IMM GRANULOCYTES # BLD AUTO: 0 K/MCL (ref 0–0.2)
IMM GRANULOCYTES NFR BLD: 0 %
LENGTH OF FAST TIME PATIENT: ABNORMAL HRS
LYMPHOCYTES # BLD: 1.3 K/MCL (ref 1–4)
LYMPHOCYTES NFR BLD: 12 %
MCH RBC QN AUTO: 29 PG (ref 26–34)
MCHC RBC AUTO-ENTMCNC: 30.4 G/DL (ref 32–36.5)
MCV RBC AUTO: 95.3 FL (ref 78–100)
MONOCYTES # BLD: 0.9 K/MCL (ref 0.3–0.9)
MONOCYTES NFR BLD: 9 %
NEUTROPHILS # BLD: 8.2 K/MCL (ref 1.8–7.7)
NEUTROPHILS NFR BLD: 77 %
NRBC BLD MANUAL-RTO: 0 /100 WBC
PLATELET # BLD: 246 K/MCL (ref 140–450)
POTASSIUM SERPL-SCNC: 3.5 MMOL/L (ref 3.4–5.1)
PROT SERPL-MCNC: 6.8 G/DL (ref 6.4–8.2)
RBC # BLD: 4.48 MIL/MCL (ref 4–5.2)
SODIUM SERPL-SCNC: 143 MMOL/L (ref 135–145)
WBC # BLD: 10.7 K/MCL (ref 4.2–11)

## 2020-11-11 ENCOUNTER — TELEPHONE (OUTPATIENT)
Dept: INTERNAL MEDICINE | Age: 85
End: 2020-11-11

## 2020-11-13 ENCOUNTER — ANTI-COAG (OUTPATIENT)
Dept: CHRONIC DISEASE MANAGEMENT | Age: 85
End: 2020-11-13

## 2020-11-13 DIAGNOSIS — I26.99 PULMONARY EMBOLISM (CMD): Primary | ICD-10-CM

## 2020-11-14 ENCOUNTER — TELEPHONE (OUTPATIENT)
Dept: INTERNAL MEDICINE | Age: 85
End: 2020-11-14

## 2020-11-16 ENCOUNTER — HOSPITAL ENCOUNTER (EMERGENCY)
Age: 85
Discharge: HOME | End: 2020-11-16
Attending: EMERGENCY MEDICINE

## 2020-11-16 ENCOUNTER — APPOINTMENT (OUTPATIENT)
Dept: GENERAL RADIOLOGY | Age: 85
End: 2020-11-16
Attending: EMERGENCY MEDICINE

## 2020-11-16 ENCOUNTER — ANTI-COAG (OUTPATIENT)
Dept: CHRONIC DISEASE MANAGEMENT | Age: 85
End: 2020-11-16

## 2020-11-16 ENCOUNTER — TELEPHONE (OUTPATIENT)
Dept: INTERNAL MEDICINE | Age: 85
End: 2020-11-16

## 2020-11-16 VITALS
HEIGHT: 65 IN | WEIGHT: 198.41 LBS | OXYGEN SATURATION: 96 % | BODY MASS INDEX: 33.06 KG/M2 | TEMPERATURE: 99 F | DIASTOLIC BLOOD PRESSURE: 87 MMHG | HEART RATE: 77 BPM | SYSTOLIC BLOOD PRESSURE: 145 MMHG

## 2020-11-16 DIAGNOSIS — R06.00 DYSPNEA, UNSPECIFIED TYPE: ICD-10-CM

## 2020-11-16 DIAGNOSIS — I26.99 PULMONARY EMBOLISM (CMD): ICD-10-CM

## 2020-11-16 DIAGNOSIS — Z20.822 SUSPECTED COVID-19 VIRUS INFECTION: Primary | ICD-10-CM

## 2020-11-16 LAB
ALBUMIN SERPL-MCNC: 3.2 G/DL (ref 3.6–5.1)
ALBUMIN/GLOB SERPL: 0.7 {RATIO} (ref 1–2.4)
ALP SERPL-CCNC: 92 UNITS/L (ref 45–117)
ALT SERPL-CCNC: 19 UNITS/L
ANION GAP SERPL CALC-SCNC: 9 MMOL/L (ref 10–20)
APTT PPP: 32 SEC (ref 22–32)
AST SERPL-CCNC: 18 UNITS/L
BASOPHILS # BLD: 0 K/MCL (ref 0–0.3)
BASOPHILS NFR BLD: 0 %
BILIRUB SERPL-MCNC: 0.5 MG/DL (ref 0.2–1)
BUN SERPL-MCNC: 26 MG/DL (ref 6–20)
BUN/CREAT SERPL: 33 (ref 7–25)
CALCIUM SERPL-MCNC: 9.6 MG/DL (ref 8.4–10.2)
CHLORIDE SERPL-SCNC: 104 MMOL/L (ref 98–107)
CO2 SERPL-SCNC: 31 MMOL/L (ref 21–32)
CREAT SERPL-MCNC: 0.8 MG/DL (ref 0.51–0.95)
DIFFERENTIAL METHOD BLD: ABNORMAL
EOSINOPHIL # BLD: 0.1 K/MCL (ref 0.1–0.5)
EOSINOPHIL NFR BLD: 1 %
ERYTHROCYTE [DISTWIDTH] IN BLOOD: 15.3 % (ref 11–15)
GLOBULIN SER-MCNC: 4.3 G/DL (ref 2–4)
GLUCOSE SERPL-MCNC: 96 MG/DL (ref 65–99)
HCT VFR BLD CALC: 44.1 % (ref 36–46.5)
HGB BLD-MCNC: 13.5 G/DL (ref 12–15.5)
IMM GRANULOCYTES # BLD AUTO: 0 K/MCL (ref 0–0.2)
IMM GRANULOCYTES NFR BLD: 0 %
INR PPP: 2.2
LACTATE BLDV-SCNC: 1.2 MMOL/L (ref 0–2)
LYMPHOCYTES # BLD: 1.5 K/MCL (ref 1–4)
LYMPHOCYTES NFR BLD: 14 %
MCH RBC QN AUTO: 29.1 PG (ref 26–34)
MCHC RBC AUTO-ENTMCNC: 30.6 G/DL (ref 32–36.5)
MCV RBC AUTO: 95 FL (ref 78–100)
MONOCYTES # BLD: 0.9 K/MCL (ref 0.3–0.9)
MONOCYTES NFR BLD: 9 %
NEUTROPHILS # BLD: 7.9 K/MCL (ref 1.8–7.7)
NEUTROPHILS NFR BLD: 76 %
NRBC BLD MANUAL-RTO: 0 /100 WBC
PLATELET # BLD: 253 K/MCL (ref 140–450)
POTASSIUM SERPL-SCNC: 4 MMOL/L (ref 3.4–5.1)
PROT SERPL-MCNC: 7.5 G/DL (ref 6.4–8.2)
PROTHROMBIN TIME: 22.5 SEC (ref 9.7–11.8)
RBC # BLD: 4.64 MIL/MCL (ref 4–5.2)
SODIUM SERPL-SCNC: 140 MMOL/L (ref 135–145)
TROPONIN I SERPL HS-MCNC: <0.02 NG/ML
WBC # BLD: 10.4 K/MCL (ref 4.2–11)

## 2020-11-16 PROCEDURE — U0003 INFECTIOUS AGENT DETECTION BY NUCLEIC ACID (DNA OR RNA); SEVERE ACUTE RESPIRATORY SYNDROME CORONAVIRUS 2 (SARS-COV-2) (CORONAVIRUS DISEASE [COVID-19]), AMPLIFIED PROBE TECHNIQUE, MAKING USE OF HIGH THROUGHPUT TECHNOLOGIES AS DESCRIBED BY CMS-2020-01-R: HCPCS

## 2020-11-16 PROCEDURE — 85730 THROMBOPLASTIN TIME PARTIAL: CPT

## 2020-11-16 PROCEDURE — 36415 COLL VENOUS BLD VENIPUNCTURE: CPT

## 2020-11-16 PROCEDURE — 84484 ASSAY OF TROPONIN QUANT: CPT

## 2020-11-16 PROCEDURE — 85025 COMPLETE CBC W/AUTO DIFF WBC: CPT

## 2020-11-16 PROCEDURE — 85610 PROTHROMBIN TIME: CPT

## 2020-11-16 PROCEDURE — 71045 X-RAY EXAM CHEST 1 VIEW: CPT

## 2020-11-16 PROCEDURE — 83605 ASSAY OF LACTIC ACID: CPT

## 2020-11-16 PROCEDURE — 99285 EMERGENCY DEPT VISIT HI MDM: CPT

## 2020-11-16 PROCEDURE — C9803 HOPD COVID-19 SPEC COLLECT: HCPCS

## 2020-11-16 PROCEDURE — 80053 COMPREHEN METABOLIC PANEL: CPT

## 2020-11-16 PROCEDURE — 93005 ELECTROCARDIOGRAM TRACING: CPT | Performed by: EMERGENCY MEDICINE

## 2020-11-16 RX ORDER — AZITHROMYCIN 250 MG/1
TABLET, FILM COATED ORAL
Qty: 6 TABLET | Refills: 0 | Status: SHIPPED | OUTPATIENT
Start: 2020-11-16 | End: 2020-11-20 | Stop reason: ALTCHOICE

## 2020-11-16 ASSESSMENT — PAIN SCALES - GENERAL: PAINLEVEL_OUTOF10: 0

## 2020-11-17 ENCOUNTER — TELEPHONE (OUTPATIENT)
Dept: CARDIOLOGY | Age: 85
End: 2020-11-17

## 2020-11-17 ENCOUNTER — ANTI-COAG (OUTPATIENT)
Dept: CHRONIC DISEASE MANAGEMENT | Age: 85
End: 2020-11-17

## 2020-11-17 ENCOUNTER — TELEPHONE (OUTPATIENT)
Dept: INTERNAL MEDICINE | Age: 85
End: 2020-11-17

## 2020-11-17 DIAGNOSIS — I26.99 PULMONARY EMBOLISM (CMD): ICD-10-CM

## 2020-11-17 LAB
ATRIAL RATE (BPM): 79
P AXIS (DEGREES): 28
PR-INTERVAL (MSEC): 152
QRS-INTERVAL (MSEC): 88
QT-INTERVAL (MSEC): 396
QTC: 476
R AXIS (DEGREES): 22
REPORT TEXT: NORMAL
SARS-COV-2 RNA RESP QL NAA+PROBE: NOT DETECTED
SERVICE CMNT-IMP: NORMAL
SPECIMEN SOURCE: NORMAL
T AXIS (DEGREES): 13
VENTRICULAR RATE EKG/MIN (BPM): 87

## 2020-11-20 ENCOUNTER — TELEPHONE (OUTPATIENT)
Dept: INTERNAL MEDICINE | Age: 85
End: 2020-11-20

## 2020-11-20 RX ORDER — CEFDINIR 300 MG/1
300 CAPSULE ORAL 2 TIMES DAILY
Qty: 20 CAPSULE | Refills: 0 | Status: SHIPPED | OUTPATIENT
Start: 2020-11-20 | End: 2020-11-23 | Stop reason: ALTCHOICE

## 2020-11-23 ENCOUNTER — OFFICE VISIT (OUTPATIENT)
Dept: INTERNAL MEDICINE | Age: 85
End: 2020-11-23

## 2020-11-23 ENCOUNTER — ANTI-COAG (OUTPATIENT)
Dept: CHRONIC DISEASE MANAGEMENT | Age: 85
End: 2020-11-23

## 2020-11-23 DIAGNOSIS — I26.99 PULMONARY EMBOLISM (CMD): ICD-10-CM

## 2020-11-23 DIAGNOSIS — J96.11 CHRONIC RESPIRATORY FAILURE WITH HYPOXIA (CMD): ICD-10-CM

## 2020-11-23 DIAGNOSIS — B36.9 FUNGAL INFECTION OF SKIN: ICD-10-CM

## 2020-11-23 DIAGNOSIS — I10 ESSENTIAL HYPERTENSION: ICD-10-CM

## 2020-11-23 DIAGNOSIS — J43.9 PULMONARY EMPHYSEMA, UNSPECIFIED EMPHYSEMA TYPE (CMD): Primary | ICD-10-CM

## 2020-11-23 LAB — INR PPP: 1.7

## 2020-11-23 PROCEDURE — 99214 OFFICE O/P EST MOD 30 MIN: CPT | Performed by: INTERNAL MEDICINE

## 2020-11-23 RX ORDER — WARFARIN SODIUM 3 MG/1
TABLET ORAL
Qty: 90 TABLET | Refills: 0 | Status: ON HOLD | OUTPATIENT
Start: 2020-11-23 | End: 2021-01-13 | Stop reason: SDUPTHER

## 2020-11-23 RX ORDER — FLUTICASONE FUROATE, UMECLIDINIUM BROMIDE AND VILANTEROL TRIFENATATE 100; 62.5; 25 UG/1; UG/1; UG/1
1 POWDER RESPIRATORY (INHALATION) DAILY
Qty: 1 EACH | Refills: 0 | Status: SHIPPED | OUTPATIENT
Start: 2020-11-23 | End: 2020-12-19 | Stop reason: SDUPTHER

## 2020-11-23 ASSESSMENT — PATIENT HEALTH QUESTIONNAIRE - PHQ9
2. FEELING DOWN, DEPRESSED OR HOPELESS: NOT AT ALL
CLINICAL INTERPRETATION OF PHQ9 SCORE: NO FURTHER SCREENING NEEDED
SUM OF ALL RESPONSES TO PHQ9 QUESTIONS 1 AND 2: 0
1. LITTLE INTEREST OR PLEASURE IN DOING THINGS: NOT AT ALL
SUM OF ALL RESPONSES TO PHQ9 QUESTIONS 1 AND 2: 0
CLINICAL INTERPRETATION OF PHQ2 SCORE: NO FURTHER SCREENING NEEDED

## 2020-11-23 ASSESSMENT — COGNITIVE AND FUNCTIONAL STATUS - GENERAL
DO YOU HAVE DIFFICULTY DRESSING OR BATHING: NO
DO YOU HAVE SERIOUS DIFFICULTY WALKING OR CLIMBING STAIRS: YES
BECAUSE OF A PHYSICAL, MENTAL, OR EMOTIONAL CONDITION, DO YOU HAVE SERIOUS DIFFICULTY CONCENTRATING, REMEMBERING OR MAKING DECISIONS: NO
BECAUSE OF A PHYSICAL, MENTAL, OR EMOTIONAL CONDITION, DO YOU HAVE DIFFICULTY DOING ERRANDS ALONE: YES

## 2020-11-23 ASSESSMENT — PAIN SCALES - GENERAL: PAINLEVEL: 0

## 2020-11-24 ENCOUNTER — TELEPHONE (OUTPATIENT)
Dept: INTERNAL MEDICINE | Age: 85
End: 2020-11-24

## 2020-11-30 ENCOUNTER — TELEPHONE (OUTPATIENT)
Dept: INTERNAL MEDICINE | Age: 85
End: 2020-11-30

## 2020-11-30 ENCOUNTER — APPOINTMENT (OUTPATIENT)
Dept: CARDIOLOGY | Age: 85
End: 2020-11-30
Attending: INTERNAL MEDICINE

## 2020-12-01 ENCOUNTER — ANTI-COAG (OUTPATIENT)
Dept: CHRONIC DISEASE MANAGEMENT | Age: 85
End: 2020-12-01

## 2020-12-01 DIAGNOSIS — I26.99 PULMONARY EMBOLISM (CMD): ICD-10-CM

## 2020-12-01 LAB — INR PPP: 2.2

## 2020-12-03 ENCOUNTER — OFFICE VISIT (OUTPATIENT)
Dept: PULMONOLOGY | Age: 85
End: 2020-12-03

## 2020-12-03 VITALS
OXYGEN SATURATION: 90 % | DIASTOLIC BLOOD PRESSURE: 82 MMHG | SYSTOLIC BLOOD PRESSURE: 166 MMHG | HEART RATE: 79 BPM | BODY MASS INDEX: 31.65 KG/M2 | WEIGHT: 190 LBS | TEMPERATURE: 96.9 F | HEIGHT: 65 IN

## 2020-12-03 DIAGNOSIS — R93.89 NONSPECIFIC ABNORMAL FINDINGS ON DIAGNOSTIC IMAGING: Primary | ICD-10-CM

## 2020-12-03 PROBLEM — E66.9 OBESITY: Status: ACTIVE | Noted: 2020-12-03

## 2020-12-03 PROBLEM — I49.3 PVC'S (PREMATURE VENTRICULAR CONTRACTIONS): Status: ACTIVE | Noted: 2020-12-03

## 2020-12-03 PROBLEM — E53.8 B12 DEFICIENCY: Status: ACTIVE | Noted: 2020-12-03

## 2020-12-03 PROBLEM — K92.2 GASTROINTESTINAL HEMORRHAGE: Status: ACTIVE | Noted: 2020-12-03

## 2020-12-03 PROBLEM — M19.90 OSTEOARTHRITIS: Status: ACTIVE | Noted: 2020-12-03

## 2020-12-03 PROBLEM — M81.0 AGE-RELATED OSTEOPOROSIS WITHOUT CURRENT PATHOLOGICAL FRACTURE: Status: ACTIVE | Noted: 2020-12-03

## 2020-12-03 PROBLEM — D49.89: Status: ACTIVE | Noted: 2020-12-03

## 2020-12-03 PROBLEM — K25.9 GASTRIC ULCER: Status: ACTIVE | Noted: 2020-12-03

## 2020-12-03 PROBLEM — G47.33 OBSTRUCTIVE SLEEP APNEA: Status: ACTIVE | Noted: 2020-12-03

## 2020-12-03 PROBLEM — Z95.828 PRESENCE OF INFERIOR VENA CAVA FILTER: Status: ACTIVE | Noted: 2020-12-03

## 2020-12-03 PROCEDURE — 94010 BREATHING CAPACITY TEST: CPT | Performed by: INTERNAL MEDICINE

## 2020-12-03 PROCEDURE — 99204 OFFICE O/P NEW MOD 45 MIN: CPT | Performed by: INTERNAL MEDICINE

## 2020-12-03 PROCEDURE — 94760 N-INVAS EAR/PLS OXIMETRY 1: CPT | Performed by: INTERNAL MEDICINE

## 2020-12-03 SDOH — HEALTH STABILITY: MENTAL HEALTH: HOW OFTEN DO YOU HAVE A DRINK CONTAINING ALCOHOL?: 4 OR MORE TIMES A WEEK

## 2020-12-03 SDOH — HEALTH STABILITY: MENTAL HEALTH: HOW MANY STANDARD DRINKS CONTAINING ALCOHOL DO YOU HAVE ON A TYPICAL DAY?: 1 OR 2

## 2020-12-03 SDOH — HEALTH STABILITY: PHYSICAL HEALTH: ON AVERAGE, HOW MANY DAYS PER WEEK DO YOU ENGAGE IN MODERATE TO STRENUOUS EXERCISE (LIKE A BRISK WALK)?: 0 DAYS

## 2020-12-03 SDOH — HEALTH STABILITY: PHYSICAL HEALTH: ON AVERAGE, HOW MANY MINUTES DO YOU ENGAGE IN EXERCISE AT THIS LEVEL?: 0 MIN

## 2020-12-03 SDOH — HEALTH STABILITY: MENTAL HEALTH: HOW OFTEN DO YOU HAVE 6 OR MORE DRINKS ON ONE OCCASION?: NEVER

## 2020-12-03 ASSESSMENT — ENCOUNTER SYMPTOMS
GASTROINTESTINAL NEGATIVE: 1
NEUROLOGICAL NEGATIVE: 1
APNEA: 1
SHORTNESS OF BREATH: 1
PSYCHIATRIC NEGATIVE: 1
ALLERGIC/IMMUNOLOGIC NEGATIVE: 1
HEMATOLOGIC/LYMPHATIC NEGATIVE: 1
CHEST TIGHTNESS: 0
ENDOCRINE NEGATIVE: 1
CONSTITUTIONAL NEGATIVE: 1
EYES NEGATIVE: 1

## 2020-12-07 ENCOUNTER — ANTI-COAG (OUTPATIENT)
Dept: CHRONIC DISEASE MANAGEMENT | Age: 85
End: 2020-12-07

## 2020-12-07 DIAGNOSIS — I26.99 PULMONARY EMBOLISM (CMD): ICD-10-CM

## 2020-12-07 LAB — INR PPP: 2.5

## 2020-12-08 ENCOUNTER — OFFICE VISIT (OUTPATIENT)
Dept: CARDIOLOGY | Age: 85
End: 2020-12-08
Attending: INTERNAL MEDICINE

## 2020-12-08 VITALS
BODY MASS INDEX: 31.65 KG/M2 | DIASTOLIC BLOOD PRESSURE: 70 MMHG | HEIGHT: 65 IN | HEART RATE: 68 BPM | WEIGHT: 190 LBS | SYSTOLIC BLOOD PRESSURE: 150 MMHG | OXYGEN SATURATION: 96 %

## 2020-12-08 DIAGNOSIS — E78.5 HYPERLIPIDEMIA, UNSPECIFIED HYPERLIPIDEMIA TYPE: Primary | ICD-10-CM

## 2020-12-08 PROCEDURE — 99204 OFFICE O/P NEW MOD 45 MIN: CPT | Performed by: INTERNAL MEDICINE

## 2020-12-08 SDOH — HEALTH STABILITY: MENTAL HEALTH: HOW MANY STANDARD DRINKS CONTAINING ALCOHOL DO YOU HAVE ON A TYPICAL DAY?: 1 OR 2

## 2020-12-08 SDOH — HEALTH STABILITY: MENTAL HEALTH: HOW OFTEN DO YOU HAVE A DRINK CONTAINING ALCOHOL?: 4 OR MORE TIMES A WEEK

## 2020-12-08 SDOH — HEALTH STABILITY: MENTAL HEALTH: HOW OFTEN DO YOU HAVE 6 OR MORE DRINKS ON ONE OCCASION?: NEVER

## 2020-12-08 ASSESSMENT — ENCOUNTER SYMPTOMS
NAUSEA: 0
WEAKNESS: 0
LIGHT-HEADEDNESS: 0
VOMITING: 0
NERVOUS/ANXIOUS: 0
SLEEP DISTURBANCE: 0
CONFUSION: 0
DIAPHORESIS: 0
FEVER: 0
BRUISES/BLEEDS EASILY: 0
CONSTITUTIONAL NEGATIVE: 1
ABDOMINAL PAIN: 0
DIZZINESS: 0
SHORTNESS OF BREATH: 1
WOUND: 0
BLOOD IN STOOL: 0

## 2020-12-08 ASSESSMENT — PATIENT HEALTH QUESTIONNAIRE - PHQ9
CLINICAL INTERPRETATION OF PHQ2 SCORE: NO FURTHER SCREENING NEEDED
SUM OF ALL RESPONSES TO PHQ9 QUESTIONS 1 AND 2: 0
SUM OF ALL RESPONSES TO PHQ9 QUESTIONS 1 AND 2: 0
1. LITTLE INTEREST OR PLEASURE IN DOING THINGS: NOT AT ALL
2. FEELING DOWN, DEPRESSED OR HOPELESS: NOT AT ALL
CLINICAL INTERPRETATION OF PHQ9 SCORE: NO FURTHER SCREENING NEEDED

## 2020-12-18 ENCOUNTER — TELEPHONE (OUTPATIENT)
Dept: INTERNAL MEDICINE | Age: 85
End: 2020-12-18

## 2020-12-18 DIAGNOSIS — J43.9 PULMONARY EMPHYSEMA, UNSPECIFIED EMPHYSEMA TYPE (CMD): ICD-10-CM

## 2020-12-19 RX ORDER — FLUTICASONE FUROATE, UMECLIDINIUM BROMIDE AND VILANTEROL TRIFENATATE 100; 62.5; 25 UG/1; UG/1; UG/1
1 POWDER RESPIRATORY (INHALATION) DAILY
Qty: 1 EACH | Refills: 1 | Status: SHIPPED | OUTPATIENT
Start: 2020-12-19 | End: 2021-02-15 | Stop reason: SDUPTHER

## 2020-12-21 ENCOUNTER — ANTI-COAG (OUTPATIENT)
Dept: CHRONIC DISEASE MANAGEMENT | Age: 85
End: 2020-12-21

## 2020-12-21 ENCOUNTER — TELEPHONE (OUTPATIENT)
Dept: INTERNAL MEDICINE | Age: 85
End: 2020-12-21

## 2020-12-21 DIAGNOSIS — I26.99 PULMONARY EMBOLISM (CMD): ICD-10-CM

## 2020-12-21 LAB — INR PPP: 2.6

## 2020-12-28 ENCOUNTER — TELEPHONE (OUTPATIENT)
Dept: CHRONIC DISEASE MANAGEMENT | Age: 85
End: 2020-12-28

## 2020-12-28 DIAGNOSIS — I26.99 PULMONARY EMBOLISM (CMD): ICD-10-CM

## 2021-01-01 ENCOUNTER — EXTERNAL RECORD (OUTPATIENT)
Dept: HEALTH INFORMATION MANAGEMENT | Facility: OTHER | Age: 86
End: 2021-01-01

## 2021-01-04 ENCOUNTER — TELEPHONE (OUTPATIENT)
Dept: INTERNAL MEDICINE | Age: 86
End: 2021-01-04

## 2021-01-04 ENCOUNTER — ANTI-COAG (OUTPATIENT)
Dept: CHRONIC DISEASE MANAGEMENT | Age: 86
End: 2021-01-04

## 2021-01-04 DIAGNOSIS — I26.99 PULMONARY EMBOLISM (CMD): ICD-10-CM

## 2021-01-04 DIAGNOSIS — Z79.01 ANTICOAGULATION MONITORING, INR RANGE 2-3: ICD-10-CM

## 2021-01-04 DIAGNOSIS — J43.9 PULMONARY EMPHYSEMA, UNSPECIFIED EMPHYSEMA TYPE (CMD): Primary | ICD-10-CM

## 2021-01-04 LAB — INR PPP: 2.6

## 2021-01-05 ENCOUNTER — TELEPHONE (OUTPATIENT)
Dept: INTERNAL MEDICINE | Age: 86
End: 2021-01-05

## 2021-01-05 ENCOUNTER — APPOINTMENT (OUTPATIENT)
Dept: GENERAL RADIOLOGY | Age: 86
DRG: 177 | End: 2021-01-05
Attending: EMERGENCY MEDICINE

## 2021-01-05 ENCOUNTER — V-VISIT (OUTPATIENT)
Dept: INTERNAL MEDICINE | Age: 86
End: 2021-01-05

## 2021-01-05 ENCOUNTER — HOSPITAL ENCOUNTER (INPATIENT)
Age: 86
LOS: 8 days | Discharge: HOME HEALTH | DRG: 177 | End: 2021-01-13
Attending: EMERGENCY MEDICINE | Admitting: INTERNAL MEDICINE

## 2021-01-05 DIAGNOSIS — Z20.822 SUSPECTED COVID-19 VIRUS INFECTION: ICD-10-CM

## 2021-01-05 DIAGNOSIS — J96.11 CHRONIC RESPIRATORY FAILURE WITH HYPOXIA (CMD): ICD-10-CM

## 2021-01-05 DIAGNOSIS — I26.99 OTHER PULMONARY EMBOLISM WITHOUT ACUTE COR PULMONALE, UNSPECIFIED CHRONICITY (CMD): ICD-10-CM

## 2021-01-05 DIAGNOSIS — Z79.01 ANTICOAGULATION MONITORING, INR RANGE 2-3: ICD-10-CM

## 2021-01-05 DIAGNOSIS — G47.33 OBSTRUCTIVE SLEEP APNEA: ICD-10-CM

## 2021-01-05 DIAGNOSIS — I10 ESSENTIAL HYPERTENSION: Primary | ICD-10-CM

## 2021-01-05 DIAGNOSIS — Z87.09 HISTORY OF COPD: Primary | ICD-10-CM

## 2021-01-05 LAB
ALBUMIN SERPL-MCNC: 3 G/DL (ref 3.6–5.1)
ALBUMIN/GLOB SERPL: 0.7 {RATIO} (ref 1–2.4)
ALP SERPL-CCNC: 93 UNITS/L (ref 45–117)
ALT SERPL-CCNC: 20 UNITS/L
ANION GAP SERPL CALC-SCNC: 14 MMOL/L (ref 10–20)
AST SERPL-CCNC: 22 UNITS/L
BASOPHILS # BLD: 0 K/MCL (ref 0–0.3)
BASOPHILS NFR BLD: 0 %
BILIRUB SERPL-MCNC: 0.4 MG/DL (ref 0.2–1)
BUN SERPL-MCNC: 21 MG/DL (ref 6–20)
BUN/CREAT SERPL: 24 (ref 7–25)
CALCIUM SERPL-MCNC: 8.8 MG/DL (ref 8.4–10.2)
CHLORIDE SERPL-SCNC: 101 MMOL/L (ref 98–107)
CO2 SERPL-SCNC: 28 MMOL/L (ref 21–32)
CREAT SERPL-MCNC: 0.87 MG/DL (ref 0.51–0.95)
DEPRECATED RDW RBC: 48.6 FL (ref 39–50)
EOSINOPHIL # BLD: 0 K/MCL (ref 0–0.5)
EOSINOPHIL NFR BLD: 0 %
ERYTHROCYTE [DISTWIDTH] IN BLOOD: 14.2 % (ref 11–15)
FASTING DURATION TIME PATIENT: ABNORMAL H
GFR SERPLBLD BASED ON 1.73 SQ M-ARVRAT: 60 ML/MIN/1.73M2
GLOBULIN SER-MCNC: 4.2 G/DL (ref 2–4)
GLUCOSE SERPL-MCNC: 111 MG/DL (ref 65–99)
HCT VFR BLD CALC: 42 % (ref 36–46.5)
HGB BLD-MCNC: 13.1 G/DL (ref 12–15.5)
IMM GRANULOCYTES # BLD AUTO: 0 K/MCL (ref 0–0.2)
IMM GRANULOCYTES # BLD: 0 %
INR PPP: 1.9 SEC
LACTATE BLDV-SCNC: 1 MMOL/L (ref 0–2)
LYMPHOCYTES # BLD: 0.7 K/MCL (ref 1–4)
LYMPHOCYTES NFR BLD: 12 %
MCH RBC QN AUTO: 29 PG (ref 26–34)
MCHC RBC AUTO-ENTMCNC: 31.2 G/DL (ref 32–36.5)
MCV RBC AUTO: 93.1 FL (ref 78–100)
MONOCYTES # BLD: 0.8 K/MCL (ref 0.3–0.9)
MONOCYTES NFR BLD: 13 %
NEUTROPHILS # BLD: 4.5 K/MCL (ref 1.8–7.7)
NEUTROPHILS NFR BLD: 75 %
NRBC BLD MANUAL-RTO: 0 /100 WBC
PLATELET # BLD AUTO: 226 K/MCL (ref 140–450)
POTASSIUM SERPL-SCNC: 3.5 MMOL/L (ref 3.4–5.1)
PROT SERPL-MCNC: 7.2 G/DL (ref 6.4–8.2)
PROTHROMBIN TIME: 19.3 SEC (ref 9.7–11.8)
RAINBOW EXTRA TUBES HOLD SPECIMEN: NORMAL
RAINBOW EXTRA TUBES HOLD SPECIMEN: NORMAL
RBC # BLD: 4.51 MIL/MCL (ref 4–5.2)
SODIUM SERPL-SCNC: 139 MMOL/L (ref 135–145)
WBC # BLD: 6 K/MCL (ref 4.2–11)

## 2021-01-05 PROCEDURE — 82306 VITAMIN D 25 HYDROXY: CPT

## 2021-01-05 PROCEDURE — U0005 INFEC AGEN DETEC AMPLI PROBE: HCPCS | Performed by: EMERGENCY MEDICINE

## 2021-01-05 PROCEDURE — 10002800 HB RX 250 W HCPCS: Performed by: EMERGENCY MEDICINE

## 2021-01-05 PROCEDURE — 85025 COMPLETE CBC W/AUTO DIFF WBC: CPT | Performed by: EMERGENCY MEDICINE

## 2021-01-05 PROCEDURE — 99443 TELEPHONE E&M BY PHYSICIAN EST PT NOT ORIG PREV 7 DAYS 21-30 MIN: CPT | Performed by: INTERNAL MEDICINE

## 2021-01-05 PROCEDURE — 85610 PROTHROMBIN TIME: CPT | Performed by: EMERGENCY MEDICINE

## 2021-01-05 PROCEDURE — 10002803 HB RX 637: Performed by: INTERNAL MEDICINE

## 2021-01-05 PROCEDURE — 83605 ASSAY OF LACTIC ACID: CPT | Performed by: EMERGENCY MEDICINE

## 2021-01-05 PROCEDURE — C9803 HOPD COVID-19 SPEC COLLECT: HCPCS

## 2021-01-05 PROCEDURE — 71045 X-RAY EXAM CHEST 1 VIEW: CPT

## 2021-01-05 PROCEDURE — 87040 BLOOD CULTURE FOR BACTERIA: CPT | Performed by: EMERGENCY MEDICINE

## 2021-01-05 PROCEDURE — 80053 COMPREHEN METABOLIC PANEL: CPT | Performed by: EMERGENCY MEDICINE

## 2021-01-05 PROCEDURE — 96374 THER/PROPH/DIAG INJ IV PUSH: CPT

## 2021-01-05 PROCEDURE — 94640 AIRWAY INHALATION TREATMENT: CPT | Performed by: INTERNAL MEDICINE

## 2021-01-05 PROCEDURE — 10002803 HB RX 637: Performed by: EMERGENCY MEDICINE

## 2021-01-05 PROCEDURE — 93005 ELECTROCARDIOGRAM TRACING: CPT | Performed by: EMERGENCY MEDICINE

## 2021-01-05 PROCEDURE — 99285 EMERGENCY DEPT VISIT HI MDM: CPT

## 2021-01-05 PROCEDURE — 10006031 HB ROOM CHARGE TELEMETRY

## 2021-01-05 PROCEDURE — 10002801 HB RX 250 W/O HCPCS: Performed by: INTERNAL MEDICINE

## 2021-01-05 RX ORDER — ALBUTEROL SULFATE 90 UG/1
2 AEROSOL, METERED RESPIRATORY (INHALATION)
Status: DISCONTINUED | OUTPATIENT
Start: 2021-01-05 | End: 2021-01-13 | Stop reason: HOSPADM

## 2021-01-05 RX ORDER — ALPRAZOLAM 0.25 MG/1
0.25 TABLET ORAL EVERY 8 HOURS PRN
Status: DISCONTINUED | OUTPATIENT
Start: 2021-01-05 | End: 2021-01-13 | Stop reason: HOSPADM

## 2021-01-05 RX ORDER — ASCORBIC ACID 500 MG
500 TABLET ORAL DAILY
Status: DISCONTINUED | OUTPATIENT
Start: 2021-01-06 | End: 2021-01-13 | Stop reason: HOSPADM

## 2021-01-05 RX ORDER — LANOLIN ALCOHOL/MO/W.PET/CERES
100 CREAM (GRAM) TOPICAL DAILY
Status: DISCONTINUED | OUTPATIENT
Start: 2021-01-06 | End: 2021-01-13 | Stop reason: HOSPADM

## 2021-01-05 RX ORDER — ALBUTEROL SULFATE 90 UG/1
2 AEROSOL, METERED RESPIRATORY (INHALATION) ONCE
Status: COMPLETED | OUTPATIENT
Start: 2021-01-05 | End: 2021-01-05

## 2021-01-05 RX ORDER — PANTOPRAZOLE SODIUM 40 MG/1
40 TABLET, DELAYED RELEASE ORAL DAILY
Status: DISCONTINUED | OUTPATIENT
Start: 2021-01-06 | End: 2021-01-13 | Stop reason: HOSPADM

## 2021-01-05 RX ORDER — WARFARIN SODIUM 3 MG/1
3 TABLET ORAL
Status: DISCONTINUED | OUTPATIENT
Start: 2021-01-07 | End: 2021-01-07

## 2021-01-05 RX ORDER — CHOLECALCIFEROL (VITAMIN D3) 125 MCG
1000 CAPSULE ORAL DAILY
Status: DISCONTINUED | OUTPATIENT
Start: 2021-01-06 | End: 2021-01-13 | Stop reason: HOSPADM

## 2021-01-05 RX ORDER — DEXAMETHASONE SODIUM PHOSPHATE 10 MG/ML
6 INJECTION, SOLUTION INTRAMUSCULAR; INTRAVENOUS DAILY
Status: DISCONTINUED | OUTPATIENT
Start: 2021-01-05 | End: 2021-01-12

## 2021-01-05 RX ORDER — DEXAMETHASONE SODIUM PHOSPHATE 4 MG/ML
6 INJECTION, SOLUTION INTRA-ARTICULAR; INTRALESIONAL; INTRAMUSCULAR; INTRAVENOUS; SOFT TISSUE ONCE
Status: COMPLETED | OUTPATIENT
Start: 2021-01-05 | End: 2021-01-05

## 2021-01-05 RX ORDER — FERROUS SULFATE 325(65) MG
325 TABLET ORAL DAILY
Status: DISCONTINUED | OUTPATIENT
Start: 2021-01-06 | End: 2021-01-13 | Stop reason: HOSPADM

## 2021-01-05 RX ORDER — WARFARIN SODIUM 4 MG/1
4 TABLET ORAL
Status: DISCONTINUED | OUTPATIENT
Start: 2021-01-05 | End: 2021-01-07

## 2021-01-05 RX ORDER — MONTELUKAST SODIUM 10 MG/1
10 TABLET ORAL EVERY EVENING
Status: DISCONTINUED | OUTPATIENT
Start: 2021-01-05 | End: 2021-01-13 | Stop reason: HOSPADM

## 2021-01-05 RX ADMIN — ALBUTEROL SULFATE 2 PUFF: 90 AEROSOL, METERED RESPIRATORY (INHALATION) at 14:54

## 2021-01-05 RX ADMIN — DEXAMETHASONE SODIUM PHOSPHATE 6 MG: 4 INJECTION INTRA-ARTICULAR; INTRALESIONAL; INTRAMUSCULAR; INTRAVENOUS; SOFT TISSUE at 14:53

## 2021-01-05 RX ADMIN — WARFARIN SODIUM 4 MG: 4 TABLET ORAL at 21:27

## 2021-01-05 RX ADMIN — MONTELUKAST SODIUM 10 MG: 10 TABLET, FILM COATED ORAL at 18:41

## 2021-01-05 SDOH — HEALTH STABILITY: MENTAL HEALTH: HOW OFTEN DO YOU HAVE A DRINK CONTAINING ALCOHOL?: 4 OR MORE TIMES A WEEK

## 2021-01-05 SDOH — HEALTH STABILITY: PHYSICAL HEALTH: ON AVERAGE, HOW MANY MINUTES DO YOU ENGAGE IN EXERCISE AT THIS LEVEL?: 0 MIN

## 2021-01-05 SDOH — HEALTH STABILITY: MENTAL HEALTH: HOW OFTEN DO YOU HAVE 6 OR MORE DRINKS ON ONE OCCASION?: NEVER

## 2021-01-05 SDOH — HEALTH STABILITY: MENTAL HEALTH: HOW MANY STANDARD DRINKS CONTAINING ALCOHOL DO YOU HAVE ON A TYPICAL DAY?: 1 OR 2

## 2021-01-05 SDOH — HEALTH STABILITY: PHYSICAL HEALTH: ON AVERAGE, HOW MANY DAYS PER WEEK DO YOU ENGAGE IN MODERATE TO STRENUOUS EXERCISE (LIKE A BRISK WALK)?: 0 DAYS

## 2021-01-05 ASSESSMENT — LIFESTYLE VARIABLES
AUDIT-C TOTAL SCORE: 0
HOW MANY STANDARD DRINKS CONTAINING ALCOHOL DO YOU HAVE ON A TYPICAL DAY: 0,1 OR 2
HOW OFTEN DO YOU HAVE A DRINK CONTAINING ALCOHOL: NEVER
ALCOHOL_USE_STATUS: NO OR LOW RISK WITH VALIDATED TOOL
HOW OFTEN DO YOU HAVE 6 OR MORE DRINKS ON ONE OCCASION: NEVER

## 2021-01-05 ASSESSMENT — PATIENT HEALTH QUESTIONNAIRE - PHQ9
SUM OF ALL RESPONSES TO PHQ9 QUESTIONS 1 AND 2: 0
CLINICAL INTERPRETATION OF PHQ9 SCORE: NO FURTHER SCREENING NEEDED
IS PATIENT ABLE TO COMPLETE PHQ2 OR PHQ9: YES
SUM OF ALL RESPONSES TO PHQ9 QUESTIONS 1 AND 2: 0
CLINICAL INTERPRETATION OF PHQ2 SCORE: NO FURTHER SCREENING NEEDED
2. FEELING DOWN, DEPRESSED OR HOPELESS: NOT AT ALL
1. LITTLE INTEREST OR PLEASURE IN DOING THINGS: NOT AT ALL

## 2021-01-05 ASSESSMENT — COGNITIVE AND FUNCTIONAL STATUS - GENERAL
BECAUSE OF A PHYSICAL, MENTAL, OR EMOTIONAL CONDITION, DO YOU HAVE DIFFICULTY DOING ERRANDS ALONE: NO
ARE YOU DEAF OR DO YOU HAVE SERIOUS DIFFICULTY  HEARING: NO
DO YOU HAVE SERIOUS DIFFICULTY WALKING OR CLIMBING STAIRS: NO
BECAUSE OF A PHYSICAL, MENTAL, OR EMOTIONAL CONDITION, DO YOU HAVE SERIOUS DIFFICULTY CONCENTRATING, REMEMBERING OR MAKING DECISIONS: NO
ARE YOU BLIND OR DO YOU HAVE SERIOUS DIFFICULTY SEEING, EVEN WHEN WEARING GLASSES: NO
DO YOU HAVE DIFFICULTY DRESSING OR BATHING: NO

## 2021-01-05 ASSESSMENT — PAIN SCALES - GENERAL
PAINLEVEL_OUTOF10: 0
PAINLEVEL_OUTOF10: 0

## 2021-01-05 ASSESSMENT — ACTIVITIES OF DAILY LIVING (ADL)
CHRONIC_PAIN_PRESENT: NO
ADL_SHORT_OF_BREATH: YES
RECENT_DECLINE_ADL: YES, DECLINE IN BATHING/DRESSING/FEEDING, COLLABORATE WITH PROVIDER (T)
ADL_SCORE: 12
ADL_BEFORE_ADMISSION: INDEPENDENT

## 2021-01-06 LAB
25(OH)D3+25(OH)D2 SERPL-MCNC: 39.8 NG/ML (ref 30–100)
ALBUMIN SERPL-MCNC: 3 G/DL (ref 3.6–5.1)
ALBUMIN/GLOB SERPL: 0.7 {RATIO} (ref 1–2.4)
ALP SERPL-CCNC: 96 UNITS/L (ref 45–117)
ALT SERPL-CCNC: 22 UNITS/L
ANION GAP SERPL CALC-SCNC: 11 MMOL/L (ref 10–20)
ANION GAP SERPL CALC-SCNC: 9 MMOL/L (ref 10–20)
AST SERPL-CCNC: 21 UNITS/L
ATRIAL RATE (BPM): 81
BILIRUB SERPL-MCNC: 0.3 MG/DL (ref 0.2–1)
BUN SERPL-MCNC: 22 MG/DL (ref 6–20)
BUN SERPL-MCNC: 29 MG/DL (ref 6–20)
BUN/CREAT SERPL: 29 (ref 7–25)
BUN/CREAT SERPL: 29 (ref 7–25)
CALCIUM SERPL-MCNC: 8.7 MG/DL (ref 8.4–10.2)
CALCIUM SERPL-MCNC: 9.1 MG/DL (ref 8.4–10.2)
CHLORIDE SERPL-SCNC: 100 MMOL/L (ref 98–107)
CHLORIDE SERPL-SCNC: 101 MMOL/L (ref 98–107)
CO2 SERPL-SCNC: 29 MMOL/L (ref 21–32)
CO2 SERPL-SCNC: 32 MMOL/L (ref 21–32)
CREAT SERPL-MCNC: 0.76 MG/DL (ref 0.51–0.95)
CREAT SERPL-MCNC: 1 MG/DL (ref 0.51–0.95)
DEPRECATED RDW RBC: 49 FL (ref 39–50)
ERYTHROCYTE [DISTWIDTH] IN BLOOD: 13.9 % (ref 11–15)
FASTING DURATION TIME PATIENT: ABNORMAL H
FASTING DURATION TIME PATIENT: ABNORMAL H
GFR SERPLBLD BASED ON 1.73 SQ M-ARVRAT: 51 ML/MIN/1.73M2
GFR SERPLBLD BASED ON 1.73 SQ M-ARVRAT: 71 ML/MIN/1.73M2
GLOBULIN SER-MCNC: 4.6 G/DL (ref 2–4)
GLUCOSE SERPL-MCNC: 110 MG/DL (ref 65–99)
GLUCOSE SERPL-MCNC: 188 MG/DL (ref 65–99)
HCT VFR BLD CALC: 43.3 % (ref 36–46.5)
HGB BLD-MCNC: 13.1 G/DL (ref 12–15.5)
INR PPP: 2
MCH RBC QN AUTO: 28.9 PG (ref 26–34)
MCHC RBC AUTO-ENTMCNC: 30.3 G/DL (ref 32–36.5)
MCV RBC AUTO: 95.6 FL (ref 78–100)
NRBC BLD MANUAL-RTO: 0 /100 WBC
P AXIS (DEGREES): 38
PLATELET # BLD AUTO: 209 K/MCL (ref 140–450)
POTASSIUM SERPL-SCNC: 4.3 MMOL/L (ref 3.4–5.1)
POTASSIUM SERPL-SCNC: 4.3 MMOL/L (ref 3.4–5.1)
PR-INTERVAL (MSEC): 160
PROCALCITONIN SERPL IA-MCNC: <0.05 NG/ML
PROT SERPL-MCNC: 7.6 G/DL (ref 6.4–8.2)
PROTHROMBIN TIME: 20.5 SEC (ref 9.7–11.8)
QRS-INTERVAL (MSEC): 88
QT-INTERVAL (MSEC): 408
QTC: 471
R AXIS (DEGREES): 39
RAINBOW EXTRA TUBES HOLD SPECIMEN: NORMAL
RAINBOW EXTRA TUBES HOLD SPECIMEN: NORMAL
RBC # BLD: 4.53 MIL/MCL (ref 4–5.2)
REPORT TEXT: NORMAL
SARS-COV-2 RNA RESP QL NAA+PROBE: DETECTED
SERVICE CMNT-IMP: ABNORMAL
SODIUM SERPL-SCNC: 136 MMOL/L (ref 135–145)
SODIUM SERPL-SCNC: 138 MMOL/L (ref 135–145)
VENTRICULAR RATE EKG/MIN (BPM): 80
WBC # BLD: 5.2 K/MCL (ref 4.2–11)

## 2021-01-06 PROCEDURE — 94660 CPAP INITIATION&MGMT: CPT | Performed by: INTERNAL MEDICINE

## 2021-01-06 PROCEDURE — 80048 BASIC METABOLIC PNL TOTAL CA: CPT | Performed by: INTERNAL MEDICINE

## 2021-01-06 PROCEDURE — 36415 COLL VENOUS BLD VENIPUNCTURE: CPT | Performed by: INTERNAL MEDICINE

## 2021-01-06 PROCEDURE — 99223 1ST HOSP IP/OBS HIGH 75: CPT | Performed by: INTERNAL MEDICINE

## 2021-01-06 PROCEDURE — 10002803 HB RX 637: Performed by: INTERNAL MEDICINE

## 2021-01-06 PROCEDURE — 10002800 HB RX 250 W HCPCS: Performed by: INTERNAL MEDICINE

## 2021-01-06 PROCEDURE — C9399 UNCLASSIFIED DRUGS OR BIOLOG: HCPCS | Performed by: STUDENT IN AN ORGANIZED HEALTH CARE EDUCATION/TRAINING PROGRAM

## 2021-01-06 PROCEDURE — 80053 COMPREHEN METABOLIC PANEL: CPT | Performed by: STUDENT IN AN ORGANIZED HEALTH CARE EDUCATION/TRAINING PROGRAM

## 2021-01-06 PROCEDURE — 13003289 HB OXYGEN THERAPY DAILY

## 2021-01-06 PROCEDURE — 10004281 HB COUNTER-STAFF TIME PER 15 MIN

## 2021-01-06 PROCEDURE — 10006031 HB ROOM CHARGE TELEMETRY

## 2021-01-06 PROCEDURE — 85027 COMPLETE CBC AUTOMATED: CPT | Performed by: INTERNAL MEDICINE

## 2021-01-06 PROCEDURE — 84145 PROCALCITONIN (PCT): CPT | Performed by: INTERNAL MEDICINE

## 2021-01-06 PROCEDURE — 10002807 HB RX 258: Performed by: STUDENT IN AN ORGANIZED HEALTH CARE EDUCATION/TRAINING PROGRAM

## 2021-01-06 PROCEDURE — 10002800 HB RX 250 W HCPCS: Performed by: STUDENT IN AN ORGANIZED HEALTH CARE EDUCATION/TRAINING PROGRAM

## 2021-01-06 PROCEDURE — XW033E5 INTRODUCTION OF REMDESIVIR ANTI-INFECTIVE INTO PERIPHERAL VEIN, PERCUTANEOUS APPROACH, NEW TECHNOLOGY GROUP 5: ICD-10-PCS | Performed by: STUDENT IN AN ORGANIZED HEALTH CARE EDUCATION/TRAINING PROGRAM

## 2021-01-06 PROCEDURE — 10002801 HB RX 250 W/O HCPCS: Performed by: INTERNAL MEDICINE

## 2021-01-06 PROCEDURE — 10002803 HB RX 637: Performed by: STUDENT IN AN ORGANIZED HEALTH CARE EDUCATION/TRAINING PROGRAM

## 2021-01-06 PROCEDURE — 85610 PROTHROMBIN TIME: CPT | Performed by: STUDENT IN AN ORGANIZED HEALTH CARE EDUCATION/TRAINING PROGRAM

## 2021-01-06 RX ORDER — LOSARTAN POTASSIUM 50 MG/1
100 TABLET ORAL DAILY
Status: DISCONTINUED | OUTPATIENT
Start: 2021-01-06 | End: 2021-01-13 | Stop reason: HOSPADM

## 2021-01-06 RX ORDER — GUAIFENESIN 600 MG/1
600 TABLET, EXTENDED RELEASE ORAL EVERY 8 HOURS PRN
Status: DISCONTINUED | OUTPATIENT
Start: 2021-01-06 | End: 2021-01-13 | Stop reason: HOSPADM

## 2021-01-06 RX ORDER — HYDROCHLOROTHIAZIDE 25 MG/1
25 TABLET ORAL DAILY
Status: DISCONTINUED | OUTPATIENT
Start: 2021-01-06 | End: 2021-01-13 | Stop reason: HOSPADM

## 2021-01-06 RX ORDER — SODIUM CHLORIDE 9 MG/ML
INJECTION, SOLUTION INTRAVENOUS
Status: DISPENSED
Start: 2021-01-06 | End: 2021-01-07

## 2021-01-06 RX ADMIN — FERROUS SULFATE TAB 325 MG (65 MG ELEMENTAL FE) 325 MG: 325 (65 FE) TAB at 09:04

## 2021-01-06 RX ADMIN — MONTELUKAST SODIUM 10 MG: 10 TABLET, FILM COATED ORAL at 17:11

## 2021-01-06 RX ADMIN — GUAIFENESIN 600 MG: 600 TABLET, EXTENDED RELEASE ORAL at 13:23

## 2021-01-06 RX ADMIN — LOSARTAN POTASSIUM 100 MG: 50 TABLET, FILM COATED ORAL at 13:23

## 2021-01-06 RX ADMIN — DEXAMETHASONE SODIUM PHOSPHATE 6 MG: 10 INJECTION, SOLUTION INTRAMUSCULAR; INTRAVENOUS at 09:04

## 2021-01-06 RX ADMIN — Medication 1000 MCG: at 09:04

## 2021-01-06 RX ADMIN — WARFARIN SODIUM 4 MG: 4 TABLET ORAL at 22:03

## 2021-01-06 RX ADMIN — HYDROCHLOROTHIAZIDE 25 MG: 25 TABLET ORAL at 13:23

## 2021-01-06 RX ADMIN — Medication 10 MCG: at 12:27

## 2021-01-06 RX ADMIN — GUAIFENESIN 600 MG: 600 TABLET, EXTENDED RELEASE ORAL at 22:03

## 2021-01-06 RX ADMIN — OXYCODONE HYDROCHLORIDE AND ACETAMINOPHEN 500 MG: 500 TABLET ORAL at 09:04

## 2021-01-06 RX ADMIN — REMDESIVIR 200 MG: 100 INJECTION, POWDER, LYOPHILIZED, FOR SOLUTION INTRAVENOUS at 15:31

## 2021-01-06 RX ADMIN — Medication 100 MG: at 09:04

## 2021-01-06 RX ADMIN — PANTOPRAZOLE SODIUM 40 MG: 40 TABLET, DELAYED RELEASE ORAL at 09:04

## 2021-01-06 ASSESSMENT — ENCOUNTER SYMPTOMS
CONSTIPATION: 0
WEAKNESS: 1
ENDOCRINE NEGATIVE: 1
APPETITE CHANGE: 1
POLYDIPSIA: 0
GASTROINTESTINAL NEGATIVE: 1
CHILLS: 1
BLOOD IN STOOL: 0
DIZZINESS: 0
ABDOMINAL PAIN: 0
CHEST TIGHTNESS: 0
FATIGUE: 1
BRUISES/BLEEDS EASILY: 0
EYES NEGATIVE: 1
SORE THROAT: 0
CONFUSION: 0
UNEXPECTED WEIGHT CHANGE: 0
SINUS PAIN: 0
NERVOUS/ANXIOUS: 0
VOICE CHANGE: 0
SEIZURES: 0
DIARRHEA: 0
ABDOMINAL DISTENTION: 0
COUGH: 0
WEAKNESS: 0
ADENOPATHY: 0
NAUSEA: 0
WHEEZING: 0
HEADACHES: 0
TROUBLE SWALLOWING: 0
VOMITING: 0
SHORTNESS OF BREATH: 1
FEVER: 1
EYE PAIN: 0
DIAPHORESIS: 0

## 2021-01-06 ASSESSMENT — PAIN SCALES - GENERAL
PAINLEVEL_OUTOF10: 0

## 2021-01-07 LAB
ALBUMIN SERPL-MCNC: 3 G/DL (ref 3.6–5.1)
ALBUMIN/GLOB SERPL: 0.7 {RATIO} (ref 1–2.4)
ALP SERPL-CCNC: 90 UNITS/L (ref 45–117)
ALT SERPL-CCNC: 21 UNITS/L
ANION GAP SERPL CALC-SCNC: 12 MMOL/L (ref 10–20)
AST SERPL-CCNC: 22 UNITS/L
BASOPHILS # BLD: 0 K/MCL (ref 0–0.3)
BASOPHILS NFR BLD: 0 %
BILIRUB SERPL-MCNC: 0.3 MG/DL (ref 0.2–1)
BUN SERPL-MCNC: 27 MG/DL (ref 6–20)
BUN/CREAT SERPL: 30 (ref 7–25)
CALCIUM SERPL-MCNC: 8.9 MG/DL (ref 8.4–10.2)
CHLORIDE SERPL-SCNC: 101 MMOL/L (ref 98–107)
CO2 SERPL-SCNC: 30 MMOL/L (ref 21–32)
CREAT SERPL-MCNC: 0.9 MG/DL (ref 0.51–0.95)
DEPRECATED RDW RBC: 49.4 FL (ref 39–50)
EOSINOPHIL # BLD: 0 K/MCL (ref 0–0.5)
EOSINOPHIL NFR BLD: 0 %
ERYTHROCYTE [DISTWIDTH] IN BLOOD: 14.1 % (ref 11–15)
FASTING DURATION TIME PATIENT: ABNORMAL H
GFR SERPLBLD BASED ON 1.73 SQ M-ARVRAT: 58 ML/MIN/1.73M2
GLOBULIN SER-MCNC: 4.4 G/DL (ref 2–4)
GLUCOSE SERPL-MCNC: 80 MG/DL (ref 65–99)
HCT VFR BLD CALC: 47.7 % (ref 36–46.5)
HGB BLD-MCNC: 14.7 G/DL (ref 12–15.5)
IMM GRANULOCYTES # BLD AUTO: 0 K/MCL (ref 0–0.2)
IMM GRANULOCYTES # BLD: 0 %
INR PPP: 2.1
LYMPHOCYTES # BLD: 1 K/MCL (ref 1–4)
LYMPHOCYTES NFR BLD: 12 %
MCH RBC QN AUTO: 29.4 PG (ref 26–34)
MCHC RBC AUTO-ENTMCNC: 30.8 G/DL (ref 32–36.5)
MCV RBC AUTO: 95.4 FL (ref 78–100)
MONOCYTES # BLD: 0.6 K/MCL (ref 0.3–0.9)
MONOCYTES NFR BLD: 8 %
NEUTROPHILS # BLD: 6.3 K/MCL (ref 1.8–7.7)
NEUTROPHILS NFR BLD: 80 %
NRBC BLD MANUAL-RTO: 0 /100 WBC
PLATELET # BLD AUTO: 212 K/MCL (ref 140–450)
POTASSIUM SERPL-SCNC: 3.8 MMOL/L (ref 3.4–5.1)
PROT SERPL-MCNC: 7.4 G/DL (ref 6.4–8.2)
PROTHROMBIN TIME: 21.7 SEC (ref 9.7–11.8)
RBC # BLD: 5 MIL/MCL (ref 4–5.2)
SODIUM SERPL-SCNC: 139 MMOL/L (ref 135–145)
WBC # BLD: 8 K/MCL (ref 4.2–11)

## 2021-01-07 PROCEDURE — 10006031 HB ROOM CHARGE TELEMETRY

## 2021-01-07 PROCEDURE — C9399 UNCLASSIFIED DRUGS OR BIOLOG: HCPCS | Performed by: STUDENT IN AN ORGANIZED HEALTH CARE EDUCATION/TRAINING PROGRAM

## 2021-01-07 PROCEDURE — 80053 COMPREHEN METABOLIC PANEL: CPT | Performed by: STUDENT IN AN ORGANIZED HEALTH CARE EDUCATION/TRAINING PROGRAM

## 2021-01-07 PROCEDURE — 36415 COLL VENOUS BLD VENIPUNCTURE: CPT | Performed by: STUDENT IN AN ORGANIZED HEALTH CARE EDUCATION/TRAINING PROGRAM

## 2021-01-07 PROCEDURE — 85610 PROTHROMBIN TIME: CPT | Performed by: STUDENT IN AN ORGANIZED HEALTH CARE EDUCATION/TRAINING PROGRAM

## 2021-01-07 PROCEDURE — 10002803 HB RX 637: Performed by: INTERNAL MEDICINE

## 2021-01-07 PROCEDURE — 10002801 HB RX 250 W/O HCPCS: Performed by: INTERNAL MEDICINE

## 2021-01-07 PROCEDURE — 10002800 HB RX 250 W HCPCS: Performed by: STUDENT IN AN ORGANIZED HEALTH CARE EDUCATION/TRAINING PROGRAM

## 2021-01-07 PROCEDURE — 10004651 HB RX, NO CHARGE ITEM: Performed by: FAMILY MEDICINE

## 2021-01-07 PROCEDURE — 85025 COMPLETE CBC W/AUTO DIFF WBC: CPT | Performed by: STUDENT IN AN ORGANIZED HEALTH CARE EDUCATION/TRAINING PROGRAM

## 2021-01-07 PROCEDURE — 99232 SBSQ HOSP IP/OBS MODERATE 35: CPT | Performed by: INTERNAL MEDICINE

## 2021-01-07 PROCEDURE — 94660 CPAP INITIATION&MGMT: CPT

## 2021-01-07 PROCEDURE — 94640 AIRWAY INHALATION TREATMENT: CPT

## 2021-01-07 PROCEDURE — 10002803 HB RX 637: Performed by: STUDENT IN AN ORGANIZED HEALTH CARE EDUCATION/TRAINING PROGRAM

## 2021-01-07 PROCEDURE — 10002800 HB RX 250 W HCPCS: Performed by: INTERNAL MEDICINE

## 2021-01-07 PROCEDURE — 10004281 HB COUNTER-STAFF TIME PER 15 MIN

## 2021-01-07 PROCEDURE — 10002807 HB RX 258: Performed by: STUDENT IN AN ORGANIZED HEALTH CARE EDUCATION/TRAINING PROGRAM

## 2021-01-07 RX ORDER — ACETAMINOPHEN 325 MG/1
650 TABLET ORAL EVERY 4 HOURS PRN
Status: DISCONTINUED | OUTPATIENT
Start: 2021-01-07 | End: 2021-01-13 | Stop reason: HOSPADM

## 2021-01-07 RX ORDER — WARFARIN SODIUM 3 MG/1
3 TABLET ORAL ONCE
Status: COMPLETED | OUTPATIENT
Start: 2021-01-07 | End: 2021-01-07

## 2021-01-07 RX ADMIN — HYDROCHLOROTHIAZIDE 25 MG: 25 TABLET ORAL at 08:52

## 2021-01-07 RX ADMIN — Medication 1000 MCG: at 08:51

## 2021-01-07 RX ADMIN — MONTELUKAST SODIUM 10 MG: 10 TABLET, FILM COATED ORAL at 18:35

## 2021-01-07 RX ADMIN — DEXAMETHASONE SODIUM PHOSPHATE 6 MG: 10 INJECTION, SOLUTION INTRAMUSCULAR; INTRAVENOUS at 08:52

## 2021-01-07 RX ADMIN — ACETAMINOPHEN 650 MG: 325 TABLET ORAL at 11:50

## 2021-01-07 RX ADMIN — GUAIFENESIN 600 MG: 600 TABLET, EXTENDED RELEASE ORAL at 11:50

## 2021-01-07 RX ADMIN — REMDESIVIR 100 MG: 5 INJECTION INTRAVENOUS at 11:58

## 2021-01-07 RX ADMIN — FERROUS SULFATE TAB 325 MG (65 MG ELEMENTAL FE) 325 MG: 325 (65 FE) TAB at 08:51

## 2021-01-07 RX ADMIN — Medication 100 MG: at 08:52

## 2021-01-07 RX ADMIN — FLUTICASONE FUROATE, UMECLIDINIUM BROMIDE AND VILANTEROL TRIFENATATE 1 PUFF: 100; 62.5; 25 POWDER RESPIRATORY (INHALATION) at 11:05

## 2021-01-07 RX ADMIN — Medication 10 MCG: at 08:52

## 2021-01-07 RX ADMIN — PANTOPRAZOLE SODIUM 40 MG: 40 TABLET, DELAYED RELEASE ORAL at 08:52

## 2021-01-07 RX ADMIN — GUAIFENESIN 600 MG: 600 TABLET, EXTENDED RELEASE ORAL at 21:30

## 2021-01-07 RX ADMIN — WARFARIN SODIUM 3 MG: 3 TABLET ORAL at 18:35

## 2021-01-07 RX ADMIN — OXYCODONE HYDROCHLORIDE AND ACETAMINOPHEN 500 MG: 500 TABLET ORAL at 08:52

## 2021-01-07 RX ADMIN — LOSARTAN POTASSIUM 100 MG: 50 TABLET, FILM COATED ORAL at 08:52

## 2021-01-07 RX ADMIN — ACETAMINOPHEN 650 MG: 325 TABLET ORAL at 00:18

## 2021-01-07 ASSESSMENT — ENCOUNTER SYMPTOMS
VOMITING: 0
TROUBLE SWALLOWING: 0
ABDOMINAL DISTENTION: 0
SEIZURES: 0
UNEXPECTED WEIGHT CHANGE: 0
FEVER: 1
DIARRHEA: 0
CHEST TIGHTNESS: 0
EYE PAIN: 0
POLYDIPSIA: 0
SINUS PAIN: 0
ADENOPATHY: 0
ABDOMINAL PAIN: 0
BLOOD IN STOOL: 0
DIAPHORESIS: 0
CHILLS: 1
FATIGUE: 1
NAUSEA: 0
VOICE CHANGE: 0
BRUISES/BLEEDS EASILY: 0
HEADACHES: 0
SORE THROAT: 0
WEAKNESS: 0
DIZZINESS: 0

## 2021-01-07 ASSESSMENT — PAIN SCALES - GENERAL
PAINLEVEL_OUTOF10: 1
PAINLEVEL_OUTOF10: 4
PAINLEVEL_OUTOF10: 5
PAINLEVEL_OUTOF10: 2
PAINLEVEL_OUTOF10: 1
PAINLEVEL_OUTOF10: 0

## 2021-01-07 ASSESSMENT — PAIN DESCRIPTION - PAIN TYPE
TYPE: ACUTE PAIN
TYPE: ACUTE PAIN

## 2021-01-08 ENCOUNTER — CASE MANAGEMENT (OUTPATIENT)
Dept: CARE COORDINATION | Age: 86
End: 2021-01-08

## 2021-01-08 LAB
ALBUMIN SERPL-MCNC: 2.8 G/DL (ref 3.6–5.1)
ALBUMIN/GLOB SERPL: 0.7 {RATIO} (ref 1–2.4)
ALP SERPL-CCNC: 81 UNITS/L (ref 45–117)
ALT SERPL-CCNC: 19 UNITS/L
ANION GAP SERPL CALC-SCNC: 10 MMOL/L (ref 10–20)
AST SERPL-CCNC: 22 UNITS/L
BILIRUB SERPL-MCNC: 0.3 MG/DL (ref 0.2–1)
BUN SERPL-MCNC: 28 MG/DL (ref 6–20)
BUN/CREAT SERPL: 33 (ref 7–25)
CALCIUM SERPL-MCNC: 8.7 MG/DL (ref 8.4–10.2)
CHLORIDE SERPL-SCNC: 100 MMOL/L (ref 98–107)
CO2 SERPL-SCNC: 31 MMOL/L (ref 21–32)
CREAT SERPL-MCNC: 0.84 MG/DL (ref 0.51–0.95)
DEPRECATED RDW RBC: 48.8 FL (ref 39–50)
ERYTHROCYTE [DISTWIDTH] IN BLOOD: 14.1 % (ref 11–15)
FASTING DURATION TIME PATIENT: ABNORMAL H
GFR SERPLBLD BASED ON 1.73 SQ M-ARVRAT: 63 ML/MIN/1.73M2
GLOBULIN SER-MCNC: 4.2 G/DL (ref 2–4)
GLUCOSE SERPL-MCNC: 82 MG/DL (ref 65–99)
HCT VFR BLD CALC: 44 % (ref 36–46.5)
HGB BLD-MCNC: 13.2 G/DL (ref 12–15.5)
INR PPP: 2
MCH RBC QN AUTO: 28.6 PG (ref 26–34)
MCHC RBC AUTO-ENTMCNC: 30 G/DL (ref 32–36.5)
MCV RBC AUTO: 95.2 FL (ref 78–100)
NRBC BLD MANUAL-RTO: 0 /100 WBC
PLATELET # BLD AUTO: 191 K/MCL (ref 140–450)
POTASSIUM SERPL-SCNC: 4.2 MMOL/L (ref 3.4–5.1)
PROT SERPL-MCNC: 7 G/DL (ref 6.4–8.2)
PROTHROMBIN TIME: 20.6 SEC (ref 9.7–11.8)
RAINBOW EXTRA TUBES HOLD SPECIMEN: NORMAL
RBC # BLD: 4.62 MIL/MCL (ref 4–5.2)
SODIUM SERPL-SCNC: 137 MMOL/L (ref 135–145)
WBC # BLD: 6.8 K/MCL (ref 4.2–11)

## 2021-01-08 PROCEDURE — 85610 PROTHROMBIN TIME: CPT | Performed by: STUDENT IN AN ORGANIZED HEALTH CARE EDUCATION/TRAINING PROGRAM

## 2021-01-08 PROCEDURE — 10002800 HB RX 250 W HCPCS: Performed by: INTERNAL MEDICINE

## 2021-01-08 PROCEDURE — 10002803 HB RX 637: Performed by: INTERNAL MEDICINE

## 2021-01-08 PROCEDURE — 10002807 HB RX 258: Performed by: STUDENT IN AN ORGANIZED HEALTH CARE EDUCATION/TRAINING PROGRAM

## 2021-01-08 PROCEDURE — 10004281 HB COUNTER-STAFF TIME PER 15 MIN

## 2021-01-08 PROCEDURE — 36415 COLL VENOUS BLD VENIPUNCTURE: CPT | Performed by: STUDENT IN AN ORGANIZED HEALTH CARE EDUCATION/TRAINING PROGRAM

## 2021-01-08 PROCEDURE — 94640 AIRWAY INHALATION TREATMENT: CPT

## 2021-01-08 PROCEDURE — 10002803 HB RX 637: Performed by: STUDENT IN AN ORGANIZED HEALTH CARE EDUCATION/TRAINING PROGRAM

## 2021-01-08 PROCEDURE — C9399 UNCLASSIFIED DRUGS OR BIOLOG: HCPCS | Performed by: STUDENT IN AN ORGANIZED HEALTH CARE EDUCATION/TRAINING PROGRAM

## 2021-01-08 PROCEDURE — 94660 CPAP INITIATION&MGMT: CPT

## 2021-01-08 PROCEDURE — 85027 COMPLETE CBC AUTOMATED: CPT | Performed by: STUDENT IN AN ORGANIZED HEALTH CARE EDUCATION/TRAINING PROGRAM

## 2021-01-08 PROCEDURE — 10006031 HB ROOM CHARGE TELEMETRY

## 2021-01-08 PROCEDURE — 99232 SBSQ HOSP IP/OBS MODERATE 35: CPT | Performed by: INTERNAL MEDICINE

## 2021-01-08 PROCEDURE — 10002800 HB RX 250 W HCPCS: Performed by: STUDENT IN AN ORGANIZED HEALTH CARE EDUCATION/TRAINING PROGRAM

## 2021-01-08 PROCEDURE — 80053 COMPREHEN METABOLIC PANEL: CPT | Performed by: STUDENT IN AN ORGANIZED HEALTH CARE EDUCATION/TRAINING PROGRAM

## 2021-01-08 PROCEDURE — 10002801 HB RX 250 W/O HCPCS: Performed by: INTERNAL MEDICINE

## 2021-01-08 RX ORDER — WARFARIN SODIUM 4 MG/1
4 TABLET ORAL ONCE
Status: COMPLETED | OUTPATIENT
Start: 2021-01-08 | End: 2021-01-08

## 2021-01-08 RX ADMIN — Medication 1000 MCG: at 09:22

## 2021-01-08 RX ADMIN — HYDROCHLOROTHIAZIDE 25 MG: 25 TABLET ORAL at 06:20

## 2021-01-08 RX ADMIN — WARFARIN SODIUM 4 MG: 4 TABLET ORAL at 16:26

## 2021-01-08 RX ADMIN — OXYCODONE HYDROCHLORIDE AND ACETAMINOPHEN 500 MG: 500 TABLET ORAL at 09:22

## 2021-01-08 RX ADMIN — PANTOPRAZOLE SODIUM 40 MG: 40 TABLET, DELAYED RELEASE ORAL at 09:23

## 2021-01-08 RX ADMIN — MONTELUKAST SODIUM 10 MG: 10 TABLET, FILM COATED ORAL at 15:35

## 2021-01-08 RX ADMIN — DEXAMETHASONE SODIUM PHOSPHATE 6 MG: 10 INJECTION, SOLUTION INTRAMUSCULAR; INTRAVENOUS at 09:24

## 2021-01-08 RX ADMIN — FLUTICASONE FUROATE, UMECLIDINIUM BROMIDE AND VILANTEROL TRIFENATATE 1 PUFF: 100; 62.5; 25 POWDER RESPIRATORY (INHALATION) at 07:51

## 2021-01-08 RX ADMIN — Medication 100 MG: at 09:23

## 2021-01-08 RX ADMIN — GUAIFENESIN 600 MG: 600 TABLET, EXTENDED RELEASE ORAL at 15:35

## 2021-01-08 RX ADMIN — Medication 10 MCG: at 11:26

## 2021-01-08 RX ADMIN — REMDESIVIR 100 MG: 5 INJECTION INTRAVENOUS at 11:26

## 2021-01-08 RX ADMIN — LOSARTAN POTASSIUM 100 MG: 50 TABLET, FILM COATED ORAL at 06:20

## 2021-01-08 RX ADMIN — ALBUTEROL SULFATE 2 PUFF: 90 AEROSOL, METERED RESPIRATORY (INHALATION) at 07:52

## 2021-01-08 RX ADMIN — FERROUS SULFATE TAB 325 MG (65 MG ELEMENTAL FE) 325 MG: 325 (65 FE) TAB at 09:23

## 2021-01-08 ASSESSMENT — PAIN SCALES - GENERAL
PAINLEVEL_OUTOF10: 0

## 2021-01-08 ASSESSMENT — ENCOUNTER SYMPTOMS
BRUISES/BLEEDS EASILY: 0
BLOOD IN STOOL: 0
HEADACHES: 0
EYE PAIN: 0
TROUBLE SWALLOWING: 0
FEVER: 1
SEIZURES: 0
ABDOMINAL PAIN: 0
UNEXPECTED WEIGHT CHANGE: 0
VOMITING: 0
ADENOPATHY: 0
POLYDIPSIA: 0
DIZZINESS: 0
NAUSEA: 0
ABDOMINAL DISTENTION: 0
DIAPHORESIS: 0
DIARRHEA: 0
VOICE CHANGE: 0
CHEST TIGHTNESS: 0
SINUS PAIN: 0
SORE THROAT: 0
WEAKNESS: 0
FATIGUE: 1
CHILLS: 1

## 2021-01-09 LAB
ALBUMIN SERPL-MCNC: 2.5 G/DL (ref 3.6–5.1)
ALBUMIN/GLOB SERPL: 0.6 {RATIO} (ref 1–2.4)
ALP SERPL-CCNC: 77 UNITS/L (ref 45–117)
ALT SERPL-CCNC: 24 UNITS/L
ANION GAP SERPL CALC-SCNC: 8 MMOL/L (ref 10–20)
AST SERPL-CCNC: 26 UNITS/L
BASOPHILS # BLD: 0 K/MCL (ref 0–0.3)
BASOPHILS NFR BLD: 0 %
BILIRUB SERPL-MCNC: 0.2 MG/DL (ref 0.2–1)
BUN SERPL-MCNC: 29 MG/DL (ref 6–20)
BUN/CREAT SERPL: 36 (ref 7–25)
CALCIUM SERPL-MCNC: 8.6 MG/DL (ref 8.4–10.2)
CHLORIDE SERPL-SCNC: 102 MMOL/L (ref 98–107)
CO2 SERPL-SCNC: 31 MMOL/L (ref 21–32)
CREAT SERPL-MCNC: 0.81 MG/DL (ref 0.51–0.95)
DEPRECATED RDW RBC: 48.6 FL (ref 39–50)
EOSINOPHIL # BLD: 0 K/MCL (ref 0–0.5)
EOSINOPHIL NFR BLD: 0 %
ERYTHROCYTE [DISTWIDTH] IN BLOOD: 13.9 % (ref 11–15)
FASTING DURATION TIME PATIENT: ABNORMAL H
GFR SERPLBLD BASED ON 1.73 SQ M-ARVRAT: 66 ML/MIN/1.73M2
GLOBULIN SER-MCNC: 3.9 G/DL (ref 2–4)
GLUCOSE SERPL-MCNC: 107 MG/DL (ref 65–99)
HCT VFR BLD CALC: 41.8 % (ref 36–46.5)
HGB BLD-MCNC: 13 G/DL (ref 12–15.5)
IMM GRANULOCYTES # BLD AUTO: 0 K/MCL (ref 0–0.2)
IMM GRANULOCYTES # BLD: 0 %
INR PPP: 1.8
LYMPHOCYTES # BLD: 1.1 K/MCL (ref 1–4)
LYMPHOCYTES NFR BLD: 23 %
MAGNESIUM SERPL-MCNC: 2 MG/DL (ref 1.7–2.4)
MCH RBC QN AUTO: 29.5 PG (ref 26–34)
MCHC RBC AUTO-ENTMCNC: 31.1 G/DL (ref 32–36.5)
MCV RBC AUTO: 94.8 FL (ref 78–100)
MONOCYTES # BLD: 0.8 K/MCL (ref 0.3–0.9)
MONOCYTES NFR BLD: 16 %
NEUTROPHILS # BLD: 2.8 K/MCL (ref 1.8–7.7)
NEUTROPHILS NFR BLD: 61 %
NRBC BLD MANUAL-RTO: 0 /100 WBC
PLATELET # BLD AUTO: 183 K/MCL (ref 140–450)
POTASSIUM SERPL-SCNC: 4.6 MMOL/L (ref 3.4–5.1)
PROT SERPL-MCNC: 6.4 G/DL (ref 6.4–8.2)
PROTHROMBIN TIME: 18.2 SEC (ref 9.7–11.8)
RBC # BLD: 4.41 MIL/MCL (ref 4–5.2)
SODIUM SERPL-SCNC: 136 MMOL/L (ref 135–145)
WBC # BLD: 4.7 K/MCL (ref 4.2–11)

## 2021-01-09 PROCEDURE — 94640 AIRWAY INHALATION TREATMENT: CPT

## 2021-01-09 PROCEDURE — 85025 COMPLETE CBC W/AUTO DIFF WBC: CPT | Performed by: INTERNAL MEDICINE

## 2021-01-09 PROCEDURE — 10002800 HB RX 250 W HCPCS: Performed by: STUDENT IN AN ORGANIZED HEALTH CARE EDUCATION/TRAINING PROGRAM

## 2021-01-09 PROCEDURE — 83735 ASSAY OF MAGNESIUM: CPT | Performed by: INTERNAL MEDICINE

## 2021-01-09 PROCEDURE — 10002803 HB RX 637: Performed by: STUDENT IN AN ORGANIZED HEALTH CARE EDUCATION/TRAINING PROGRAM

## 2021-01-09 PROCEDURE — 10002803 HB RX 637: Performed by: INTERNAL MEDICINE

## 2021-01-09 PROCEDURE — 94660 CPAP INITIATION&MGMT: CPT

## 2021-01-09 PROCEDURE — 85610 PROTHROMBIN TIME: CPT | Performed by: STUDENT IN AN ORGANIZED HEALTH CARE EDUCATION/TRAINING PROGRAM

## 2021-01-09 PROCEDURE — 10002801 HB RX 250 W/O HCPCS: Performed by: INTERNAL MEDICINE

## 2021-01-09 PROCEDURE — 10006031 HB ROOM CHARGE TELEMETRY

## 2021-01-09 PROCEDURE — C9399 UNCLASSIFIED DRUGS OR BIOLOG: HCPCS | Performed by: STUDENT IN AN ORGANIZED HEALTH CARE EDUCATION/TRAINING PROGRAM

## 2021-01-09 PROCEDURE — 80053 COMPREHEN METABOLIC PANEL: CPT | Performed by: STUDENT IN AN ORGANIZED HEALTH CARE EDUCATION/TRAINING PROGRAM

## 2021-01-09 PROCEDURE — 10002807 HB RX 258: Performed by: STUDENT IN AN ORGANIZED HEALTH CARE EDUCATION/TRAINING PROGRAM

## 2021-01-09 PROCEDURE — 36415 COLL VENOUS BLD VENIPUNCTURE: CPT | Performed by: INTERNAL MEDICINE

## 2021-01-09 PROCEDURE — 10002800 HB RX 250 W HCPCS: Performed by: INTERNAL MEDICINE

## 2021-01-09 RX ORDER — WARFARIN SODIUM 4 MG/1
4 TABLET ORAL ONCE
Status: COMPLETED | OUTPATIENT
Start: 2021-01-09 | End: 2021-01-09

## 2021-01-09 RX ORDER — DEXAMETHASONE 6 MG/1
6 TABLET ORAL
Qty: 5 TABLET | Refills: 0 | Status: SHIPPED | OUTPATIENT
Start: 2021-01-09 | End: 2021-01-13 | Stop reason: SDUPTHER

## 2021-01-09 RX ADMIN — OXYCODONE HYDROCHLORIDE AND ACETAMINOPHEN 500 MG: 500 TABLET ORAL at 10:12

## 2021-01-09 RX ADMIN — Medication 1000 MCG: at 10:12

## 2021-01-09 RX ADMIN — MONTELUKAST SODIUM 10 MG: 10 TABLET, FILM COATED ORAL at 21:28

## 2021-01-09 RX ADMIN — REMDESIVIR 100 MG: 5 INJECTION INTRAVENOUS at 13:09

## 2021-01-09 RX ADMIN — DEXAMETHASONE SODIUM PHOSPHATE 6 MG: 10 INJECTION, SOLUTION INTRAMUSCULAR; INTRAVENOUS at 10:11

## 2021-01-09 RX ADMIN — Medication 10 MCG: at 10:10

## 2021-01-09 RX ADMIN — PANTOPRAZOLE SODIUM 40 MG: 40 TABLET, DELAYED RELEASE ORAL at 10:12

## 2021-01-09 RX ADMIN — LOSARTAN POTASSIUM 100 MG: 50 TABLET, FILM COATED ORAL at 10:12

## 2021-01-09 RX ADMIN — FLUTICASONE FUROATE, UMECLIDINIUM BROMIDE AND VILANTEROL TRIFENATATE 1 PUFF: 100; 62.5; 25 POWDER RESPIRATORY (INHALATION) at 08:22

## 2021-01-09 RX ADMIN — FERROUS SULFATE TAB 325 MG (65 MG ELEMENTAL FE) 325 MG: 325 (65 FE) TAB at 10:15

## 2021-01-09 RX ADMIN — Medication 100 MG: at 10:12

## 2021-01-09 RX ADMIN — GUAIFENESIN 600 MG: 600 TABLET, EXTENDED RELEASE ORAL at 17:58

## 2021-01-09 RX ADMIN — WARFARIN SODIUM 4 MG: 4 TABLET ORAL at 16:36

## 2021-01-09 RX ADMIN — HYDROCHLOROTHIAZIDE 25 MG: 25 TABLET ORAL at 10:12

## 2021-01-09 ASSESSMENT — PAIN SCALES - GENERAL
PAINLEVEL_OUTOF10: 0

## 2021-01-10 LAB
ALBUMIN SERPL-MCNC: 2.5 G/DL (ref 3.6–5.1)
ALBUMIN/GLOB SERPL: 0.7 {RATIO} (ref 1–2.4)
ALP SERPL-CCNC: 80 UNITS/L (ref 45–117)
ALT SERPL-CCNC: 22 UNITS/L
ANION GAP SERPL CALC-SCNC: 7 MMOL/L (ref 10–20)
AST SERPL-CCNC: 24 UNITS/L
BACTERIA BLD CULT: NORMAL
BACTERIA BLD CULT: NORMAL
BILIRUB SERPL-MCNC: 0.2 MG/DL (ref 0.2–1)
BUN SERPL-MCNC: 29 MG/DL (ref 6–20)
BUN/CREAT SERPL: 41 (ref 7–25)
CALCIUM SERPL-MCNC: 8.7 MG/DL (ref 8.4–10.2)
CHLORIDE SERPL-SCNC: 101 MMOL/L (ref 98–107)
CO2 SERPL-SCNC: 31 MMOL/L (ref 21–32)
CREAT SERPL-MCNC: 0.71 MG/DL (ref 0.51–0.95)
DEPRECATED RDW RBC: 47 FL (ref 39–50)
ERYTHROCYTE [DISTWIDTH] IN BLOOD: 13.6 % (ref 11–15)
FASTING DURATION TIME PATIENT: ABNORMAL H
GFR SERPLBLD BASED ON 1.73 SQ M-ARVRAT: 77 ML/MIN/1.73M2
GLOBULIN SER-MCNC: 3.7 G/DL (ref 2–4)
GLUCOSE SERPL-MCNC: 111 MG/DL (ref 65–99)
HCT VFR BLD CALC: 41 % (ref 36–46.5)
HGB BLD-MCNC: 12.6 G/DL (ref 12–15.5)
INR PPP: 2
MCH RBC QN AUTO: 29 PG (ref 26–34)
MCHC RBC AUTO-ENTMCNC: 30.7 G/DL (ref 32–36.5)
MCV RBC AUTO: 94.3 FL (ref 78–100)
NRBC BLD MANUAL-RTO: 0 /100 WBC
PLATELET # BLD AUTO: 192 K/MCL (ref 140–450)
POTASSIUM SERPL-SCNC: 4 MMOL/L (ref 3.4–5.1)
PROT SERPL-MCNC: 6.2 G/DL (ref 6.4–8.2)
PROTHROMBIN TIME: 20.4 SEC (ref 9.7–11.8)
RAINBOW EXTRA TUBES HOLD SPECIMEN: NORMAL
RBC # BLD: 4.35 MIL/MCL (ref 4–5.2)
SODIUM SERPL-SCNC: 135 MMOL/L (ref 135–145)
WBC # BLD: 5.6 K/MCL (ref 4.2–11)

## 2021-01-10 PROCEDURE — C9399 UNCLASSIFIED DRUGS OR BIOLOG: HCPCS | Performed by: STUDENT IN AN ORGANIZED HEALTH CARE EDUCATION/TRAINING PROGRAM

## 2021-01-10 PROCEDURE — 10002807 HB RX 258: Performed by: STUDENT IN AN ORGANIZED HEALTH CARE EDUCATION/TRAINING PROGRAM

## 2021-01-10 PROCEDURE — 85027 COMPLETE CBC AUTOMATED: CPT | Performed by: STUDENT IN AN ORGANIZED HEALTH CARE EDUCATION/TRAINING PROGRAM

## 2021-01-10 PROCEDURE — 10002801 HB RX 250 W/O HCPCS: Performed by: INTERNAL MEDICINE

## 2021-01-10 PROCEDURE — 93005 ELECTROCARDIOGRAM TRACING: CPT | Performed by: INTERNAL MEDICINE

## 2021-01-10 PROCEDURE — 10002800 HB RX 250 W HCPCS: Performed by: INTERNAL MEDICINE

## 2021-01-10 PROCEDURE — 36415 COLL VENOUS BLD VENIPUNCTURE: CPT | Performed by: STUDENT IN AN ORGANIZED HEALTH CARE EDUCATION/TRAINING PROGRAM

## 2021-01-10 PROCEDURE — 80053 COMPREHEN METABOLIC PANEL: CPT | Performed by: STUDENT IN AN ORGANIZED HEALTH CARE EDUCATION/TRAINING PROGRAM

## 2021-01-10 PROCEDURE — 10004281 HB COUNTER-STAFF TIME PER 15 MIN

## 2021-01-10 PROCEDURE — 94640 AIRWAY INHALATION TREATMENT: CPT

## 2021-01-10 PROCEDURE — 94660 CPAP INITIATION&MGMT: CPT

## 2021-01-10 PROCEDURE — 10002803 HB RX 637: Performed by: INTERNAL MEDICINE

## 2021-01-10 PROCEDURE — 85610 PROTHROMBIN TIME: CPT | Performed by: STUDENT IN AN ORGANIZED HEALTH CARE EDUCATION/TRAINING PROGRAM

## 2021-01-10 PROCEDURE — 10006031 HB ROOM CHARGE TELEMETRY

## 2021-01-10 PROCEDURE — 10002803 HB RX 637: Performed by: STUDENT IN AN ORGANIZED HEALTH CARE EDUCATION/TRAINING PROGRAM

## 2021-01-10 PROCEDURE — 10002800 HB RX 250 W HCPCS: Performed by: STUDENT IN AN ORGANIZED HEALTH CARE EDUCATION/TRAINING PROGRAM

## 2021-01-10 RX ORDER — WARFARIN SODIUM 4 MG/1
4 TABLET ORAL ONCE
Status: COMPLETED | OUTPATIENT
Start: 2021-01-10 | End: 2021-01-10

## 2021-01-10 RX ORDER — FUROSEMIDE 10 MG/ML
20 INJECTION INTRAMUSCULAR; INTRAVENOUS ONCE
Status: COMPLETED | OUTPATIENT
Start: 2021-01-10 | End: 2021-01-10

## 2021-01-10 RX ADMIN — FLUTICASONE FUROATE, UMECLIDINIUM BROMIDE AND VILANTEROL TRIFENATATE 1 PUFF: 100; 62.5; 25 POWDER RESPIRATORY (INHALATION) at 09:40

## 2021-01-10 RX ADMIN — Medication 10 MCG: at 08:28

## 2021-01-10 RX ADMIN — REMDESIVIR 100 MG: 5 INJECTION INTRAVENOUS at 12:07

## 2021-01-10 RX ADMIN — MONTELUKAST SODIUM 10 MG: 10 TABLET, FILM COATED ORAL at 22:25

## 2021-01-10 RX ADMIN — PANTOPRAZOLE SODIUM 40 MG: 40 TABLET, DELAYED RELEASE ORAL at 08:28

## 2021-01-10 RX ADMIN — DEXAMETHASONE SODIUM PHOSPHATE 6 MG: 10 INJECTION, SOLUTION INTRAMUSCULAR; INTRAVENOUS at 08:28

## 2021-01-10 RX ADMIN — OXYCODONE HYDROCHLORIDE AND ACETAMINOPHEN 500 MG: 500 TABLET ORAL at 08:28

## 2021-01-10 RX ADMIN — FUROSEMIDE 20 MG: 10 INJECTION, SOLUTION INTRAVENOUS at 17:27

## 2021-01-10 RX ADMIN — FERROUS SULFATE TAB 325 MG (65 MG ELEMENTAL FE) 325 MG: 325 (65 FE) TAB at 08:28

## 2021-01-10 RX ADMIN — LOSARTAN POTASSIUM 100 MG: 50 TABLET, FILM COATED ORAL at 06:08

## 2021-01-10 RX ADMIN — Medication 1000 MCG: at 08:28

## 2021-01-10 RX ADMIN — GUAIFENESIN 600 MG: 600 TABLET, EXTENDED RELEASE ORAL at 18:06

## 2021-01-10 RX ADMIN — GUAIFENESIN 600 MG: 600 TABLET, EXTENDED RELEASE ORAL at 09:45

## 2021-01-10 RX ADMIN — Medication 100 MG: at 08:28

## 2021-01-10 RX ADMIN — WARFARIN SODIUM 4 MG: 4 TABLET ORAL at 17:27

## 2021-01-10 RX ADMIN — HYDROCHLOROTHIAZIDE 25 MG: 25 TABLET ORAL at 08:28

## 2021-01-10 ASSESSMENT — PAIN SCALES - GENERAL
PAINLEVEL_OUTOF10: 0
PAINLEVEL_OUTOF10: 0

## 2021-01-11 ENCOUNTER — TELEPHONE (OUTPATIENT)
Dept: CHRONIC DISEASE MANAGEMENT | Age: 86
End: 2021-01-11

## 2021-01-11 DIAGNOSIS — Z79.01 ANTICOAGULATION MONITORING, INR RANGE 2-3: ICD-10-CM

## 2021-01-11 DIAGNOSIS — I26.99 PULMONARY EMBOLISM (CMD): ICD-10-CM

## 2021-01-11 LAB
ANION GAP SERPL CALC-SCNC: 9 MMOL/L (ref 10–20)
ATRIAL RATE (BPM): 46
BASOPHILS # BLD: 0 K/MCL (ref 0–0.3)
BASOPHILS NFR BLD: 0 %
BUN SERPL-MCNC: 32 MG/DL (ref 6–20)
BUN/CREAT SERPL: 36 (ref 7–25)
CALCIUM SERPL-MCNC: 8.9 MG/DL (ref 8.4–10.2)
CHLORIDE SERPL-SCNC: 99 MMOL/L (ref 98–107)
CO2 SERPL-SCNC: 34 MMOL/L (ref 21–32)
CREAT SERPL-MCNC: 0.9 MG/DL (ref 0.51–0.95)
DEPRECATED RDW RBC: 48.8 FL (ref 39–50)
EOSINOPHIL # BLD: 0 K/MCL (ref 0–0.5)
EOSINOPHIL NFR BLD: 0 %
ERYTHROCYTE [DISTWIDTH] IN BLOOD: 13.8 % (ref 11–15)
FASTING DURATION TIME PATIENT: ABNORMAL H
GFR SERPLBLD BASED ON 1.73 SQ M-ARVRAT: 58 ML/MIN/1.73M2
GLUCOSE SERPL-MCNC: 83 MG/DL (ref 65–99)
HCT VFR BLD CALC: 46.6 % (ref 36–46.5)
HGB BLD-MCNC: 14 G/DL (ref 12–15.5)
IMM GRANULOCYTES # BLD AUTO: 0.1 K/MCL (ref 0–0.2)
IMM GRANULOCYTES # BLD: 1 %
INR PPP: 2.2
LYMPHOCYTES # BLD: 1.3 K/MCL (ref 1–4)
LYMPHOCYTES NFR BLD: 17 %
MCH RBC QN AUTO: 28.7 PG (ref 26–34)
MCHC RBC AUTO-ENTMCNC: 30 G/DL (ref 32–36.5)
MCV RBC AUTO: 95.5 FL (ref 78–100)
MONOCYTES # BLD: 0.7 K/MCL (ref 0.3–0.9)
MONOCYTES NFR BLD: 8 %
NEUTROPHILS # BLD: 6 K/MCL (ref 1.8–7.7)
NEUTROPHILS NFR BLD: 74 %
NRBC BLD MANUAL-RTO: 0 /100 WBC
P AXIS (DEGREES): 46
PLATELET # BLD AUTO: 240 K/MCL (ref 140–450)
POTASSIUM SERPL-SCNC: 3.9 MMOL/L (ref 3.4–5.1)
PR-INTERVAL (MSEC): 164
PROTHROMBIN TIME: 22.1 SEC (ref 9.7–11.8)
QRS-INTERVAL (MSEC): 96
QT-INTERVAL (MSEC): 492
QTC: 430
R AXIS (DEGREES): 8
RAINBOW EXTRA TUBES HOLD SPECIMEN: NORMAL
RBC # BLD: 4.88 MIL/MCL (ref 4–5.2)
REPORT TEXT: NORMAL
SODIUM SERPL-SCNC: 138 MMOL/L (ref 135–145)
T AXIS (DEGREES): 18
VENTRICULAR RATE EKG/MIN (BPM): 46
WBC # BLD: 8.1 K/MCL (ref 4.2–11)

## 2021-01-11 PROCEDURE — 99232 SBSQ HOSP IP/OBS MODERATE 35: CPT | Performed by: INTERNAL MEDICINE

## 2021-01-11 PROCEDURE — 36415 COLL VENOUS BLD VENIPUNCTURE: CPT | Performed by: INTERNAL MEDICINE

## 2021-01-11 PROCEDURE — 85610 PROTHROMBIN TIME: CPT | Performed by: STUDENT IN AN ORGANIZED HEALTH CARE EDUCATION/TRAINING PROGRAM

## 2021-01-11 PROCEDURE — 10002803 HB RX 637: Performed by: INTERNAL MEDICINE

## 2021-01-11 PROCEDURE — 80048 BASIC METABOLIC PNL TOTAL CA: CPT | Performed by: INTERNAL MEDICINE

## 2021-01-11 PROCEDURE — 94660 CPAP INITIATION&MGMT: CPT

## 2021-01-11 PROCEDURE — 94640 AIRWAY INHALATION TREATMENT: CPT

## 2021-01-11 PROCEDURE — 85025 COMPLETE CBC W/AUTO DIFF WBC: CPT | Performed by: INTERNAL MEDICINE

## 2021-01-11 PROCEDURE — 10006031 HB ROOM CHARGE TELEMETRY

## 2021-01-11 PROCEDURE — 10002803 HB RX 637: Performed by: STUDENT IN AN ORGANIZED HEALTH CARE EDUCATION/TRAINING PROGRAM

## 2021-01-11 PROCEDURE — 10002800 HB RX 250 W HCPCS: Performed by: INTERNAL MEDICINE

## 2021-01-11 PROCEDURE — 10002801 HB RX 250 W/O HCPCS: Performed by: INTERNAL MEDICINE

## 2021-01-11 RX ORDER — AMLODIPINE BESYLATE 5 MG/1
5 TABLET ORAL DAILY
Status: DISCONTINUED | OUTPATIENT
Start: 2021-01-11 | End: 2021-01-13 | Stop reason: HOSPADM

## 2021-01-11 RX ORDER — WARFARIN SODIUM 4 MG/1
4 TABLET ORAL ONCE
Status: COMPLETED | OUTPATIENT
Start: 2021-01-11 | End: 2021-01-11

## 2021-01-11 RX ORDER — AMLODIPINE BESYLATE 5 MG/1
5 TABLET ORAL DAILY
Qty: 30 TABLET | Refills: 0 | Status: SHIPPED | OUTPATIENT
Start: 2021-01-11 | End: 2021-02-05 | Stop reason: ALTCHOICE

## 2021-01-11 RX ADMIN — WARFARIN SODIUM 4 MG: 4 TABLET ORAL at 16:17

## 2021-01-11 RX ADMIN — DEXAMETHASONE SODIUM PHOSPHATE 6 MG: 10 INJECTION, SOLUTION INTRAMUSCULAR; INTRAVENOUS at 08:49

## 2021-01-11 RX ADMIN — ALPRAZOLAM 0.25 MG: 0.25 TABLET ORAL at 16:39

## 2021-01-11 RX ADMIN — GUAIFENESIN 600 MG: 600 TABLET, EXTENDED RELEASE ORAL at 14:47

## 2021-01-11 RX ADMIN — FERROUS SULFATE TAB 325 MG (65 MG ELEMENTAL FE) 325 MG: 325 (65 FE) TAB at 08:49

## 2021-01-11 RX ADMIN — Medication 10 MCG: at 08:50

## 2021-01-11 RX ADMIN — FLUTICASONE FUROATE, UMECLIDINIUM BROMIDE AND VILANTEROL TRIFENATATE 1 PUFF: 100; 62.5; 25 POWDER RESPIRATORY (INHALATION) at 08:14

## 2021-01-11 RX ADMIN — HYDROCHLOROTHIAZIDE 25 MG: 25 TABLET ORAL at 06:23

## 2021-01-11 RX ADMIN — Medication 1000 MCG: at 08:49

## 2021-01-11 RX ADMIN — AMLODIPINE BESYLATE 5 MG: 5 TABLET ORAL at 14:46

## 2021-01-11 RX ADMIN — OXYCODONE HYDROCHLORIDE AND ACETAMINOPHEN 500 MG: 500 TABLET ORAL at 08:49

## 2021-01-11 RX ADMIN — PANTOPRAZOLE SODIUM 40 MG: 40 TABLET, DELAYED RELEASE ORAL at 08:49

## 2021-01-11 RX ADMIN — Medication 100 MG: at 08:49

## 2021-01-11 RX ADMIN — GUAIFENESIN 600 MG: 600 TABLET, EXTENDED RELEASE ORAL at 02:35

## 2021-01-11 RX ADMIN — MONTELUKAST SODIUM 10 MG: 10 TABLET, FILM COATED ORAL at 22:15

## 2021-01-11 RX ADMIN — LOSARTAN POTASSIUM 100 MG: 50 TABLET, FILM COATED ORAL at 06:23

## 2021-01-11 ASSESSMENT — ENCOUNTER SYMPTOMS
CHEST TIGHTNESS: 0
DIAPHORESIS: 0
POLYDIPSIA: 0
DIZZINESS: 0
VOMITING: 0
BRUISES/BLEEDS EASILY: 0
HEADACHES: 0
ADENOPATHY: 0
UNEXPECTED WEIGHT CHANGE: 0
SINUS PAIN: 0
CHILLS: 1
WEAKNESS: 0
ABDOMINAL PAIN: 0
BLOOD IN STOOL: 0
DIARRHEA: 0
ABDOMINAL DISTENTION: 0
FATIGUE: 1
TROUBLE SWALLOWING: 0
VOICE CHANGE: 0
FEVER: 1
EYE PAIN: 0
NAUSEA: 0
SEIZURES: 0
SORE THROAT: 0

## 2021-01-11 ASSESSMENT — PAIN SCALES - GENERAL
PAINLEVEL_OUTOF10: 0

## 2021-01-12 ENCOUNTER — TELEPHONE (OUTPATIENT)
Dept: INTERNAL MEDICINE | Age: 86
End: 2021-01-12

## 2021-01-12 ENCOUNTER — APPOINTMENT (OUTPATIENT)
Dept: GENERAL RADIOLOGY | Age: 86
DRG: 177 | End: 2021-01-12
Attending: INTERNAL MEDICINE

## 2021-01-12 LAB
DEPRECATED RDW RBC: 47.5 FL (ref 39–50)
ERYTHROCYTE [DISTWIDTH] IN BLOOD: 13.7 % (ref 11–15)
HCT VFR BLD CALC: 42.1 % (ref 36–46.5)
HGB BLD-MCNC: 12.7 G/DL (ref 12–15.5)
INR PPP: 2.3
MCH RBC QN AUTO: 28.4 PG (ref 26–34)
MCHC RBC AUTO-ENTMCNC: 30.2 G/DL (ref 32–36.5)
MCV RBC AUTO: 94.2 FL (ref 78–100)
NRBC BLD MANUAL-RTO: 0 /100 WBC
PLATELET # BLD AUTO: 244 K/MCL (ref 140–450)
PROTHROMBIN TIME: 23 SEC (ref 9.7–11.8)
RAINBOW EXTRA TUBES HOLD SPECIMEN: NORMAL
RAINBOW EXTRA TUBES HOLD SPECIMEN: NORMAL
RBC # BLD: 4.47 MIL/MCL (ref 4–5.2)
WBC # BLD: 8.3 K/MCL (ref 4.2–11)

## 2021-01-12 PROCEDURE — 36415 COLL VENOUS BLD VENIPUNCTURE: CPT | Performed by: STUDENT IN AN ORGANIZED HEALTH CARE EDUCATION/TRAINING PROGRAM

## 2021-01-12 PROCEDURE — 10002803 HB RX 637: Performed by: INTERNAL MEDICINE

## 2021-01-12 PROCEDURE — 94640 AIRWAY INHALATION TREATMENT: CPT

## 2021-01-12 PROCEDURE — 94667 MNPJ CHEST WALL 1ST: CPT

## 2021-01-12 PROCEDURE — 10002800 HB RX 250 W HCPCS: Performed by: INTERNAL MEDICINE

## 2021-01-12 PROCEDURE — 94660 CPAP INITIATION&MGMT: CPT

## 2021-01-12 PROCEDURE — 71045 X-RAY EXAM CHEST 1 VIEW: CPT

## 2021-01-12 PROCEDURE — 13001086 HB INCENTIVE SPIROMETER W INSTRUCT

## 2021-01-12 PROCEDURE — 10002016 HB COUNTER INCENTIVE SPIROMETRY

## 2021-01-12 PROCEDURE — 10006031 HB ROOM CHARGE TELEMETRY

## 2021-01-12 PROCEDURE — 10002803 HB RX 637: Performed by: STUDENT IN AN ORGANIZED HEALTH CARE EDUCATION/TRAINING PROGRAM

## 2021-01-12 PROCEDURE — 85027 COMPLETE CBC AUTOMATED: CPT | Performed by: STUDENT IN AN ORGANIZED HEALTH CARE EDUCATION/TRAINING PROGRAM

## 2021-01-12 PROCEDURE — 10004281 HB COUNTER-STAFF TIME PER 15 MIN

## 2021-01-12 PROCEDURE — 10002801 HB RX 250 W/O HCPCS: Performed by: INTERNAL MEDICINE

## 2021-01-12 PROCEDURE — 85610 PROTHROMBIN TIME: CPT | Performed by: STUDENT IN AN ORGANIZED HEALTH CARE EDUCATION/TRAINING PROGRAM

## 2021-01-12 RX ORDER — AMLODIPINE BESYLATE 5 MG/1
5 TABLET ORAL ONCE
Status: COMPLETED | OUTPATIENT
Start: 2021-01-12 | End: 2021-01-12

## 2021-01-12 RX ORDER — WARFARIN SODIUM 4 MG/1
4 TABLET ORAL ONCE
Status: COMPLETED | OUTPATIENT
Start: 2021-01-12 | End: 2021-01-12

## 2021-01-12 RX ORDER — IPRATROPIUM BROMIDE AND ALBUTEROL SULFATE 2.5; .5 MG/3ML; MG/3ML
3 SOLUTION RESPIRATORY (INHALATION)
Status: DISCONTINUED | OUTPATIENT
Start: 2021-01-12 | End: 2021-01-13 | Stop reason: HOSPADM

## 2021-01-12 RX ADMIN — WARFARIN SODIUM 4 MG: 4 TABLET ORAL at 17:19

## 2021-01-12 RX ADMIN — Medication 100 MG: at 10:32

## 2021-01-12 RX ADMIN — Medication 10 MCG: at 10:34

## 2021-01-12 RX ADMIN — MONTELUKAST SODIUM 10 MG: 10 TABLET, FILM COATED ORAL at 20:47

## 2021-01-12 RX ADMIN — OXYCODONE HYDROCHLORIDE AND ACETAMINOPHEN 500 MG: 500 TABLET ORAL at 10:35

## 2021-01-12 RX ADMIN — ALBUTEROL SULFATE 2 PUFF: 90 AEROSOL, METERED RESPIRATORY (INHALATION) at 07:10

## 2021-01-12 RX ADMIN — LOSARTAN POTASSIUM 100 MG: 50 TABLET, FILM COATED ORAL at 10:33

## 2021-01-12 RX ADMIN — GUAIFENESIN 600 MG: 600 TABLET, EXTENDED RELEASE ORAL at 20:47

## 2021-01-12 RX ADMIN — HYDROCHLOROTHIAZIDE 25 MG: 25 TABLET ORAL at 10:33

## 2021-01-12 RX ADMIN — FERROUS SULFATE TAB 325 MG (65 MG ELEMENTAL FE) 325 MG: 325 (65 FE) TAB at 10:33

## 2021-01-12 RX ADMIN — PANTOPRAZOLE SODIUM 40 MG: 40 TABLET, DELAYED RELEASE ORAL at 10:33

## 2021-01-12 RX ADMIN — AMLODIPINE BESYLATE 5 MG: 5 TABLET ORAL at 13:07

## 2021-01-12 RX ADMIN — GUAIFENESIN 600 MG: 600 TABLET, EXTENDED RELEASE ORAL at 01:17

## 2021-01-12 RX ADMIN — FLUTICASONE FUROATE, UMECLIDINIUM BROMIDE AND VILANTEROL TRIFENATATE 1 PUFF: 100; 62.5; 25 POWDER RESPIRATORY (INHALATION) at 07:16

## 2021-01-12 RX ADMIN — DEXAMETHASONE SODIUM PHOSPHATE 6 MG: 10 INJECTION, SOLUTION INTRAMUSCULAR; INTRAVENOUS at 10:34

## 2021-01-12 RX ADMIN — AMLODIPINE BESYLATE 5 MG: 5 TABLET ORAL at 10:32

## 2021-01-12 RX ADMIN — Medication 1000 MCG: at 10:32

## 2021-01-12 ASSESSMENT — PAIN SCALES - GENERAL
PAINLEVEL_OUTOF10: 0

## 2021-01-13 ENCOUNTER — ANTI-COAG (OUTPATIENT)
Dept: CHRONIC DISEASE MANAGEMENT | Age: 86
End: 2021-01-13

## 2021-01-13 VITALS
HEART RATE: 62 BPM | HEIGHT: 65 IN | SYSTOLIC BLOOD PRESSURE: 120 MMHG | BODY MASS INDEX: 31.77 KG/M2 | OXYGEN SATURATION: 96 % | RESPIRATION RATE: 16 BRPM | WEIGHT: 190.7 LBS | DIASTOLIC BLOOD PRESSURE: 65 MMHG | TEMPERATURE: 97.3 F

## 2021-01-13 DIAGNOSIS — Z79.01 ANTICOAGULATION MONITORING, INR RANGE 2-3: ICD-10-CM

## 2021-01-13 DIAGNOSIS — I26.99 PULMONARY EMBOLISM (CMD): ICD-10-CM

## 2021-01-13 LAB
DEPRECATED RDW RBC: 47.8 FL (ref 39–50)
ERYTHROCYTE [DISTWIDTH] IN BLOOD: 13.8 % (ref 11–15)
HCT VFR BLD CALC: 41.4 % (ref 36–46.5)
HGB BLD-MCNC: 12.7 G/DL (ref 12–15.5)
INR PPP: 2.7
MCH RBC QN AUTO: 28.9 PG (ref 26–34)
MCHC RBC AUTO-ENTMCNC: 30.7 G/DL (ref 32–36.5)
MCV RBC AUTO: 94.1 FL (ref 78–100)
NRBC BLD MANUAL-RTO: 0 /100 WBC
PLATELET # BLD AUTO: 244 K/MCL (ref 140–450)
PROTHROMBIN TIME: 27.4 SEC (ref 9.7–11.8)
RAINBOW EXTRA TUBES HOLD SPECIMEN: NORMAL
RAINBOW EXTRA TUBES HOLD SPECIMEN: NORMAL
RBC # BLD: 4.4 MIL/MCL (ref 4–5.2)
WBC # BLD: 10.6 K/MCL (ref 4.2–11)

## 2021-01-13 PROCEDURE — 10002016 HB COUNTER INCENTIVE SPIROMETRY

## 2021-01-13 PROCEDURE — 10002803 HB RX 637: Performed by: INTERNAL MEDICINE

## 2021-01-13 PROCEDURE — 94640 AIRWAY INHALATION TREATMENT: CPT

## 2021-01-13 PROCEDURE — 36415 COLL VENOUS BLD VENIPUNCTURE: CPT | Performed by: STUDENT IN AN ORGANIZED HEALTH CARE EDUCATION/TRAINING PROGRAM

## 2021-01-13 PROCEDURE — 10002801 HB RX 250 W/O HCPCS: Performed by: INTERNAL MEDICINE

## 2021-01-13 PROCEDURE — 85610 PROTHROMBIN TIME: CPT | Performed by: STUDENT IN AN ORGANIZED HEALTH CARE EDUCATION/TRAINING PROGRAM

## 2021-01-13 PROCEDURE — 10004281 HB COUNTER-STAFF TIME PER 15 MIN

## 2021-01-13 PROCEDURE — 99232 SBSQ HOSP IP/OBS MODERATE 35: CPT | Performed by: INTERNAL MEDICINE

## 2021-01-13 PROCEDURE — 94660 CPAP INITIATION&MGMT: CPT

## 2021-01-13 PROCEDURE — 94667 MNPJ CHEST WALL 1ST: CPT

## 2021-01-13 PROCEDURE — 10002800 HB RX 250 W HCPCS: Performed by: INTERNAL MEDICINE

## 2021-01-13 PROCEDURE — 10002803 HB RX 637: Performed by: STUDENT IN AN ORGANIZED HEALTH CARE EDUCATION/TRAINING PROGRAM

## 2021-01-13 PROCEDURE — 85027 COMPLETE CBC AUTOMATED: CPT | Performed by: STUDENT IN AN ORGANIZED HEALTH CARE EDUCATION/TRAINING PROGRAM

## 2021-01-13 RX ORDER — NYSTATIN 100000 [USP'U]/G
POWDER TOPICAL 3 TIMES DAILY
Status: DISCONTINUED | OUTPATIENT
Start: 2021-01-13 | End: 2021-01-13 | Stop reason: HOSPADM

## 2021-01-13 RX ORDER — DEXAMETHASONE 6 MG/1
6 TABLET ORAL
Qty: 2 TABLET | Refills: 0 | Status: SHIPPED | OUTPATIENT
Start: 2021-01-13 | End: 2021-01-22

## 2021-01-13 RX ORDER — NYSTATIN 100000 [USP'U]/G
POWDER TOPICAL 3 TIMES DAILY
Qty: 30 G | Refills: 0 | Status: SHIPPED | OUTPATIENT
Start: 2021-01-13 | End: 2021-06-08

## 2021-01-13 RX ORDER — NYSTATIN 100000 U/G
CREAM TOPICAL 3 TIMES DAILY
Status: DISCONTINUED | OUTPATIENT
Start: 2021-01-13 | End: 2021-01-13

## 2021-01-13 RX ORDER — WARFARIN SODIUM 3 MG/1
3 TABLET ORAL ONCE
Status: DISCONTINUED | OUTPATIENT
Start: 2021-01-13 | End: 2021-01-13 | Stop reason: HOSPADM

## 2021-01-13 RX ORDER — GUAIFENESIN 600 MG/1
600 TABLET, EXTENDED RELEASE ORAL 2 TIMES DAILY
Qty: 20 TABLET | Refills: 0 | Status: SHIPPED | OUTPATIENT
Start: 2021-01-13 | End: 2021-06-08

## 2021-01-13 RX ORDER — WARFARIN SODIUM 3 MG/1
1 TABLET ORAL DAILY
Qty: 90 TABLET | Refills: 0 | Status: SHIPPED | COMMUNITY
Start: 2021-01-16 | End: 2021-03-05 | Stop reason: SDUPTHER

## 2021-01-13 RX ADMIN — AMLODIPINE BESYLATE 5 MG: 5 TABLET ORAL at 09:17

## 2021-01-13 RX ADMIN — FLUTICASONE FUROATE, UMECLIDINIUM BROMIDE AND VILANTEROL TRIFENATATE 1 PUFF: 100; 62.5; 25 POWDER RESPIRATORY (INHALATION) at 08:43

## 2021-01-13 RX ADMIN — Medication 10 MCG: at 09:17

## 2021-01-13 RX ADMIN — DEXAMETHASONE 6 MG: 4 TABLET ORAL at 09:16

## 2021-01-13 RX ADMIN — HYDROCHLOROTHIAZIDE 25 MG: 25 TABLET ORAL at 09:17

## 2021-01-13 RX ADMIN — ALPRAZOLAM 0.25 MG: 0.25 TABLET ORAL at 01:36

## 2021-01-13 RX ADMIN — FERROUS SULFATE TAB 325 MG (65 MG ELEMENTAL FE) 325 MG: 325 (65 FE) TAB at 09:17

## 2021-01-13 RX ADMIN — Medication 1000 MCG: at 09:17

## 2021-01-13 RX ADMIN — NYSTATIN: 100000 POWDER TOPICAL at 12:30

## 2021-01-13 RX ADMIN — PANTOPRAZOLE SODIUM 40 MG: 40 TABLET, DELAYED RELEASE ORAL at 09:16

## 2021-01-13 RX ADMIN — ALBUTEROL SULFATE 2 PUFF: 90 AEROSOL, METERED RESPIRATORY (INHALATION) at 01:41

## 2021-01-13 RX ADMIN — LOSARTAN POTASSIUM 100 MG: 50 TABLET, FILM COATED ORAL at 09:17

## 2021-01-13 RX ADMIN — ALBUTEROL SULFATE 2 PUFF: 90 AEROSOL, METERED RESPIRATORY (INHALATION) at 08:48

## 2021-01-13 RX ADMIN — GUAIFENESIN 600 MG: 600 TABLET, EXTENDED RELEASE ORAL at 09:16

## 2021-01-13 RX ADMIN — Medication 100 MG: at 09:17

## 2021-01-13 RX ADMIN — OXYCODONE HYDROCHLORIDE AND ACETAMINOPHEN 500 MG: 500 TABLET ORAL at 09:17

## 2021-01-13 ASSESSMENT — PAIN SCALES - GENERAL: PAINLEVEL_OUTOF10: 0

## 2021-01-13 ASSESSMENT — ENCOUNTER SYMPTOMS
FEVER: 1
ABDOMINAL DISTENTION: 0
HEADACHES: 0
NAUSEA: 0
SEIZURES: 0
DIZZINESS: 0
SORE THROAT: 0
DIAPHORESIS: 0
EYE PAIN: 0
VOMITING: 0
BRUISES/BLEEDS EASILY: 0
DIARRHEA: 0
CHILLS: 1
WEAKNESS: 0
SINUS PAIN: 0
POLYDIPSIA: 0
CHEST TIGHTNESS: 0
TROUBLE SWALLOWING: 0
ABDOMINAL PAIN: 0
VOICE CHANGE: 0
FATIGUE: 1
UNEXPECTED WEIGHT CHANGE: 0
ADENOPATHY: 0
BLOOD IN STOOL: 0

## 2021-01-14 ENCOUNTER — CASE MANAGEMENT (OUTPATIENT)
Dept: CARE COORDINATION | Age: 86
End: 2021-01-14

## 2021-01-14 ENCOUNTER — ANTI-COAG (OUTPATIENT)
Dept: CHRONIC DISEASE MANAGEMENT | Age: 86
End: 2021-01-14

## 2021-01-14 ENCOUNTER — ADVANCED DIRECTIVES (OUTPATIENT)
Dept: HEALTH INFORMATION MANAGEMENT | Age: 86
End: 2021-01-14

## 2021-01-14 DIAGNOSIS — Z79.01 ANTICOAGULATION MONITORING, INR RANGE 2-3: ICD-10-CM

## 2021-01-14 DIAGNOSIS — I26.99 PULMONARY EMBOLISM (CMD): ICD-10-CM

## 2021-01-14 LAB — INR PPP: 3.5

## 2021-01-18 ENCOUNTER — ANTI-COAG (OUTPATIENT)
Dept: CHRONIC DISEASE MANAGEMENT | Age: 86
End: 2021-01-18

## 2021-01-18 DIAGNOSIS — Z79.01 ANTICOAGULATION MONITORING, INR RANGE 2-3: ICD-10-CM

## 2021-01-18 DIAGNOSIS — I26.99 PULMONARY EMBOLISM (CMD): ICD-10-CM

## 2021-01-18 LAB — INR PPP: 2.4

## 2021-01-22 ENCOUNTER — TELEPHONE (OUTPATIENT)
Dept: PULMONOLOGY | Age: 86
End: 2021-01-22

## 2021-01-22 ENCOUNTER — V-VISIT (OUTPATIENT)
Dept: INTERNAL MEDICINE | Age: 86
End: 2021-01-22

## 2021-01-22 DIAGNOSIS — U07.1 PNEUMONIA DUE TO COVID-19 VIRUS: Primary | ICD-10-CM

## 2021-01-22 DIAGNOSIS — H57.89 RED EYES: ICD-10-CM

## 2021-01-22 DIAGNOSIS — J12.82 PNEUMONIA DUE TO COVID-19 VIRUS: Primary | ICD-10-CM

## 2021-01-22 DIAGNOSIS — I10 ESSENTIAL HYPERTENSION: ICD-10-CM

## 2021-01-22 DIAGNOSIS — J96.11 CHRONIC RESPIRATORY FAILURE WITH HYPOXIA (CMD): ICD-10-CM

## 2021-01-22 DIAGNOSIS — J44.9 CHRONIC OBSTRUCTIVE PULMONARY DISEASE, UNSPECIFIED COPD TYPE (CMD): Primary | ICD-10-CM

## 2021-01-22 PROCEDURE — 99215 OFFICE O/P EST HI 40 MIN: CPT | Performed by: INTERNAL MEDICINE

## 2021-01-22 RX ORDER — DEXAMETHASONE 4 MG/1
2 TABLET ORAL 2 TIMES DAILY
Qty: 12 G | Refills: 11 | Status: SHIPPED | OUTPATIENT
Start: 2021-01-22 | End: 2021-06-08

## 2021-01-22 RX ORDER — LOSARTAN POTASSIUM AND HYDROCHLOROTHIAZIDE 25; 100 MG/1; MG/1
1 TABLET ORAL DAILY
Qty: 90 TABLET | Refills: 0 | Status: SHIPPED | OUTPATIENT
Start: 2021-01-22 | End: 2021-04-19 | Stop reason: SDUPTHER

## 2021-01-22 SDOH — HEALTH STABILITY: MENTAL HEALTH: HOW OFTEN DO YOU HAVE 6 OR MORE DRINKS ON ONE OCCASION?: NEVER

## 2021-01-22 SDOH — HEALTH STABILITY: MENTAL HEALTH: HOW OFTEN DO YOU HAVE A DRINK CONTAINING ALCOHOL?: 4 OR MORE TIMES A WEEK

## 2021-01-22 SDOH — HEALTH STABILITY: PHYSICAL HEALTH: ON AVERAGE, HOW MANY MINUTES DO YOU ENGAGE IN EXERCISE AT THIS LEVEL?: 0 MIN

## 2021-01-22 SDOH — HEALTH STABILITY: MENTAL HEALTH: HOW MANY STANDARD DRINKS CONTAINING ALCOHOL DO YOU HAVE ON A TYPICAL DAY?: 1 OR 2

## 2021-01-22 SDOH — HEALTH STABILITY: PHYSICAL HEALTH: ON AVERAGE, HOW MANY DAYS PER WEEK DO YOU ENGAGE IN MODERATE TO STRENUOUS EXERCISE (LIKE A BRISK WALK)?: 0 DAYS

## 2021-01-22 ASSESSMENT — PATIENT HEALTH QUESTIONNAIRE - PHQ9
1. LITTLE INTEREST OR PLEASURE IN DOING THINGS: NOT AT ALL
SUM OF ALL RESPONSES TO PHQ9 QUESTIONS 1 AND 2: 0
SUM OF ALL RESPONSES TO PHQ9 QUESTIONS 1 AND 2: 0
CLINICAL INTERPRETATION OF PHQ2 SCORE: NO FURTHER SCREENING NEEDED
2. FEELING DOWN, DEPRESSED OR HOPELESS: NOT AT ALL
CLINICAL INTERPRETATION OF PHQ9 SCORE: NO FURTHER SCREENING NEEDED

## 2021-01-25 ENCOUNTER — ANTI-COAG (OUTPATIENT)
Dept: CHRONIC DISEASE MANAGEMENT | Age: 86
End: 2021-01-25

## 2021-01-25 DIAGNOSIS — I26.99 PULMONARY EMBOLISM (CMD): ICD-10-CM

## 2021-01-25 DIAGNOSIS — Z79.01 ANTICOAGULATION MONITORING, INR RANGE 2-3: ICD-10-CM

## 2021-01-25 LAB — INR PPP: 1.7

## 2021-01-26 ENCOUNTER — TELEPHONE (OUTPATIENT)
Dept: INTERNAL MEDICINE | Age: 86
End: 2021-01-26

## 2021-01-28 ENCOUNTER — ANTI-COAG (OUTPATIENT)
Dept: CHRONIC DISEASE MANAGEMENT | Age: 86
End: 2021-01-28

## 2021-01-28 ENCOUNTER — TELEPHONE (OUTPATIENT)
Dept: INTERNAL MEDICINE | Age: 86
End: 2021-01-28

## 2021-01-28 DIAGNOSIS — I26.99 PULMONARY EMBOLISM (CMD): ICD-10-CM

## 2021-01-28 DIAGNOSIS — Z79.01 ANTICOAGULATION MONITORING, INR RANGE 2-3: ICD-10-CM

## 2021-01-28 LAB — INR PPP: 2.4

## 2021-02-02 ENCOUNTER — TELEPHONE (OUTPATIENT)
Dept: SCHEDULING | Age: 86
End: 2021-02-02

## 2021-02-04 ENCOUNTER — ANTI-COAG (OUTPATIENT)
Dept: CHRONIC DISEASE MANAGEMENT | Age: 86
End: 2021-02-04

## 2021-02-04 DIAGNOSIS — Z79.01 ANTICOAGULATION MONITORING, INR RANGE 2-3: ICD-10-CM

## 2021-02-04 DIAGNOSIS — I26.99 PULMONARY EMBOLISM (CMD): ICD-10-CM

## 2021-02-04 LAB — INR PPP: 3.1

## 2021-02-05 ENCOUNTER — OFFICE VISIT (OUTPATIENT)
Dept: INTERNAL MEDICINE | Age: 86
End: 2021-02-05

## 2021-02-05 DIAGNOSIS — U07.1 PNEUMONIA DUE TO COVID-19 VIRUS: ICD-10-CM

## 2021-02-05 DIAGNOSIS — I10 ESSENTIAL HYPERTENSION: Primary | ICD-10-CM

## 2021-02-05 DIAGNOSIS — I26.99 PULMONARY EMBOLISM, OTHER, UNSPECIFIED CHRONICITY, UNSPECIFIED WHETHER ACUTE COR PULMONALE PRESENT (CMD): ICD-10-CM

## 2021-02-05 DIAGNOSIS — J12.82 PNEUMONIA DUE TO COVID-19 VIRUS: ICD-10-CM

## 2021-02-05 DIAGNOSIS — H53.9 VISION ABNORMALITIES: ICD-10-CM

## 2021-02-05 DIAGNOSIS — J96.11 CHRONIC RESPIRATORY FAILURE WITH HYPOXIA (CMD): ICD-10-CM

## 2021-02-05 DIAGNOSIS — K76.89 LIVER CYST: ICD-10-CM

## 2021-02-05 PROCEDURE — 99215 OFFICE O/P EST HI 40 MIN: CPT | Performed by: INTERNAL MEDICINE

## 2021-02-05 SDOH — HEALTH STABILITY: MENTAL HEALTH: HOW OFTEN DO YOU HAVE 6 OR MORE DRINKS ON ONE OCCASION?: NEVER

## 2021-02-05 SDOH — HEALTH STABILITY: PHYSICAL HEALTH: ON AVERAGE, HOW MANY DAYS PER WEEK DO YOU ENGAGE IN MODERATE TO STRENUOUS EXERCISE (LIKE A BRISK WALK)?: 0 DAYS

## 2021-02-05 SDOH — HEALTH STABILITY: MENTAL HEALTH: HOW MANY STANDARD DRINKS CONTAINING ALCOHOL DO YOU HAVE ON A TYPICAL DAY?: 1 OR 2

## 2021-02-05 SDOH — HEALTH STABILITY: MENTAL HEALTH: HOW OFTEN DO YOU HAVE A DRINK CONTAINING ALCOHOL?: 4 OR MORE TIMES A WEEK

## 2021-02-05 SDOH — HEALTH STABILITY: PHYSICAL HEALTH: ON AVERAGE, HOW MANY MINUTES DO YOU ENGAGE IN EXERCISE AT THIS LEVEL?: 0 MIN

## 2021-02-05 ASSESSMENT — PATIENT HEALTH QUESTIONNAIRE - PHQ9
SUM OF ALL RESPONSES TO PHQ9 QUESTIONS 1 AND 2: 0
CLINICAL INTERPRETATION OF PHQ9 SCORE: NO FURTHER SCREENING NEEDED
2. FEELING DOWN, DEPRESSED OR HOPELESS: NOT AT ALL
1. LITTLE INTEREST OR PLEASURE IN DOING THINGS: NOT AT ALL
SUM OF ALL RESPONSES TO PHQ9 QUESTIONS 1 AND 2: 0
CLINICAL INTERPRETATION OF PHQ2 SCORE: NO FURTHER SCREENING NEEDED

## 2021-02-05 ASSESSMENT — PAIN SCALES - GENERAL: PAINLEVEL: 0

## 2021-02-11 ENCOUNTER — ANTI-COAG (OUTPATIENT)
Dept: CHRONIC DISEASE MANAGEMENT | Age: 86
End: 2021-02-11

## 2021-02-11 DIAGNOSIS — Z79.01 ANTICOAGULATION MONITORING, INR RANGE 2-3: ICD-10-CM

## 2021-02-11 DIAGNOSIS — I26.99 PULMONARY EMBOLISM (CMD): Primary | ICD-10-CM

## 2021-02-11 DIAGNOSIS — I26.99 PULMONARY EMBOLISM (CMD): ICD-10-CM

## 2021-02-11 LAB — INR PPP: 2.9

## 2021-02-12 ENCOUNTER — TELEPHONE (OUTPATIENT)
Dept: INTERNAL MEDICINE | Age: 86
End: 2021-02-12

## 2021-02-15 ENCOUNTER — OFFICE VISIT (OUTPATIENT)
Dept: PULMONOLOGY | Age: 86
End: 2021-02-15

## 2021-02-15 DIAGNOSIS — J44.9 CHRONIC OBSTRUCTIVE PULMONARY DISEASE, UNSPECIFIED COPD TYPE (CMD): ICD-10-CM

## 2021-02-15 DIAGNOSIS — J12.82 PNEUMONIA DUE TO COVID-19 VIRUS: ICD-10-CM

## 2021-02-15 DIAGNOSIS — J96.11 CHRONIC RESPIRATORY FAILURE WITH HYPOXIA (CMD): Primary | ICD-10-CM

## 2021-02-15 DIAGNOSIS — U07.1 PNEUMONIA DUE TO COVID-19 VIRUS: ICD-10-CM

## 2021-02-15 PROCEDURE — 99214 OFFICE O/P EST MOD 30 MIN: CPT | Performed by: INTERNAL MEDICINE

## 2021-02-15 RX ORDER — FLUTICASONE FUROATE, UMECLIDINIUM BROMIDE AND VILANTEROL TRIFENATATE 100; 62.5; 25 UG/1; UG/1; UG/1
1 POWDER RESPIRATORY (INHALATION) DAILY
Qty: 1 EACH | Refills: 11 | Status: SHIPPED | OUTPATIENT
Start: 2021-02-15 | End: 2022-02-21

## 2021-02-15 RX ORDER — ALBUTEROL SULFATE 90 UG/1
2 AEROSOL, METERED RESPIRATORY (INHALATION) EVERY 4 HOURS PRN
Qty: 1 INHALER | Refills: 12 | Status: SHIPPED | OUTPATIENT
Start: 2021-02-15 | End: 2022-01-01

## 2021-02-15 SDOH — HEALTH STABILITY: MENTAL HEALTH: HOW OFTEN DO YOU HAVE 6 OR MORE DRINKS ON ONE OCCASION?: NEVER

## 2021-02-15 SDOH — HEALTH STABILITY: MENTAL HEALTH: HOW OFTEN DO YOU HAVE A DRINK CONTAINING ALCOHOL?: 4 OR MORE TIMES A WEEK

## 2021-02-15 SDOH — HEALTH STABILITY: MENTAL HEALTH: HOW MANY STANDARD DRINKS CONTAINING ALCOHOL DO YOU HAVE ON A TYPICAL DAY?: 1 OR 2

## 2021-02-15 SDOH — HEALTH STABILITY: PHYSICAL HEALTH: ON AVERAGE, HOW MANY DAYS PER WEEK DO YOU ENGAGE IN MODERATE TO STRENUOUS EXERCISE (LIKE A BRISK WALK)?: 0 DAYS

## 2021-02-15 SDOH — HEALTH STABILITY: PHYSICAL HEALTH: ON AVERAGE, HOW MANY MINUTES DO YOU ENGAGE IN EXERCISE AT THIS LEVEL?: 0 MIN

## 2021-02-15 ASSESSMENT — ENCOUNTER SYMPTOMS
VOMITING: 0
SINUS PAIN: 0
DIZZINESS: 0
CHEST TIGHTNESS: 0
HEADACHES: 0
SORE THROAT: 0
EYE PAIN: 0
VOICE CHANGE: 0
ABDOMINAL PAIN: 0
SEIZURES: 0
ABDOMINAL DISTENTION: 0
FATIGUE: 0
DIAPHORESIS: 0
UNEXPECTED WEIGHT CHANGE: 0
FEVER: 0
BLOOD IN STOOL: 0
NAUSEA: 0
WEAKNESS: 0
TROUBLE SWALLOWING: 0
BRUISES/BLEEDS EASILY: 0
DIARRHEA: 0
POLYDIPSIA: 0
ADENOPATHY: 0
CHILLS: 0

## 2021-02-15 ASSESSMENT — PATIENT HEALTH QUESTIONNAIRE - PHQ9
SUM OF ALL RESPONSES TO PHQ9 QUESTIONS 1 AND 2: 0
1. LITTLE INTEREST OR PLEASURE IN DOING THINGS: NOT AT ALL
2. FEELING DOWN, DEPRESSED OR HOPELESS: NOT AT ALL
CLINICAL INTERPRETATION OF PHQ2 SCORE: NO FURTHER SCREENING NEEDED
CLINICAL INTERPRETATION OF PHQ9 SCORE: NO FURTHER SCREENING NEEDED
SUM OF ALL RESPONSES TO PHQ9 QUESTIONS 1 AND 2: 0

## 2021-02-15 ASSESSMENT — PAIN SCALES - GENERAL: PAINLEVEL: 0

## 2021-02-17 ENCOUNTER — TELEPHONE (OUTPATIENT)
Dept: PULMONOLOGY | Age: 86
End: 2021-02-17

## 2021-02-22 ENCOUNTER — ANTI-COAG (OUTPATIENT)
Dept: CHRONIC DISEASE MANAGEMENT | Age: 86
End: 2021-02-22
Attending: INTERNAL MEDICINE

## 2021-02-22 DIAGNOSIS — I26.99 PULMONARY EMBOLISM (CMD): ICD-10-CM

## 2021-02-22 DIAGNOSIS — Z79.01 ANTICOAGULATION MONITORING, INR RANGE 2-3: ICD-10-CM

## 2021-02-22 LAB — INTERNATIONAL NORMALIZATION RATIO, POC: 2.8 (ref 2–3)

## 2021-02-22 PROCEDURE — 99213 OFFICE O/P EST LOW 20 MIN: CPT

## 2021-02-22 PROCEDURE — 85610 PROTHROMBIN TIME: CPT

## 2021-02-24 ENCOUNTER — TELEPHONE (OUTPATIENT)
Dept: WOUND CARE | Age: 86
End: 2021-02-24

## 2021-03-05 ENCOUNTER — TELEPHONE (OUTPATIENT)
Dept: CHRONIC DISEASE MANAGEMENT | Age: 86
End: 2021-03-05

## 2021-03-05 ENCOUNTER — TELEPHONE (OUTPATIENT)
Dept: INTERNAL MEDICINE | Age: 86
End: 2021-03-05

## 2021-03-05 ENCOUNTER — OFFICE VISIT (OUTPATIENT)
Dept: INTERNAL MEDICINE | Age: 86
End: 2021-03-05

## 2021-03-05 ENCOUNTER — APPOINTMENT (OUTPATIENT)
Dept: INTERNAL MEDICINE | Age: 86
End: 2021-03-05

## 2021-03-05 DIAGNOSIS — L85.3 DRY SKIN DERMATITIS: ICD-10-CM

## 2021-03-05 DIAGNOSIS — I26.99 PULMONARY EMBOLISM (CMD): ICD-10-CM

## 2021-03-05 DIAGNOSIS — Z79.01 ANTICOAGULATION MONITORING, INR RANGE 2-3: ICD-10-CM

## 2021-03-05 DIAGNOSIS — Z00.00 MEDICARE ANNUAL WELLNESS VISIT, INITIAL: Primary | ICD-10-CM

## 2021-03-05 DIAGNOSIS — L03.115 CELLULITIS OF RIGHT LOWER EXTREMITY: ICD-10-CM

## 2021-03-05 DIAGNOSIS — L03.90 WOUND CELLULITIS: ICD-10-CM

## 2021-03-05 DIAGNOSIS — I10 ESSENTIAL HYPERTENSION: ICD-10-CM

## 2021-03-05 DIAGNOSIS — E78.00 HIGH CHOLESTEROL: ICD-10-CM

## 2021-03-05 DIAGNOSIS — Z13.31 ENCOUNTER FOR SCREENING FOR DEPRESSION: ICD-10-CM

## 2021-03-05 PROCEDURE — G0439 PPPS, SUBSEQ VISIT: HCPCS | Performed by: INTERNAL MEDICINE

## 2021-03-05 PROCEDURE — 99214 OFFICE O/P EST MOD 30 MIN: CPT | Performed by: INTERNAL MEDICINE

## 2021-03-05 RX ORDER — CLINDAMYCIN HYDROCHLORIDE 300 MG/1
300 CAPSULE ORAL 3 TIMES DAILY
Qty: 30 CAPSULE | Refills: 0 | Status: SHIPPED | OUTPATIENT
Start: 2021-03-05 | End: 2021-04-09 | Stop reason: ALTCHOICE

## 2021-03-05 RX ORDER — WARFARIN SODIUM 3 MG/1
TABLET ORAL
Qty: 90 TABLET | Refills: 0 | Status: SHIPPED | OUTPATIENT
Start: 2021-03-05 | End: 2021-04-12

## 2021-03-05 RX ORDER — WARFARIN SODIUM 1 MG/1
TABLET ORAL
Qty: 90 TABLET | Refills: 0 | Status: SHIPPED | OUTPATIENT
Start: 2021-03-05 | End: 2021-04-12

## 2021-03-05 SDOH — ECONOMIC STABILITY: HOUSING INSECURITY: ARE YOU WORRIED ABOUT LOSING YOUR HOUSING?: NO

## 2021-03-05 SDOH — HEALTH STABILITY: MENTAL HEALTH: HOW MANY STANDARD DRINKS CONTAINING ALCOHOL DO YOU HAVE ON A TYPICAL DAY?: 1 OR 2

## 2021-03-05 SDOH — HEALTH STABILITY: MENTAL HEALTH: HOW OFTEN DO YOU HAVE A DRINK CONTAINING ALCOHOL?: 4 OR MORE TIMES A WEEK

## 2021-03-05 SDOH — HEALTH STABILITY: MENTAL HEALTH: HOW OFTEN DO YOU HAVE 6 OR MORE DRINKS ON ONE OCCASION?: NEVER

## 2021-03-05 SDOH — ECONOMIC STABILITY: HOUSING INSECURITY: WHAT IS YOUR LIVING SITUATION TODAY?: I HAVE HOUSING

## 2021-03-05 SDOH — HEALTH STABILITY: PHYSICAL HEALTH: ON AVERAGE, HOW MANY MINUTES DO YOU ENGAGE IN EXERCISE AT THIS LEVEL?: 0 MIN

## 2021-03-05 SDOH — HEALTH STABILITY: PHYSICAL HEALTH: ON AVERAGE, HOW MANY DAYS PER WEEK DO YOU ENGAGE IN MODERATE TO STRENUOUS EXERCISE (LIKE A BRISK WALK)?: 0 DAYS

## 2021-03-05 ASSESSMENT — ACTIVITIES OF DAILY LIVING (ADL)
MANAGING FINANCES: INDEPENDENT
ADL_BEFORE_ADMISSION: INDEPENDENT
ADL_SCORE: 12
RECENT_DECLINE_ADL: NO
NEEDS_ASSIST: NO
GROCERY SHOPPING: TOTAL CARE
DOING HOUSEWORK: TOTAL CARE
EATING: INDEPENDENT
STIL DRIVING: NO
ADL_SHORT_OF_BREATH: NO
NEEDS ASSISTANCE WITH FOOD: INDEPENDENT
USING TRANSPORTATION: TOTAL CARE
TAKING MEDICATION: INDEPENDENT
PREPARING MEALS: TOTAL CARE
PILL BOX USED: NO
SENSORY_SUPPORT_DEVICES: DENTURES;EYEGLASSES
USING TELEPHONE: INDEPENDENT

## 2021-03-05 ASSESSMENT — COGNITIVE AND FUNCTIONAL STATUS - GENERAL
ARE YOU DEAF OR DO YOU HAVE SERIOUS DIFFICULTY  HEARING: NO
TRAIL MAKING TEST: PATIENT COMPLETES TRAIL MAKING TEST PROPERLY.
ARE YOU BLIND OR DO YOU HAVE SERIOUS DIFFICULTY SEEING, EVEN WHEN WEARING GLASSES: NO

## 2021-03-05 ASSESSMENT — GERIATRIC DEPRESSION SCALE SHORT VERSION (GDS-SV)
DO YOU OFTEN FEEL HELPLESS: NO
DO YOU OFTEN GET BORED: NO
DO YOU FEEL HAPPY MOST OF THE TIME: YES
DO YOU THINK IT IS WONDERFUL TO BE ALIVE NOW: YES
ARE YOU AFRAID THAT SOMETHING BAD IS GOING TO HAPPEN TO YOU: NO
DO YOU FEEL THAT YOUR LIFE IS EMPTY: NO
DO YOU FEEL YOU HAVE MORE PROBLEMS WITH MEMORY THAN MOST: NO
DO YOU FEEL PRETTY WORTHLESS THE WAY YOU ARE NOW: NO
DO YOU PREFER TO STAY AT HOME, RATHER THAN GOING OUT AND DOING NEW THINGS: NO
ARE YOU BASICALLY SATISFIED WITH YOUR LIFE: YES
GDS TOTAL SCORE: 1
IS IT HARD FOR YOU TO GET STARTED ON NEW PROJECTS?: NO
ARE YOU IN GOOD SPIRITS MOST OF THE TIME: YES
DO YOU FEEL FULL OF ENERGY: NO
DO YOU FIND LIFE VERY EXCITING: YES
HAVE YOU DROPPED MANY OF YOUR ACTIVITIES AND INTERESTS?: NO

## 2021-03-05 ASSESSMENT — PATIENT HEALTH QUESTIONNAIRE - PHQ9
SUM OF ALL RESPONSES TO PHQ9 QUESTIONS 1 AND 2: 0
2. FEELING DOWN, DEPRESSED OR HOPELESS: NOT AT ALL
CLINICAL INTERPRETATION OF PHQ9 SCORE: NO FURTHER SCREENING NEEDED
CLINICAL INTERPRETATION OF PHQ2 SCORE: NO FURTHER SCREENING NEEDED
SUM OF ALL RESPONSES TO PHQ9 QUESTIONS 1 AND 2: 0
1. LITTLE INTEREST OR PLEASURE IN DOING THINGS: NOT AT ALL

## 2021-03-05 ASSESSMENT — MINI COG
PATIENT WAS GIVEN REPEAT BACK WORDS FROM VERSION: IMMEDIATE REPEAT BACK - APPLE WATCH PENNY
ABLE TO REPEAT THE 3 WORDS GIVEN PREVIOUSLY?: APPLE;WATCH;PENNY
TOTAL SCORE: 3  NEGATIVE FOR COGNITIVE IMPAIRMENT (NO NEED TO SCORE CDT)
PATIENT ABLE TO FILL IN THE CLOCK FACE WITH 10 MINUTES PAST 11 O'CLOCK?: YES, CLOCK IS CORRECT

## 2021-03-05 ASSESSMENT — GERIATRIC MINI NUTRITIONAL ASSESSMENT (MNA)
A HAS FOOD INTAKE DECLINED OVER THE PAST 3 MONTHS DUE TO LOSS OF APPETITE, DIGESTIVE PROBLEMS, CHEWING OR SWALLOWING DIFFICULTIES?: SEVERE DECREASE IN FOOD INTAKE
B WEIGHT LOSS DURING THE LAST 3 MONTHS: NO WEIGHT LOSS
D HAS SUFFERED PSYCHOLOGICAL STRESS OR ACUTE DISEASE IN THE PAST 3 MONTHS?: NO
C GENERAL MOBILITY: ABLE TO GET OUT OF BED/CHAIR BUT DOES NOT GO OUT

## 2021-03-05 ASSESSMENT — PAIN SCALES - GENERAL: PAINLEVEL: 0

## 2021-03-08 ENCOUNTER — ANTI-COAG (OUTPATIENT)
Dept: CHRONIC DISEASE MANAGEMENT | Age: 86
End: 2021-03-08
Attending: INTERNAL MEDICINE

## 2021-03-08 DIAGNOSIS — I26.99 PULMONARY EMBOLISM (CMD): ICD-10-CM

## 2021-03-08 DIAGNOSIS — Z79.01 ANTICOAGULATION MONITORING, INR RANGE 2-3: ICD-10-CM

## 2021-03-08 LAB — INTERNATIONAL NORMALIZATION RATIO, POC: 2.5 (ref 2–3)

## 2021-03-08 PROCEDURE — 85610 PROTHROMBIN TIME: CPT

## 2021-03-08 PROCEDURE — 99211 OFF/OP EST MAY X REQ PHY/QHP: CPT

## 2021-03-17 ENCOUNTER — HOSPITAL ENCOUNTER (OUTPATIENT)
Dept: WOUND CARE | Age: 86
Discharge: STILL ADMITTED OR ADMINISTRATIVE | End: 2021-03-17
Attending: INTERNAL MEDICINE

## 2021-03-17 VITALS
TEMPERATURE: 97.6 F | BODY MASS INDEX: 32.49 KG/M2 | SYSTOLIC BLOOD PRESSURE: 156 MMHG | DIASTOLIC BLOOD PRESSURE: 71 MMHG | WEIGHT: 195 LBS | RESPIRATION RATE: 20 BRPM | HEART RATE: 62 BPM | HEIGHT: 65 IN

## 2021-03-17 DIAGNOSIS — S81.801A TRAUMATIC OPEN WOUND OF RIGHT LOWER LEG, INITIAL ENCOUNTER: Primary | ICD-10-CM

## 2021-03-17 DIAGNOSIS — R60.0 BILATERAL LEG EDEMA: ICD-10-CM

## 2021-03-17 PROCEDURE — 97597 DBRDMT OPN WND 1ST 20 CM/<: CPT | Performed by: NURSE PRACTITIONER

## 2021-03-17 PROCEDURE — 99203 OFFICE O/P NEW LOW 30 MIN: CPT | Performed by: NURSE PRACTITIONER

## 2021-03-17 PROCEDURE — 97597 DBRDMT OPN WND 1ST 20 CM/<: CPT

## 2021-03-17 SDOH — HEALTH STABILITY: MENTAL HEALTH: HOW MANY STANDARD DRINKS CONTAINING ALCOHOL DO YOU HAVE ON A TYPICAL DAY?: 1 OR 2

## 2021-03-17 SDOH — HEALTH STABILITY: MENTAL HEALTH: HOW OFTEN DO YOU HAVE 6 OR MORE DRINKS ON ONE OCCASION?: NEVER

## 2021-03-17 SDOH — HEALTH STABILITY: MENTAL HEALTH: HOW OFTEN DO YOU HAVE A DRINK CONTAINING ALCOHOL?: 4 OR MORE TIMES A WEEK

## 2021-03-22 ENCOUNTER — TELEPHONE (OUTPATIENT)
Dept: INTERNAL MEDICINE | Age: 86
End: 2021-03-22

## 2021-03-22 ENCOUNTER — LAB SERVICES (OUTPATIENT)
Dept: LAB | Age: 86
End: 2021-03-22
Attending: INTERNAL MEDICINE

## 2021-03-22 ENCOUNTER — HOSPITAL ENCOUNTER (OUTPATIENT)
Dept: WOUND CARE | Age: 86
Discharge: STILL ADMITTED OR ADMINISTRATIVE | End: 2021-03-22
Attending: INTERNAL MEDICINE

## 2021-03-22 ENCOUNTER — ANTI-COAG (OUTPATIENT)
Dept: CHRONIC DISEASE MANAGEMENT | Age: 86
End: 2021-03-22
Attending: INTERNAL MEDICINE

## 2021-03-22 VITALS
DIASTOLIC BLOOD PRESSURE: 78 MMHG | RESPIRATION RATE: 20 BRPM | SYSTOLIC BLOOD PRESSURE: 153 MMHG | TEMPERATURE: 97.6 F | HEART RATE: 69 BPM

## 2021-03-22 DIAGNOSIS — S81.801D TRAUMATIC OPEN WOUND OF RIGHT LOWER LEG, SUBSEQUENT ENCOUNTER: Primary | ICD-10-CM

## 2021-03-22 DIAGNOSIS — E78.00 HIGH CHOLESTEROL: ICD-10-CM

## 2021-03-22 DIAGNOSIS — I26.99 PULMONARY EMBOLISM (CMD): ICD-10-CM

## 2021-03-22 DIAGNOSIS — Z79.01 ANTICOAGULATION MONITORING, INR RANGE 2-3: ICD-10-CM

## 2021-03-22 DIAGNOSIS — R60.0 BILATERAL LEG EDEMA: ICD-10-CM

## 2021-03-22 LAB
ALBUMIN SERPL-MCNC: 3.2 G/DL (ref 3.6–5.1)
ALBUMIN/GLOB SERPL: 0.7 {RATIO} (ref 1–2.4)
ALP SERPL-CCNC: 82 UNITS/L (ref 45–117)
ALT SERPL-CCNC: 22 UNITS/L
ANION GAP SERPL CALC-SCNC: 10 MMOL/L (ref 10–20)
AST SERPL-CCNC: 19 UNITS/L
BASOPHILS # BLD: 0 K/MCL (ref 0–0.3)
BASOPHILS NFR BLD: 0 %
BILIRUB SERPL-MCNC: 0.4 MG/DL (ref 0.2–1)
BUN SERPL-MCNC: 20 MG/DL (ref 6–20)
BUN/CREAT SERPL: 25 (ref 7–25)
CALCIUM SERPL-MCNC: 9.7 MG/DL (ref 8.4–10.2)
CHLORIDE SERPL-SCNC: 104 MMOL/L (ref 98–107)
CHOLEST SERPL-MCNC: 199 MG/DL
CHOLEST/HDLC SERPL: 2.9 {RATIO}
CO2 SERPL-SCNC: 35 MMOL/L (ref 21–32)
CREAT SERPL-MCNC: 0.79 MG/DL (ref 0.51–0.95)
DEPRECATED RDW RBC: 52.5 FL (ref 39–50)
EOSINOPHIL # BLD: 0.1 K/MCL (ref 0–0.5)
EOSINOPHIL NFR BLD: 2 %
ERYTHROCYTE [DISTWIDTH] IN BLOOD: 15 % (ref 11–15)
FASTING DURATION TIME PATIENT: ABNORMAL H
FASTING DURATION TIME PATIENT: NORMAL H
GFR SERPLBLD BASED ON 1.73 SQ M-ARVRAT: 68 ML/MIN/1.73M2
GLOBULIN SER-MCNC: 4.3 G/DL (ref 2–4)
GLUCOSE SERPL-MCNC: 92 MG/DL (ref 65–99)
HCT VFR BLD CALC: 40.8 % (ref 36–46.5)
HDLC SERPL-MCNC: 69 MG/DL
HGB BLD-MCNC: 12.1 G/DL (ref 12–15.5)
IMM GRANULOCYTES # BLD AUTO: 0 K/MCL (ref 0–0.2)
IMM GRANULOCYTES # BLD: 0 %
INTERNATIONAL NORMALIZATION RATIO, POC: 1.7 (ref 2–3)
LDLC SERPL CALC-MCNC: 114 MG/DL
LYMPHOCYTES # BLD: 1.1 K/MCL (ref 1–4)
LYMPHOCYTES NFR BLD: 15 %
MCH RBC QN AUTO: 28.5 PG (ref 26–34)
MCHC RBC AUTO-ENTMCNC: 29.7 G/DL (ref 32–36.5)
MCV RBC AUTO: 96.2 FL (ref 78–100)
MONOCYTES # BLD: 0.5 K/MCL (ref 0.3–0.9)
MONOCYTES NFR BLD: 7 %
NEUTROPHILS # BLD: 5.2 K/MCL (ref 1.8–7.7)
NEUTROPHILS NFR BLD: 76 %
NONHDLC SERPL-MCNC: 130 MG/DL
NRBC BLD MANUAL-RTO: 0 /100 WBC
PLATELET # BLD AUTO: 297 K/MCL (ref 140–450)
POTASSIUM SERPL-SCNC: 4 MMOL/L (ref 3.4–5.1)
PROT SERPL-MCNC: 7.5 G/DL (ref 6.4–8.2)
RBC # BLD: 4.24 MIL/MCL (ref 4–5.2)
SODIUM SERPL-SCNC: 145 MMOL/L (ref 135–145)
TRIGL SERPL-MCNC: 79 MG/DL
TSH SERPL-ACNC: 1.9 MCUNITS/ML (ref 0.35–5)
WBC # BLD: 6.9 K/MCL (ref 4.2–11)

## 2021-03-22 PROCEDURE — 97597 DBRDMT OPN WND 1ST 20 CM/<: CPT | Performed by: NURSE PRACTITIONER

## 2021-03-22 PROCEDURE — 84443 ASSAY THYROID STIM HORMONE: CPT

## 2021-03-22 PROCEDURE — 99211 OFF/OP EST MAY X REQ PHY/QHP: CPT

## 2021-03-22 PROCEDURE — 80053 COMPREHEN METABOLIC PANEL: CPT

## 2021-03-22 PROCEDURE — 85610 PROTHROMBIN TIME: CPT

## 2021-03-22 PROCEDURE — 97597 DBRDMT OPN WND 1ST 20 CM/<: CPT

## 2021-03-22 PROCEDURE — 36415 COLL VENOUS BLD VENIPUNCTURE: CPT

## 2021-03-22 PROCEDURE — 85025 COMPLETE CBC W/AUTO DIFF WBC: CPT

## 2021-03-22 PROCEDURE — 80061 LIPID PANEL: CPT

## 2021-03-29 ENCOUNTER — APPOINTMENT (OUTPATIENT)
Dept: WOUND CARE | Age: 86
End: 2021-03-29
Attending: INTERNAL MEDICINE

## 2021-04-09 ENCOUNTER — OFFICE VISIT (OUTPATIENT)
Dept: INTERNAL MEDICINE | Age: 86
End: 2021-04-09

## 2021-04-09 ENCOUNTER — ANTI-COAG (OUTPATIENT)
Dept: CHRONIC DISEASE MANAGEMENT | Age: 86
End: 2021-04-09
Attending: INTERNAL MEDICINE

## 2021-04-09 DIAGNOSIS — Z71.89 ADVANCED DIRECTIVES, COUNSELING/DISCUSSION: ICD-10-CM

## 2021-04-09 DIAGNOSIS — Z79.01 ANTICOAGULATION MONITORING, INR RANGE 2-3: ICD-10-CM

## 2021-04-09 DIAGNOSIS — I26.99 OTHER PULMONARY EMBOLISM WITHOUT ACUTE COR PULMONALE, UNSPECIFIED CHRONICITY (CMD): ICD-10-CM

## 2021-04-09 DIAGNOSIS — I87.2 VENOUS STASIS DERMATITIS OF RIGHT LOWER EXTREMITY: ICD-10-CM

## 2021-04-09 DIAGNOSIS — I10 ESSENTIAL HYPERTENSION: Primary | ICD-10-CM

## 2021-04-09 DIAGNOSIS — I26.99 PULMONARY EMBOLISM (CMD): ICD-10-CM

## 2021-04-09 DIAGNOSIS — F41.9 ANXIETY DISORDER, UNSPECIFIED TYPE: ICD-10-CM

## 2021-04-09 DIAGNOSIS — S81.801D WOUND OF RIGHT LOWER EXTREMITY, SUBSEQUENT ENCOUNTER: ICD-10-CM

## 2021-04-09 DIAGNOSIS — K21.9 GASTROESOPHAGEAL REFLUX DISEASE WITHOUT ESOPHAGITIS: ICD-10-CM

## 2021-04-09 LAB — INTERNATIONAL NORMALIZATION RATIO, POC: 1.7 (ref 2–3)

## 2021-04-09 PROCEDURE — 99215 OFFICE O/P EST HI 40 MIN: CPT | Performed by: INTERNAL MEDICINE

## 2021-04-09 PROCEDURE — 99211 OFF/OP EST MAY X REQ PHY/QHP: CPT

## 2021-04-09 PROCEDURE — 85610 PROTHROMBIN TIME: CPT

## 2021-04-09 RX ORDER — ALPRAZOLAM 0.25 MG/1
0.25 TABLET ORAL DAILY PRN
Qty: 30 TABLET | Refills: 0 | Status: SHIPPED | OUTPATIENT
Start: 2021-04-09 | End: 2021-12-28 | Stop reason: SDUPTHER

## 2021-04-09 RX ORDER — FAMOTIDINE 20 MG/1
20 TABLET, FILM COATED ORAL 2 TIMES DAILY
Qty: 60 TABLET | Refills: 0 | Status: SHIPPED | OUTPATIENT
Start: 2021-04-09 | End: 2021-07-06 | Stop reason: CLARIF

## 2021-04-09 ASSESSMENT — PATIENT HEALTH QUESTIONNAIRE - PHQ9
SUM OF ALL RESPONSES TO PHQ9 QUESTIONS 1 AND 2: 0
CLINICAL INTERPRETATION OF PHQ2 SCORE: NO FURTHER SCREENING NEEDED
1. LITTLE INTEREST OR PLEASURE IN DOING THINGS: NOT AT ALL
2. FEELING DOWN, DEPRESSED OR HOPELESS: NOT AT ALL
SUM OF ALL RESPONSES TO PHQ9 QUESTIONS 1 AND 2: 0
CLINICAL INTERPRETATION OF PHQ9 SCORE: NO FURTHER SCREENING NEEDED

## 2021-04-09 ASSESSMENT — PAIN SCALES - GENERAL: PAINLEVEL: 0

## 2021-04-12 RX ORDER — WARFARIN SODIUM 3 MG/1
TABLET ORAL
Qty: 90 TABLET | Refills: 0 | Status: SHIPPED | OUTPATIENT
Start: 2021-04-12 | End: 2021-06-07

## 2021-04-12 RX ORDER — WARFARIN SODIUM 1 MG/1
TABLET ORAL
Qty: 90 TABLET | Refills: 0 | Status: SHIPPED | OUTPATIENT
Start: 2021-04-12 | End: 2021-09-13 | Stop reason: SDUPTHER

## 2021-04-19 ENCOUNTER — TELEPHONE (OUTPATIENT)
Dept: INTERNAL MEDICINE | Age: 86
End: 2021-04-19

## 2021-04-19 RX ORDER — LOSARTAN POTASSIUM AND HYDROCHLOROTHIAZIDE 25; 100 MG/1; MG/1
1 TABLET ORAL DAILY
Qty: 90 TABLET | Refills: 0 | Status: SHIPPED | OUTPATIENT
Start: 2021-04-19 | End: 2021-07-22 | Stop reason: SDUPTHER

## 2021-04-23 ENCOUNTER — ANTI-COAG (OUTPATIENT)
Dept: CHRONIC DISEASE MANAGEMENT | Age: 86
End: 2021-04-23
Attending: INTERNAL MEDICINE

## 2021-04-23 DIAGNOSIS — Z79.01 ANTICOAGULATION MONITORING, INR RANGE 2-3: ICD-10-CM

## 2021-04-23 DIAGNOSIS — I26.99 PULMONARY EMBOLISM (CMD): ICD-10-CM

## 2021-04-23 LAB — INTERNATIONAL NORMALIZATION RATIO, POC: 1.5 (ref 2–3)

## 2021-04-23 PROCEDURE — 85610 PROTHROMBIN TIME: CPT

## 2021-04-23 PROCEDURE — 99211 OFF/OP EST MAY X REQ PHY/QHP: CPT

## 2021-05-07 ENCOUNTER — APPOINTMENT (OUTPATIENT)
Dept: CHRONIC DISEASE MANAGEMENT | Age: 86
End: 2021-05-07
Attending: INTERNAL MEDICINE

## 2021-05-10 ENCOUNTER — TELEPHONE (OUTPATIENT)
Dept: INTERNAL MEDICINE | Age: 86
End: 2021-05-10

## 2021-05-10 ENCOUNTER — V-VISIT (OUTPATIENT)
Dept: INTERNAL MEDICINE | Age: 86
End: 2021-05-10

## 2021-05-10 ENCOUNTER — TELEPHONE (OUTPATIENT)
Dept: PAIN MANAGEMENT | Age: 86
End: 2021-05-10

## 2021-05-10 ENCOUNTER — TELEPHONE (OUTPATIENT)
Dept: CHRONIC DISEASE MANAGEMENT | Age: 86
End: 2021-05-10

## 2021-05-10 DIAGNOSIS — I26.99 PULMONARY EMBOLISM (CMD): ICD-10-CM

## 2021-05-10 DIAGNOSIS — Z79.01 ANTICOAGULATION MONITORING, INR RANGE 2-3: ICD-10-CM

## 2021-05-10 DIAGNOSIS — R19.7 DIARRHEA, UNSPECIFIED TYPE: Primary | ICD-10-CM

## 2021-05-10 DIAGNOSIS — K52.9 GASTROENTERITIS: ICD-10-CM

## 2021-05-10 PROCEDURE — 99443 TELEPHONE E&M BY PHYSICIAN EST PT NOT ORIG PREV 7 DAYS 21-30 MIN: CPT | Performed by: INTERNAL MEDICINE

## 2021-05-10 ASSESSMENT — PATIENT HEALTH QUESTIONNAIRE - PHQ9
SUM OF ALL RESPONSES TO PHQ9 QUESTIONS 1 AND 2: 0
2. FEELING DOWN, DEPRESSED OR HOPELESS: NOT AT ALL
1. LITTLE INTEREST OR PLEASURE IN DOING THINGS: NOT AT ALL
SUM OF ALL RESPONSES TO PHQ9 QUESTIONS 1 AND 2: 0
CLINICAL INTERPRETATION OF PHQ2 SCORE: NO FURTHER SCREENING NEEDED
CLINICAL INTERPRETATION OF PHQ9 SCORE: NO FURTHER SCREENING NEEDED

## 2021-05-12 ENCOUNTER — ANTI-COAG (OUTPATIENT)
Dept: CHRONIC DISEASE MANAGEMENT | Age: 86
End: 2021-05-12
Attending: INTERNAL MEDICINE

## 2021-05-12 DIAGNOSIS — Z79.01 ANTICOAGULATION MONITORING, INR RANGE 2-3: ICD-10-CM

## 2021-05-12 DIAGNOSIS — I26.99 PULMONARY EMBOLISM (CMD): ICD-10-CM

## 2021-05-12 LAB — INTERNATIONAL NORMALIZATION RATIO, POC: 2.3 (ref 2–3)

## 2021-05-12 PROCEDURE — 99211 OFF/OP EST MAY X REQ PHY/QHP: CPT

## 2021-05-12 PROCEDURE — 85610 PROTHROMBIN TIME: CPT

## 2021-05-17 ENCOUNTER — APPOINTMENT (OUTPATIENT)
Dept: PULMONOLOGY | Age: 86
End: 2021-05-17

## 2021-05-24 ENCOUNTER — ANTI-COAG (OUTPATIENT)
Dept: CHRONIC DISEASE MANAGEMENT | Age: 86
End: 2021-05-24
Attending: INTERNAL MEDICINE

## 2021-05-24 DIAGNOSIS — I26.99 PULMONARY EMBOLISM (CMD): ICD-10-CM

## 2021-05-24 DIAGNOSIS — Z79.01 ANTICOAGULATION MONITORING, INR RANGE 2-3: ICD-10-CM

## 2021-05-24 LAB — INTERNATIONAL NORMALIZATION RATIO, POC: 2.2 (ref 2–3)

## 2021-05-24 PROCEDURE — 85610 PROTHROMBIN TIME: CPT

## 2021-05-24 PROCEDURE — 99211 OFF/OP EST MAY X REQ PHY/QHP: CPT

## 2021-05-26 VITALS
BODY MASS INDEX: 32.49 KG/M2 | WEIGHT: 195 LBS | RESPIRATION RATE: 18 BRPM | DIASTOLIC BLOOD PRESSURE: 58 MMHG | TEMPERATURE: 97.2 F | OXYGEN SATURATION: 95 % | RESPIRATION RATE: 14 BRPM | HEIGHT: 65 IN | HEART RATE: 82 BPM | HEIGHT: 65 IN | DIASTOLIC BLOOD PRESSURE: 80 MMHG | SYSTOLIC BLOOD PRESSURE: 165 MMHG | BODY MASS INDEX: 31.65 KG/M2 | TEMPERATURE: 98.6 F | WEIGHT: 195 LBS | TEMPERATURE: 92.5 F | BODY MASS INDEX: 33.32 KG/M2 | RESPIRATION RATE: 14 BRPM | WEIGHT: 191.5 LBS | BODY MASS INDEX: 32.49 KG/M2 | DIASTOLIC BLOOD PRESSURE: 73 MMHG | TEMPERATURE: 97.3 F | OXYGEN SATURATION: 93 % | HEIGHT: 65 IN | DIASTOLIC BLOOD PRESSURE: 58 MMHG | SYSTOLIC BLOOD PRESSURE: 132 MMHG | DIASTOLIC BLOOD PRESSURE: 68 MMHG | HEART RATE: 67 BPM | SYSTOLIC BLOOD PRESSURE: 162 MMHG | HEART RATE: 60 BPM | DIASTOLIC BLOOD PRESSURE: 60 MMHG | HEART RATE: 64 BPM | OXYGEN SATURATION: 95 % | RESPIRATION RATE: 20 BRPM | RESPIRATION RATE: 16 BRPM | BODY MASS INDEX: 31.9 KG/M2 | WEIGHT: 195 LBS | BODY MASS INDEX: 32.49 KG/M2 | HEIGHT: 65 IN | WEIGHT: 200 LBS | TEMPERATURE: 97.5 F | SYSTOLIC BLOOD PRESSURE: 110 MMHG | HEIGHT: 65 IN | OXYGEN SATURATION: 94 % | TEMPERATURE: 96.9 F | HEART RATE: 80 BPM | OXYGEN SATURATION: 90 % | SYSTOLIC BLOOD PRESSURE: 140 MMHG | HEART RATE: 77 BPM | HEIGHT: 65 IN | WEIGHT: 190 LBS | SYSTOLIC BLOOD PRESSURE: 130 MMHG | RESPIRATION RATE: 14 BRPM

## 2021-06-02 ENCOUNTER — TELEPHONE (OUTPATIENT)
Dept: INTERNAL MEDICINE | Age: 86
End: 2021-06-02

## 2021-06-02 DIAGNOSIS — J12.82 PNEUMONIA DUE TO COVID-19 VIRUS: ICD-10-CM

## 2021-06-02 DIAGNOSIS — J43.9 PULMONARY EMPHYSEMA, UNSPECIFIED EMPHYSEMA TYPE (CMD): Primary | ICD-10-CM

## 2021-06-02 DIAGNOSIS — U07.1 PNEUMONIA DUE TO COVID-19 VIRUS: ICD-10-CM

## 2021-06-02 DIAGNOSIS — J96.11 CHRONIC RESPIRATORY FAILURE WITH HYPOXIA (CMD): ICD-10-CM

## 2021-06-03 ENCOUNTER — TELEPHONE (OUTPATIENT)
Dept: PULMONOLOGY | Age: 86
End: 2021-06-03

## 2021-06-03 ENCOUNTER — E-ADVICE (OUTPATIENT)
Dept: PULMONOLOGY | Age: 86
End: 2021-06-03

## 2021-06-03 DIAGNOSIS — J96.11 CHRONIC RESPIRATORY FAILURE WITH HYPOXIA (CMD): Primary | ICD-10-CM

## 2021-06-04 ENCOUNTER — TELEPHONE (OUTPATIENT)
Dept: INTERNAL MEDICINE | Age: 86
End: 2021-06-04

## 2021-06-07 ENCOUNTER — ANTI-COAG (OUTPATIENT)
Dept: CHRONIC DISEASE MANAGEMENT | Age: 86
End: 2021-06-07
Attending: INTERNAL MEDICINE

## 2021-06-07 DIAGNOSIS — I26.99 PULMONARY EMBOLISM (CMD): ICD-10-CM

## 2021-06-07 DIAGNOSIS — Z79.01 ANTICOAGULATION MONITORING, INR RANGE 2-3: ICD-10-CM

## 2021-06-07 LAB — INTERNATIONAL NORMALIZATION RATIO, POC: 1.8 (ref 2–3)

## 2021-06-07 PROCEDURE — 99211 OFF/OP EST MAY X REQ PHY/QHP: CPT

## 2021-06-07 PROCEDURE — 85610 PROTHROMBIN TIME: CPT

## 2021-06-07 RX ORDER — WARFARIN SODIUM 3 MG/1
TABLET ORAL
Qty: 90 TABLET | Refills: 0 | Status: SHIPPED | OUTPATIENT
Start: 2021-06-07 | End: 2021-07-16

## 2021-06-08 ENCOUNTER — OFFICE VISIT (OUTPATIENT)
Dept: CARDIOLOGY | Age: 86
End: 2021-06-08

## 2021-06-08 VITALS
BODY MASS INDEX: 31.06 KG/M2 | HEIGHT: 65 IN | HEART RATE: 74 BPM | WEIGHT: 186.4 LBS | SYSTOLIC BLOOD PRESSURE: 130 MMHG | DIASTOLIC BLOOD PRESSURE: 60 MMHG

## 2021-06-08 DIAGNOSIS — R60.0 LEG EDEMA: Primary | ICD-10-CM

## 2021-06-08 PROCEDURE — 99214 OFFICE O/P EST MOD 30 MIN: CPT | Performed by: INTERNAL MEDICINE

## 2021-06-08 RX ORDER — ACETAMINOPHEN 325 MG/1
500 TABLET ORAL EVERY 4 HOURS PRN
Status: ON HOLD | COMMUNITY
End: 2023-01-01

## 2021-06-08 RX ORDER — FUROSEMIDE 20 MG/1
20 TABLET ORAL DAILY
Qty: 30 TABLET | Refills: 11 | Status: SHIPPED | OUTPATIENT
Start: 2021-06-08 | End: 2022-01-01 | Stop reason: ALTCHOICE

## 2021-06-08 ASSESSMENT — PATIENT HEALTH QUESTIONNAIRE - PHQ9
1. LITTLE INTEREST OR PLEASURE IN DOING THINGS: NOT AT ALL
2. FEELING DOWN, DEPRESSED OR HOPELESS: NOT AT ALL
CLINICAL INTERPRETATION OF PHQ9 SCORE: NO FURTHER SCREENING NEEDED
SUM OF ALL RESPONSES TO PHQ9 QUESTIONS 1 AND 2: 0
SUM OF ALL RESPONSES TO PHQ9 QUESTIONS 1 AND 2: 0
CLINICAL INTERPRETATION OF PHQ2 SCORE: NO FURTHER SCREENING NEEDED

## 2021-06-08 ASSESSMENT — ENCOUNTER SYMPTOMS
DIAPHORESIS: 0
VOMITING: 0
CONFUSION: 0
SHORTNESS OF BREATH: 1
NAUSEA: 0
NERVOUS/ANXIOUS: 0
BRUISES/BLEEDS EASILY: 0
WOUND: 0
ABDOMINAL PAIN: 0
SLEEP DISTURBANCE: 0
FEVER: 0
CONSTITUTIONAL NEGATIVE: 1
BLOOD IN STOOL: 0
LIGHT-HEADEDNESS: 0
WEAKNESS: 0
DIZZINESS: 0

## 2021-06-09 ENCOUNTER — TELEPHONE (OUTPATIENT)
Dept: CHRONIC DISEASE MANAGEMENT | Age: 86
End: 2021-06-09

## 2021-06-09 DIAGNOSIS — I26.99 PULMONARY EMBOLISM (CMD): ICD-10-CM

## 2021-06-09 DIAGNOSIS — Z79.01 ANTICOAGULATION MONITORING, INR RANGE 2-3: ICD-10-CM

## 2021-06-21 ENCOUNTER — ANTI-COAG (OUTPATIENT)
Dept: CHRONIC DISEASE MANAGEMENT | Age: 86
End: 2021-06-21
Attending: INTERNAL MEDICINE

## 2021-06-21 ENCOUNTER — TELEPHONE (OUTPATIENT)
Dept: INTERNAL MEDICINE | Age: 86
End: 2021-06-21

## 2021-06-21 ENCOUNTER — OFFICE VISIT (OUTPATIENT)
Dept: PULMONOLOGY | Age: 86
End: 2021-06-21

## 2021-06-21 VITALS
TEMPERATURE: 97.2 F | DIASTOLIC BLOOD PRESSURE: 70 MMHG | HEIGHT: 65 IN | OXYGEN SATURATION: 94 % | HEART RATE: 80 BPM | BODY MASS INDEX: 30.49 KG/M2 | WEIGHT: 182.98 LBS | SYSTOLIC BLOOD PRESSURE: 159 MMHG

## 2021-06-21 DIAGNOSIS — I26.99 PULMONARY EMBOLISM (CMD): Primary | ICD-10-CM

## 2021-06-21 DIAGNOSIS — Z79.01 ANTICOAGULATION MONITORING, INR RANGE 2-3: ICD-10-CM

## 2021-06-21 DIAGNOSIS — J96.11 CHRONIC RESPIRATORY FAILURE WITH HYPOXIA (CMD): ICD-10-CM

## 2021-06-21 DIAGNOSIS — J43.9 PULMONARY EMPHYSEMA, UNSPECIFIED EMPHYSEMA TYPE (CMD): Primary | ICD-10-CM

## 2021-06-21 LAB — INTERNATIONAL NORMALIZATION RATIO, POC: 1.6 (ref 2–3)

## 2021-06-21 PROCEDURE — 85610 PROTHROMBIN TIME: CPT

## 2021-06-21 PROCEDURE — 99214 OFFICE O/P EST MOD 30 MIN: CPT | Performed by: INTERNAL MEDICINE

## 2021-06-21 PROCEDURE — 99211 OFF/OP EST MAY X REQ PHY/QHP: CPT

## 2021-06-21 ASSESSMENT — PAIN SCALES - GENERAL: PAINLEVEL: 0

## 2021-06-21 ASSESSMENT — PATIENT HEALTH QUESTIONNAIRE - PHQ9
1. LITTLE INTEREST OR PLEASURE IN DOING THINGS: NOT AT ALL
CLINICAL INTERPRETATION OF PHQ9 SCORE: NO FURTHER SCREENING NEEDED
2. FEELING DOWN, DEPRESSED OR HOPELESS: NOT AT ALL
CLINICAL INTERPRETATION OF PHQ2 SCORE: NO FURTHER SCREENING NEEDED
SUM OF ALL RESPONSES TO PHQ9 QUESTIONS 1 AND 2: 0
SUM OF ALL RESPONSES TO PHQ9 QUESTIONS 1 AND 2: 0

## 2021-06-21 ASSESSMENT — ENCOUNTER SYMPTOMS
VOMITING: 0
SORE THROAT: 0
BRUISES/BLEEDS EASILY: 0
SINUS PAIN: 0
FEVER: 0
ADENOPATHY: 0
NAUSEA: 0
DIZZINESS: 0
ABDOMINAL PAIN: 0
CHILLS: 0
DIAPHORESIS: 0
WEAKNESS: 0
UNEXPECTED WEIGHT CHANGE: 0
DIARRHEA: 0
BLOOD IN STOOL: 0
EYE PAIN: 0
TROUBLE SWALLOWING: 0
POLYDIPSIA: 0
SEIZURES: 0
ABDOMINAL DISTENTION: 0
HEADACHES: 0
VOICE CHANGE: 0
CHEST TIGHTNESS: 0
FATIGUE: 0

## 2021-07-02 ENCOUNTER — TELEPHONE (OUTPATIENT)
Dept: SCHEDULING | Age: 86
End: 2021-07-02

## 2021-07-06 ENCOUNTER — OFFICE VISIT (OUTPATIENT)
Dept: INTERNAL MEDICINE | Age: 86
End: 2021-07-06

## 2021-07-06 ENCOUNTER — ANTI-COAG (OUTPATIENT)
Dept: CHRONIC DISEASE MANAGEMENT | Age: 86
End: 2021-07-06
Attending: INTERNAL MEDICINE

## 2021-07-06 VITALS
SYSTOLIC BLOOD PRESSURE: 128 MMHG | OXYGEN SATURATION: 94 % | BODY MASS INDEX: 30.32 KG/M2 | HEIGHT: 65 IN | DIASTOLIC BLOOD PRESSURE: 70 MMHG | RESPIRATION RATE: 14 BRPM | WEIGHT: 182 LBS | TEMPERATURE: 97.9 F | HEART RATE: 64 BPM

## 2021-07-06 DIAGNOSIS — I26.99 PULMONARY EMBOLISM (CMD): Primary | ICD-10-CM

## 2021-07-06 DIAGNOSIS — I10 ESSENTIAL HYPERTENSION: Primary | ICD-10-CM

## 2021-07-06 DIAGNOSIS — R06.02 SOB (SHORTNESS OF BREATH): ICD-10-CM

## 2021-07-06 DIAGNOSIS — R34 DECREASED URINE VOLUME: ICD-10-CM

## 2021-07-06 DIAGNOSIS — Z79.01 ANTICOAGULATION MONITORING, INR RANGE 2-3: ICD-10-CM

## 2021-07-06 DIAGNOSIS — M79.89 SWELLING OF LOWER EXTREMITY: ICD-10-CM

## 2021-07-06 LAB — INTERNATIONAL NORMALIZATION RATIO, POC: 1.9 (ref 2–3)

## 2021-07-06 PROCEDURE — 85025 COMPLETE CBC W/AUTO DIFF WBC: CPT | Performed by: CLINICAL MEDICAL LABORATORY

## 2021-07-06 PROCEDURE — 99214 OFFICE O/P EST MOD 30 MIN: CPT | Performed by: INTERNAL MEDICINE

## 2021-07-06 PROCEDURE — 99211 OFF/OP EST MAY X REQ PHY/QHP: CPT

## 2021-07-06 PROCEDURE — 85610 PROTHROMBIN TIME: CPT

## 2021-07-06 PROCEDURE — 80053 COMPREHEN METABOLIC PANEL: CPT | Performed by: CLINICAL MEDICAL LABORATORY

## 2021-07-06 ASSESSMENT — PATIENT HEALTH QUESTIONNAIRE - PHQ9
2. FEELING DOWN, DEPRESSED OR HOPELESS: NOT AT ALL
SUM OF ALL RESPONSES TO PHQ9 QUESTIONS 1 AND 2: 0
CLINICAL INTERPRETATION OF PHQ2 SCORE: NO FURTHER SCREENING NEEDED
SUM OF ALL RESPONSES TO PHQ9 QUESTIONS 1 AND 2: 0
CLINICAL INTERPRETATION OF PHQ9 SCORE: NO FURTHER SCREENING NEEDED
1. LITTLE INTEREST OR PLEASURE IN DOING THINGS: NOT AT ALL

## 2021-07-06 ASSESSMENT — PAIN SCALES - GENERAL: PAINLEVEL: 0

## 2021-07-07 LAB
ALBUMIN SERPL-MCNC: 3.4 G/DL (ref 3.6–5.1)
ALBUMIN/GLOB SERPL: 0.9 {RATIO} (ref 1–2.4)
ALP SERPL-CCNC: 91 UNITS/L (ref 45–117)
ALT SERPL-CCNC: 19 UNITS/L
ANION GAP SERPL CALC-SCNC: 9 MMOL/L (ref 10–20)
AST SERPL-CCNC: 21 UNITS/L
BASOPHILS # BLD: 0 K/MCL (ref 0–0.3)
BASOPHILS NFR BLD: 0 %
BILIRUB SERPL-MCNC: 0.5 MG/DL (ref 0.2–1)
BUN SERPL-MCNC: 30 MG/DL (ref 6–20)
BUN/CREAT SERPL: 33 (ref 7–25)
CALCIUM SERPL-MCNC: 9.6 MG/DL (ref 8.4–10.2)
CHLORIDE SERPL-SCNC: 103 MMOL/L (ref 98–107)
CO2 SERPL-SCNC: 34 MMOL/L (ref 21–32)
CREAT SERPL-MCNC: 0.9 MG/DL (ref 0.51–0.95)
DEPRECATED RDW RBC: 50.4 FL (ref 39–50)
EOSINOPHIL # BLD: 0.1 K/MCL (ref 0–0.5)
EOSINOPHIL NFR BLD: 1 %
ERYTHROCYTE [DISTWIDTH] IN BLOOD: 14.6 % (ref 11–15)
FASTING DURATION TIME PATIENT: ABNORMAL H
GFR SERPLBLD BASED ON 1.73 SQ M-ARVRAT: 58 ML/MIN/1.73M2
GLOBULIN SER-MCNC: 3.8 G/DL (ref 2–4)
GLUCOSE SERPL-MCNC: 91 MG/DL (ref 65–99)
HCT VFR BLD CALC: 40.2 % (ref 36–46.5)
HGB BLD-MCNC: 12.1 G/DL (ref 12–15.5)
IMM GRANULOCYTES # BLD AUTO: 0 K/MCL (ref 0–0.2)
IMM GRANULOCYTES # BLD: 0 %
LYMPHOCYTES # BLD: 1.2 K/MCL (ref 1–4)
LYMPHOCYTES NFR BLD: 14 %
MCH RBC QN AUTO: 28.4 PG (ref 26–34)
MCHC RBC AUTO-ENTMCNC: 30.1 G/DL (ref 32–36.5)
MCV RBC AUTO: 94.4 FL (ref 78–100)
MONOCYTES # BLD: 0.7 K/MCL (ref 0.3–0.9)
MONOCYTES NFR BLD: 8 %
NEUTROPHILS # BLD: 6.3 K/MCL (ref 1.8–7.7)
NEUTROPHILS NFR BLD: 77 %
NRBC BLD MANUAL-RTO: 0 /100 WBC
PLATELET # BLD AUTO: 238 K/MCL (ref 140–450)
POTASSIUM SERPL-SCNC: 4.3 MMOL/L (ref 3.4–5.1)
PROT SERPL-MCNC: 7.2 G/DL (ref 6.4–8.2)
RBC # BLD: 4.26 MIL/MCL (ref 4–5.2)
SODIUM SERPL-SCNC: 142 MMOL/L (ref 135–145)
WBC # BLD: 8.3 K/MCL (ref 4.2–11)

## 2021-07-12 ENCOUNTER — TELEPHONE (OUTPATIENT)
Dept: INTERNAL MEDICINE | Age: 86
End: 2021-07-12

## 2021-07-16 RX ORDER — WARFARIN SODIUM 3 MG/1
TABLET ORAL
Qty: 90 TABLET | Refills: 0 | Status: SHIPPED | OUTPATIENT
Start: 2021-07-16 | End: 2021-07-18 | Stop reason: SDUPTHER

## 2021-07-16 RX ORDER — WARFARIN SODIUM 3 MG/1
TABLET ORAL
Qty: 90 TABLET | Refills: 0 | Status: CANCELLED | OUTPATIENT
Start: 2021-07-16

## 2021-07-17 ENCOUNTER — TELEPHONE (OUTPATIENT)
Dept: SCHEDULING | Age: 86
End: 2021-07-17

## 2021-07-18 RX ORDER — WARFARIN SODIUM 3 MG/1
3 TABLET ORAL DAILY
Qty: 90 TABLET | Refills: 0 | Status: SHIPPED | OUTPATIENT
Start: 2021-07-18 | End: 2022-01-24 | Stop reason: SDUPTHER

## 2021-07-19 ENCOUNTER — ANTI-COAG (OUTPATIENT)
Dept: CHRONIC DISEASE MANAGEMENT | Age: 86
End: 2021-07-19
Attending: INTERNAL MEDICINE

## 2021-07-19 DIAGNOSIS — I26.99 PULMONARY EMBOLISM (CMD): Primary | ICD-10-CM

## 2021-07-19 DIAGNOSIS — Z79.01 ANTICOAGULATION MONITORING, INR RANGE 2-3: ICD-10-CM

## 2021-07-19 LAB — INTERNATIONAL NORMALIZATION RATIO, POC: 2.1 (ref 2–3)

## 2021-07-19 PROCEDURE — 99211 OFF/OP EST MAY X REQ PHY/QHP: CPT

## 2021-07-19 PROCEDURE — 85610 PROTHROMBIN TIME: CPT

## 2021-07-21 ENCOUNTER — OFFICE VISIT (OUTPATIENT)
Dept: INTERNAL MEDICINE | Age: 86
End: 2021-07-21

## 2021-07-21 ENCOUNTER — HOSPITAL ENCOUNTER (OUTPATIENT)
Dept: GENERAL RADIOLOGY | Age: 86
Discharge: HOME | End: 2021-07-21

## 2021-07-21 VITALS
HEART RATE: 68 BPM | WEIGHT: 182 LBS | BODY MASS INDEX: 30.32 KG/M2 | TEMPERATURE: 97.7 F | SYSTOLIC BLOOD PRESSURE: 136 MMHG | OXYGEN SATURATION: 94 % | HEIGHT: 65 IN | DIASTOLIC BLOOD PRESSURE: 70 MMHG | RESPIRATION RATE: 14 BRPM

## 2021-07-21 DIAGNOSIS — N18.31 STAGE 3A CHRONIC KIDNEY DISEASE (CMD): ICD-10-CM

## 2021-07-21 DIAGNOSIS — S96.902A OPEN WOUND OF LEFT KNEE, LEG AND ANKLE WITH TENDON INVOLVEMENT, INITIAL ENCOUNTER: ICD-10-CM

## 2021-07-21 DIAGNOSIS — S81.802A OPEN WOUND OF LEFT KNEE, LEG AND ANKLE WITH TENDON INVOLVEMENT, INITIAL ENCOUNTER: ICD-10-CM

## 2021-07-21 DIAGNOSIS — M54.6 ACUTE MIDLINE THORACIC BACK PAIN: Primary | ICD-10-CM

## 2021-07-21 DIAGNOSIS — S81.002A OPEN WOUND OF LEFT KNEE, LEG AND ANKLE WITH TENDON INVOLVEMENT, INITIAL ENCOUNTER: ICD-10-CM

## 2021-07-21 DIAGNOSIS — R14.0 ABDOMINAL BLOATING: ICD-10-CM

## 2021-07-21 DIAGNOSIS — S86.902A OPEN WOUND OF LEFT KNEE, LEG AND ANKLE WITH TENDON INVOLVEMENT, INITIAL ENCOUNTER: ICD-10-CM

## 2021-07-21 DIAGNOSIS — M54.6 ACUTE MIDLINE THORACIC BACK PAIN: ICD-10-CM

## 2021-07-21 DIAGNOSIS — S81.802A WOUND OF LEFT LOWER EXTREMITY, INITIAL ENCOUNTER: ICD-10-CM

## 2021-07-21 DIAGNOSIS — S91.002A OPEN WOUND OF LEFT KNEE, LEG AND ANKLE WITH TENDON INVOLVEMENT, INITIAL ENCOUNTER: ICD-10-CM

## 2021-07-21 PROCEDURE — 72072 X-RAY EXAM THORAC SPINE 3VWS: CPT

## 2021-07-21 PROCEDURE — 99214 OFFICE O/P EST MOD 30 MIN: CPT | Performed by: INTERNAL MEDICINE

## 2021-07-21 RX ORDER — CEPHALEXIN 500 MG/1
TABLET ORAL
COMMUNITY
Start: 2021-07-21 | End: 2022-03-10

## 2021-07-21 ASSESSMENT — PATIENT HEALTH QUESTIONNAIRE - PHQ9
2. FEELING DOWN, DEPRESSED OR HOPELESS: NOT AT ALL
SUM OF ALL RESPONSES TO PHQ9 QUESTIONS 1 AND 2: 0
SUM OF ALL RESPONSES TO PHQ9 QUESTIONS 1 AND 2: 0
CLINICAL INTERPRETATION OF PHQ2 SCORE: NO FURTHER SCREENING NEEDED
CLINICAL INTERPRETATION OF PHQ9 SCORE: NO FURTHER SCREENING NEEDED
1. LITTLE INTEREST OR PLEASURE IN DOING THINGS: NOT AT ALL

## 2021-07-21 ASSESSMENT — PAIN SCALES - GENERAL: PAINLEVEL: 4

## 2021-07-22 ENCOUNTER — TELEPHONE (OUTPATIENT)
Dept: INTERNAL MEDICINE | Age: 86
End: 2021-07-22

## 2021-07-22 DIAGNOSIS — I10 ESSENTIAL HYPERTENSION: Primary | ICD-10-CM

## 2021-07-22 DIAGNOSIS — S22.000D COMPRESSION FRACTURE OF THORACIC VERTEBRA WITH ROUTINE HEALING, UNSPECIFIED THORACIC VERTEBRAL LEVEL, SUBSEQUENT ENCOUNTER: Primary | ICD-10-CM

## 2021-07-22 RX ORDER — LOSARTAN POTASSIUM AND HYDROCHLOROTHIAZIDE 25; 100 MG/1; MG/1
1 TABLET ORAL DAILY
Qty: 90 TABLET | Refills: 0 | Status: SHIPPED | OUTPATIENT
Start: 2021-07-22 | End: 2021-10-18 | Stop reason: SDUPTHER

## 2021-08-09 ENCOUNTER — ANTI-COAG (OUTPATIENT)
Dept: CHRONIC DISEASE MANAGEMENT | Age: 86
End: 2021-08-09
Attending: INTERNAL MEDICINE

## 2021-08-09 DIAGNOSIS — I26.99 PULMONARY EMBOLISM (CMD): Primary | ICD-10-CM

## 2021-08-09 DIAGNOSIS — Z79.01 LONG TERM (CURRENT) USE OF ANTICOAGULANTS: ICD-10-CM

## 2021-08-09 LAB — INTERNATIONAL NORMALIZATION RATIO, POC: 1.8 (ref 2–3)

## 2021-08-09 PROCEDURE — 99211 OFF/OP EST MAY X REQ PHY/QHP: CPT

## 2021-08-09 PROCEDURE — 85610 PROTHROMBIN TIME: CPT

## 2021-08-23 ENCOUNTER — ANTI-COAG (OUTPATIENT)
Dept: CHRONIC DISEASE MANAGEMENT | Age: 86
End: 2021-08-23
Attending: INTERNAL MEDICINE

## 2021-08-23 DIAGNOSIS — Z79.01 LONG TERM (CURRENT) USE OF ANTICOAGULANTS: ICD-10-CM

## 2021-08-23 DIAGNOSIS — I26.99 PULMONARY EMBOLISM (CMD): Primary | ICD-10-CM

## 2021-08-23 LAB — INTERNATIONAL NORMALIZATION RATIO, POC: 2.4 (ref 2–3)

## 2021-08-23 PROCEDURE — 85610 PROTHROMBIN TIME: CPT

## 2021-08-23 PROCEDURE — 99211 OFF/OP EST MAY X REQ PHY/QHP: CPT

## 2021-09-13 ENCOUNTER — ANTI-COAG (OUTPATIENT)
Dept: CHRONIC DISEASE MANAGEMENT | Age: 86
End: 2021-09-13
Attending: INTERNAL MEDICINE

## 2021-09-13 DIAGNOSIS — I26.99 PULMONARY EMBOLISM (CMD): Primary | ICD-10-CM

## 2021-09-13 DIAGNOSIS — Z79.01 LONG TERM (CURRENT) USE OF ANTICOAGULANTS: ICD-10-CM

## 2021-09-13 LAB — INR PPP: 1.7 (ref 2–3)

## 2021-09-13 PROCEDURE — 99211 OFF/OP EST MAY X REQ PHY/QHP: CPT

## 2021-09-13 PROCEDURE — 85610 PROTHROMBIN TIME: CPT

## 2021-09-13 RX ORDER — WARFARIN SODIUM 1 MG/1
TABLET ORAL
Qty: 90 TABLET | Refills: 0 | Status: SHIPPED | OUTPATIENT
Start: 2021-09-13 | End: 2021-11-30 | Stop reason: SDUPTHER

## 2021-09-27 ENCOUNTER — ANTI-COAG (OUTPATIENT)
Dept: CHRONIC DISEASE MANAGEMENT | Age: 86
End: 2021-09-27
Attending: INTERNAL MEDICINE

## 2021-09-27 DIAGNOSIS — I26.99 PULMONARY EMBOLISM (CMD): Primary | ICD-10-CM

## 2021-09-27 DIAGNOSIS — Z79.01 LONG TERM (CURRENT) USE OF ANTICOAGULANTS: ICD-10-CM

## 2021-09-27 LAB — INR PPP: 2.9 (ref 2–3)

## 2021-09-27 PROCEDURE — 85610 PROTHROMBIN TIME: CPT

## 2021-09-27 PROCEDURE — 99211 OFF/OP EST MAY X REQ PHY/QHP: CPT

## 2021-10-12 ENCOUNTER — ANTI-COAG (OUTPATIENT)
Dept: CHRONIC DISEASE MANAGEMENT | Age: 86
End: 2021-10-12
Attending: INTERNAL MEDICINE

## 2021-10-12 DIAGNOSIS — Z79.01 LONG TERM (CURRENT) USE OF ANTICOAGULANTS: ICD-10-CM

## 2021-10-12 DIAGNOSIS — I26.99 PULMONARY EMBOLISM (CMD): Primary | ICD-10-CM

## 2021-10-12 LAB — INR PPP: 1.9 (ref 2–3)

## 2021-10-12 PROCEDURE — 85610 PROTHROMBIN TIME: CPT

## 2021-10-12 PROCEDURE — 99211 OFF/OP EST MAY X REQ PHY/QHP: CPT

## 2021-10-17 DIAGNOSIS — I10 ESSENTIAL HYPERTENSION: ICD-10-CM

## 2021-10-18 ENCOUNTER — OFFICE VISIT (OUTPATIENT)
Dept: INTERNAL MEDICINE | Age: 86
End: 2021-10-18

## 2021-10-18 VITALS
RESPIRATION RATE: 14 BRPM | SYSTOLIC BLOOD PRESSURE: 142 MMHG | WEIGHT: 185 LBS | OXYGEN SATURATION: 98 % | HEART RATE: 78 BPM | HEIGHT: 65 IN | DIASTOLIC BLOOD PRESSURE: 76 MMHG | TEMPERATURE: 96.9 F | BODY MASS INDEX: 30.82 KG/M2

## 2021-10-18 DIAGNOSIS — M54.50 CHRONIC BILATERAL LOW BACK PAIN WITHOUT SCIATICA: ICD-10-CM

## 2021-10-18 DIAGNOSIS — R60.0 LEG EDEMA: ICD-10-CM

## 2021-10-18 DIAGNOSIS — N18.31 STAGE 3A CHRONIC KIDNEY DISEASE (CMD): ICD-10-CM

## 2021-10-18 DIAGNOSIS — I10 ESSENTIAL HYPERTENSION: Primary | ICD-10-CM

## 2021-10-18 DIAGNOSIS — Z79.01 LONG TERM (CURRENT) USE OF ANTICOAGULANTS: ICD-10-CM

## 2021-10-18 DIAGNOSIS — G89.29 CHRONIC BILATERAL LOW BACK PAIN WITHOUT SCIATICA: ICD-10-CM

## 2021-10-18 DIAGNOSIS — R20.2 PARESTHESIA: ICD-10-CM

## 2021-10-18 DIAGNOSIS — J96.11 CHRONIC RESPIRATORY FAILURE WITH HYPOXIA (CMD): ICD-10-CM

## 2021-10-18 PROCEDURE — 99214 OFFICE O/P EST MOD 30 MIN: CPT | Performed by: INTERNAL MEDICINE

## 2021-10-18 RX ORDER — LOSARTAN POTASSIUM AND HYDROCHLOROTHIAZIDE 25; 100 MG/1; MG/1
1 TABLET ORAL DAILY
Qty: 90 TABLET | Refills: 0 | Status: CANCELLED | OUTPATIENT
Start: 2021-10-18

## 2021-10-18 RX ORDER — LOSARTAN POTASSIUM AND HYDROCHLOROTHIAZIDE 25; 100 MG/1; MG/1
1 TABLET ORAL DAILY
Qty: 90 TABLET | Refills: 3 | Status: SHIPPED | OUTPATIENT
Start: 2021-10-18 | End: 2022-01-01

## 2021-10-18 ASSESSMENT — PATIENT HEALTH QUESTIONNAIRE - PHQ9
1. LITTLE INTEREST OR PLEASURE IN DOING THINGS: NOT AT ALL
CLINICAL INTERPRETATION OF PHQ2 SCORE: NO FURTHER SCREENING NEEDED
CLINICAL INTERPRETATION OF PHQ9 SCORE: NO FURTHER SCREENING NEEDED
SUM OF ALL RESPONSES TO PHQ9 QUESTIONS 1 AND 2: 0
2. FEELING DOWN, DEPRESSED OR HOPELESS: NOT AT ALL
SUM OF ALL RESPONSES TO PHQ9 QUESTIONS 1 AND 2: 0

## 2021-10-18 ASSESSMENT — PAIN SCALES - GENERAL: PAINLEVEL: 0

## 2021-10-25 ENCOUNTER — OFFICE VISIT (OUTPATIENT)
Dept: PULMONOLOGY | Age: 86
End: 2021-10-25

## 2021-10-25 VITALS
SYSTOLIC BLOOD PRESSURE: 163 MMHG | HEIGHT: 65 IN | HEART RATE: 80 BPM | WEIGHT: 185 LBS | DIASTOLIC BLOOD PRESSURE: 84 MMHG | BODY MASS INDEX: 30.82 KG/M2 | OXYGEN SATURATION: 94 %

## 2021-10-25 DIAGNOSIS — R06.09 DYSPNEA ON EXERTION: Primary | ICD-10-CM

## 2021-10-25 DIAGNOSIS — R05.9 COUGH: ICD-10-CM

## 2021-10-25 PROCEDURE — 99214 OFFICE O/P EST MOD 30 MIN: CPT | Performed by: INTERNAL MEDICINE

## 2021-10-25 ASSESSMENT — SLEEP AND FATIGUE QUESTIONNAIRES
HOW LIKELY ARE YOU TO NOD OFF OR FALL ASLEEP WHILE SITTING AND TALKING TO SOMEONE: WOULD NEVER DOZE
HOW LIKELY ARE YOU TO NOD OFF OR FALL ASLEEP WHILE SITTING AND READING: SLIGHT CHANCE OF DOZING
HOW LIKELY ARE YOU TO NOD OFF OR FALL ASLEEP WHEN YOU ARE A PASSENGER IN A CAR FOR AN HOUR WITHOUT A BREAK: WOULD NEVER DOZE
HOW LIKELY ARE YOU TO NOD OFF OR FALL ASLEEP WHILE WATCHING TV: MODERATE CHANCE OF DOZING
HOW LIKELY ARE YOU TO NOD OFF OR FALL ASLEEP WHILE SITTING QUIETLY AFTER LUNCH WITHOUT ALCOHOL: SLIGHT CHANCE OF DOZING
HOW LIKELY ARE YOU TO NOD OFF OR FALL ASLEEP IN A CAR, WHILE STOPPED FOR A FEW MINUTES IN TRAFFIC: WOULD NEVER DOZE
HOW LIKELY ARE YOU TO NOD OFF OR FALL ASLEEP WHILE SITTING INACTIVE IN A PUBLIC PLACE: WOULD NEVER DOZE
ESS TOTAL SCORE: 4
HOW LIKELY ARE YOU TO NOD OFF OR FALL ASLEEP WHILE LYING DOWN TO REST IN THE AFTERNOON WHEN CIRCUMSTANCES PERMIT: WOULD NEVER DOZE

## 2021-10-27 ENCOUNTER — LAB SERVICES (OUTPATIENT)
Dept: LAB | Age: 86
End: 2021-10-27
Attending: INTERNAL MEDICINE

## 2021-10-27 ENCOUNTER — ANTI-COAG (OUTPATIENT)
Dept: CHRONIC DISEASE MANAGEMENT | Age: 86
End: 2021-10-27
Attending: INTERNAL MEDICINE

## 2021-10-27 DIAGNOSIS — Z79.01 LONG TERM (CURRENT) USE OF ANTICOAGULANTS: ICD-10-CM

## 2021-10-27 DIAGNOSIS — I26.99 PULMONARY EMBOLISM (CMD): Primary | ICD-10-CM

## 2021-10-27 DIAGNOSIS — N18.31 STAGE 3A CHRONIC KIDNEY DISEASE (CMD): ICD-10-CM

## 2021-10-27 LAB
ALBUMIN SERPL-MCNC: 3.3 G/DL (ref 3.6–5.1)
ALBUMIN/GLOB SERPL: 1 {RATIO} (ref 1–2.4)
ALP SERPL-CCNC: 67 UNITS/L (ref 45–117)
ALT SERPL-CCNC: 19 UNITS/L
ANION GAP SERPL CALC-SCNC: 9 MMOL/L (ref 10–20)
AST SERPL-CCNC: 19 UNITS/L
BILIRUB SERPL-MCNC: 0.3 MG/DL (ref 0.2–1)
BUN SERPL-MCNC: 24 MG/DL (ref 6–20)
BUN/CREAT SERPL: 30 (ref 7–25)
CALCIUM SERPL-MCNC: 9.2 MG/DL (ref 8.4–10.2)
CHLORIDE SERPL-SCNC: 103 MMOL/L (ref 98–107)
CO2 SERPL-SCNC: 35 MMOL/L (ref 21–32)
CREAT SERPL-MCNC: 0.81 MG/DL (ref 0.51–0.95)
FASTING DURATION TIME PATIENT: ABNORMAL H
GFR SERPLBLD BASED ON 1.73 SQ M-ARVRAT: 65 ML/MIN
GLOBULIN SER-MCNC: 3.3 G/DL (ref 2–4)
GLUCOSE SERPL-MCNC: 91 MG/DL (ref 70–99)
INR PPP: 2.1
POTASSIUM SERPL-SCNC: 4.1 MMOL/L (ref 3.4–5.1)
PROT SERPL-MCNC: 6.6 G/DL (ref 6.4–8.2)
SODIUM SERPL-SCNC: 143 MMOL/L (ref 135–145)

## 2021-10-27 PROCEDURE — 80053 COMPREHEN METABOLIC PANEL: CPT

## 2021-10-27 PROCEDURE — 36415 COLL VENOUS BLD VENIPUNCTURE: CPT

## 2021-10-27 PROCEDURE — 85610 PROTHROMBIN TIME: CPT

## 2021-10-27 PROCEDURE — 99211 OFF/OP EST MAY X REQ PHY/QHP: CPT

## 2021-10-28 ENCOUNTER — TELEPHONE (OUTPATIENT)
Dept: INTERNAL MEDICINE | Age: 86
End: 2021-10-28

## 2021-11-05 ENCOUNTER — CLINICAL ABSTRACT (OUTPATIENT)
Dept: HEALTH INFORMATION MANAGEMENT | Age: 86
End: 2021-11-05

## 2021-11-08 ENCOUNTER — APPOINTMENT (OUTPATIENT)
Dept: INTERNAL MEDICINE | Age: 86
End: 2021-11-08

## 2021-11-17 ENCOUNTER — ANTI-COAG (OUTPATIENT)
Dept: CHRONIC DISEASE MANAGEMENT | Age: 86
End: 2021-11-17
Attending: INTERNAL MEDICINE

## 2021-11-17 DIAGNOSIS — I26.99 PULMONARY EMBOLISM (CMD): Primary | ICD-10-CM

## 2021-11-17 DIAGNOSIS — Z79.01 LONG TERM (CURRENT) USE OF ANTICOAGULANTS: ICD-10-CM

## 2021-11-17 LAB — INR PPP: 1.9 (ref 2–3)

## 2021-11-17 PROCEDURE — 85610 PROTHROMBIN TIME: CPT

## 2021-11-17 PROCEDURE — 99211 OFF/OP EST MAY X REQ PHY/QHP: CPT

## 2021-11-30 DIAGNOSIS — I26.99 PULMONARY EMBOLISM (CMD): ICD-10-CM

## 2021-12-06 RX ORDER — WARFARIN SODIUM 1 MG/1
TABLET ORAL
Qty: 90 TABLET | Refills: 0 | Status: SHIPPED | OUTPATIENT
Start: 2021-12-06 | End: 2022-01-24 | Stop reason: SDUPTHER

## 2021-12-07 ENCOUNTER — ANTI-COAG (OUTPATIENT)
Dept: CHRONIC DISEASE MANAGEMENT | Age: 86
End: 2021-12-07
Attending: INTERNAL MEDICINE

## 2021-12-07 DIAGNOSIS — I26.99 PULMONARY EMBOLISM (CMD): Primary | ICD-10-CM

## 2021-12-07 DIAGNOSIS — Z79.01 LONG TERM (CURRENT) USE OF ANTICOAGULANTS: ICD-10-CM

## 2021-12-07 LAB — INR PPP: 1.7

## 2021-12-07 PROCEDURE — 99211 OFF/OP EST MAY X REQ PHY/QHP: CPT

## 2021-12-07 PROCEDURE — 85610 PROTHROMBIN TIME: CPT

## 2021-12-27 ENCOUNTER — ANTI-COAG (OUTPATIENT)
Dept: CHRONIC DISEASE MANAGEMENT | Age: 86
End: 2021-12-27
Attending: INTERNAL MEDICINE

## 2021-12-27 DIAGNOSIS — Z79.01 LONG TERM (CURRENT) USE OF ANTICOAGULANTS: ICD-10-CM

## 2021-12-27 DIAGNOSIS — I26.99 PULMONARY EMBOLISM (CMD): Primary | ICD-10-CM

## 2021-12-27 LAB — INR PPP: 1.9 (ref 2–3)

## 2021-12-27 PROCEDURE — 99211 OFF/OP EST MAY X REQ PHY/QHP: CPT

## 2021-12-27 PROCEDURE — 85610 PROTHROMBIN TIME: CPT

## 2021-12-28 RX ORDER — ALPRAZOLAM 0.25 MG/1
0.25 TABLET ORAL DAILY PRN
Qty: 30 TABLET | Refills: 0 | Status: SHIPPED | OUTPATIENT
Start: 2021-12-28 | End: 2022-01-01 | Stop reason: SDUPTHER

## 2021-12-31 ENCOUNTER — TELEPHONE (OUTPATIENT)
Dept: SCHEDULING | Age: 86
End: 2021-12-31

## 2022-01-01 ENCOUNTER — APPOINTMENT (OUTPATIENT)
Dept: INTERNAL MEDICINE | Age: 87
End: 2022-01-01

## 2022-01-01 ENCOUNTER — TELEPHONE (OUTPATIENT)
Dept: CHRONIC DISEASE MANAGEMENT | Age: 87
End: 2022-01-01

## 2022-01-01 ENCOUNTER — TELEPHONE (OUTPATIENT)
Dept: INTERNAL MEDICINE | Age: 87
End: 2022-01-01

## 2022-01-01 ENCOUNTER — APPOINTMENT (OUTPATIENT)
Dept: GENERAL RADIOLOGY | Age: 87
DRG: 604 | End: 2022-01-01
Attending: INTERNAL MEDICINE

## 2022-01-01 ENCOUNTER — APPOINTMENT (OUTPATIENT)
Dept: GENERAL RADIOLOGY | Age: 87
DRG: 604 | End: 2022-01-01
Attending: STUDENT IN AN ORGANIZED HEALTH CARE EDUCATION/TRAINING PROGRAM

## 2022-01-01 ENCOUNTER — OFFICE VISIT (OUTPATIENT)
Dept: INTERNAL MEDICINE | Age: 87
End: 2022-01-01

## 2022-01-01 ENCOUNTER — APPOINTMENT (OUTPATIENT)
Dept: CHRONIC DISEASE MANAGEMENT | Age: 87
End: 2022-01-01
Attending: INTERNAL MEDICINE

## 2022-01-01 ENCOUNTER — ANTI-COAG (OUTPATIENT)
Dept: ANTICOAGULATION | Age: 87
End: 2022-01-01

## 2022-01-01 ENCOUNTER — APPOINTMENT (OUTPATIENT)
Dept: CT IMAGING | Age: 87
DRG: 604 | End: 2022-01-01
Attending: STUDENT IN AN ORGANIZED HEALTH CARE EDUCATION/TRAINING PROGRAM

## 2022-01-01 ENCOUNTER — EXTERNAL LAB (OUTPATIENT)
Dept: INTERNAL MEDICINE | Age: 87
End: 2022-01-01

## 2022-01-01 ENCOUNTER — ANTI-COAG (OUTPATIENT)
Dept: CHRONIC DISEASE MANAGEMENT | Age: 87
End: 2022-01-01
Attending: INTERNAL MEDICINE

## 2022-01-01 ENCOUNTER — HOSPITAL ENCOUNTER (INPATIENT)
Age: 87
LOS: 4 days | Discharge: HOME HEALTH | DRG: 604 | End: 2022-07-10
Attending: STUDENT IN AN ORGANIZED HEALTH CARE EDUCATION/TRAINING PROGRAM | Admitting: INTERNAL MEDICINE

## 2022-01-01 ENCOUNTER — OFFICE VISIT (OUTPATIENT)
Dept: PULMONOLOGY | Age: 87
End: 2022-01-01

## 2022-01-01 ENCOUNTER — LAB SERVICES (OUTPATIENT)
Dept: LAB | Age: 87
End: 2022-01-01
Attending: INTERNAL MEDICINE

## 2022-01-01 ENCOUNTER — LAB SERVICES (OUTPATIENT)
Dept: LAB | Age: 87
DRG: 604 | End: 2022-01-01
Attending: INTERNAL MEDICINE

## 2022-01-01 ENCOUNTER — CASE MANAGEMENT (OUTPATIENT)
Dept: CARE COORDINATION | Age: 87
End: 2022-01-01

## 2022-01-01 ENCOUNTER — OFF PREMISE (OUTPATIENT)
Dept: CHRONIC DISEASE MANAGEMENT | Age: 87
End: 2022-01-01
Attending: INTERNAL MEDICINE

## 2022-01-01 VITALS
HEART RATE: 71 BPM | RESPIRATION RATE: 14 BRPM | HEIGHT: 65 IN | SYSTOLIC BLOOD PRESSURE: 130 MMHG | OXYGEN SATURATION: 95 % | WEIGHT: 177 LBS | BODY MASS INDEX: 29.49 KG/M2 | TEMPERATURE: 97.6 F | DIASTOLIC BLOOD PRESSURE: 60 MMHG

## 2022-01-01 VITALS
HEART RATE: 81 BPM | BODY MASS INDEX: 29.49 KG/M2 | DIASTOLIC BLOOD PRESSURE: 64 MMHG | OXYGEN SATURATION: 94 % | TEMPERATURE: 97.7 F | OXYGEN SATURATION: 93 % | WEIGHT: 177 LBS | BODY MASS INDEX: 29.49 KG/M2 | HEART RATE: 75 BPM | WEIGHT: 177 LBS | RESPIRATION RATE: 17 BRPM | RESPIRATION RATE: 14 BRPM | HEIGHT: 65 IN | DIASTOLIC BLOOD PRESSURE: 80 MMHG | SYSTOLIC BLOOD PRESSURE: 167 MMHG | SYSTOLIC BLOOD PRESSURE: 122 MMHG | HEIGHT: 65 IN

## 2022-01-01 VITALS
HEART RATE: 87 BPM | WEIGHT: 177 LBS | TEMPERATURE: 97.2 F | RESPIRATION RATE: 16 BRPM | OXYGEN SATURATION: 97 % | HEIGHT: 65 IN | DIASTOLIC BLOOD PRESSURE: 70 MMHG | BODY MASS INDEX: 29.49 KG/M2 | SYSTOLIC BLOOD PRESSURE: 110 MMHG

## 2022-01-01 VITALS
BODY MASS INDEX: 29.49 KG/M2 | WEIGHT: 177 LBS | OXYGEN SATURATION: 95 % | DIASTOLIC BLOOD PRESSURE: 66 MMHG | HEART RATE: 78 BPM | HEIGHT: 65 IN | SYSTOLIC BLOOD PRESSURE: 107 MMHG

## 2022-01-01 VITALS
RESPIRATION RATE: 16 BRPM | TEMPERATURE: 97.9 F | SYSTOLIC BLOOD PRESSURE: 138 MMHG | WEIGHT: 175 LBS | BODY MASS INDEX: 29.16 KG/M2 | OXYGEN SATURATION: 98 % | DIASTOLIC BLOOD PRESSURE: 70 MMHG | HEART RATE: 64 BPM | HEIGHT: 65 IN

## 2022-01-01 VITALS
HEART RATE: 90 BPM | OXYGEN SATURATION: 92 % | DIASTOLIC BLOOD PRESSURE: 69 MMHG | TEMPERATURE: 97.9 F | BODY MASS INDEX: 28.91 KG/M2 | HEIGHT: 65 IN | RESPIRATION RATE: 18 BRPM | WEIGHT: 173.5 LBS | SYSTOLIC BLOOD PRESSURE: 144 MMHG

## 2022-01-01 DIAGNOSIS — J43.9 PULMONARY EMPHYSEMA, UNSPECIFIED EMPHYSEMA TYPE (CMD): ICD-10-CM

## 2022-01-01 DIAGNOSIS — Z79.01 LONG TERM (CURRENT) USE OF ANTICOAGULANTS: ICD-10-CM

## 2022-01-01 DIAGNOSIS — D72.829 LEUKOCYTOSIS, UNSPECIFIED TYPE: ICD-10-CM

## 2022-01-01 DIAGNOSIS — M53.3 SACROILIAC JOINT DYSFUNCTION: Primary | ICD-10-CM

## 2022-01-01 DIAGNOSIS — N30.00 ACUTE CYSTITIS WITHOUT HEMATURIA: ICD-10-CM

## 2022-01-01 DIAGNOSIS — S20.211A CONTUSION OF RIB ON RIGHT SIDE, INITIAL ENCOUNTER: ICD-10-CM

## 2022-01-01 DIAGNOSIS — I26.99 PULMONARY EMBOLISM (CMD): ICD-10-CM

## 2022-01-01 DIAGNOSIS — N18.31 ANEMIA DUE TO STAGE 3A CHRONIC KIDNEY DISEASE (CMD): ICD-10-CM

## 2022-01-01 DIAGNOSIS — J96.11 CHRONIC RESPIRATORY FAILURE WITH HYPOXIA (CMD): ICD-10-CM

## 2022-01-01 DIAGNOSIS — L60.0 IGTN (INGROWING TOE NAIL): ICD-10-CM

## 2022-01-01 DIAGNOSIS — I10 ESSENTIAL HYPERTENSION: ICD-10-CM

## 2022-01-01 DIAGNOSIS — J96.11 CHRONIC RESPIRATORY FAILURE WITH HYPOXIA (CMD): Primary | ICD-10-CM

## 2022-01-01 DIAGNOSIS — Z79.01 LONG TERM (CURRENT) USE OF ANTICOAGULANTS: Primary | ICD-10-CM

## 2022-01-01 DIAGNOSIS — J44.1 ACUTE EXACERBATION OF CHRONIC OBSTRUCTIVE PULMONARY DISEASE (COPD) (CMD): Primary | ICD-10-CM

## 2022-01-01 DIAGNOSIS — I49.3 PVC'S (PREMATURE VENTRICULAR CONTRACTIONS): Primary | ICD-10-CM

## 2022-01-01 DIAGNOSIS — I26.99 PULMONARY EMBOLISM (CMD): Primary | ICD-10-CM

## 2022-01-01 DIAGNOSIS — S81.812A LACERATION OF LEFT LOWER EXTREMITY, INITIAL ENCOUNTER: ICD-10-CM

## 2022-01-01 DIAGNOSIS — J44.9 CHRONIC OBSTRUCTIVE PULMONARY DISEASE, UNSPECIFIED COPD TYPE (CMD): Primary | ICD-10-CM

## 2022-01-01 DIAGNOSIS — I26.99 PULMONARY EMBOLISM, UNSPECIFIED CHRONICITY, UNSPECIFIED PULMONARY EMBOLISM TYPE, UNSPECIFIED WHETHER ACUTE COR PULMONALE PRESENT (CMD): ICD-10-CM

## 2022-01-01 DIAGNOSIS — D64.9 ANEMIA, UNSPECIFIED TYPE: ICD-10-CM

## 2022-01-01 DIAGNOSIS — D50.9 IRON DEFICIENCY ANEMIA, UNSPECIFIED IRON DEFICIENCY ANEMIA TYPE: ICD-10-CM

## 2022-01-01 DIAGNOSIS — Z02.2 ENCOUNTER FOR EXAMINATION FOR ADMISSION TO ASSISTED LIVING FACILITY: ICD-10-CM

## 2022-01-01 DIAGNOSIS — J44.9 CHRONIC OBSTRUCTIVE PULMONARY DISEASE, UNSPECIFIED COPD TYPE (CMD): ICD-10-CM

## 2022-01-01 DIAGNOSIS — I50.32 CHRONIC HEART FAILURE WITH PRESERVED EJECTION FRACTION (CMD): ICD-10-CM

## 2022-01-01 DIAGNOSIS — J42 CHRONIC BRONCHITIS, UNSPECIFIED CHRONIC BRONCHITIS TYPE (CMD): ICD-10-CM
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
## 2022-01-01 DIAGNOSIS — I26.99 PULMONARY EMBOLISM, UNSPECIFIED CHRONICITY, UNSPECIFIED PULMONARY EMBOLISM TYPE, UNSPECIFIED WHETHER ACUTE COR PULMONALE PRESENT (CMD): Primary | ICD-10-CM

## 2022-01-01 DIAGNOSIS — Z79.01 ANTICOAGULATION GOAL OF INR 2 TO 3: ICD-10-CM

## 2022-01-01 DIAGNOSIS — Z02.2 ENCOUNTER FOR EXAMINATION FOR ADMISSION TO ASSISTED LIVING FACILITY: Primary | ICD-10-CM

## 2022-01-01 DIAGNOSIS — D63.1 ANEMIA DUE TO STAGE 3A CHRONIC KIDNEY DISEASE (CMD): ICD-10-CM

## 2022-01-01 DIAGNOSIS — I12.9 HYPERTENSIVE KIDNEY DISEASE WITH STAGE 3A CHRONIC KIDNEY DISEASE (CMD): ICD-10-CM

## 2022-01-01 DIAGNOSIS — R79.1 ELEVATED INR: ICD-10-CM

## 2022-01-01 DIAGNOSIS — G47.33 OBSTRUCTIVE SLEEP APNEA: Primary | ICD-10-CM

## 2022-01-01 DIAGNOSIS — Z51.81 ANTICOAGULATION GOAL OF INR 2 TO 3: ICD-10-CM

## 2022-01-01 DIAGNOSIS — R14.0 ABDOMINAL DISTENSION (GASEOUS): ICD-10-CM

## 2022-01-01 DIAGNOSIS — I50.33 ACUTE ON CHRONIC DIASTOLIC CONGESTIVE HEART FAILURE (CMD): ICD-10-CM

## 2022-01-01 DIAGNOSIS — I10 PRIMARY HYPERTENSION: ICD-10-CM
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
## 2022-01-01 DIAGNOSIS — N18.31 HYPERTENSIVE KIDNEY DISEASE WITH STAGE 3A CHRONIC KIDNEY DISEASE (CMD): ICD-10-CM

## 2022-01-01 LAB
ALBUMIN SERPL-MCNC: 2.4 G/DL (ref 3.6–5.1)
ALBUMIN SERPL-MCNC: 2.8 G/DL (ref 3.6–5.1)
ALBUMIN SERPL-MCNC: 3.2 G/DL (ref 3.6–5.1)
ALBUMIN SERPL-MCNC: 3.2 G/DL (ref 3.6–5.1)
ALBUMIN/GLOB SERPL: 0.6 {RATIO} (ref 1–2.4)
ALBUMIN/GLOB SERPL: 0.6 {RATIO} (ref 1–2.4)
ALBUMIN/GLOB SERPL: 0.9 {RATIO} (ref 1–2.4)
ALBUMIN/GLOB SERPL: 1 {RATIO} (ref 1–2.4)
ALP SERPL-CCNC: 106 UNITS/L (ref 45–117)
ALP SERPL-CCNC: 61 UNITS/L (ref 45–117)
ALP SERPL-CCNC: 68 UNITS/L (ref 45–117)
ALP SERPL-CCNC: 70 UNITS/L (ref 45–117)
ALT SERPL-CCNC: 14 UNITS/L
ALT SERPL-CCNC: 20 UNITS/L
ALT SERPL-CCNC: 22 UNITS/L
ALT SERPL-CCNC: 24 UNITS/L
ANION GAP SERPL CALC-SCNC: 10 MMOL/L (ref 7–19)
ANION GAP SERPL CALC-SCNC: 11 MMOL/L (ref 7–19)
ANION GAP SERPL CALC-SCNC: 7 MMOL/L (ref 7–19)
ANION GAP SERPL CALC-SCNC: 8 MMOL/L (ref 7–19)
ANION GAP SERPL CALC-SCNC: 8 MMOL/L (ref 7–19)
ANION GAP SERPL CALC-SCNC: 9 MMOL/L (ref 7–19)
APPEARANCE UR: ABNORMAL
AST SERPL-CCNC: 12 UNITS/L
AST SERPL-CCNC: 14 UNITS/L
AST SERPL-CCNC: 20 UNITS/L
AST SERPL-CCNC: 22 UNITS/L
ATRIAL RATE (BPM): 73
BACTERIA #/AREA URNS HPF: ABNORMAL /HPF
BACTERIA BLD CULT: NORMAL
BACTERIA BLD CULT: NORMAL
BACTERIA UR CULT: ABNORMAL
BASE EXCESS / DEFICIT, ARTERIAL - RESPIRATORY: 11 MMOL/L (ref -2–3)
BASOPHILS # BLD: 0 K/MCL (ref 0–0.3)
BASOPHILS # BLD: 0.1 K/MCL (ref 0–0.3)
BASOPHILS NFR BLD: 0 %
BASOPHILS NFR BLD: 1 %
BDY SITE: ABNORMAL
BILIRUB SERPL-MCNC: 0.2 MG/DL (ref 0.2–1)
BILIRUB SERPL-MCNC: 0.3 MG/DL (ref 0.2–1)
BILIRUB SERPL-MCNC: 0.3 MG/DL (ref 0.2–1)
BILIRUB SERPL-MCNC: 0.4 MG/DL (ref 0.2–1)
BILIRUB UR QL STRIP: NEGATIVE
BODY TEMPERATURE: 37 DEGREES
BUN SERPL-MCNC: 18 MG/DL (ref 6–20)
BUN SERPL-MCNC: 20 MG/DL (ref 6–20)
BUN SERPL-MCNC: 20 MG/DL (ref 6–20)
BUN SERPL-MCNC: 26 MG/DL (ref 6–20)
BUN SERPL-MCNC: 26 MG/DL (ref 6–20)
BUN SERPL-MCNC: 36 MG/DL (ref 6–20)
BUN/CREAT SERPL: 25 (ref 7–25)
BUN/CREAT SERPL: 25 (ref 7–25)
BUN/CREAT SERPL: 26 (ref 7–25)
BUN/CREAT SERPL: 31 (ref 7–25)
BUN/CREAT SERPL: 34 (ref 7–25)
BUN/CREAT SERPL: 37 (ref 7–25)
CALCIUM SERPL-MCNC: 10.1 MG/DL (ref 8.4–10.2)
CALCIUM SERPL-MCNC: 8.8 MG/DL (ref 8.4–10.2)
CALCIUM SERPL-MCNC: 9.2 MG/DL (ref 8.4–10.2)
CALCIUM SERPL-MCNC: 9.3 MG/DL (ref 8.4–10.2)
CALCIUM SERPL-MCNC: 9.5 MG/DL (ref 8.4–10.2)
CALCIUM SERPL-MCNC: 9.7 MG/DL (ref 8.4–10.2)
CHLORIDE SERPL-SCNC: 100 MMOL/L (ref 97–110)
CHLORIDE SERPL-SCNC: 100 MMOL/L (ref 97–110)
CHLORIDE SERPL-SCNC: 102 MMOL/L (ref 97–110)
CHLORIDE SERPL-SCNC: 102 MMOL/L (ref 97–110)
CHLORIDE SERPL-SCNC: 97 MMOL/L (ref 97–110)
CHLORIDE SERPL-SCNC: 99 MMOL/L (ref 97–110)
CO2 SERPL-SCNC: 34 MMOL/L (ref 21–32)
CO2 SERPL-SCNC: 34 MMOL/L (ref 21–32)
CO2 SERPL-SCNC: 35 MMOL/L (ref 21–32)
CO2 SERPL-SCNC: 35 MMOL/L (ref 21–32)
CO2 SERPL-SCNC: 36 MMOL/L (ref 21–32)
CO2 SERPL-SCNC: 39 MMOL/L (ref 21–32)
COHGB MFR BLDV: 1.9 %
COLOR UR: YELLOW
CONDITION: ABNORMAL
CREAT SERPL-MCNC: 0.7 MG/DL (ref 0.51–0.95)
CREAT SERPL-MCNC: 0.76 MG/DL (ref 0.51–0.95)
CREAT SERPL-MCNC: 0.79 MG/DL (ref 0.51–0.95)
CREAT SERPL-MCNC: 0.79 MG/DL (ref 0.51–0.95)
CREAT SERPL-MCNC: 0.85 MG/DL (ref 0.51–0.95)
CREAT SERPL-MCNC: 0.98 MG/DL (ref 0.51–0.95)
DEPRECATED RDW RBC: 47.6 FL (ref 39–50)
DEPRECATED RDW RBC: 47.7 FL (ref 39–50)
DEPRECATED RDW RBC: 48.2 FL (ref 39–50)
DEPRECATED RDW RBC: 48.3 FL (ref 39–50)
DEPRECATED RDW RBC: 48.7 FL (ref 39–50)
DEPRECATED RDW RBC: 54.5 FL (ref 39–50)
DEPRECATED RDW RBC: 55.2 FL (ref 39–50)
EOSINOPHIL # BLD: 0 K/MCL (ref 0–0.5)
EOSINOPHIL # BLD: 0.1 K/MCL (ref 0–0.5)
EOSINOPHIL # BLD: 0.1 K/MCL (ref 0–0.5)
EOSINOPHIL # BLD: 0.2 K/MCL (ref 0–0.5)
EOSINOPHIL # BLD: 0.3 K/MCL (ref 0–0.5)
EOSINOPHIL # BLD: 0.3 K/MCL (ref 0–0.5)
EOSINOPHIL NFR BLD: 1 %
EOSINOPHIL NFR BLD: 1 %
EOSINOPHIL NFR BLD: 2 %
EOSINOPHIL NFR BLD: 2 %
EOSINOPHIL NFR BLD: 3 %
EOSINOPHIL NFR BLD: 3 %
ERYTHROCYTE [DISTWIDTH] IN BLOOD: 14.1 % (ref 11–15)
ERYTHROCYTE [DISTWIDTH] IN BLOOD: 14.1 % (ref 11–15)
ERYTHROCYTE [DISTWIDTH] IN BLOOD: 14.2 % (ref 11–15)
ERYTHROCYTE [DISTWIDTH] IN BLOOD: 14.3 % (ref 11–15)
ERYTHROCYTE [DISTWIDTH] IN BLOOD: 14.7 % (ref 11–15)
ERYTHROCYTE [DISTWIDTH] IN BLOOD: 15.6 % (ref 11–15)
ERYTHROCYTE [DISTWIDTH] IN BLOOD: 15.9 % (ref 11–15)
FASTING DURATION TIME PATIENT: ABNORMAL H
FIO2 ON VENT: 36 %
GAMMA INTERFERON BACKGROUND BLD IA-ACNC: 0.02 IU/ML
GFR SERPLBLD BASED ON 1.73 SQ M-ARVRAT: 56 ML/MIN
GFR SERPLBLD BASED ON 1.73 SQ M-ARVRAT: 66 ML/MIN
GFR SERPLBLD BASED ON 1.73 SQ M-ARVRAT: 71 ML/MIN
GFR SERPLBLD BASED ON 1.73 SQ M-ARVRAT: 72 ML/MIN
GFR SERPLBLD BASED ON 1.73 SQ M-ARVRAT: 75 ML/MIN
GFR SERPLBLD BASED ON 1.73 SQ M-ARVRAT: 83 ML/MIN
GLOBULIN SER-MCNC: 3.2 G/DL (ref 2–4)
GLOBULIN SER-MCNC: 3.4 G/DL (ref 2–4)
GLOBULIN SER-MCNC: 4.2 G/DL (ref 2–4)
GLOBULIN SER-MCNC: 4.4 G/DL (ref 2–4)
GLUCOSE SERPL-MCNC: 101 MG/DL (ref 70–99)
GLUCOSE SERPL-MCNC: 84 MG/DL (ref 70–99)
GLUCOSE SERPL-MCNC: 87 MG/DL (ref 70–99)
GLUCOSE SERPL-MCNC: 92 MG/DL (ref 70–99)
GLUCOSE SERPL-MCNC: 93 MG/DL (ref 70–99)
GLUCOSE SERPL-MCNC: 99 MG/DL (ref 70–99)
GLUCOSE UR STRIP-MCNC: NEGATIVE MG/DL
HCO3 BLDA-SCNC: 36 MMOL/L (ref 22–28)
HCT VFR BLD CALC: 27.2 % (ref 36–46.5)
HCT VFR BLD CALC: 28.2 % (ref 36–46.5)
HCT VFR BLD CALC: 29.5 % (ref 36–46.5)
HCT VFR BLD CALC: 30.3 % (ref 36–46.5)
HCT VFR BLD CALC: 30.7 % (ref 36–46.5)
HCT VFR BLD CALC: 32.8 % (ref 36–46.5)
HCT VFR BLD CALC: 33.8 % (ref 36–46.5)
HGB BLD-MCNC: 10 G/DL (ref 12–15.5)
HGB BLD-MCNC: 10.3 G/DL (ref 12–15.5)
HGB BLD-MCNC: 8.2 G/DL (ref 12–15.5)
HGB BLD-MCNC: 8.7 G/DL (ref 12–15.5)
HGB BLD-MCNC: 9 G/DL (ref 12–15.5)
HGB BLD-MCNC: 9.2 G/DL (ref 12–15.5)
HGB BLD-MCNC: 9.3 G/DL (ref 12–15.5)
HGB BLD-MCNC: 9.9 G/DL (ref 12–15.5)
HGB UR QL STRIP: ABNORMAL
HYALINE CASTS #/AREA URNS LPF: ABNORMAL /LPF
IMM GRANULOCYTES # BLD AUTO: 0 K/MCL (ref 0–0.2)
IMM GRANULOCYTES # BLD AUTO: 0.1 K/MCL (ref 0–0.2)
IMM GRANULOCYTES # BLD AUTO: 0.1 K/MCL (ref 0–0.2)
IMM GRANULOCYTES # BLD: 0 %
IMM GRANULOCYTES # BLD: 1 %
IMM GRANULOCYTES # BLD: 1 %
INR PPP: 1.2
INR PPP: 1.3
INR PPP: 1.3
INR PPP: 1.4
INR PPP: 1.4
INR PPP: 1.5
INR PPP: 1.54 (ref 2–3)
INR PPP: 1.54 RATIO (ref 0.8–1.29)
INR PPP: 1.6 (ref 2–3)
INR PPP: 1.7
INR PPP: 1.7 (ref 2–3)
INR PPP: 1.9
INR PPP: 2 (ref 2–3)
INR PPP: 2.1 (ref 2–3)
INR PPP: 2.2 (ref 2–3)
INR PPP: 2.4
INR PPP: 2.4
INR PPP: 2.5
INR PPP: 2.6 (ref 2–3)
INR PPP: 2.6 (ref 2–3)
INR PPP: 2.73 (ref 2–3)
INR PPP: 2.73 RATIO (ref 0.8–1.29)
INR PPP: 2.8 (ref 2–3)
INR PPP: 5.7
INR PPP: 6.2
INR PPP: 6.3
INR PPP: 7.1
KETONES UR STRIP-MCNC: NEGATIVE MG/DL
LACTATE BLDV-SCNC: 0.9 MMOL/L (ref 0–2)
LEUKOCYTE ESTERASE UR QL STRIP: ABNORMAL
LYMPHOCYTES # BLD: 0.8 K/MCL (ref 1–4)
LYMPHOCYTES # BLD: 1.1 K/MCL (ref 1–4)
LYMPHOCYTES # BLD: 1.3 K/MCL (ref 1–4)
LYMPHOCYTES # BLD: 1.3 K/MCL (ref 1–4)
LYMPHOCYTES NFR BLD: 11 %
LYMPHOCYTES NFR BLD: 12 %
LYMPHOCYTES NFR BLD: 13 %
LYMPHOCYTES NFR BLD: 8 %
M TB IFN-G BLD-IMP: NEGATIVE
M TB IFN-G CD4+ BCKGRND COR BLD-ACNC: 0 IU/ML
M TB IFN-G CD4+CD8+ BCKGRND COR BLD-ACNC: 0 IU/ML
MAGNESIUM SERPL-MCNC: 1.4 MG/DL (ref 1.7–2.4)
MAGNESIUM SERPL-MCNC: 1.7 MG/DL (ref 1.7–2.4)
MAGNESIUM SERPL-MCNC: 1.7 MG/DL (ref 1.7–2.4)
MCH RBC QN AUTO: 27.6 PG (ref 26–34)
MCH RBC QN AUTO: 27.8 PG (ref 26–34)
MCH RBC QN AUTO: 27.8 PG (ref 26–34)
MCH RBC QN AUTO: 28 PG (ref 26–34)
MCH RBC QN AUTO: 29.3 PG (ref 26–34)
MCHC RBC AUTO-ENTMCNC: 30.1 G/DL (ref 32–36.5)
MCHC RBC AUTO-ENTMCNC: 30.2 G/DL (ref 32–36.5)
MCHC RBC AUTO-ENTMCNC: 30.3 G/DL (ref 32–36.5)
MCHC RBC AUTO-ENTMCNC: 30.4 G/DL (ref 32–36.5)
MCHC RBC AUTO-ENTMCNC: 30.5 G/DL (ref 32–36.5)
MCHC RBC AUTO-ENTMCNC: 30.5 G/DL (ref 32–36.5)
MCHC RBC AUTO-ENTMCNC: 30.9 G/DL (ref 32–36.5)
MCV RBC AUTO: 90.1 FL (ref 78–100)
MCV RBC AUTO: 91.6 FL (ref 78–100)
MCV RBC AUTO: 91.6 FL (ref 78–100)
MCV RBC AUTO: 91.9 FL (ref 78–100)
MCV RBC AUTO: 92.4 FL (ref 78–100)
MCV RBC AUTO: 92.7 FL (ref 78–100)
MCV RBC AUTO: 96 FL (ref 78–100)
METHGB MFR BLDMV: 0.8 %
MITOGEN IGNF BCKGRD COR BLD-ACNC: 1.64 IU/ML
MONOCYTES # BLD: 0.4 K/MCL (ref 0.3–0.9)
MONOCYTES # BLD: 0.7 K/MCL (ref 0.3–0.9)
MONOCYTES # BLD: 0.8 K/MCL (ref 0.3–0.9)
MONOCYTES # BLD: 0.9 K/MCL (ref 0.3–0.9)
MONOCYTES # BLD: 1 K/MCL (ref 0.3–0.9)
MONOCYTES # BLD: 1.3 K/MCL (ref 0.3–0.9)
MONOCYTES NFR BLD: 10 %
MONOCYTES NFR BLD: 11 %
MONOCYTES NFR BLD: 7 %
MONOCYTES NFR BLD: 9 %
NEUTROPHILS # BLD: 5.1 K/MCL (ref 1.8–7.7)
NEUTROPHILS # BLD: 5.4 K/MCL (ref 1.8–7.7)
NEUTROPHILS # BLD: 6.8 K/MCL (ref 1.8–7.7)
NEUTROPHILS # BLD: 7.7 K/MCL (ref 1.8–7.7)
NEUTROPHILS # BLD: 8.2 K/MCL (ref 1.8–7.7)
NEUTROPHILS # BLD: 8.7 K/MCL (ref 1.8–7.7)
NEUTROPHILS NFR BLD: 74 %
NEUTROPHILS NFR BLD: 75 %
NEUTROPHILS NFR BLD: 75 %
NEUTROPHILS NFR BLD: 76 %
NEUTROPHILS NFR BLD: 79 %
NEUTROPHILS NFR BLD: 81 %
NITRITE UR QL STRIP: POSITIVE
NRBC BLD MANUAL-RTO: 0 /100 WBC
NT-PROBNP SERPL-MCNC: 666 PG/ML
OXYHGB MFR BLDA: 95.4 % (ref 94–98)
P AXIS (DEGREES): 55
PCO2 BLDA: 48 MM HG (ref 32–45)
PH BLDA: 7.48 UNITS (ref 7.35–7.45)
PH UR STRIP: 7 [PH] (ref 5–7)
PLATELET # BLD AUTO: 243 K/MCL (ref 140–450)
PLATELET # BLD AUTO: 299 K/MCL (ref 140–450)
PLATELET # BLD AUTO: 357 K/MCL (ref 140–450)
PLATELET # BLD AUTO: 366 K/MCL (ref 140–450)
PLATELET # BLD AUTO: 377 K/MCL (ref 140–450)
PLATELET # BLD AUTO: 378 K/MCL (ref 140–450)
PLATELET # BLD AUTO: 415 K/MCL (ref 140–450)
PO2 BLDA: 77 MM HG (ref 83–108)
POTASSIUM SERPL-SCNC: 3.8 MMOL/L (ref 3.4–5.1)
POTASSIUM SERPL-SCNC: 3.9 MMOL/L (ref 3.4–5.1)
POTASSIUM SERPL-SCNC: 3.9 MMOL/L (ref 3.4–5.1)
POTASSIUM SERPL-SCNC: 4 MMOL/L (ref 3.4–5.1)
POTASSIUM SERPL-SCNC: 4.1 MMOL/L (ref 3.4–5.1)
POTASSIUM SERPL-SCNC: 4.4 MMOL/L (ref 3.4–5.1)
PR-INTERVAL (MSEC): 168
PROT SERPL-MCNC: 6.4 G/DL (ref 6.4–8.2)
PROT SERPL-MCNC: 6.6 G/DL (ref 6.4–8.2)
PROT SERPL-MCNC: 6.6 G/DL (ref 6.4–8.2)
PROT SERPL-MCNC: 7.2 G/DL (ref 6.4–8.2)
PROT UR STRIP-MCNC: NEGATIVE MG/DL
PROTHROMBIN TIME: 12.9 SEC (ref 9.7–11.8)
PROTHROMBIN TIME: 13.8 SEC (ref 9.7–11.8)
PROTHROMBIN TIME: 14.3 SEC (ref 9.7–11.8)
PROTHROMBIN TIME: 15.8 SEC (ref 9.7–11.8)
PROTHROMBIN TIME: 16.3 SECONDS (ref 0–9.5)
PROTHROMBIN TIME: 28.4 SECONDS (ref 0–9.5)
PROTHROMBIN TIME: 55.5 SEC (ref 9.7–11.8)
PROTHROMBIN TIME: 60.2 SEC (ref 9.7–11.8)
PROTHROMBIN TIME: 60.6 SEC (ref 9.7–11.8)
QRS-INTERVAL (MSEC): 92
QT-INTERVAL (MSEC): 384
QTC: 423
R AXIS (DEGREES): 12
RAINBOW EXTRA TUBES HOLD SPECIMEN: NORMAL
RBC # BLD: 2.97 MIL/MCL (ref 4–5.2)
RBC # BLD: 3.13 MIL/MCL (ref 4–5.2)
RBC # BLD: 3.22 MIL/MCL (ref 4–5.2)
RBC # BLD: 3.28 MIL/MCL (ref 4–5.2)
RBC # BLD: 3.34 MIL/MCL (ref 4–5.2)
RBC # BLD: 3.52 MIL/MCL (ref 4–5.2)
RBC # BLD: 3.54 MIL/MCL (ref 4–5.2)
RBC #/AREA URNS HPF: ABNORMAL /HPF
REPORT TEXT: NORMAL
SAO2 % BLDA: 14 % (ref 15–23)
SAO2 % BLDA: 98 % (ref 95–99)
SARS-COV-2 RNA RESP QL NAA+PROBE: NOT DETECTED
SERVICE CMNT-IMP: NORMAL
SERVICE CMNT-IMP: NORMAL
SODIUM SERPL-SCNC: 138 MMOL/L (ref 135–145)
SODIUM SERPL-SCNC: 139 MMOL/L (ref 135–145)
SODIUM SERPL-SCNC: 140 MMOL/L (ref 135–145)
SODIUM SERPL-SCNC: 141 MMOL/L (ref 135–145)
SODIUM SERPL-SCNC: 142 MMOL/L (ref 135–145)
SODIUM SERPL-SCNC: 142 MMOL/L (ref 135–145)
SP GR UR STRIP: 1.01 (ref 1–1.03)
SQUAMOUS #/AREA URNS HPF: ABNORMAL /HPF
T AXIS (DEGREES): 10
UROBILINOGEN UR STRIP-MCNC: 0.2 MG/DL
VENTRICULAR RATE EKG/MIN (BPM): 73
WBC # BLD: 10.1 K/MCL (ref 4.2–11)
WBC # BLD: 10.4 K/MCL (ref 4.2–11)
WBC # BLD: 11.5 K/MCL (ref 4.2–11)
WBC # BLD: 13.8 K/MCL (ref 4.2–11)
WBC # BLD: 6.4 K/MCL (ref 4.2–11)
WBC # BLD: 7.1 K/MCL (ref 4.2–11)
WBC # BLD: 9.1 K/MCL (ref 4.2–11)
WBC #/AREA URNS HPF: ABNORMAL /HPF

## 2022-01-01 PROCEDURE — 85610 PROTHROMBIN TIME: CPT | Performed by: NURSE PRACTITIONER

## 2022-01-01 PROCEDURE — 10002803 HB RX 637: Performed by: NURSE PRACTITIONER

## 2022-01-01 PROCEDURE — 87040 BLOOD CULTURE FOR BACTERIA: CPT | Performed by: STUDENT IN AN ORGANIZED HEALTH CARE EDUCATION/TRAINING PROGRAM

## 2022-01-01 PROCEDURE — 71101 X-RAY EXAM UNILAT RIBS/CHEST: CPT

## 2022-01-01 PROCEDURE — 81001 URINALYSIS AUTO W/SCOPE: CPT | Performed by: STUDENT IN AN ORGANIZED HEALTH CARE EDUCATION/TRAINING PROGRAM

## 2022-01-01 PROCEDURE — 99211 OFF/OP EST MAY X REQ PHY/QHP: CPT

## 2022-01-01 PROCEDURE — X1094 NO CHARGE VISIT: HCPCS | Performed by: INTERNAL MEDICINE

## 2022-01-01 PROCEDURE — 85025 COMPLETE CBC W/AUTO DIFF WBC: CPT

## 2022-01-01 PROCEDURE — 10004651 HB RX, NO CHARGE ITEM: Performed by: NURSE PRACTITIONER

## 2022-01-01 PROCEDURE — 85610 PROTHROMBIN TIME: CPT

## 2022-01-01 PROCEDURE — 10002801 HB RX 250 W/O HCPCS: Performed by: INTERNAL MEDICINE

## 2022-01-01 PROCEDURE — 93793 ANTICOAG MGMT PT WARFARIN: CPT | Performed by: INTERNAL MEDICINE

## 2022-01-01 PROCEDURE — 10002803 HB RX 637: Performed by: INTERNAL MEDICINE

## 2022-01-01 PROCEDURE — 10002800 HB RX 250 W HCPCS: Performed by: INTERNAL MEDICINE

## 2022-01-01 PROCEDURE — 80053 COMPREHEN METABOLIC PANEL: CPT | Performed by: STUDENT IN AN ORGANIZED HEALTH CARE EDUCATION/TRAINING PROGRAM

## 2022-01-01 PROCEDURE — 13003289 HB OXYGEN THERAPY DAILY

## 2022-01-01 PROCEDURE — 70450 CT HEAD/BRAIN W/O DYE: CPT

## 2022-01-01 PROCEDURE — 97116 GAIT TRAINING THERAPY: CPT

## 2022-01-01 PROCEDURE — 85610 PROTHROMBIN TIME: CPT | Performed by: STUDENT IN AN ORGANIZED HEALTH CARE EDUCATION/TRAINING PROGRAM

## 2022-01-01 PROCEDURE — X1094 NO CHARGE VISIT: HCPCS

## 2022-01-01 PROCEDURE — 94640 AIRWAY INHALATION TREATMENT: CPT

## 2022-01-01 PROCEDURE — 36415 COLL VENOUS BLD VENIPUNCTURE: CPT

## 2022-01-01 PROCEDURE — 80048 BASIC METABOLIC PNL TOTAL CA: CPT | Performed by: INTERNAL MEDICINE

## 2022-01-01 PROCEDURE — 83880 ASSAY OF NATRIURETIC PEPTIDE: CPT | Performed by: INTERNAL MEDICINE

## 2022-01-01 PROCEDURE — G0378 HOSPITAL OBSERVATION PER HR: HCPCS

## 2022-01-01 PROCEDURE — C9803 HOPD COVID-19 SPEC COLLECT: HCPCS

## 2022-01-01 PROCEDURE — 36415 COLL VENOUS BLD VENIPUNCTURE: CPT | Performed by: INTERNAL MEDICINE

## 2022-01-01 PROCEDURE — 97535 SELF CARE MNGMENT TRAINING: CPT | Performed by: OCCUPATIONAL THERAPIST

## 2022-01-01 PROCEDURE — 10002800 HB RX 250 W HCPCS: Performed by: NURSE PRACTITIONER

## 2022-01-01 PROCEDURE — 10002807 HB RX 258: Performed by: STUDENT IN AN ORGANIZED HEALTH CARE EDUCATION/TRAINING PROGRAM

## 2022-01-01 PROCEDURE — 83735 ASSAY OF MAGNESIUM: CPT | Performed by: STUDENT IN AN ORGANIZED HEALTH CARE EDUCATION/TRAINING PROGRAM

## 2022-01-01 PROCEDURE — 36415 COLL VENOUS BLD VENIPUNCTURE: CPT | Performed by: NURSE PRACTITIONER

## 2022-01-01 PROCEDURE — 10000002 HB ROOM CHARGE MED SURG

## 2022-01-01 PROCEDURE — 99285 EMERGENCY DEPT VISIT HI MDM: CPT

## 2022-01-01 PROCEDURE — 99233 SBSQ HOSP IP/OBS HIGH 50: CPT | Performed by: INTERNAL MEDICINE

## 2022-01-01 PROCEDURE — 85025 COMPLETE CBC W/AUTO DIFF WBC: CPT | Performed by: STUDENT IN AN ORGANIZED HEALTH CARE EDUCATION/TRAINING PROGRAM

## 2022-01-01 PROCEDURE — 10002807 HB RX 258: Performed by: NURSE PRACTITIONER

## 2022-01-01 PROCEDURE — 85027 COMPLETE CBC AUTOMATED: CPT | Performed by: NURSE PRACTITIONER

## 2022-01-01 PROCEDURE — 71045 X-RAY EXAM CHEST 1 VIEW: CPT

## 2022-01-01 PROCEDURE — 97530 THERAPEUTIC ACTIVITIES: CPT

## 2022-01-01 PROCEDURE — 90662 IIV NO PRSV INCREASED AG IM: CPT | Performed by: INTERNAL MEDICINE

## 2022-01-01 PROCEDURE — 83735 ASSAY OF MAGNESIUM: CPT | Performed by: NURSE PRACTITIONER

## 2022-01-01 PROCEDURE — 99496 TRANSJ CARE MGMT HIGH F2F 7D: CPT | Performed by: INTERNAL MEDICINE

## 2022-01-01 PROCEDURE — 99214 OFFICE O/P EST MOD 30 MIN: CPT | Performed by: INTERNAL MEDICINE

## 2022-01-01 PROCEDURE — 10004281 HB COUNTER-STAFF TIME PER 15 MIN

## 2022-01-01 PROCEDURE — 99215 OFFICE O/P EST HI 40 MIN: CPT | Performed by: INTERNAL MEDICINE

## 2022-01-01 PROCEDURE — 10002800 HB RX 250 W HCPCS: Performed by: STUDENT IN AN ORGANIZED HEALTH CARE EDUCATION/TRAINING PROGRAM

## 2022-01-01 PROCEDURE — 96361 HYDRATE IV INFUSION ADD-ON: CPT

## 2022-01-01 PROCEDURE — 80053 COMPREHEN METABOLIC PANEL: CPT | Performed by: CLINICAL MEDICAL LABORATORY

## 2022-01-01 PROCEDURE — 80053 COMPREHEN METABOLIC PANEL: CPT

## 2022-01-01 PROCEDURE — 96375 TX/PRO/DX INJ NEW DRUG ADDON: CPT

## 2022-01-01 PROCEDURE — 83050 HGB METHEMOGLOBIN QUAN: CPT

## 2022-01-01 PROCEDURE — 85018 HEMOGLOBIN: CPT

## 2022-01-01 PROCEDURE — 80053 COMPREHEN METABOLIC PANEL: CPT | Performed by: NURSE PRACTITIONER

## 2022-01-01 PROCEDURE — 99223 1ST HOSP IP/OBS HIGH 75: CPT | Performed by: INTERNAL MEDICINE

## 2022-01-01 PROCEDURE — 97161 PT EVAL LOW COMPLEX 20 MIN: CPT

## 2022-01-01 PROCEDURE — 83735 ASSAY OF MAGNESIUM: CPT | Performed by: INTERNAL MEDICINE

## 2022-01-01 PROCEDURE — 85025 COMPLETE CBC W/AUTO DIFF WBC: CPT | Performed by: NURSE PRACTITIONER

## 2022-01-01 PROCEDURE — 85025 COMPLETE CBC W/AUTO DIFF WBC: CPT | Performed by: INTERNAL MEDICINE

## 2022-01-01 PROCEDURE — 94660 CPAP INITIATION&MGMT: CPT

## 2022-01-01 PROCEDURE — G0008 ADMIN INFLUENZA VIRUS VAC: HCPCS | Performed by: INTERNAL MEDICINE

## 2022-01-01 PROCEDURE — 87635 SARS-COV-2 COVID-19 AMP PRB: CPT | Performed by: STUDENT IN AN ORGANIZED HEALTH CARE EDUCATION/TRAINING PROGRAM

## 2022-01-01 PROCEDURE — 97165 OT EVAL LOW COMPLEX 30 MIN: CPT | Performed by: OCCUPATIONAL THERAPIST

## 2022-01-01 PROCEDURE — 86480 TB TEST CELL IMMUN MEASURE: CPT

## 2022-01-01 PROCEDURE — 93005 ELECTROCARDIOGRAM TRACING: CPT | Performed by: STUDENT IN AN ORGANIZED HEALTH CARE EDUCATION/TRAINING PROGRAM

## 2022-01-01 PROCEDURE — G1004 CDSM NDSC: HCPCS

## 2022-01-01 PROCEDURE — 85025 COMPLETE CBC W/AUTO DIFF WBC: CPT | Performed by: CLINICAL MEDICAL LABORATORY

## 2022-01-01 PROCEDURE — 96374 THER/PROPH/DIAG INJ IV PUSH: CPT

## 2022-01-01 PROCEDURE — 87186 SC STD MICRODIL/AGAR DIL: CPT | Performed by: STUDENT IN AN ORGANIZED HEALTH CARE EDUCATION/TRAINING PROGRAM

## 2022-01-01 PROCEDURE — 83605 ASSAY OF LACTIC ACID: CPT | Performed by: STUDENT IN AN ORGANIZED HEALTH CARE EDUCATION/TRAINING PROGRAM

## 2022-01-01 PROCEDURE — 10002803 HB RX 637: Performed by: STUDENT IN AN ORGANIZED HEALTH CARE EDUCATION/TRAINING PROGRAM

## 2022-01-01 RX ORDER — HYDROCODONE BITARTRATE AND ACETAMINOPHEN 5; 325 MG/1; MG/1
1 TABLET ORAL ONCE
Status: COMPLETED | OUTPATIENT
Start: 2022-01-01 | End: 2022-01-01

## 2022-01-01 RX ORDER — ALPRAZOLAM 0.25 MG/1
0.25 TABLET ORAL NIGHTLY PRN
Status: DISCONTINUED | OUTPATIENT
Start: 2022-01-01 | End: 2022-01-01 | Stop reason: HOSPADM

## 2022-01-01 RX ORDER — IPRATROPIUM BROMIDE AND ALBUTEROL SULFATE 2.5; .5 MG/3ML; MG/3ML
3 SOLUTION RESPIRATORY (INHALATION)
Status: DISCONTINUED | OUTPATIENT
Start: 2022-01-01 | End: 2022-01-01 | Stop reason: HOSPADM

## 2022-01-01 RX ORDER — LOSARTAN POTASSIUM AND HYDROCHLOROTHIAZIDE 25; 100 MG/1; MG/1
1 TABLET ORAL DAILY
Qty: 90 TABLET | Refills: 3 | Status: ON HOLD | OUTPATIENT
Start: 2022-01-01 | End: 2023-01-01 | Stop reason: HOSPADM

## 2022-01-01 RX ORDER — WARFARIN SODIUM 4 MG/1
4 TABLET ORAL ONCE
Status: DISCONTINUED | OUTPATIENT
Start: 2022-01-01 | End: 2022-01-01 | Stop reason: HOSPADM

## 2022-01-01 RX ORDER — WARFARIN SODIUM 4 MG/1
4 TABLET ORAL
COMMUNITY
End: 2022-01-01 | Stop reason: DRUGHIGH

## 2022-01-01 RX ORDER — FUROSEMIDE 20 MG/1
20 TABLET ORAL 2 TIMES DAILY
Qty: 60 TABLET | Refills: 0 | Status: SHIPPED | OUTPATIENT
Start: 2022-01-01 | End: 2022-01-01 | Stop reason: SDUPTHER

## 2022-01-01 RX ORDER — CIPROFLOXACIN 500 MG/1
500 TABLET, FILM COATED ORAL
Status: DISCONTINUED | OUTPATIENT
Start: 2022-01-01 | End: 2022-01-01

## 2022-01-01 RX ORDER — WARFARIN SODIUM 4 MG/1
4 TABLET ORAL ONCE
Status: COMPLETED | OUTPATIENT
Start: 2022-01-01 | End: 2022-01-01

## 2022-01-01 RX ORDER — FUROSEMIDE 20 MG/1
20 TABLET ORAL DAILY PRN
Status: ON HOLD | COMMUNITY
End: 2022-01-01 | Stop reason: SDUPTHER

## 2022-01-01 RX ORDER — CEFAZOLIN SODIUM/WATER 1 G/10 ML
1000 SYRINGE (ML) INTRAVENOUS ONCE
Status: COMPLETED | OUTPATIENT
Start: 2022-01-01 | End: 2022-01-01

## 2022-01-01 RX ORDER — WARFARIN SODIUM 3 MG/1
TABLET ORAL
Qty: 60 TABLET | Refills: 1 | Status: SHIPPED | COMMUNITY
Start: 2022-01-01 | End: 2022-01-01

## 2022-01-01 RX ORDER — FERROUS SULFATE 325(65) MG
325 TABLET ORAL DAILY
Status: DISCONTINUED | OUTPATIENT
Start: 2022-01-01 | End: 2022-01-01 | Stop reason: HOSPADM

## 2022-01-01 RX ORDER — ALBUTEROL SULFATE 90 UG/1
2 AEROSOL, METERED RESPIRATORY (INHALATION)
Status: DISCONTINUED | OUTPATIENT
Start: 2022-01-01 | End: 2022-01-01 | Stop reason: HOSPADM

## 2022-01-01 RX ORDER — WARFARIN SODIUM 5 MG/1
5 TABLET ORAL
INPATIENT
Start: 2022-01-01 | End: 2022-01-01 | Stop reason: DRUGHIGH

## 2022-01-01 RX ORDER — WARFARIN SODIUM 3 MG/1
TABLET ORAL
Qty: 60 TABLET | Refills: 1 | Status: SHIPPED | OUTPATIENT
Start: 2022-01-01 | End: 2022-01-01 | Stop reason: DRUGHIGH

## 2022-01-01 RX ORDER — LIDOCAINE 4 G/G
1 PATCH TOPICAL ONCE
Status: COMPLETED | OUTPATIENT
Start: 2022-01-01 | End: 2022-01-01

## 2022-01-01 RX ORDER — FLUTICASONE FUROATE AND VILANTEROL 100; 25 UG/1; UG/1
1 POWDER RESPIRATORY (INHALATION) DAILY
Status: DISCONTINUED | OUTPATIENT
Start: 2022-01-01 | End: 2022-01-01

## 2022-01-01 RX ORDER — WARFARIN SODIUM 5 MG/1
5 TABLET ORAL
Status: ON HOLD | COMMUNITY
End: 2022-01-01 | Stop reason: SDUPTHER

## 2022-01-01 RX ORDER — HYDROCODONE BITARTRATE AND ACETAMINOPHEN 5; 325 MG/1; MG/1
1 TABLET ORAL EVERY 4 HOURS PRN
Status: DISCONTINUED | OUTPATIENT
Start: 2022-01-01 | End: 2022-01-01 | Stop reason: HOSPADM

## 2022-01-01 RX ORDER — CEFAZOLIN SODIUM/WATER 1 G/10 ML
1000 SYRINGE (ML) INTRAVENOUS DAILY
Status: DISCONTINUED | OUTPATIENT
Start: 2022-01-01 | End: 2022-01-01

## 2022-01-01 RX ORDER — SODIUM CHLORIDE 9 MG/ML
INJECTION, SOLUTION INTRAVENOUS CONTINUOUS
Status: DISCONTINUED | OUTPATIENT
Start: 2022-01-01 | End: 2022-01-01

## 2022-01-01 RX ORDER — FUROSEMIDE 20 MG/1
20 TABLET ORAL DAILY
Qty: 30 TABLET | Refills: 0 | Status: SHIPPED | OUTPATIENT
Start: 2022-01-01 | End: 2023-01-01 | Stop reason: SDUPTHER

## 2022-01-01 RX ORDER — LIDOCAINE 4 G/G
1 PATCH TOPICAL DAILY
Status: DISCONTINUED | OUTPATIENT
Start: 2022-01-01 | End: 2022-01-01 | Stop reason: HOSPADM

## 2022-01-01 RX ORDER — DOCUSATE SODIUM 100 MG/1
100 CAPSULE, LIQUID FILLED ORAL DAILY PRN
Status: ON HOLD | COMMUNITY
End: 2023-01-01 | Stop reason: HOSPADM

## 2022-01-01 RX ORDER — ACETAMINOPHEN 325 MG/1
650 TABLET ORAL EVERY 4 HOURS PRN
Status: DISCONTINUED | OUTPATIENT
Start: 2022-01-01 | End: 2022-01-01 | Stop reason: HOSPADM

## 2022-01-01 RX ORDER — METHYLPREDNISOLONE 4 MG/1
4 TABLET ORAL SEE ADMIN INSTRUCTIONS
Qty: 21 TABLET | Refills: 0 | Status: SHIPPED | OUTPATIENT
Start: 2022-01-01 | End: 2022-01-01 | Stop reason: ALTCHOICE

## 2022-01-01 RX ORDER — 0.9 % SODIUM CHLORIDE 0.9 %
2 VIAL (ML) INJECTION EVERY 12 HOURS SCHEDULED
Status: DISCONTINUED | OUTPATIENT
Start: 2022-01-01 | End: 2022-01-01 | Stop reason: HOSPADM

## 2022-01-01 RX ORDER — CEFDINIR 300 MG/1
300 CAPSULE ORAL EVERY 12 HOURS SCHEDULED
Status: DISCONTINUED | OUTPATIENT
Start: 2022-01-01 | End: 2022-01-01 | Stop reason: HOSPADM

## 2022-01-01 RX ORDER — WARFARIN SODIUM 3 MG/1
TABLET ORAL
Qty: 90 TABLET | Refills: 0 | Status: CANCELLED | OUTPATIENT
Start: 2022-01-01

## 2022-01-01 RX ORDER — PREDNISONE 10 MG/1
40 TABLET ORAL
Status: DISCONTINUED | OUTPATIENT
Start: 2022-01-01 | End: 2022-01-01 | Stop reason: HOSPADM

## 2022-01-01 RX ORDER — ALBUTEROL SULFATE 90 UG/1
2 AEROSOL, METERED RESPIRATORY (INHALATION) EVERY 4 HOURS PRN
Qty: 8.5 G | Refills: 3 | Status: SHIPPED | OUTPATIENT
Start: 2022-01-01 | End: 2023-06-29

## 2022-01-01 RX ORDER — WARFARIN SODIUM 1 MG/1
TABLET ORAL
Status: SHIPPED | COMMUNITY
Start: 2022-01-01 | End: 2023-01-01 | Stop reason: SDUPTHER

## 2022-01-01 RX ORDER — FLUTICASONE FUROATE AND VILANTEROL 100; 25 UG/1; UG/1
1 POWDER RESPIRATORY (INHALATION) DAILY
Qty: 60 EACH | Refills: 11 | Status: SHIPPED | OUTPATIENT
Start: 2022-01-01 | End: 2023-01-01

## 2022-01-01 RX ORDER — WARFARIN SODIUM 3 MG/1
3 TABLET ORAL ONCE
Status: COMPLETED | OUTPATIENT
Start: 2022-01-01 | End: 2022-01-01

## 2022-01-01 RX ORDER — CEFDINIR 300 MG/1
300 CAPSULE ORAL EVERY 12 HOURS SCHEDULED
Qty: 8 CAPSULE | Refills: 0 | Status: SHIPPED | OUTPATIENT
Start: 2022-01-01 | End: 2022-01-01

## 2022-01-01 RX ORDER — ALPRAZOLAM 0.25 MG/1
0.25 TABLET ORAL DAILY PRN
Qty: 30 TABLET | Refills: 0 | Status: SHIPPED | OUTPATIENT
Start: 2022-01-01 | End: 2023-06-29

## 2022-01-01 RX ORDER — MULTIVIT WITH MINERALS/LUTEIN
1 TABLET ORAL DAILY
COMMUNITY
End: 2023-01-01

## 2022-01-01 RX ORDER — DOCUSATE SODIUM 100 MG/1
100 CAPSULE, LIQUID FILLED ORAL DAILY PRN
Status: DISCONTINUED | OUTPATIENT
Start: 2022-01-01 | End: 2022-01-01 | Stop reason: HOSPADM

## 2022-01-01 RX ORDER — WARFARIN SODIUM 3 MG/1
TABLET ORAL
Qty: 60 TABLET | Refills: 1 | Status: SHIPPED | OUTPATIENT
Start: 2022-01-01 | End: 2023-01-01

## 2022-01-01 RX ORDER — FLUTICASONE FUROATE, UMECLIDINIUM BROMIDE AND VILANTEROL TRIFENATATE 100; 62.5; 25 UG/1; UG/1; UG/1
1 POWDER RESPIRATORY (INHALATION) DAILY
Qty: 60 EACH | Refills: 3 | Status: SHIPPED | OUTPATIENT
Start: 2022-01-01 | End: 2022-01-01 | Stop reason: ALTCHOICE

## 2022-01-01 RX ORDER — ONDANSETRON 2 MG/ML
4 INJECTION INTRAMUSCULAR; INTRAVENOUS EVERY 6 HOURS PRN
Status: DISCONTINUED | OUTPATIENT
Start: 2022-01-01 | End: 2022-01-01 | Stop reason: HOSPADM

## 2022-01-01 RX ORDER — FUROSEMIDE 10 MG/ML
20 INJECTION INTRAMUSCULAR; INTRAVENOUS
Status: DISCONTINUED | OUTPATIENT
Start: 2022-01-01 | End: 2022-01-01 | Stop reason: HOSPADM

## 2022-01-01 RX ADMIN — IPRATROPIUM BROMIDE AND ALBUTEROL SULFATE 3 ML: .5; 3 SOLUTION RESPIRATORY (INHALATION) at 07:30

## 2022-01-01 RX ADMIN — CEFDINIR 300 MG: 300 CAPSULE ORAL at 20:44

## 2022-01-01 RX ADMIN — CEFTRIAXONE SODIUM 1000 MG: 100 INJECTION, POWDER, FOR SOLUTION INTRAVENOUS at 18:24

## 2022-01-01 RX ADMIN — HYDROCODONE BITARTRATE AND ACETAMINOPHEN 1 TABLET: 5; 325 TABLET ORAL at 22:56

## 2022-01-01 RX ADMIN — HYDROCHLOROTHIAZIDE: 25 TABLET ORAL at 08:55

## 2022-01-01 RX ADMIN — CEFDINIR 300 MG: 300 CAPSULE ORAL at 08:50

## 2022-01-01 RX ADMIN — ACETAMINOPHEN 650 MG: 325 TABLET ORAL at 22:28

## 2022-01-01 RX ADMIN — CEFDINIR 300 MG: 300 CAPSULE ORAL at 09:19

## 2022-01-01 RX ADMIN — ACETAMINOPHEN 650 MG: 325 TABLET ORAL at 21:36

## 2022-01-01 RX ADMIN — FUROSEMIDE 20 MG: 10 INJECTION, SOLUTION INTRAMUSCULAR; INTRAVENOUS at 18:00

## 2022-01-01 RX ADMIN — LIDOCAINE 1 PATCH: 4 PATCH TOPICAL at 09:42

## 2022-01-01 RX ADMIN — ACETAMINOPHEN 650 MG: 325 TABLET ORAL at 16:03

## 2022-01-01 RX ADMIN — WARFARIN SODIUM 4 MG: 4 TABLET ORAL at 18:00

## 2022-01-01 RX ADMIN — IPRATROPIUM BROMIDE AND ALBUTEROL SULFATE 3 ML: .5; 3 SOLUTION RESPIRATORY (INHALATION) at 07:47

## 2022-01-01 RX ADMIN — FLUTICASONE FUROATE AND VILANTEROL TRIFENATATE 1 PUFF: 100; 25 POWDER RESPIRATORY (INHALATION) at 07:35

## 2022-01-01 RX ADMIN — IPRATROPIUM BROMIDE AND ALBUTEROL SULFATE 3 ML: .5; 3 SOLUTION RESPIRATORY (INHALATION) at 10:59

## 2022-01-01 RX ADMIN — IPRATROPIUM BROMIDE AND ALBUTEROL SULFATE 3 ML: .5; 3 SOLUTION RESPIRATORY (INHALATION) at 15:29

## 2022-01-01 RX ADMIN — MAGNESIUM SULFATE HEPTAHYDRATE 2 G: 40 INJECTION, SOLUTION INTRAVENOUS at 11:04

## 2022-01-01 RX ADMIN — FLUTICASONE FUROATE AND VILANTEROL TRIFENATATE 1 PUFF: 100; 25 POWDER RESPIRATORY (INHALATION) at 07:50

## 2022-01-01 RX ADMIN — LIDOCAINE 1 PATCH: 4 PATCH TOPICAL at 08:50

## 2022-01-01 RX ADMIN — HYDROCHLOROTHIAZIDE: 25 TABLET ORAL at 08:50

## 2022-01-01 RX ADMIN — HYDROCODONE BITARTRATE AND ACETAMINOPHEN 1 TABLET: 5; 325 TABLET ORAL at 15:55

## 2022-01-01 RX ADMIN — Medication 2.5 MG: at 23:37

## 2022-01-01 RX ADMIN — SODIUM CHLORIDE, PRESERVATIVE FREE 2 ML: 5 INJECTION INTRAVENOUS at 21:36

## 2022-01-01 RX ADMIN — LIDOCAINE 1 PATCH: 4 PATCH TOPICAL at 15:56

## 2022-01-01 RX ADMIN — ACETAMINOPHEN 650 MG: 325 TABLET ORAL at 14:06

## 2022-01-01 RX ADMIN — DOCUSATE SODIUM 100 MG: 100 CAPSULE, LIQUID FILLED ORAL at 10:05

## 2022-01-01 RX ADMIN — FUROSEMIDE 20 MG: 10 INJECTION, SOLUTION INTRAMUSCULAR; INTRAVENOUS at 09:19

## 2022-01-01 RX ADMIN — SODIUM CHLORIDE, PRESERVATIVE FREE 2 ML: 5 INJECTION INTRAVENOUS at 21:42

## 2022-01-01 RX ADMIN — IPRATROPIUM BROMIDE AND ALBUTEROL SULFATE 3 ML: .5; 3 SOLUTION RESPIRATORY (INHALATION) at 15:02

## 2022-01-01 RX ADMIN — CEFDINIR 300 MG: 300 CAPSULE ORAL at 13:49

## 2022-01-01 RX ADMIN — PREDNISONE 40 MG: 10 TABLET ORAL at 10:05

## 2022-01-01 RX ADMIN — CEFTRIAXONE SODIUM 1000 MG: 100 INJECTION, POWDER, FOR SOLUTION INTRAVENOUS at 18:02

## 2022-01-01 RX ADMIN — WARFARIN SODIUM 3 MG: 3 TABLET ORAL at 18:08

## 2022-01-01 RX ADMIN — ACETAMINOPHEN 650 MG: 325 TABLET ORAL at 08:50

## 2022-01-01 RX ADMIN — FERROUS SULFATE TAB 325 MG (65 MG ELEMENTAL FE) 325 MG: 325 (65 FE) TAB at 08:51

## 2022-01-01 RX ADMIN — PREDNISONE 40 MG: 10 TABLET ORAL at 16:51

## 2022-01-01 RX ADMIN — SODIUM CHLORIDE: 9 INJECTION, SOLUTION INTRAVENOUS at 09:02

## 2022-01-01 RX ADMIN — LIDOCAINE 1 PATCH: 4 PATCH TOPICAL at 10:04

## 2022-01-01 RX ADMIN — WARFARIN SODIUM 3 MG: 3 TABLET ORAL at 16:51

## 2022-01-01 RX ADMIN — FUROSEMIDE 20 MG: 10 INJECTION, SOLUTION INTRAMUSCULAR; INTRAVENOUS at 20:43

## 2022-01-01 RX ADMIN — ACETAMINOPHEN 650 MG: 325 TABLET ORAL at 21:35

## 2022-01-01 RX ADMIN — IPRATROPIUM BROMIDE AND ALBUTEROL SULFATE 3 ML: .5; 3 SOLUTION RESPIRATORY (INHALATION) at 11:15

## 2022-01-01 RX ADMIN — SODIUM CHLORIDE, PRESERVATIVE FREE 2 ML: 5 INJECTION INTRAVENOUS at 08:55

## 2022-01-01 RX ADMIN — SODIUM CHLORIDE, PRESERVATIVE FREE 2 ML: 5 INJECTION INTRAVENOUS at 09:42

## 2022-01-01 RX ADMIN — SODIUM CHLORIDE: 9 INJECTION, SOLUTION INTRAVENOUS at 22:53

## 2022-01-01 RX ADMIN — IPRATROPIUM BROMIDE AND ALBUTEROL SULFATE 3 ML: .5; 3 SOLUTION RESPIRATORY (INHALATION) at 19:50

## 2022-01-01 RX ADMIN — IPRATROPIUM BROMIDE AND ALBUTEROL SULFATE 3 ML: .5; 3 SOLUTION RESPIRATORY (INHALATION) at 20:00

## 2022-01-01 RX ADMIN — SODIUM CHLORIDE, PRESERVATIVE FREE 2 ML: 5 INJECTION INTRAVENOUS at 22:55

## 2022-01-01 RX ADMIN — FERROUS SULFATE TAB 325 MG (65 MG ELEMENTAL FE) 325 MG: 325 (65 FE) TAB at 10:05

## 2022-01-01 RX ADMIN — CEFDINIR 300 MG: 300 CAPSULE ORAL at 21:21

## 2022-01-01 RX ADMIN — DOCUSATE SODIUM 100 MG: 100 CAPSULE, LIQUID FILLED ORAL at 21:36

## 2022-01-01 RX ADMIN — FERROUS SULFATE TAB 325 MG (65 MG ELEMENTAL FE) 325 MG: 325 (65 FE) TAB at 09:19

## 2022-01-01 RX ADMIN — LIDOCAINE 1 PATCH: 4 PATCH TOPICAL at 18:02

## 2022-01-01 RX ADMIN — IPRATROPIUM BROMIDE AND ALBUTEROL SULFATE 3 ML: .5; 3 SOLUTION RESPIRATORY (INHALATION) at 11:34

## 2022-01-01 RX ADMIN — IPRATROPIUM BROMIDE AND ALBUTEROL SULFATE 3 ML: .5; 3 SOLUTION RESPIRATORY (INHALATION) at 15:36

## 2022-01-01 RX ADMIN — SODIUM CHLORIDE, PRESERVATIVE FREE 2 ML: 5 INJECTION INTRAVENOUS at 20:44

## 2022-01-01 RX ADMIN — ACETAMINOPHEN 650 MG: 325 TABLET ORAL at 03:58

## 2022-01-01 RX ADMIN — HYDROCHLOROTHIAZIDE: 25 TABLET ORAL at 09:19

## 2022-01-01 RX ADMIN — HYDROCHLOROTHIAZIDE: 25 TABLET ORAL at 09:41

## 2022-01-01 RX ADMIN — SODIUM CHLORIDE, PRESERVATIVE FREE 2 ML: 5 INJECTION INTRAVENOUS at 10:09

## 2022-01-01 RX ADMIN — IPRATROPIUM BROMIDE AND ALBUTEROL SULFATE 3 ML: .5; 3 SOLUTION RESPIRATORY (INHALATION) at 07:46

## 2022-01-01 RX ADMIN — ACETAMINOPHEN 650 MG: 325 TABLET ORAL at 03:05

## 2022-01-01 RX ADMIN — PREDNISONE 40 MG: 10 TABLET ORAL at 09:18

## 2022-01-01 RX ADMIN — ACETAMINOPHEN 650 MG: 325 TABLET ORAL at 08:54

## 2022-01-01 RX ADMIN — FERROUS SULFATE TAB 325 MG (65 MG ELEMENTAL FE) 325 MG: 325 (65 FE) TAB at 09:42

## 2022-01-01 RX ADMIN — SODIUM CHLORIDE, PRESERVATIVE FREE 2 ML: 5 INJECTION INTRAVENOUS at 21:21

## 2022-01-01 RX ADMIN — FERROUS SULFATE TAB 325 MG (65 MG ELEMENTAL FE) 325 MG: 325 (65 FE) TAB at 08:54

## 2022-01-01 RX ADMIN — IPRATROPIUM BROMIDE AND ALBUTEROL SULFATE 3 ML: .5; 3 SOLUTION RESPIRATORY (INHALATION) at 15:37

## 2022-01-01 RX ADMIN — PREDNISONE 40 MG: 10 TABLET ORAL at 08:50

## 2022-01-01 RX ADMIN — FUROSEMIDE 20 MG: 10 INJECTION, SOLUTION INTRAMUSCULAR; INTRAVENOUS at 13:50

## 2022-01-01 RX ADMIN — SODIUM CHLORIDE, PRESERVATIVE FREE 2 ML: 5 INJECTION INTRAVENOUS at 08:03

## 2022-01-01 RX ADMIN — HYDROCHLOROTHIAZIDE: 25 TABLET ORAL at 10:11

## 2022-01-01 RX ADMIN — CEFTRIAXONE SODIUM 1000 MG: 100 INJECTION, POWDER, FOR SOLUTION INTRAVENOUS at 16:51

## 2022-01-01 RX ADMIN — PHYTONADIONE 2.5 MG: 10 INJECTION, EMULSION INTRAMUSCULAR; INTRAVENOUS; SUBCUTANEOUS at 07:02

## 2022-01-01 RX ADMIN — ACETAMINOPHEN 650 MG: 325 TABLET ORAL at 10:05

## 2022-01-01 ASSESSMENT — PATIENT HEALTH QUESTIONNAIRE - PHQ9
1. LITTLE INTEREST OR PLEASURE IN DOING THINGS: NOT AT ALL
SUM OF ALL RESPONSES TO PHQ9 QUESTIONS 1 AND 2: 0
2. FEELING DOWN, DEPRESSED OR HOPELESS: NOT AT ALL
SUM OF ALL RESPONSES TO PHQ9 QUESTIONS 1 AND 2: 0
1. LITTLE INTEREST OR PLEASURE IN DOING THINGS: NOT AT ALL
2. FEELING DOWN, DEPRESSED OR HOPELESS: NOT AT ALL
CLINICAL INTERPRETATION OF PHQ2 SCORE: NO FURTHER SCREENING NEEDED
2. FEELING DOWN, DEPRESSED OR HOPELESS: NOT AT ALL
CLINICAL INTERPRETATION OF PHQ2 SCORE: NO FURTHER SCREENING NEEDED
2. FEELING DOWN, DEPRESSED OR HOPELESS: NOT AT ALL
SUM OF ALL RESPONSES TO PHQ9 QUESTIONS 1 AND 2: 0
CLINICAL INTERPRETATION OF PHQ2 SCORE: NO FURTHER SCREENING NEEDED
IS PATIENT ABLE TO COMPLETE PHQ2 OR PHQ9: YES
2. FEELING DOWN, DEPRESSED OR HOPELESS: NOT AT ALL
SUM OF ALL RESPONSES TO PHQ9 QUESTIONS 1 AND 2: 0
1. LITTLE INTEREST OR PLEASURE IN DOING THINGS: NOT AT ALL
1. LITTLE INTEREST OR PLEASURE IN DOING THINGS: NOT AT ALL
SUM OF ALL RESPONSES TO PHQ9 QUESTIONS 1 AND 2: 0
SUM OF ALL RESPONSES TO PHQ9 QUESTIONS 1 AND 2: 0
CLINICAL INTERPRETATION OF PHQ2 SCORE: NO FURTHER SCREENING NEEDED
1. LITTLE INTEREST OR PLEASURE IN DOING THINGS: NOT AT ALL
CLINICAL INTERPRETATION OF PHQ2 SCORE: NO FURTHER SCREENING NEEDED

## 2022-01-01 ASSESSMENT — PAIN SCALES - GENERAL
PAINLEVEL_OUTOF10: 2
PAINLEVEL_OUTOF10: 3
PAINLEVEL_OUTOF10: 6
PAINLEVEL_OUTOF10: 3
PAINLEVEL_OUTOF10: 5
PAINLEVEL_OUTOF10: 3
PAINLEVEL: 3
PAINLEVEL_OUTOF10: 4
PAINLEVEL_OUTOF10: 6
PAINLEVEL_OUTOF10: 4
PAINLEVEL_OUTOF10: 6
PAINLEVEL_OUTOF10: 0
PAINLEVEL_OUTOF10: 7
PAINLEVEL_OUTOF10: 4
PAINLEVEL: 3
PAINLEVEL_OUTOF10: 3
PAINLEVEL_OUTOF10: 3
PAINLEVEL_OUTOF10: 5
PAINLEVEL_OUTOF10: 1
PAINLEVEL_OUTOF10: 3
PAINLEVEL_OUTOF10: 5
PAINLEVEL_OUTOF10: 6
PAINLEVEL_OUTOF10: 3
PAINLEVEL_OUTOF10: 3
PAINLEVEL_OUTOF10: 6
PAINLEVEL_OUTOF10: 5
PAINLEVEL_OUTOF10: 2
PAINLEVEL_OUTOF10: 7

## 2022-01-01 ASSESSMENT — PAIN SCALES - WONG BAKER
WONGBAKER_NUMERICALRESPONSE: 3
WONGBAKER_NUMERICALRESPONSE: 7
WONGBAKER_NUMERICALRESPONSE: 3
WONGBAKER_NUMERICALRESPONSE: 3
WONGBAKER_NUMERICALRESPONSE: 6

## 2022-01-01 ASSESSMENT — ENCOUNTER SYMPTOMS
FATIGUE: 1
HEADACHES: 0
ABDOMINAL PAIN: 0
CHILLS: 1
ABDOMINAL PAIN: 0
WEAKNESS: 0
SORE THROAT: 0
CONFUSION: 0
TROUBLE SWALLOWING: 0
DIAPHORESIS: 0
NERVOUS/ANXIOUS: 0
ACTIVITY CHANGE: 1
HEADACHES: 0
HEMATOLOGIC/LYMPHATIC NEGATIVE: 1
UNEXPECTED WEIGHT CHANGE: 0
WEAKNESS: 0
CHILLS: 0
SHORTNESS OF BREATH: 1
BRUISES/BLEEDS EASILY: 0
DIARRHEA: 0
SHORTNESS OF BREATH: 0
VOICE CHANGE: 0
NEUROLOGICAL NEGATIVE: 1
FEVER: 1
ALLERGIC/IMMUNOLOGIC NEGATIVE: 1
GASTROINTESTINAL NEGATIVE: 1
GASTROINTESTINAL NEGATIVE: 1
ADENOPATHY: 0
ENDOCRINE NEGATIVE: 1
DIARRHEA: 0
VOICE CHANGE: 0
VOMITING: 0
ENDOCRINE NEGATIVE: 1
CHEST TIGHTNESS: 0
FEVER: 0
ABDOMINAL DISTENTION: 0
DIZZINESS: 0
PSYCHIATRIC NEGATIVE: 1
DIAPHORESIS: 0
ABDOMINAL PAIN: 0
ANAL BLEEDING: 0
ACTIVITY CHANGE: 1
COUGH: 1
BRUISES/BLEEDS EASILY: 0
CHILLS: 1
FATIGUE: 1
BLOOD IN STOOL: 0
PSYCHIATRIC NEGATIVE: 1
SORE THROAT: 0
VOMITING: 0
CHEST TIGHTNESS: 0
DIZZINESS: 0
EYES NEGATIVE: 1
NUMBNESS: 0
WHEEZING: 1
UNEXPECTED WEIGHT CHANGE: 0
WHEEZING: 1
DIZZINESS: 0
BLOOD IN STOOL: 0
BLOOD IN STOOL: 0
EYE PAIN: 0
ALLERGIC/IMMUNOLOGIC NEGATIVE: 1
NAUSEA: 0
WEAKNESS: 0
EYE PAIN: 0
POLYDIPSIA: 0
COUGH: 0
NEUROLOGICAL NEGATIVE: 1
ADENOPATHY: 0
WOUND: 0
SEIZURES: 0
COUGH: 1
CHEST TIGHTNESS: 0
EYES NEGATIVE: 1
NAUSEA: 0
TROUBLE SWALLOWING: 0
SHORTNESS OF BREATH: 1
ABDOMINAL DISTENTION: 0
HEMATOLOGIC/LYMPHATIC NEGATIVE: 1
HEADACHES: 0
SEIZURES: 0
POLYDIPSIA: 0
FEVER: 1
EYES NEGATIVE: 1
SINUS PAIN: 0
SINUS PAIN: 0

## 2022-01-01 ASSESSMENT — ACTIVITIES OF DAILY LIVING (ADL)
MOBILITY_ASSIST_DEVICES: NONE;CANE
DRESSING YOURSELF: INDEPENDENT
PRIOR_ADL: MODIFIED INDEPENDENT
EATING: INDEPENDENT
PRIOR_ADL_BATHING: MODIFIED INDEPENDENT
CONTINENCE: INDEPENDENT
TOILETING: INDEPENDENT
BATHING: INDEPENDENT
GROOMING: MODIFIED INDEPENDENT
CHRONIC_PAIN_PRESENT: NO
ADL_SHORT_OF_BREATH: YES
RECENT_DECLINE_ADL: NO
HOME_MANAGEMENT_TIME_ENTRY: 10
TRANSFERRING: INDEPENDENT
ADL_SCORE: 24
ADL_BEFORE_ADMISSION: INDEPENDENT
PRIOR_ADL_TOILETING: MODIFIED INDEPENDENT
FEEDING YOURSELF: INDEPENDENT

## 2022-01-01 ASSESSMENT — COGNITIVE AND FUNCTIONAL STATUS - GENERAL
HELP NEEDED FOR BATHING: A LITTLE
DO YOU HAVE DIFFICULTY DRESSING OR BATHING: NO
BASIC_MOBILITY_RAW_SCORE: 20
DAILY_ACTIVITY_CONVERTED_SCORE: 38.66
DAILY_ACTIVITY_RAW_SCORE: 18
HELP NEEDED DRESSING REGULAR UPPER BODY CLOTHING: A LITTLE
BECAUSE OF A PHYSICAL, MENTAL, OR EMOTIONAL CONDITION, DO YOU HAVE SERIOUS DIFFICULTY CONCENTRATING, REMEMBERING OR MAKING DECISIONS: NO
DO YOU HAVE SERIOUS DIFFICULTY WALKING OR CLIMBING STAIRS: NO
HELP NEEDED DRESSING REGULAR LOWER BODY CLOTHING: A LITTLE
BASIC_MOBILITY_CONVERTED_SCORE: 43.99
BECAUSE OF A PHYSICAL, MENTAL, OR EMOTIONAL CONDITION, DO YOU HAVE DIFFICULTY DOING ERRANDS ALONE: YES
ARE YOU BLIND OR DO YOU HAVE SERIOUS DIFFICULTY SEEING, EVEN WHEN WEARING GLASSES: NO
BASIC_MOBILITY_RAW_SCORE: 20
HELP NEEDED FOR PERSONAL GROOMING: A LITTLE
HELP NEEDED FOR TOILETING: A LITTLE
BASIC_MOBILITY_CONVERTED_SCORE: 43.99
ARE YOU DEAF OR DO YOU HAVE SERIOUS DIFFICULTY  HEARING: NO

## 2022-01-01 ASSESSMENT — SLEEP AND FATIGUE QUESTIONNAIRES
HOW LIKELY ARE YOU TO NOD OFF OR FALL ASLEEP WHILE LYING DOWN TO REST IN THE AFTERNOON WHEN CIRCUMSTANCES PERMIT: SLIGHT CHANCE OF DOZING
ESS TOTAL SCORE: 3
HOW LIKELY ARE YOU TO NOD OFF OR FALL ASLEEP WHILE WATCHING TV: SLIGHT CHANCE OF DOZING
HOW LIKELY ARE YOU TO NOD OFF OR FALL ASLEEP WHILE SITTING AND READING: SLIGHT CHANCE OF DOZING
HOW LIKELY ARE YOU TO NOD OFF OR FALL ASLEEP IN A CAR, WHILE STOPPED FOR A FEW MINUTES IN TRAFFIC: WOULD NEVER DOZE
HOW LIKELY ARE YOU TO NOD OFF OR FALL ASLEEP WHILE SITTING QUIETLY AFTER LUNCH WITHOUT ALCOHOL: WOULD NEVER DOZE
HOW LIKELY ARE YOU TO NOD OFF OR FALL ASLEEP WHILE SITTING INACTIVE IN A PUBLIC PLACE: WOULD NEVER DOZE
HOW LIKELY ARE YOU TO NOD OFF OR FALL ASLEEP WHILE SITTING AND TALKING TO SOMEONE: WOULD NEVER DOZE
HOW LIKELY ARE YOU TO NOD OFF OR FALL ASLEEP WHEN YOU ARE A PASSENGER IN A CAR FOR AN HOUR WITHOUT A BREAK: WOULD NEVER DOZE

## 2022-01-01 ASSESSMENT — LIFESTYLE VARIABLES
HOW OFTEN DO YOU HAVE A DRINK CONTAINING ALCOHOL: 4 OR MORE TIMES PER WEEK
AUDIT-C TOTAL SCORE: 4
PRIOR ALCOHOL TREATMENT: NO
LAST DRINK YOU HAD: 49 HOURS OR MORE
HISTORY OF PROBLEMS WHEN YOU STOP DRINKING ALCOHOL: NO
HOW OFTEN DO YOU HAVE 6 OR MORE DRINKS ON ONE OCCASION: NEVER
ALCOHOL_USE_STATUS: UNHEALTHY DRINKING IDENTIFIED. AUDIT C: 3 OR MORE FOR WOMEN AND 4 OR MORE FOR MEN.
HOW MANY STANDARD DRINKS CONTAINING ALCOHOL DO YOU HAVE ON A TYPICAL DAY: 0,1 OR 2

## 2022-01-01 ASSESSMENT — COLUMBIA-SUICIDE SEVERITY RATING SCALE - C-SSRS
6. HAVE YOU EVER DONE ANYTHING, STARTED TO DO ANYTHING, OR PREPARED TO DO ANYTHING TO END YOUR LIFE?: NO
IS THE PATIENT ABLE TO COMPLETE C-SSRS: YES
2. HAVE YOU ACTUALLY HAD ANY THOUGHTS OF KILLING YOURSELF?: NO
1. WITHIN THE PAST MONTH, HAVE YOU WISHED YOU WERE DEAD OR WISHED YOU COULD GO TO SLEEP AND NOT WAKE UP?: NO

## 2022-01-07 ENCOUNTER — TELEPHONE (OUTPATIENT)
Dept: INTERNAL MEDICINE | Age: 87
End: 2022-01-07

## 2022-01-07 DIAGNOSIS — J96.11 CHRONIC RESPIRATORY FAILURE WITH HYPOXIA (CMD): Primary | ICD-10-CM

## 2022-01-11 ENCOUNTER — EXTERNAL RECORD (OUTPATIENT)
Dept: HEALTH INFORMATION MANAGEMENT | Facility: OTHER | Age: 87
End: 2022-01-11

## 2022-01-17 ENCOUNTER — ANTI-COAG (OUTPATIENT)
Dept: CHRONIC DISEASE MANAGEMENT | Age: 87
End: 2022-01-17
Attending: INTERNAL MEDICINE

## 2022-01-17 DIAGNOSIS — Z79.01 LONG TERM (CURRENT) USE OF ANTICOAGULANTS: ICD-10-CM

## 2022-01-17 DIAGNOSIS — I26.99 PULMONARY EMBOLISM (CMD): Primary | ICD-10-CM

## 2022-01-17 LAB — INR PPP: 2.9 (ref 2–3)

## 2022-01-17 PROCEDURE — 85610 PROTHROMBIN TIME: CPT

## 2022-01-17 PROCEDURE — 99211 OFF/OP EST MAY X REQ PHY/QHP: CPT

## 2022-01-18 ENCOUNTER — TELEPHONE (OUTPATIENT)
Dept: PULMONOLOGY | Age: 87
End: 2022-01-18

## 2022-01-18 DIAGNOSIS — J96.11 CHRONIC RESPIRATORY FAILURE WITH HYPOXIA (CMD): Primary | ICD-10-CM

## 2022-01-24 ENCOUNTER — APPOINTMENT (OUTPATIENT)
Dept: CARDIOLOGY | Age: 87
End: 2022-01-24

## 2022-01-24 ENCOUNTER — TELEPHONE (OUTPATIENT)
Dept: INTERNAL MEDICINE | Age: 87
End: 2022-01-24

## 2022-01-24 ENCOUNTER — APPOINTMENT (OUTPATIENT)
Dept: INTERNAL MEDICINE | Age: 87
End: 2022-01-24

## 2022-01-24 DIAGNOSIS — I26.99 PULMONARY EMBOLISM (CMD): ICD-10-CM

## 2022-01-24 RX ORDER — WARFARIN SODIUM 1 MG/1
TABLET ORAL
Qty: 90 TABLET | Refills: 0 | Status: SHIPPED | OUTPATIENT
Start: 2022-01-24 | End: 2022-01-27 | Stop reason: SDUPTHER

## 2022-01-24 RX ORDER — WARFARIN SODIUM 3 MG/1
3 TABLET ORAL DAILY
Qty: 90 TABLET | Refills: 0 | Status: SHIPPED | OUTPATIENT
Start: 2022-01-24 | End: 2022-02-07 | Stop reason: SDUPTHER

## 2022-01-27 DIAGNOSIS — I26.99 PULMONARY EMBOLISM (CMD): ICD-10-CM

## 2022-01-28 RX ORDER — WARFARIN SODIUM 1 MG/1
TABLET ORAL
Qty: 90 TABLET | Refills: 0 | Status: SHIPPED | OUTPATIENT
Start: 2022-01-28 | End: 2022-03-15

## 2022-01-31 DIAGNOSIS — I26.99 PULMONARY EMBOLISM (CMD): ICD-10-CM

## 2022-02-01 RX ORDER — WARFARIN SODIUM 1 MG/1
TABLET ORAL
Qty: 90 TABLET | Refills: 0 | Status: CANCELLED | OUTPATIENT
Start: 2022-02-01

## 2022-02-04 ENCOUNTER — TELEPHONE (OUTPATIENT)
Dept: PULMONOLOGY | Age: 87
End: 2022-02-04

## 2022-02-04 DIAGNOSIS — J96.11 CHRONIC RESPIRATORY FAILURE WITH HYPOXIA (CMD): Primary | ICD-10-CM

## 2022-02-07 ENCOUNTER — ANTI-COAG (OUTPATIENT)
Dept: CHRONIC DISEASE MANAGEMENT | Age: 87
End: 2022-02-07
Attending: INTERNAL MEDICINE

## 2022-02-07 ENCOUNTER — OFFICE VISIT (OUTPATIENT)
Dept: INTERNAL MEDICINE | Age: 87
End: 2022-02-07

## 2022-02-07 VITALS
BODY MASS INDEX: 30.21 KG/M2 | HEIGHT: 65 IN | DIASTOLIC BLOOD PRESSURE: 70 MMHG | WEIGHT: 181.3 LBS | RESPIRATION RATE: 14 BRPM | SYSTOLIC BLOOD PRESSURE: 144 MMHG | TEMPERATURE: 96.1 F | OXYGEN SATURATION: 96 % | HEART RATE: 69 BPM

## 2022-02-07 DIAGNOSIS — F51.01 PRIMARY INSOMNIA: ICD-10-CM

## 2022-02-07 DIAGNOSIS — J96.11 CHRONIC RESPIRATORY FAILURE WITH HYPOXIA (CMD): ICD-10-CM

## 2022-02-07 DIAGNOSIS — N18.31 STAGE 3A CHRONIC KIDNEY DISEASE (CMD): ICD-10-CM

## 2022-02-07 DIAGNOSIS — Z79.01 LONG TERM (CURRENT) USE OF ANTICOAGULANTS: ICD-10-CM

## 2022-02-07 DIAGNOSIS — J43.9 PULMONARY EMPHYSEMA, UNSPECIFIED EMPHYSEMA TYPE (CMD): ICD-10-CM

## 2022-02-07 DIAGNOSIS — M19.011 PRIMARY OSTEOARTHRITIS OF RIGHT SHOULDER: ICD-10-CM

## 2022-02-07 DIAGNOSIS — I10 ESSENTIAL HYPERTENSION: Primary | ICD-10-CM

## 2022-02-07 DIAGNOSIS — I26.99 PULMONARY EMBOLISM (CMD): Primary | ICD-10-CM

## 2022-02-07 DIAGNOSIS — I26.99 OTHER PULMONARY EMBOLISM WITHOUT ACUTE COR PULMONALE, UNSPECIFIED CHRONICITY (CMD): ICD-10-CM

## 2022-02-07 LAB — INR PPP: 2.3 (ref 2–3)

## 2022-02-07 PROCEDURE — 99211 OFF/OP EST MAY X REQ PHY/QHP: CPT

## 2022-02-07 PROCEDURE — 85610 PROTHROMBIN TIME: CPT

## 2022-02-07 PROCEDURE — 99215 OFFICE O/P EST HI 40 MIN: CPT | Performed by: INTERNAL MEDICINE

## 2022-02-07 RX ORDER — THIAMINE MONONITRATE (VIT B1) 100 MG
100 TABLET ORAL DAILY
Qty: 90 TABLET | Refills: 0 | Status: CANCELLED | OUTPATIENT
Start: 2022-02-07

## 2022-02-07 RX ORDER — WARFARIN SODIUM 3 MG/1
3 TABLET ORAL DAILY
Qty: 90 TABLET | Refills: 0 | Status: SHIPPED | OUTPATIENT
Start: 2022-02-07 | End: 2022-01-01 | Stop reason: ALTCHOICE

## 2022-02-07 ASSESSMENT — PATIENT HEALTH QUESTIONNAIRE - PHQ9
SUM OF ALL RESPONSES TO PHQ9 QUESTIONS 1 AND 2: 0
CLINICAL INTERPRETATION OF PHQ2 SCORE: NO FURTHER SCREENING NEEDED
1. LITTLE INTEREST OR PLEASURE IN DOING THINGS: NOT AT ALL
2. FEELING DOWN, DEPRESSED OR HOPELESS: NOT AT ALL
SUM OF ALL RESPONSES TO PHQ9 QUESTIONS 1 AND 2: 0

## 2022-02-07 ASSESSMENT — COGNITIVE AND FUNCTIONAL STATUS - GENERAL
DO YOU HAVE SERIOUS DIFFICULTY WALKING OR CLIMBING STAIRS: YES
DO YOU HAVE DIFFICULTY DRESSING OR BATHING: NO
BECAUSE OF A PHYSICAL, MENTAL, OR EMOTIONAL CONDITION, DO YOU HAVE DIFFICULTY DOING ERRANDS ALONE: NO
BECAUSE OF A PHYSICAL, MENTAL, OR EMOTIONAL CONDITION, DO YOU HAVE SERIOUS DIFFICULTY CONCENTRATING, REMEMBERING OR MAKING DECISIONS: NO

## 2022-02-08 ENCOUNTER — APPOINTMENT (OUTPATIENT)
Dept: INTERNAL MEDICINE | Age: 87
End: 2022-02-08

## 2022-02-10 ENCOUNTER — TELEPHONE (OUTPATIENT)
Dept: CARDIOLOGY | Age: 87
End: 2022-02-10

## 2022-02-15 ENCOUNTER — EXTERNAL RECORD (OUTPATIENT)
Dept: HEALTH INFORMATION MANAGEMENT | Facility: OTHER | Age: 87
End: 2022-02-15

## 2022-02-19 DIAGNOSIS — J44.9 CHRONIC OBSTRUCTIVE PULMONARY DISEASE, UNSPECIFIED COPD TYPE (CMD): ICD-10-CM

## 2022-02-19 DIAGNOSIS — J96.11 CHRONIC RESPIRATORY FAILURE WITH HYPOXIA (CMD): ICD-10-CM

## 2022-02-21 RX ORDER — FLUTICASONE FUROATE, UMECLIDINIUM BROMIDE AND VILANTEROL TRIFENATATE 100; 62.5; 25 UG/1; UG/1; UG/1
1 POWDER RESPIRATORY (INHALATION) DAILY
Qty: 60 EACH | Refills: 0 | Status: SHIPPED | OUTPATIENT
Start: 2022-02-21 | End: 2022-04-12 | Stop reason: SDUPTHER

## 2022-02-21 RX ORDER — FLUTICASONE FUROATE, UMECLIDINIUM BROMIDE AND VILANTEROL TRIFENATATE 100; 62.5; 25 UG/1; UG/1; UG/1
1 POWDER RESPIRATORY (INHALATION) DAILY
Qty: 60 EACH | Refills: 3 | Status: SHIPPED | OUTPATIENT
Start: 2022-02-21 | End: 2022-04-27 | Stop reason: ALTCHOICE

## 2022-03-07 ENCOUNTER — ANTI-COAG (OUTPATIENT)
Dept: CHRONIC DISEASE MANAGEMENT | Age: 87
End: 2022-03-07
Attending: INTERNAL MEDICINE

## 2022-03-07 DIAGNOSIS — I26.99 PULMONARY EMBOLISM (CMD): Primary | ICD-10-CM

## 2022-03-07 DIAGNOSIS — Z79.01 LONG TERM (CURRENT) USE OF ANTICOAGULANTS: ICD-10-CM

## 2022-03-07 LAB — INR PPP: 2.6 (ref 2–3)

## 2022-03-07 PROCEDURE — 85610 PROTHROMBIN TIME: CPT

## 2022-03-07 PROCEDURE — 99211 OFF/OP EST MAY X REQ PHY/QHP: CPT

## 2022-03-10 ENCOUNTER — TELEPHONE (OUTPATIENT)
Dept: INTERNAL MEDICINE | Age: 87
End: 2022-03-10

## 2022-03-10 ENCOUNTER — OFFICE VISIT (OUTPATIENT)
Dept: CARDIOLOGY | Age: 87
End: 2022-03-10

## 2022-03-10 VITALS
SYSTOLIC BLOOD PRESSURE: 130 MMHG | DIASTOLIC BLOOD PRESSURE: 78 MMHG | WEIGHT: 185 LBS | BODY MASS INDEX: 30.82 KG/M2 | HEIGHT: 65 IN | HEART RATE: 73 BPM

## 2022-03-10 DIAGNOSIS — E78.2 MIXED HYPERLIPIDEMIA: Primary | ICD-10-CM

## 2022-03-10 DIAGNOSIS — R60.0 LEG EDEMA: Primary | ICD-10-CM

## 2022-03-10 PROCEDURE — 93000 ELECTROCARDIOGRAM COMPLETE: CPT | Performed by: INTERNAL MEDICINE

## 2022-03-10 PROCEDURE — 99214 OFFICE O/P EST MOD 30 MIN: CPT | Performed by: INTERNAL MEDICINE

## 2022-03-10 ASSESSMENT — ENCOUNTER SYMPTOMS
LIGHT-HEADEDNESS: 0
ABDOMINAL PAIN: 0
SLEEP DISTURBANCE: 0
WOUND: 0
WEAKNESS: 0
VOMITING: 0
SHORTNESS OF BREATH: 1
NERVOUS/ANXIOUS: 0
BRUISES/BLEEDS EASILY: 0
BLOOD IN STOOL: 0
CONSTITUTIONAL NEGATIVE: 1
NAUSEA: 0
FATIGUE: 0
DIZZINESS: 0
CONFUSION: 0

## 2022-03-15 DIAGNOSIS — I26.99 PULMONARY EMBOLISM (CMD): ICD-10-CM

## 2022-03-15 RX ORDER — WARFARIN SODIUM 1 MG/1
TABLET ORAL
Qty: 90 TABLET | Refills: 0 | Status: SHIPPED | OUTPATIENT
Start: 2022-03-15 | End: 2022-05-19

## 2022-03-18 ENCOUNTER — OFFICE VISIT (OUTPATIENT)
Dept: PULMONOLOGY | Age: 87
End: 2022-03-18

## 2022-03-18 VITALS
WEIGHT: 188 LBS | HEART RATE: 69 BPM | BODY MASS INDEX: 31.32 KG/M2 | HEIGHT: 65 IN | OXYGEN SATURATION: 93 % | DIASTOLIC BLOOD PRESSURE: 77 MMHG | SYSTOLIC BLOOD PRESSURE: 126 MMHG

## 2022-03-18 DIAGNOSIS — G47.33 OBSTRUCTIVE SLEEP APNEA: Primary | ICD-10-CM

## 2022-03-18 PROCEDURE — 99212 OFFICE O/P EST SF 10 MIN: CPT | Performed by: NURSE PRACTITIONER

## 2022-03-18 ASSESSMENT — SLEEP AND FATIGUE QUESTIONNAIRES
HOW LIKELY ARE YOU TO NOD OFF OR FALL ASLEEP WHILE LYING DOWN TO REST IN THE AFTERNOON WHEN CIRCUMSTANCES PERMIT: SLIGHT CHANCE OF DOZING
HOW LIKELY ARE YOU TO NOD OFF OR FALL ASLEEP WHILE SITTING INACTIVE IN A PUBLIC PLACE: WOULD NEVER DOZE
ESS TOTAL SCORE: 4
HOW LIKELY ARE YOU TO NOD OFF OR FALL ASLEEP WHILE SITTING AND TALKING TO SOMEONE: WOULD NEVER DOZE
HOW LIKELY ARE YOU TO NOD OFF OR FALL ASLEEP IN A CAR, WHILE STOPPED FOR A FEW MINUTES IN TRAFFIC: WOULD NEVER DOZE
HOW LIKELY ARE YOU TO NOD OFF OR FALL ASLEEP WHILE SITTING QUIETLY AFTER LUNCH WITHOUT ALCOHOL: WOULD NEVER DOZE
HOW LIKELY ARE YOU TO NOD OFF OR FALL ASLEEP WHILE SITTING AND READING: SLIGHT CHANCE OF DOZING
HOW LIKELY ARE YOU TO NOD OFF OR FALL ASLEEP WHILE WATCHING TV: MODERATE CHANCE OF DOZING
HOW LIKELY ARE YOU TO NOD OFF OR FALL ASLEEP WHEN YOU ARE A PASSENGER IN A CAR FOR AN HOUR WITHOUT A BREAK: WOULD NEVER DOZE

## 2022-03-18 ASSESSMENT — ENCOUNTER SYMPTOMS
CONSTITUTIONAL NEGATIVE: 1
SHORTNESS OF BREATH: 1
GASTROINTESTINAL NEGATIVE: 1
SLEEP DISTURBANCE: 1
COUGH: 1
WHEEZING: 0

## 2022-03-25 ENCOUNTER — TELEPHONE (OUTPATIENT)
Dept: PULMONOLOGY | Age: 87
End: 2022-03-25

## 2022-03-25 DIAGNOSIS — G47.33 OBSTRUCTIVE SLEEP APNEA: Primary | ICD-10-CM

## 2022-03-29 ENCOUNTER — OFFICE VISIT (OUTPATIENT)
Dept: SLEEP MEDICINE | Age: 87
End: 2022-03-29
Attending: NURSE PRACTITIONER

## 2022-03-29 DIAGNOSIS — G47.33 OBSTRUCTIVE SLEEP APNEA: ICD-10-CM

## 2022-03-29 PROCEDURE — 95806 SLEEP STUDY UNATT&RESP EFFT: CPT

## 2022-03-30 ENCOUNTER — TELEPHONE (OUTPATIENT)
Dept: SLEEP MEDICINE | Age: 87
End: 2022-03-30

## 2022-04-04 ENCOUNTER — LAB SERVICES (OUTPATIENT)
Dept: LAB | Age: 87
End: 2022-04-04
Attending: INTERNAL MEDICINE

## 2022-04-04 ENCOUNTER — ANTI-COAG (OUTPATIENT)
Dept: CHRONIC DISEASE MANAGEMENT | Age: 87
End: 2022-04-04
Attending: INTERNAL MEDICINE

## 2022-04-04 DIAGNOSIS — I26.99 PULMONARY EMBOLISM (CMD): Primary | ICD-10-CM

## 2022-04-04 DIAGNOSIS — E78.2 MIXED HYPERLIPIDEMIA: ICD-10-CM

## 2022-04-04 DIAGNOSIS — Z79.01 LONG TERM (CURRENT) USE OF ANTICOAGULANTS: ICD-10-CM

## 2022-04-04 LAB
ALBUMIN SERPL-MCNC: 3.2 G/DL (ref 3.6–5.1)
ALBUMIN/GLOB SERPL: 0.9 {RATIO} (ref 1–2.4)
ALP SERPL-CCNC: 75 UNITS/L (ref 45–117)
ALT SERPL-CCNC: 26 UNITS/L
ANION GAP SERPL CALC-SCNC: 12 MMOL/L (ref 10–20)
AST SERPL-CCNC: 20 UNITS/L
BASOPHILS # BLD: 0 K/MCL (ref 0–0.3)
BASOPHILS NFR BLD: 0 %
BILIRUB SERPL-MCNC: 0.2 MG/DL (ref 0.2–1)
BUN SERPL-MCNC: 25 MG/DL (ref 6–20)
BUN/CREAT SERPL: 31 (ref 7–25)
CALCIUM SERPL-MCNC: 9.2 MG/DL (ref 8.4–10.2)
CHLORIDE SERPL-SCNC: 104 MMOL/L (ref 98–107)
CHOLEST SERPL-MCNC: 187 MG/DL
CHOLEST/HDLC SERPL: 2.7 {RATIO}
CO2 SERPL-SCNC: 33 MMOL/L (ref 21–32)
CREAT SERPL-MCNC: 0.8 MG/DL (ref 0.51–0.95)
DEPRECATED RDW RBC: 48 FL (ref 39–50)
EOSINOPHIL # BLD: 0.1 K/MCL (ref 0–0.5)
EOSINOPHIL NFR BLD: 2 %
ERYTHROCYTE [DISTWIDTH] IN BLOOD: 13.6 % (ref 11–15)
FASTING DURATION TIME PATIENT: ABNORMAL H
FASTING DURATION TIME PATIENT: NORMAL H
GFR SERPLBLD BASED ON 1.73 SQ M-ARVRAT: 66 ML/MIN
GLOBULIN SER-MCNC: 3.4 G/DL (ref 2–4)
GLUCOSE SERPL-MCNC: 88 MG/DL (ref 70–99)
HCT VFR BLD CALC: 37.4 % (ref 36–46.5)
HDLC SERPL-MCNC: 70 MG/DL
HGB BLD-MCNC: 11.6 G/DL (ref 12–15.5)
IMM GRANULOCYTES # BLD AUTO: 0 K/MCL (ref 0–0.2)
IMM GRANULOCYTES # BLD: 0 %
INR PPP: 2.2 (ref 2–3)
LDLC SERPL CALC-MCNC: 105 MG/DL
LYMPHOCYTES # BLD: 1 K/MCL (ref 1–4)
LYMPHOCYTES NFR BLD: 18 %
MCH RBC QN AUTO: 29.7 PG (ref 26–34)
MCHC RBC AUTO-ENTMCNC: 31 G/DL (ref 32–36.5)
MCV RBC AUTO: 95.7 FL (ref 78–100)
MONOCYTES # BLD: 0.5 K/MCL (ref 0.3–0.9)
MONOCYTES NFR BLD: 9 %
NEUTROPHILS # BLD: 4.2 K/MCL (ref 1.8–7.7)
NEUTROPHILS NFR BLD: 71 %
NONHDLC SERPL-MCNC: 117 MG/DL
NRBC BLD MANUAL-RTO: 0 /100 WBC
PLATELET # BLD AUTO: 211 K/MCL (ref 140–450)
POTASSIUM SERPL-SCNC: 4.2 MMOL/L (ref 3.4–5.1)
PROT SERPL-MCNC: 6.6 G/DL (ref 6.4–8.2)
RBC # BLD: 3.91 MIL/MCL (ref 4–5.2)
SODIUM SERPL-SCNC: 145 MMOL/L (ref 135–145)
TRIGL SERPL-MCNC: 61 MG/DL
TSH SERPL-ACNC: 1.24 MCUNITS/ML (ref 0.35–5)
WBC # BLD: 5.9 K/MCL (ref 4.2–11)

## 2022-04-04 PROCEDURE — 80053 COMPREHEN METABOLIC PANEL: CPT

## 2022-04-04 PROCEDURE — 36415 COLL VENOUS BLD VENIPUNCTURE: CPT

## 2022-04-04 PROCEDURE — 84443 ASSAY THYROID STIM HORMONE: CPT

## 2022-04-04 PROCEDURE — 85025 COMPLETE CBC W/AUTO DIFF WBC: CPT

## 2022-04-04 PROCEDURE — 80061 LIPID PANEL: CPT

## 2022-04-04 PROCEDURE — 85610 PROTHROMBIN TIME: CPT

## 2022-04-04 PROCEDURE — 99211 OFF/OP EST MAY X REQ PHY/QHP: CPT

## 2022-04-11 ENCOUNTER — APPOINTMENT (OUTPATIENT)
Dept: INTERNAL MEDICINE | Age: 87
End: 2022-04-11

## 2022-04-11 ENCOUNTER — TELEPHONE (OUTPATIENT)
Dept: SCHEDULING | Age: 87
End: 2022-04-11

## 2022-04-12 ENCOUNTER — OFFICE VISIT (OUTPATIENT)
Dept: INTERNAL MEDICINE | Age: 87
End: 2022-04-12

## 2022-04-12 VITALS
WEIGHT: 184 LBS | HEIGHT: 65 IN | TEMPERATURE: 98.1 F | SYSTOLIC BLOOD PRESSURE: 135 MMHG | OXYGEN SATURATION: 95 % | HEART RATE: 83 BPM | DIASTOLIC BLOOD PRESSURE: 85 MMHG | BODY MASS INDEX: 30.66 KG/M2 | RESPIRATION RATE: 16 BRPM

## 2022-04-12 DIAGNOSIS — I10 PRIMARY HYPERTENSION: ICD-10-CM

## 2022-04-12 DIAGNOSIS — Z13.31 ENCOUNTER FOR SCREENING FOR DEPRESSION: ICD-10-CM

## 2022-04-12 DIAGNOSIS — R19.00 ABDOMINAL MASS, UNSPECIFIED ABDOMINAL LOCATION: ICD-10-CM

## 2022-04-12 DIAGNOSIS — D64.9 ANEMIA, UNSPECIFIED TYPE: ICD-10-CM

## 2022-04-12 DIAGNOSIS — Z00.00 MEDICARE ANNUAL WELLNESS VISIT, SUBSEQUENT: Primary | ICD-10-CM

## 2022-04-12 PROCEDURE — G0439 PPPS, SUBSEQ VISIT: HCPCS | Performed by: INTERNAL MEDICINE

## 2022-04-12 PROCEDURE — 99213 OFFICE O/P EST LOW 20 MIN: CPT | Performed by: INTERNAL MEDICINE

## 2022-04-12 ASSESSMENT — MINI COG
PATIENT WAS GIVEN REPEAT BACK WORDS FROM VERSION: 1 - BANANA SUNRISE CHAIR
PATIENT ABLE TO REPEAT THE 3 WORDS GIVEN PREVIOUSLY?: WAS ABLE TO REPEAT BACK 3 WORDS CORRECTLY
PATIENT ABLE TO FILL IN THE CLOCK FACE WITH 10 MINUTES PAST 11 O'CLOCK?: NO, CLOCK IS NOT CORRECT
TOTAL SCORE: 3

## 2022-04-12 ASSESSMENT — PATIENT HEALTH QUESTIONNAIRE - PHQ9
CLINICAL INTERPRETATION OF PHQ2 SCORE: NO FURTHER SCREENING NEEDED
SUM OF ALL RESPONSES TO PHQ9 QUESTIONS 1 AND 2: 0
SUM OF ALL RESPONSES TO PHQ9 QUESTIONS 1 AND 2: 0
1. LITTLE INTEREST OR PLEASURE IN DOING THINGS: NOT AT ALL
2. FEELING DOWN, DEPRESSED OR HOPELESS: NOT AT ALL

## 2022-04-12 ASSESSMENT — VISUAL ACUITY
OS_CC: 20/25
OD_CC: 20/25

## 2022-04-18 ENCOUNTER — TELEPHONE (OUTPATIENT)
Dept: INTERNAL MEDICINE | Age: 87
End: 2022-04-18

## 2022-04-26 ENCOUNTER — ANTI-COAG (OUTPATIENT)
Dept: CHRONIC DISEASE MANAGEMENT | Age: 87
End: 2022-04-26
Attending: INTERNAL MEDICINE

## 2022-04-26 DIAGNOSIS — I26.99 PULMONARY EMBOLISM (CMD): Primary | ICD-10-CM

## 2022-04-26 DIAGNOSIS — G47.33 OSA (OBSTRUCTIVE SLEEP APNEA): Primary | ICD-10-CM

## 2022-04-26 DIAGNOSIS — Z79.01 LONG TERM (CURRENT) USE OF ANTICOAGULANTS: ICD-10-CM

## 2022-04-26 LAB — INR PPP: 2.2

## 2022-04-26 PROCEDURE — 85610 PROTHROMBIN TIME: CPT

## 2022-04-26 PROCEDURE — 99211 OFF/OP EST MAY X REQ PHY/QHP: CPT

## 2022-04-27 ENCOUNTER — OFFICE VISIT (OUTPATIENT)
Dept: PULMONOLOGY | Age: 87
End: 2022-04-27

## 2022-04-27 VITALS
HEART RATE: 64 BPM | SYSTOLIC BLOOD PRESSURE: 162 MMHG | OXYGEN SATURATION: 100 % | DIASTOLIC BLOOD PRESSURE: 80 MMHG | RESPIRATION RATE: 17 BRPM | BODY MASS INDEX: 30.66 KG/M2 | WEIGHT: 184 LBS | HEIGHT: 65 IN

## 2022-04-27 DIAGNOSIS — G47.33 OBSTRUCTIVE SLEEP APNEA: Primary | ICD-10-CM

## 2022-04-27 PROCEDURE — 99214 OFFICE O/P EST MOD 30 MIN: CPT | Performed by: INTERNAL MEDICINE

## 2022-04-27 RX ORDER — FLUTICASONE FUROATE AND VILANTEROL 100; 25 UG/1; UG/1
1 POWDER RESPIRATORY (INHALATION) DAILY
Qty: 1 EACH | Refills: 11 | Status: SHIPPED | OUTPATIENT
Start: 2022-04-27 | End: 2022-01-01

## 2022-04-27 ASSESSMENT — SLEEP AND FATIGUE QUESTIONNAIRES
HOW LIKELY ARE YOU TO NOD OFF OR FALL ASLEEP WHILE SITTING INACTIVE IN A PUBLIC PLACE: WOULD NEVER DOZE
HOW LIKELY ARE YOU TO NOD OFF OR FALL ASLEEP IN A CAR, WHILE STOPPED FOR A FEW MINUTES IN TRAFFIC: WOULD NEVER DOZE
HOW LIKELY ARE YOU TO NOD OFF OR FALL ASLEEP WHILE SITTING AND READING: WOULD NEVER DOZE
HOW LIKELY ARE YOU TO NOD OFF OR FALL ASLEEP WHILE SITTING QUIETLY AFTER LUNCH WITHOUT ALCOHOL: WOULD NEVER DOZE
ESS TOTAL SCORE: 2
HOW LIKELY ARE YOU TO NOD OFF OR FALL ASLEEP WHILE WATCHING TV: SLIGHT CHANCE OF DOZING
HOW LIKELY ARE YOU TO NOD OFF OR FALL ASLEEP WHEN YOU ARE A PASSENGER IN A CAR FOR AN HOUR WITHOUT A BREAK: WOULD NEVER DOZE
HOW LIKELY ARE YOU TO NOD OFF OR FALL ASLEEP WHILE SITTING AND TALKING TO SOMEONE: WOULD NEVER DOZE
HOW LIKELY ARE YOU TO NOD OFF OR FALL ASLEEP WHILE LYING DOWN TO REST IN THE AFTERNOON WHEN CIRCUMSTANCES PERMIT: SLIGHT CHANCE OF DOZING

## 2022-05-02 ENCOUNTER — APPOINTMENT (OUTPATIENT)
Dept: CHRONIC DISEASE MANAGEMENT | Age: 87
End: 2022-05-02
Attending: INTERNAL MEDICINE

## 2022-05-03 ENCOUNTER — TELEPHONE (OUTPATIENT)
Dept: PULMONOLOGY | Age: 87
End: 2022-05-03

## 2022-05-13 ENCOUNTER — EXTERNAL RECORD (OUTPATIENT)
Dept: HEALTH INFORMATION MANAGEMENT | Facility: OTHER | Age: 87
End: 2022-05-13

## 2022-05-16 ENCOUNTER — TELEPHONE (OUTPATIENT)
Dept: INTERNAL MEDICINE | Age: 87
End: 2022-05-16

## 2022-05-19 ENCOUNTER — TELEPHONE (OUTPATIENT)
Dept: INTERNAL MEDICINE | Age: 87
End: 2022-05-19

## 2022-05-19 DIAGNOSIS — I26.99 PULMONARY EMBOLISM, UNSPECIFIED CHRONICITY, UNSPECIFIED PULMONARY EMBOLISM TYPE, UNSPECIFIED WHETHER ACUTE COR PULMONALE PRESENT (CMD): ICD-10-CM

## 2022-05-19 DIAGNOSIS — I26.99 PULMONARY EMBOLISM (CMD): ICD-10-CM

## 2022-05-19 DIAGNOSIS — Z79.01 LONG TERM (CURRENT) USE OF ANTICOAGULANTS: Primary | ICD-10-CM

## 2022-05-19 RX ORDER — WARFARIN SODIUM 1 MG/1
TABLET ORAL
Qty: 90 TABLET | Refills: 3 | Status: SHIPPED | OUTPATIENT
Start: 2022-05-19 | End: 2022-01-01 | Stop reason: ALTCHOICE

## 2022-05-19 RX ORDER — WARFARIN SODIUM 1 MG/1
TABLET ORAL
Qty: 90 TABLET | Refills: 0 | Status: SHIPPED | OUTPATIENT
Start: 2022-05-19 | End: 2022-05-19 | Stop reason: SDUPTHER

## 2022-06-06 ENCOUNTER — ANTI-COAG (OUTPATIENT)
Dept: CHRONIC DISEASE MANAGEMENT | Age: 87
End: 2022-06-06
Attending: INTERNAL MEDICINE

## 2022-06-06 DIAGNOSIS — Z79.01 LONG TERM (CURRENT) USE OF ANTICOAGULANTS: ICD-10-CM

## 2022-06-06 DIAGNOSIS — I26.99 PULMONARY EMBOLISM (CMD): Primary | ICD-10-CM

## 2022-06-06 LAB — INR PPP: 2.3 (ref 2–3)

## 2022-06-06 PROCEDURE — 99211 OFF/OP EST MAY X REQ PHY/QHP: CPT

## 2022-06-06 PROCEDURE — 85610 PROTHROMBIN TIME: CPT
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
## 2022-07-06 PROBLEM — I26.99 PULMONARY EMBOLISM  (CMD): Status: ACTIVE | Noted: 2020-11-13

## 2022-07-06 PROBLEM — T45.511A WARFARIN TOXICITY: Status: ACTIVE | Noted: 2022-01-01

## 2022-07-06 PROBLEM — J43.9 PULMONARY EMPHYSEMA (CMD): Status: ACTIVE | Noted: 2022-01-01

## 2022-07-06 PROBLEM — N30.00 ACUTE CYSTITIS: Status: ACTIVE | Noted: 2022-01-01

## 2022-07-09 PROBLEM — I50.32 CHRONIC HEART FAILURE WITH PRESERVED EJECTION FRACTION (CMD): Status: ACTIVE | Noted: 2022-01-01

## 2023-01-01 ENCOUNTER — TELEPHONE (OUTPATIENT)
Dept: INTERNAL MEDICINE | Age: 88
End: 2023-01-01

## 2023-01-01 ENCOUNTER — ANESTHESIA (OUTPATIENT)
Dept: GASTROENTEROLOGY | Age: 88
DRG: 377 | End: 2023-01-01

## 2023-01-01 ENCOUNTER — CASE MANAGEMENT (OUTPATIENT)
Dept: CARE COORDINATION | Age: 88
End: 2023-01-01

## 2023-01-01 ENCOUNTER — APPOINTMENT (OUTPATIENT)
Dept: GENERAL RADIOLOGY | Age: 88
DRG: 377 | End: 2023-01-01
Attending: NURSE PRACTITIONER

## 2023-01-01 ENCOUNTER — TELEPHONE (OUTPATIENT)
Dept: CHRONIC DISEASE MANAGEMENT | Age: 88
End: 2023-01-01

## 2023-01-01 ENCOUNTER — TELEPHONE (OUTPATIENT)
Dept: PULMONOLOGY | Age: 88
End: 2023-01-01

## 2023-01-01 ENCOUNTER — OFF PREMISE (OUTPATIENT)
Dept: CHRONIC DISEASE MANAGEMENT | Age: 88
End: 2023-01-01
Attending: INTERNAL MEDICINE

## 2023-01-01 ENCOUNTER — HOSPITAL ENCOUNTER (INPATIENT)
Age: 88
LOS: 12 days | Discharge: HOSPICE | DRG: 377 | End: 2023-06-16
Attending: EMERGENCY MEDICINE | Admitting: INTERNAL MEDICINE

## 2023-01-01 ENCOUNTER — EXTERNAL LAB (OUTPATIENT)
Dept: INTERNAL MEDICINE | Age: 88
End: 2023-01-01

## 2023-01-01 ENCOUNTER — APPOINTMENT (OUTPATIENT)
Dept: GASTROENTEROLOGY | Age: 88
DRG: 377 | End: 2023-01-01
Attending: INTERNAL MEDICINE

## 2023-01-01 ENCOUNTER — HOSPITAL ENCOUNTER (INPATIENT)
Age: 88
LOS: 9 days | Discharge: HOME HEALTH | DRG: 189 | End: 2023-05-11
Attending: EMERGENCY MEDICINE | Admitting: INTERNAL MEDICINE

## 2023-01-01 ENCOUNTER — APPOINTMENT (OUTPATIENT)
Dept: GASTROENTEROLOGY | Age: 88
DRG: 377 | End: 2023-01-01
Attending: STUDENT IN AN ORGANIZED HEALTH CARE EDUCATION/TRAINING PROGRAM

## 2023-01-01 ENCOUNTER — APPOINTMENT (OUTPATIENT)
Dept: ULTRASOUND IMAGING | Age: 88
DRG: 377 | End: 2023-01-01
Attending: INTERNAL MEDICINE

## 2023-01-01 ENCOUNTER — APPOINTMENT (OUTPATIENT)
Dept: CHRONIC DISEASE MANAGEMENT | Age: 88
End: 2023-01-01
Attending: INTERNAL MEDICINE

## 2023-01-01 ENCOUNTER — ANESTHESIA EVENT (OUTPATIENT)
Dept: GASTROENTEROLOGY | Age: 88
DRG: 377 | End: 2023-01-01

## 2023-01-01 ENCOUNTER — APPOINTMENT (OUTPATIENT)
Dept: GENERAL RADIOLOGY | Age: 88
DRG: 189 | End: 2023-01-01
Attending: INTERNAL MEDICINE

## 2023-01-01 ENCOUNTER — OFFICE VISIT (OUTPATIENT)
Dept: PULMONOLOGY | Age: 88
End: 2023-01-01

## 2023-01-01 ENCOUNTER — OFFICE VISIT (OUTPATIENT)
Dept: CARDIOLOGY | Age: 88
End: 2023-01-01

## 2023-01-01 ENCOUNTER — OFFICE VISIT (OUTPATIENT)
Dept: INTERNAL MEDICINE | Age: 88
End: 2023-01-01

## 2023-01-01 ENCOUNTER — APPOINTMENT (OUTPATIENT)
Dept: CARDIOLOGY | Age: 88
DRG: 377 | End: 2023-01-01
Attending: INTERNAL MEDICINE

## 2023-01-01 ENCOUNTER — TELEPHONE (OUTPATIENT)
Dept: CARDIOLOGY | Age: 88
End: 2023-01-01

## 2023-01-01 ENCOUNTER — NURSE TRIAGE (OUTPATIENT)
Dept: TELEHEALTH | Age: 88
End: 2023-01-01

## 2023-01-01 ENCOUNTER — APPOINTMENT (OUTPATIENT)
Dept: GENERAL RADIOLOGY | Age: 88
DRG: 189 | End: 2023-01-01
Attending: EMERGENCY MEDICINE

## 2023-01-01 ENCOUNTER — APPOINTMENT (OUTPATIENT)
Dept: GENERAL RADIOLOGY | Age: 88
DRG: 377 | End: 2023-01-01
Attending: EMERGENCY MEDICINE

## 2023-01-01 ENCOUNTER — LAB SERVICES (OUTPATIENT)
Dept: LAB | Age: 88
End: 2023-01-01
Attending: INTERNAL MEDICINE

## 2023-01-01 ENCOUNTER — EXTERNAL RECORD (OUTPATIENT)
Dept: HEALTH INFORMATION MANAGEMENT | Facility: OTHER | Age: 88
End: 2023-01-01

## 2023-01-01 ENCOUNTER — APPOINTMENT (OUTPATIENT)
Dept: CARDIOLOGY | Age: 88
End: 2023-01-01

## 2023-01-01 VITALS
OXYGEN SATURATION: 92 % | DIASTOLIC BLOOD PRESSURE: 80 MMHG | HEIGHT: 65 IN | WEIGHT: 175 LBS | SYSTOLIC BLOOD PRESSURE: 140 MMHG | HEART RATE: 78 BPM | BODY MASS INDEX: 29.16 KG/M2 | RESPIRATION RATE: 20 BRPM

## 2023-01-01 VITALS
DIASTOLIC BLOOD PRESSURE: 66 MMHG | TEMPERATURE: 97.6 F | RESPIRATION RATE: 16 BRPM | WEIGHT: 173 LBS | BODY MASS INDEX: 28.82 KG/M2 | OXYGEN SATURATION: 92 % | HEIGHT: 65 IN | HEART RATE: 78 BPM | SYSTOLIC BLOOD PRESSURE: 132 MMHG

## 2023-01-01 VITALS
TEMPERATURE: 97.7 F | HEART RATE: 79 BPM | BODY MASS INDEX: 29.57 KG/M2 | WEIGHT: 177.47 LBS | DIASTOLIC BLOOD PRESSURE: 61 MMHG | RESPIRATION RATE: 16 BRPM | HEIGHT: 65 IN | SYSTOLIC BLOOD PRESSURE: 114 MMHG | OXYGEN SATURATION: 96 %

## 2023-01-01 VITALS
BODY MASS INDEX: 33.69 KG/M2 | DIASTOLIC BLOOD PRESSURE: 72 MMHG | OXYGEN SATURATION: 100 % | WEIGHT: 197.31 LBS | TEMPERATURE: 98.2 F | RESPIRATION RATE: 16 BRPM | HEART RATE: 72 BPM | SYSTOLIC BLOOD PRESSURE: 149 MMHG | HEIGHT: 64 IN

## 2023-01-01 VITALS
HEART RATE: 88 BPM | WEIGHT: 173 LBS | TEMPERATURE: 97.7 F | RESPIRATION RATE: 14 BRPM | OXYGEN SATURATION: 92 % | HEIGHT: 65 IN | SYSTOLIC BLOOD PRESSURE: 118 MMHG | DIASTOLIC BLOOD PRESSURE: 62 MMHG | BODY MASS INDEX: 28.82 KG/M2

## 2023-01-01 VITALS
BODY MASS INDEX: 28.66 KG/M2 | WEIGHT: 172 LBS | DIASTOLIC BLOOD PRESSURE: 69 MMHG | HEART RATE: 66 BPM | OXYGEN SATURATION: 90 % | SYSTOLIC BLOOD PRESSURE: 129 MMHG | HEIGHT: 65 IN

## 2023-01-01 VITALS
SYSTOLIC BLOOD PRESSURE: 164 MMHG | WEIGHT: 175 LBS | BODY MASS INDEX: 29.16 KG/M2 | DIASTOLIC BLOOD PRESSURE: 74 MMHG | HEIGHT: 65 IN | HEART RATE: 63 BPM

## 2023-01-01 DIAGNOSIS — Z79.01 LONG TERM (CURRENT) USE OF ANTICOAGULANTS: ICD-10-CM

## 2023-01-01 DIAGNOSIS — R60.0 LEG EDEMA: ICD-10-CM

## 2023-01-01 DIAGNOSIS — I10 ESSENTIAL HYPERTENSION: ICD-10-CM

## 2023-01-01 DIAGNOSIS — I50.32 CHRONIC HEART FAILURE WITH PRESERVED EJECTION FRACTION (CMD): ICD-10-CM

## 2023-01-01 DIAGNOSIS — J44.1 COPD EXACERBATION (CMD): Primary | ICD-10-CM

## 2023-01-01 DIAGNOSIS — I26.99 OTHER PULMONARY EMBOLISM WITHOUT ACUTE COR PULMONALE, UNSPECIFIED CHRONICITY (CMD): ICD-10-CM

## 2023-01-01 DIAGNOSIS — I26.93 SINGLE SUBSEGMENTAL PULMONARY EMBOLISM WITHOUT ACUTE COR PULMONALE (CMD): ICD-10-CM

## 2023-01-01 DIAGNOSIS — D49.89 ABDOMINAL TUMOR: ICD-10-CM

## 2023-01-01 DIAGNOSIS — J44.9 CHRONIC OBSTRUCTIVE PULMONARY DISEASE, UNSPECIFIED COPD TYPE (CMD): Primary | ICD-10-CM

## 2023-01-01 DIAGNOSIS — I26.99 PULMONARY EMBOLISM (CMD): Primary | ICD-10-CM

## 2023-01-01 DIAGNOSIS — J96.11 CHRONIC RESPIRATORY FAILURE WITH HYPOXIA (CMD): Primary | ICD-10-CM

## 2023-01-01 DIAGNOSIS — D63.1 ANEMIA DUE TO STAGE 3A CHRONIC KIDNEY DISEASE (CMD): ICD-10-CM

## 2023-01-01 DIAGNOSIS — R60.0 LEG EDEMA: Primary | ICD-10-CM

## 2023-01-01 DIAGNOSIS — E87.8 ELECTROLYTE ABNORMALITY: ICD-10-CM

## 2023-01-01 DIAGNOSIS — K92.2 UPPER GI BLEED: Primary | ICD-10-CM

## 2023-01-01 DIAGNOSIS — J43.9 PULMONARY EMPHYSEMA, UNSPECIFIED EMPHYSEMA TYPE (CMD): Primary | ICD-10-CM

## 2023-01-01 DIAGNOSIS — J44.1 COPD EXACERBATION (CMD): ICD-10-CM

## 2023-01-01 DIAGNOSIS — I26.99 PULMONARY EMBOLISM, UNSPECIFIED CHRONICITY, UNSPECIFIED PULMONARY EMBOLISM TYPE, UNSPECIFIED WHETHER ACUTE COR PULMONALE PRESENT (CMD): ICD-10-CM

## 2023-01-01 DIAGNOSIS — J43.9 PULMONARY EMPHYSEMA, UNSPECIFIED EMPHYSEMA TYPE (CMD): ICD-10-CM

## 2023-01-01 DIAGNOSIS — E78.2 MIXED HYPERLIPIDEMIA: ICD-10-CM

## 2023-01-01 DIAGNOSIS — D50.9 IRON DEFICIENCY ANEMIA, UNSPECIFIED IRON DEFICIENCY ANEMIA TYPE: ICD-10-CM

## 2023-01-01 DIAGNOSIS — I50.32 HYPERTENSIVE HEART DISEASE WITH CHRONIC DIASTOLIC CONGESTIVE HEART FAILURE (CMD): ICD-10-CM

## 2023-01-01 DIAGNOSIS — D64.9 SYMPTOMATIC ANEMIA: ICD-10-CM

## 2023-01-01 DIAGNOSIS — R79.1 ELEVATED INR: ICD-10-CM

## 2023-01-01 DIAGNOSIS — Z00.00 MEDICARE ANNUAL WELLNESS VISIT, SUBSEQUENT: Primary | ICD-10-CM

## 2023-01-01 DIAGNOSIS — N18.31 ANEMIA DUE TO STAGE 3A CHRONIC KIDNEY DISEASE (CMD): ICD-10-CM

## 2023-01-01 DIAGNOSIS — L98.9 SKIN LESION: ICD-10-CM

## 2023-01-01 DIAGNOSIS — I11.0 HYPERTENSIVE HEART DISEASE WITH CHRONIC DIASTOLIC CONGESTIVE HEART FAILURE (CMD): ICD-10-CM

## 2023-01-01 DIAGNOSIS — Z13.31 ENCOUNTER FOR SCREENING FOR DEPRESSION: ICD-10-CM

## 2023-01-01 DIAGNOSIS — J44.9 CHRONIC OBSTRUCTIVE PULMONARY DISEASE, UNSPECIFIED COPD TYPE (CMD): ICD-10-CM

## 2023-01-01 DIAGNOSIS — I26.99 PULMONARY EMBOLISM, UNSPECIFIED CHRONICITY, UNSPECIFIED PULMONARY EMBOLISM TYPE, UNSPECIFIED WHETHER ACUTE COR PULMONALE PRESENT (CMD): Primary | ICD-10-CM

## 2023-01-01 DIAGNOSIS — R53.1 WEAKNESS: ICD-10-CM

## 2023-01-01 DIAGNOSIS — J96.01 ACUTE RESPIRATORY FAILURE WITH HYPOXIA (CMD): ICD-10-CM

## 2023-01-01 LAB
ABO + RH BLD: NORMAL
ABSOLUTE NRBC (AUTO): 0.01 10^3/UL
ALBUMIN SERPL-MCNC: 2 G/DL (ref 3.6–5.1)
ALBUMIN SERPL-MCNC: 2 G/DL (ref 3.6–5.1)
ALBUMIN SERPL-MCNC: 2.1 G/DL (ref 3.6–5.1)
ALBUMIN SERPL-MCNC: 2.2 G/DL (ref 3.6–5.1)
ALBUMIN SERPL-MCNC: 2.2 G/DL (ref 3.6–5.1)
ALBUMIN SERPL-MCNC: 2.3 G/DL (ref 3.6–5.1)
ALBUMIN SERPL-MCNC: 2.3 G/DL (ref 3.6–5.1)
ALBUMIN SERPL-MCNC: 2.7 G/DL (ref 3.6–5.1)
ALBUMIN SERPL-MCNC: 2.9 G/DL (ref 3.6–5.1)
ALBUMIN SERPL-MCNC: 2.9 G/DL (ref 3.6–5.1)
ALBUMIN SERPL-MCNC: 3.3 G/DL (ref 3.45–5.74)
ALBUMIN SERPL-MCNC: 3.3 G/DL (ref 3.6–5.1)
ALBUMIN/GLOB SERPL: 0.6 {RATIO} (ref 1–2.4)
ALBUMIN/GLOB SERPL: 0.7 {RATIO} (ref 1–2.4)
ALBUMIN/GLOB SERPL: 0.8 {RATIO} (ref 1–2.4)
ALBUMIN/GLOB SERPL: 0.9 {RATIO} (ref 1–2.4)
ALBUMIN/GLOB SERPL: 0.9 {RATIO} (ref 1–2.4)
ALBUMIN/GLOB SERPL: 1.1 {RATIO} (ref 1–2.4)
ALBUMIN/GLOB SERPL: 1.5 {RATIO} (ref 1–2.6)
ALP SERPL-CCNC: 49 U/L (ref 34–104)
ALP SERPL-CCNC: 59 UNITS/L (ref 45–117)
ALP SERPL-CCNC: 61 UNITS/L (ref 45–117)
ALP SERPL-CCNC: 68 UNITS/L (ref 45–117)
ALP SERPL-CCNC: 69 UNITS/L (ref 45–117)
ALP SERPL-CCNC: 70 UNITS/L (ref 45–117)
ALP SERPL-CCNC: 73 UNITS/L (ref 45–117)
ALP SERPL-CCNC: 74 UNITS/L (ref 45–117)
ALP SERPL-CCNC: 75 UNITS/L (ref 45–117)
ALP SERPL-CCNC: 77 UNITS/L (ref 45–117)
ALP SERPL-CCNC: 79 UNITS/L (ref 45–117)
ALP SERPL-CCNC: 83 UNITS/L (ref 45–117)
ALT SERPL-CCNC: 11 U/L (ref 7–52)
ALT SERPL-CCNC: 12 UNITS/L
ALT SERPL-CCNC: 12 UNITS/L
ALT SERPL-CCNC: 13 UNITS/L
ALT SERPL-CCNC: 14 UNITS/L
ALT SERPL-CCNC: 16 UNITS/L
ALT SERPL-CCNC: 19 UNITS/L
ALT SERPL-CCNC: 19 UNITS/L
ALT SERPL-CCNC: 20 UNITS/L
ALT SERPL-CCNC: 21 UNITS/L
ALT SERPL-CCNC: 22 UNITS/L
ALT SERPL-CCNC: 22 UNITS/L
ALT SERPL-CCNC: 24 UNITS/L
ALT SERPL-CCNC: 36 UNITS/L
ANION GAP SERPL CALC-SCNC: 10 MMOL/L (ref 7–19)
ANION GAP SERPL CALC-SCNC: 10 MMOL/L (ref 7–19)
ANION GAP SERPL CALC-SCNC: 11 MMOL/L (ref 7–19)
ANION GAP SERPL CALC-SCNC: 11 MMOL/L (ref 7–19)
ANION GAP SERPL CALC-SCNC: 2 MMOL/L (ref 7–19)
ANION GAP SERPL CALC-SCNC: 3 MMOL/L (ref 7–19)
ANION GAP SERPL CALC-SCNC: 3 MMOL/L (ref 7–19)
ANION GAP SERPL CALC-SCNC: 4 MMOL/L (ref 7–19)
ANION GAP SERPL CALC-SCNC: 4 MMOL/L (ref 7–19)
ANION GAP SERPL CALC-SCNC: 5 MMOL/L (ref 7–19)
ANION GAP SERPL CALC-SCNC: 6 MMOL/L (ref 7–19)
ANION GAP SERPL CALC-SCNC: 7 MMOL/L (ref 7–19)
ANION GAP SERPL CALC-SCNC: 8 MMOL/L (ref 7–19)
ANION GAP SERPL CALC-SCNC: 9 MMOL/L (ref 7–19)
AORTIC VALVE AREA (AVA): 0.62
AORTIC VALVE AREA: 2.33
APPEARANCE UR: ABNORMAL
APPEARANCE UR: ABNORMAL
APPEARANCE UR: CLEAR
APTT PPP: 26 SEC (ref 22–30)
APTT PPP: 30 SEC (ref 22–30)
APTT PPP: 33 SEC (ref 22–30)
APTT PPP: 50 SEC (ref 22–30)
ASR DISCLAIMER: NORMAL
AST SERPL-CCNC: 12 UNITS/L
AST SERPL-CCNC: 13 UNITS/L
AST SERPL-CCNC: 14 UNITS/L
AST SERPL-CCNC: 15 UNITS/L
AST SERPL-CCNC: 16 U/L (ref 13–39)
AST SERPL-CCNC: 16 UNITS/L
AST SERPL-CCNC: 17 UNITS/L
AST SERPL-CCNC: 18 UNITS/L
AST SERPL-CCNC: 19 UNITS/L
AST SERPL-CCNC: 19 UNITS/L
AST SERPL-CCNC: 22 UNITS/L
ATRIAL RATE (BPM): 101
ATRIAL RATE (BPM): 107
ATRIAL RATE (BPM): 71
AV MEAN GRADIENT (AVMG): 6
AV MEAN VELOCITY (AVMV): 1.13
AV PEAK GRADIENT (AVPG): 8
AV PEAK VELOCITY (AVPV): 1.44
AV STENOSIS SEVERITY TEXT: NORMAL
BACTERIA #/AREA URNS HPF: ABNORMAL /HPF
BACTERIA BLD CULT: NORMAL
BACTERIA UR CULT: NORMAL
BACTERIA UR CULT: NORMAL
BASE EXCESS / DEFICIT, ARTERIAL - RESPIRATORY: 10 MMOL/L (ref -2–3)
BASE EXCESS / DEFICIT, ARTERIAL - RESPIRATORY: 7 MMOL/L (ref -2–3)
BASE EXCESS / DEFICIT, ARTERIAL - RESPIRATORY: 8 MMOL/L (ref -2–3)
BASE EXCESS / DEFICIT, ARTERIAL - RESPIRATORY: 9 MMOL/L (ref -2–3)
BASO STIPL BLD QL SMEAR: PRESENT
BASO STIPL BLD QL SMEAR: PRESENT
BASOPHILS # BLD: 0 10E3/UL (ref 0–0.29)
BASOPHILS # BLD: 0 K/MCL (ref 0–0.3)
BASOPHILS # BLD: 0.1 K/MCL (ref 0–0.3)
BASOPHILS NFR BLD: 0 %
BASOPHILS NFR BLD: 0.6 % (ref 0–2.99)
BASOPHILS NFR BLD: 1 %
BDY SITE: ABNORMAL
BILIRUB SERPL-MCNC: 0.2 MG/DL (ref 0.2–1)
BILIRUB SERPL-MCNC: 0.3 MG/DL (ref 0.2–1)
BILIRUB SERPL-MCNC: 0.3 MG/DL (ref 0.3–1.2)
BILIRUB SERPL-MCNC: 0.4 MG/DL (ref 0.2–1)
BILIRUB SERPL-MCNC: 0.5 MG/DL (ref 0.2–1)
BILIRUB SERPL-MCNC: 0.7 MG/DL (ref 0.2–1)
BILIRUB SERPL-MCNC: 0.7 MG/DL (ref 0.2–1)
BILIRUB UR QL STRIP: NEGATIVE
BLD GP AB SCN SERPL QL GEL: NEGATIVE
BLOOD EXPIRATION DATE: NORMAL
BODY TEMPERATURE: 37 DEGREES
BUN SERPL-MCNC: 20 MG/DL (ref 6–20)
BUN SERPL-MCNC: 20 MG/DL (ref 6–20)
BUN SERPL-MCNC: 24 MG/DL (ref 6–20)
BUN SERPL-MCNC: 25 MG/DL (ref 6–20)
BUN SERPL-MCNC: 26 MG/DL (ref 7–25)
BUN SERPL-MCNC: 30 MG/DL (ref 6–20)
BUN SERPL-MCNC: 32 MG/DL (ref 6–20)
BUN SERPL-MCNC: 36 MG/DL (ref 6–20)
BUN SERPL-MCNC: 38 MG/DL (ref 6–20)
BUN SERPL-MCNC: 39 MG/DL (ref 6–20)
BUN SERPL-MCNC: 41 MG/DL (ref 6–20)
BUN SERPL-MCNC: 43 MG/DL (ref 6–20)
BUN SERPL-MCNC: 44 MG/DL (ref 6–20)
BUN SERPL-MCNC: 44 MG/DL (ref 6–20)
BUN SERPL-MCNC: 45 MG/DL (ref 6–20)
BUN SERPL-MCNC: 46 MG/DL (ref 6–20)
BUN SERPL-MCNC: 47 MG/DL (ref 6–20)
BUN SERPL-MCNC: 48 MG/DL (ref 6–20)
BUN SERPL-MCNC: 50 MG/DL (ref 6–20)
BUN SERPL-MCNC: 53 MG/DL (ref 6–20)
BUN SERPL-MCNC: 57 MG/DL (ref 6–20)
BUN SERPL-MCNC: 64 MG/DL (ref 6–20)
BUN/CREAT SERPL: 24 (ref 7–25)
BUN/CREAT SERPL: 25 (ref 7–25)
BUN/CREAT SERPL: 26 (ref 7–25)
BUN/CREAT SERPL: 28 (ref 7–25)
BUN/CREAT SERPL: 28 (ref 7–25)
BUN/CREAT SERPL: 29 (ref 7–25)
BUN/CREAT SERPL: 30 (ref 7–25)
BUN/CREAT SERPL: 31 (ref 7–25)
BUN/CREAT SERPL: 32 (ref 7–25)
BUN/CREAT SERPL: 32.5 (ref 8–25)
BUN/CREAT SERPL: 34 (ref 7–25)
BUN/CREAT SERPL: 36 (ref 7–25)
BUN/CREAT SERPL: 37 (ref 7–25)
BUN/CREAT SERPL: 38 (ref 7–25)
BUN/CREAT SERPL: 40 (ref 7–25)
BUN/CREAT SERPL: 44 (ref 7–25)
BUN/CREAT SERPL: 48 (ref 7–25)
BUN/CREAT SERPL: 52 (ref 7–25)
BUN/CREAT SERPL: 53 (ref 7–25)
BUN/CREAT SERPL: 54 (ref 7–25)
BUN/CREAT SERPL: 57 (ref 7–25)
BUN/CREAT SERPL: 65 (ref 7–25)
CA-I BLD-SCNC: 1.4 MMOL/L (ref 1.15–1.29)
CALCIUM SERPL-MCNC: 10.2 MG/DL (ref 8.4–10.2)
CALCIUM SERPL-MCNC: 8.4 MG/DL (ref 8.4–10.2)
CALCIUM SERPL-MCNC: 8.5 MG/DL (ref 8.4–10.2)
CALCIUM SERPL-MCNC: 8.7 MG/DL (ref 8.4–10.2)
CALCIUM SERPL-MCNC: 8.9 MG/DL (ref 8.4–10.2)
CALCIUM SERPL-MCNC: 9 MG/DL (ref 8.4–10.2)
CALCIUM SERPL-MCNC: 9 MG/DL (ref 8.4–10.2)
CALCIUM SERPL-MCNC: 9.1 MG/DL (ref 8.4–10.2)
CALCIUM SERPL-MCNC: 9.2 MG/DL (ref 8.4–10.2)
CALCIUM SERPL-MCNC: 9.3 MG/DL (ref 8.4–10.2)
CALCIUM SERPL-MCNC: 9.4 MG/DL (ref 8.4–10.2)
CALCIUM SERPL-MCNC: 9.5 MG/DL
CALCIUM SERPL-MCNC: 9.5 MG/DL (ref 8.4–10.2)
CALCIUM SERPL-MCNC: 9.5 MG/DL (ref 8.4–10.2)
CALCIUM SERPL-MCNC: 9.8 MG/DL (ref 8.4–10.2)
CASE RPRT: NORMAL
CHLORIDE BLD-SCNC: 104 MMOL/L (ref 97–110)
CHLORIDE SERPL-SCNC: 100 MMOL/L (ref 97–110)
CHLORIDE SERPL-SCNC: 101 MMOL/L (ref 97–110)
CHLORIDE SERPL-SCNC: 102 MMOL/L (ref 97–110)
CHLORIDE SERPL-SCNC: 102 MMOL/L (ref 97–110)
CHLORIDE SERPL-SCNC: 103 MMOL/L (ref 97–110)
CHLORIDE SERPL-SCNC: 103 MMOL/L (ref 97–110)
CHLORIDE SERPL-SCNC: 104 MMOL/L (ref 97–110)
CHLORIDE SERPL-SCNC: 106 MMOL/L (ref 97–110)
CHLORIDE SERPL-SCNC: 106 MMOL/L (ref 97–110)
CHLORIDE SERPL-SCNC: 92.89 MEQ/L
CHLORIDE SERPL-SCNC: 93 MMOL/L (ref 97–110)
CHLORIDE SERPL-SCNC: 93 MMOL/L (ref 97–110)
CHLORIDE SERPL-SCNC: 95 MMOL/L (ref 97–110)
CHLORIDE SERPL-SCNC: 96 MMOL/L (ref 97–110)
CHLORIDE SERPL-SCNC: 98 MMOL/L (ref 97–110)
CHLORIDE SERPL-SCNC: 99 MMOL/L (ref 97–110)
CHOLEST SERPL-MCNC: 156 MG/DL (ref 125–200)
CLINICAL INFO: NORMAL
CO2 SERPL-SCNC: 33 MMOL/L (ref 21–32)
CO2 SERPL-SCNC: 34 MMOL/L (ref 21–32)
CO2 SERPL-SCNC: 34 MMOL/L (ref 21–32)
CO2 SERPL-SCNC: 35 MMOL/L (ref 21–32)
CO2 SERPL-SCNC: 36 MMOL/L (ref 21–32)
CO2 SERPL-SCNC: 36.2 MEQ/L (ref 22–29.9)
CO2 SERPL-SCNC: 37 MMOL/L (ref 21–32)
CO2 SERPL-SCNC: 38 MMOL/L (ref 21–32)
CO2 SERPL-SCNC: 39 MMOL/L (ref 21–32)
CO2 SERPL-SCNC: 40 MMOL/L (ref 21–32)
CO2 SERPL-SCNC: 40 MMOL/L (ref 21–32)
COHGB MFR BLDV: 1.9 %
COHGB MFR BLDV: 2 %
COHGB MFR BLDV: 2.1 %
COHGB MFR BLDV: 2.2 %
COLOR UR: ABNORMAL
COLOR UR: YELLOW
COLOR UR: YELLOW
CONDITION: ABNORMAL
CREAT SERPL-MCNC: 0.75 MG/DL (ref 0.51–0.95)
CREAT SERPL-MCNC: 0.77 MG/DL (ref 0.51–0.95)
CREAT SERPL-MCNC: 0.79 MG/DL (ref 0.51–0.95)
CREAT SERPL-MCNC: 0.8 MG/DL (ref 0.51–0.95)
CREAT SERPL-MCNC: 0.8 MG/DL (ref 0.51–0.95)
CREAT SERPL-MCNC: 0.8 MG/DL (ref 0.8–1.39)
CREAT SERPL-MCNC: 0.81 MG/DL (ref 0.51–0.95)
CREAT SERPL-MCNC: 0.86 MG/DL (ref 0.51–0.95)
CREAT SERPL-MCNC: 0.87 MG/DL (ref 0.51–0.95)
CREAT SERPL-MCNC: 0.88 MG/DL (ref 0.51–0.95)
CREAT SERPL-MCNC: 0.89 MG/DL (ref 0.51–0.95)
CREAT SERPL-MCNC: 0.93 MG/DL (ref 0.51–0.95)
CREAT SERPL-MCNC: 0.98 MG/DL (ref 0.51–0.95)
CREAT SERPL-MCNC: 1.21 MG/DL (ref 0.51–0.95)
CREAT SERPL-MCNC: 1.26 MG/DL (ref 0.51–0.95)
CREAT SERPL-MCNC: 1.36 MG/DL (ref 0.51–0.95)
CREAT SERPL-MCNC: 1.36 MG/DL (ref 0.51–0.95)
CREAT SERPL-MCNC: 1.45 MG/DL (ref 0.51–0.95)
CREAT SERPL-MCNC: 1.46 MG/DL (ref 0.51–0.95)
CREAT SERPL-MCNC: 1.58 MG/DL (ref 0.51–0.95)
CREAT SERPL-MCNC: 1.98 MG/DL (ref 0.51–0.95)
CREAT SERPL-MCNC: 2.22 MG/DL (ref 0.51–0.95)
CREAT UR-MCNC: 207.5 MG/DL
CREAT UR-MCNC: 34.6 MG/DL
CROSSMATCH RESULT: NORMAL
DEPRECATED RDW RBC: 43.6 FL (ref 39–50)
DEPRECATED RDW RBC: 44.9 FL (ref 39–50)
DEPRECATED RDW RBC: 45.1 FL (ref 39–50)
DEPRECATED RDW RBC: 45.4 FL (ref 39–50)
DEPRECATED RDW RBC: 45.5 FL (ref 39–50)
DEPRECATED RDW RBC: 45.6 FL (ref 39–50)
DEPRECATED RDW RBC: 45.8 FL (ref 39–50)
DEPRECATED RDW RBC: 45.9 FL (ref 39–50)
DEPRECATED RDW RBC: 46.1 FL (ref 39–50)
DEPRECATED RDW RBC: 46.3 FL (ref 39–50)
DEPRECATED RDW RBC: 51.6 FL (ref 39–50)
DEPRECATED RDW RBC: 52.5 FL (ref 39–50)
DEPRECATED RDW RBC: 54 FL (ref 39–50)
DEPRECATED RDW RBC: 54.6 FL (ref 39–50)
DEPRECATED RDW RBC: 54.8 FL (ref 39–50)
DEPRECATED RDW RBC: 56.9 FL (ref 39–50)
DEPRECATED RDW RBC: 57.5 FL (ref 39–50)
DEPRECATED RDW RBC: 58.4 FL (ref 39–50)
DEPRECATED RDW RBC: 58.5 FL (ref 39–50)
DEPRECATED RDW RBC: 58.8 FL (ref 39–50)
DISPENSE STATUS: NORMAL
E WAVE DECELARATION TIME (MDT): 10.15
EOSINOPHIL # BLD: 0 K/MCL (ref 0–0.5)
EOSINOPHIL # BLD: 0.1 10E3/UL (ref 0–0.6)
EOSINOPHIL # BLD: 0.1 K/MCL (ref 0–0.5)
EOSINOPHIL # BLD: 0.1 K/MCL (ref 0–0.5)
EOSINOPHIL NFR BLD: 0 %
EOSINOPHIL NFR BLD: 1 %
EOSINOPHIL NFR BLD: 1 %
EOSINOPHIL NFR BLD: 1.2 % (ref 0–5.99)
ERYTHROCYTE [DISTWIDTH] IN BLOOD: 12.7 % (ref 11–15)
ERYTHROCYTE [DISTWIDTH] IN BLOOD: 12.7 % (ref 11–15)
ERYTHROCYTE [DISTWIDTH] IN BLOOD: 12.8 % (ref 11–15)
ERYTHROCYTE [DISTWIDTH] IN BLOOD: 12.9 % (ref 11–15)
ERYTHROCYTE [DISTWIDTH] IN BLOOD: 13 % (ref 11–15)
ERYTHROCYTE [DISTWIDTH] IN BLOOD: 13.1 % (ref 11–15)
ERYTHROCYTE [DISTWIDTH] IN BLOOD: 14 %
ERYTHROCYTE [DISTWIDTH] IN BLOOD: 14.5 % (ref 11–15)
ERYTHROCYTE [DISTWIDTH] IN BLOOD: 15.6 % (ref 11–15)
ERYTHROCYTE [DISTWIDTH] IN BLOOD: 15.7 % (ref 11–15)
ERYTHROCYTE [DISTWIDTH] IN BLOOD: 16.1 % (ref 11–15)
ERYTHROCYTE [DISTWIDTH] IN BLOOD: 16.4 % (ref 11–15)
ERYTHROCYTE [DISTWIDTH] IN BLOOD: 16.4 % (ref 11–15)
ERYTHROCYTE [DISTWIDTH] IN BLOOD: 16.7 % (ref 11–15)
ERYTHROCYTE [DISTWIDTH] IN BLOOD: 16.7 % (ref 11–15)
ERYTHROCYTE [DISTWIDTH] IN BLOOD: 16.9 % (ref 11–15)
ERYTHROCYTE [DISTWIDTH] IN BLOOD: 17.1 % (ref 11–15)
FASTING DURATION TIME PATIENT: ABNORMAL H
FIO2 ON VENT: 36 %
FIO2 ON VENT: 40 %
FIO2 ON VENT: 60 %
FLUAV RNA RESP QL NAA+PROBE: NOT DETECTED
FLUBV RNA RESP QL NAA+PROBE: NOT DETECTED
GFR SERPLBLD BASED ON 1.73 SQ M-ARVRAT: 21 ML/MIN
GFR SERPLBLD BASED ON 1.73 SQ M-ARVRAT: 24 ML/MIN
GFR SERPLBLD BASED ON 1.73 SQ M-ARVRAT: 31 ML/MIN
GFR SERPLBLD BASED ON 1.73 SQ M-ARVRAT: 34 ML/MIN
GFR SERPLBLD BASED ON 1.73 SQ M-ARVRAT: 34 ML/MIN
GFR SERPLBLD BASED ON 1.73 SQ M-ARVRAT: 37 ML/MIN
GFR SERPLBLD BASED ON 1.73 SQ M-ARVRAT: 37 ML/MIN
GFR SERPLBLD BASED ON 1.73 SQ M-ARVRAT: 41 ML/MIN
GFR SERPLBLD BASED ON 1.73 SQ M-ARVRAT: 43 ML/MIN
GFR SERPLBLD BASED ON 1.73 SQ M-ARVRAT: 55 ML/MIN
GFR SERPLBLD BASED ON 1.73 SQ M-ARVRAT: 59 ML/MIN
GFR SERPLBLD BASED ON 1.73 SQ M-ARVRAT: 62 ML/MIN
GFR SERPLBLD BASED ON 1.73 SQ M-ARVRAT: 63 ML/MIN
GFR SERPLBLD BASED ON 1.73 SQ M-ARVRAT: 64 ML/MIN
GFR SERPLBLD BASED ON 1.73 SQ M-ARVRAT: 65 ML/MIN
GFR SERPLBLD BASED ON 1.73 SQ M-ARVRAT: 69 ML/MIN
GFR SERPLBLD BASED ON 1.73 SQ M-ARVRAT: 70 ML/MIN
GFR SERPLBLD BASED ON 1.73 SQ M-ARVRAT: 70 ML/MIN
GFR SERPLBLD BASED ON 1.73 SQ M-ARVRAT: 71 ML/MIN
GFR SERPLBLD BASED ON 1.73 SQ M-ARVRAT: 74 ML/MIN
GFR SERPLBLD BASED ON 1.73 SQ M-ARVRAT: 76 ML/MIN
GLOBULIN SER-MCNC: 2.2 G/DL (ref 1.6–4)
GLOBULIN SER-MCNC: 3.1 G/DL (ref 2–4)
GLOBULIN SER-MCNC: 3.3 G/DL (ref 2–4)
GLOBULIN SER-MCNC: 3.3 G/DL (ref 2–4)
GLOBULIN SER-MCNC: 3.4 G/DL (ref 2–4)
GLOBULIN SER-MCNC: 3.5 G/DL (ref 2–4)
GLOBULIN SER-MCNC: 3.6 G/DL (ref 2–4)
GLOBULIN SER-MCNC: 3.8 G/DL (ref 2–4)
GLUCOSE BLD-MCNC: 103 MG/DL (ref 65–99)
GLUCOSE SERPL-MCNC: 100 MG/DL (ref 70–99)
GLUCOSE SERPL-MCNC: 104 MG/DL (ref 70–99)
GLUCOSE SERPL-MCNC: 104 MG/DL (ref 70–99)
GLUCOSE SERPL-MCNC: 106 MG/DL (ref 70–99)
GLUCOSE SERPL-MCNC: 109 MG/DL (ref 70–99)
GLUCOSE SERPL-MCNC: 111 MG/DL (ref 70–99)
GLUCOSE SERPL-MCNC: 125 MG/DL (ref 70–99)
GLUCOSE SERPL-MCNC: 126 MG/DL (ref 70–99)
GLUCOSE SERPL-MCNC: 127 MG/DL (ref 70–99)
GLUCOSE SERPL-MCNC: 128 MG/DL (ref 70–99)
GLUCOSE SERPL-MCNC: 129 MG/DL (ref 70–99)
GLUCOSE SERPL-MCNC: 131 MG/DL (ref 70–99)
GLUCOSE SERPL-MCNC: 132 MG/DL (ref 70–99)
GLUCOSE SERPL-MCNC: 139 MG/DL (ref 70–99)
GLUCOSE SERPL-MCNC: 147 MG/DL (ref 70–99)
GLUCOSE SERPL-MCNC: 156 MG/DL (ref 70–99)
GLUCOSE SERPL-MCNC: 157 MG/DL (ref 70–99)
GLUCOSE SERPL-MCNC: 167 MG/DL (ref 70–99)
GLUCOSE SERPL-MCNC: 85 MG/DL (ref 70–105)
GLUCOSE SERPL-MCNC: 87 MG/DL (ref 70–99)
GLUCOSE SERPL-MCNC: 90 MG/DL (ref 70–99)
GLUCOSE SERPL-MCNC: 93 MG/DL (ref 70–99)
GLUCOSE SERPL-MCNC: 96 MG/DL (ref 70–99)
GLUCOSE SERPL-MCNC: 99 MG/DL (ref 70–99)
GLUCOSE UR STRIP-MCNC: NEGATIVE MG/DL
HCO3 BLDA-SCNC: 35 MMOL/L (ref 22–28)
HCO3 BLDA-SCNC: 36 MMOL/L (ref 22–28)
HCO3 BLDA-SCNC: 37 MMOL/L (ref 22–28)
HCO3 BLDA-SCNC: 37 MMOL/L (ref 22–28)
HCT VFR BLD CALC: 18.5 % (ref 36–46.5)
HCT VFR BLD CALC: 24.5 % (ref 36–46.5)
HCT VFR BLD CALC: 24.5 % (ref 36–46.5)
HCT VFR BLD CALC: 24.7 % (ref 36–46.5)
HCT VFR BLD CALC: 24.8 % (ref 36–46.5)
HCT VFR BLD CALC: 24.9 % (ref 36–46.5)
HCT VFR BLD CALC: 25.1 % (ref 36–46.5)
HCT VFR BLD CALC: 25.1 % (ref 36–46.5)
HCT VFR BLD CALC: 25.3 %
HCT VFR BLD CALC: 25.3 % (ref 36–46.5)
HCT VFR BLD CALC: 25.8 % (ref 36–46.5)
HCT VFR BLD CALC: 26.3 % (ref 36–46.5)
HCT VFR BLD CALC: 26.6 % (ref 36–46.5)
HCT VFR BLD CALC: 26.7 % (ref 36–46.5)
HCT VFR BLD CALC: 27 % (ref 36–46.5)
HCT VFR BLD CALC: 27.1 % (ref 36–46.5)
HCT VFR BLD CALC: 27.4 % (ref 36–46.5)
HCT VFR BLD CALC: 27.8 % (ref 36–46.5)
HCT VFR BLD CALC: 27.8 % (ref 36–46.5)
HCT VFR BLD CALC: 27.9 % (ref 36–46.5)
HCT VFR BLD CALC: 28.2 % (ref 36–46.5)
HCT VFR BLD CALC: 28.4 % (ref 36–46.5)
HCT VFR BLD CALC: 28.5 % (ref 36–46.5)
HCT VFR BLD CALC: 28.6 % (ref 36–46.5)
HCT VFR BLD CALC: 29.6 % (ref 36–46.5)
HCT VFR BLD CALC: 29.7 % (ref 36–46.5)
HCT VFR BLD CALC: 30.3 % (ref 36–46.5)
HCT VFR BLD CALC: 30.8 % (ref 36–46.5)
HCT VFR BLD CALC: 30.8 % (ref 36–46.5)
HCT VFR BLD CALC: 31.1 % (ref 36–46.5)
HCT VFR BLD CALC: 31.5 % (ref 36–46.5)
HCT VFR BLD CALC: 32.5 % (ref 36–46.5)
HCT VFR BLD CALC: 34.1 % (ref 36–46.5)
HCT VFR BLD CALC: 34.8 % (ref 36–46.5)
HDLC SERPL-MCNC: 62.4 MG/DL (ref 46–60)
HGB BLD-MCNC: 10.2 G/DL (ref 12–15.5)
HGB BLD-MCNC: 10.7 G/DL (ref 12–15.5)
HGB BLD-MCNC: 5.6 G/DL (ref 12–15.5)
HGB BLD-MCNC: 7.5 G/DL (ref 12–15.5)
HGB BLD-MCNC: 7.5 G/DL (ref 12–15.5)
HGB BLD-MCNC: 7.7 G/DL (ref 12–15.5)
HGB BLD-MCNC: 7.7 G/DL (ref 12–15.5)
HGB BLD-MCNC: 7.8 G/DL (ref 12–15.5)
HGB BLD-MCNC: 8 G/DL
HGB BLD-MCNC: 8 G/DL (ref 12–15.5)
HGB BLD-MCNC: 8.2 G/DL (ref 12–15.5)
HGB BLD-MCNC: 8.3 G/DL (ref 12–15.5)
HGB BLD-MCNC: 8.3 G/DL (ref 12–15.5)
HGB BLD-MCNC: 8.4 G/DL (ref 12–15.5)
HGB BLD-MCNC: 8.5 G/DL (ref 12–15.5)
HGB BLD-MCNC: 8.6 G/DL (ref 12–15.5)
HGB BLD-MCNC: 8.7 G/DL (ref 12–15.5)
HGB BLD-MCNC: 8.8 G/DL (ref 12–15.5)
HGB BLD-MCNC: 9 G/DL (ref 12–15.5)
HGB BLD-MCNC: 9.1 G/DL (ref 12–15.5)
HGB BLD-MCNC: 9.2 G/DL (ref 12–15.5)
HGB BLD-MCNC: 9.3 G/DL (ref 12–15.5)
HGB BLD-MCNC: 9.3 G/DL (ref 12–15.5)
HGB BLD-MCNC: 9.4 G/DL (ref 12–15.5)
HGB BLD-MCNC: 9.5 G/DL (ref 12–15.5)
HGB BLD-MCNC: 9.5 G/DL (ref 12–15.5)
HGB BLD-MCNC: 9.6 G/DL (ref 12–15.5)
HGB BLD-MCNC: 9.9 G/DL (ref 12–15.5)
HGB BLD-MCNC: 9.9 G/DL (ref 12–15.5)
HGB UR QL STRIP: NEGATIVE
HYALINE CASTS #/AREA URNS LPF: ABNORMAL /LPF
HYPOCHROMIA BLD QL SMEAR: ABNORMAL
HYPOCHROMIA BLD QL SMEAR: ABNORMAL
IMM GRANULOCYTES # BLD AUTO: 0 K/MCL (ref 0–0.2)
IMM GRANULOCYTES # BLD AUTO: 0.1 K/MCL (ref 0–0.2)
IMM GRANULOCYTES # BLD AUTO: 0.2 K/MCL (ref 0–0.2)
IMM GRANULOCYTES # BLD AUTO: 0.4 K/MCL (ref 0–0.2)
IMM GRANULOCYTES # BLD: 0 %
IMM GRANULOCYTES # BLD: 1 %
IMM GRANULOCYTES # BLD: 2 %
INR PPP: 1.1
INR PPP: 1.1
INR PPP: 1.2
INR PPP: 1.3
INR PPP: 1.5
INR PPP: 1.8
INR PPP: 1.8
INR PPP: 1.91 RATIO (ref 0.8–1.29)
INR PPP: 1.92 (ref 2–3)
INR PPP: 13.2
INR PPP: 2
INR PPP: 2
INR PPP: 2 (ref 2–3)
INR PPP: 2.1
INR PPP: 2.1 (ref 2–3)
INR PPP: 2.2
INR PPP: 2.2 (ref 2–3)
INR PPP: 2.4
INR PPP: 2.5
INR PPP: 2.7
INR PPP: 3.2
INR PPP: 3.5
INR PPP: 3.6 (ref 2–3)
INR PPP: 4.6 (ref 2–3)
INTERVENTRICULAR SEPTUM IN END DIASTOLE (IVSD): 2.38
IRON SATN MFR SERPL: 6 % (ref 15–45)
IRON SERPL-MCNC: 16 MCG/DL (ref 50–170)
ISBT BLOOD TYPE: 7300
ISSUE DATE/TIME: NORMAL
KETONES UR STRIP-MCNC: NEGATIVE MG/DL
LACTATE BLDA-SCNC: 0.5 MMOL/L
LACTATE BLDV-SCNC: 1.4 MMOL/L (ref 0–2)
LDLC SERPL CALC-MCNC: 81 MG/DL (ref 0–130)
LENGTH OF FAST TIME PATIENT: ABNORMAL H
LENGTH OF FAST TIME PATIENT: ABNORMAL H
LEUKOCYTE ESTERASE UR QL STRIP: ABNORMAL
LV EF: NORMAL %
LVOT 2D (LVOTD): 19.4
LVOT VTI (LVOTVTI): 1.04
LYMPHOCYTES # BLD: 0.3 K/MCL (ref 1–4)
LYMPHOCYTES # BLD: 0.4 K/MCL (ref 1–4)
LYMPHOCYTES # BLD: 0.4 K/MCL (ref 1–4)
LYMPHOCYTES # BLD: 0.5 K/MCL (ref 1–4)
LYMPHOCYTES # BLD: 0.6 K/MCL (ref 1–4)
LYMPHOCYTES # BLD: 0.7 10E3/UL (ref 1.3–4.09)
LYMPHOCYTES # BLD: 0.7 K/MCL (ref 1–4)
LYMPHOCYTES # BLD: 0.8 K/MCL (ref 1–4)
LYMPHOCYTES # BLD: 0.8 K/MCL (ref 1–4)
LYMPHOCYTES # BLD: 1 K/MCL (ref 1–4)
LYMPHOCYTES # BLD: 1.2 K/MCL (ref 1–4)
LYMPHOCYTES # BLD: 1.4 K/MCL (ref 1–4)
LYMPHOCYTES NFR BLD: 10 %
LYMPHOCYTES NFR BLD: 10.1 %
LYMPHOCYTES NFR BLD: 11 %
LYMPHOCYTES NFR BLD: 2 %
LYMPHOCYTES NFR BLD: 4 %
LYMPHOCYTES NFR BLD: 5 %
LYMPHOCYTES NFR BLD: 7 %
LYMPHOCYTES NFR BLD: 8 %
LYMPHOCYTES NFR BLD: 9 %
MACROCYTES BLD QL SMEAR: ABNORMAL
MAGNESIUM SERPL-MCNC: 1.5 MG/DL (ref 1.7–2.4)
MAGNESIUM SERPL-MCNC: 1.7 MG/DL (ref 1.7–2.4)
MAGNESIUM SERPL-MCNC: 1.8 MG/DL (ref 1.7–2.4)
MAGNESIUM SERPL-MCNC: 2.1 MG/DL (ref 1.7–2.4)
MAGNESIUM SERPL-MCNC: 2.2 MG/DL (ref 1.7–2.4)
MCH RBC QN AUTO: 28.4 PG (ref 26–34)
MCH RBC QN AUTO: 28.8 PG (ref 26–34)
MCH RBC QN AUTO: 29 PG (ref 26–34)
MCH RBC QN AUTO: 29.2 PG (ref 26–34)
MCH RBC QN AUTO: 29.3 PG (ref 26–34)
MCH RBC QN AUTO: 29.4 PG (ref 26–34)
MCH RBC QN AUTO: 29.5 PG (ref 26–34)
MCH RBC QN AUTO: 29.6 PG (ref 26–34)
MCH RBC QN AUTO: 29.6 PG (ref 29–33.9)
MCH RBC QN AUTO: 29.8 PG (ref 26–34)
MCH RBC QN AUTO: 29.9 PG (ref 26–34)
MCH RBC QN AUTO: 30 PG (ref 26–34)
MCH RBC QN AUTO: 30.1 PG (ref 26–34)
MCH RBC QN AUTO: 30.1 PG (ref 26–34)
MCH RBC QN AUTO: 30.3 PG (ref 26–34)
MCH RBC QN AUTO: 30.3 PG (ref 26–34)
MCH RBC QN AUTO: 30.4 PG (ref 26–34)
MCH RBC QN AUTO: 30.5 PG (ref 26–34)
MCH RBC QN AUTO: 30.9 PG (ref 26–34)
MCHC RBC AUTO-ENTMCNC: 29 G/DL (ref 32–36.5)
MCHC RBC AUTO-ENTMCNC: 29.9 G/DL (ref 32–36.5)
MCHC RBC AUTO-ENTMCNC: 30.2 G/DL (ref 32–36.5)
MCHC RBC AUTO-ENTMCNC: 30.3 G/DL (ref 32–36.5)
MCHC RBC AUTO-ENTMCNC: 30.4 G/DL (ref 32–36.5)
MCHC RBC AUTO-ENTMCNC: 30.5 G/DL (ref 32–36.5)
MCHC RBC AUTO-ENTMCNC: 30.7 G/DL (ref 32–36.5)
MCHC RBC AUTO-ENTMCNC: 30.7 G/DL (ref 32–36.5)
MCHC RBC AUTO-ENTMCNC: 30.8 G/DL (ref 32–36.5)
MCHC RBC AUTO-ENTMCNC: 30.8 G/DL (ref 32–36.5)
MCHC RBC AUTO-ENTMCNC: 31.1 G/DL (ref 32–36.5)
MCHC RBC AUTO-ENTMCNC: 31.1 G/DL (ref 32–36.5)
MCHC RBC AUTO-ENTMCNC: 31.3 G/DL (ref 32–36.5)
MCHC RBC AUTO-ENTMCNC: 31.4 G/DL (ref 32–36.5)
MCHC RBC AUTO-ENTMCNC: 31.7 % (ref 32–37)
MCHC RBC AUTO-ENTMCNC: 31.7 G/DL (ref 32–36.5)
MCHC RBC AUTO-ENTMCNC: 31.8 G/DL (ref 32–36.5)
MCHC RBC AUTO-ENTMCNC: 32.1 G/DL (ref 32–36.5)
MCV RBC AUTO: 100.4 FL (ref 78–100)
MCV RBC AUTO: 100.9 FL (ref 78–100)
MCV RBC AUTO: 101.4 FL (ref 78–100)
MCV RBC AUTO: 93.2 FL (ref 80–99)
MCV RBC AUTO: 93.8 FL (ref 78–100)
MCV RBC AUTO: 93.9 FL (ref 78–100)
MCV RBC AUTO: 94.3 FL (ref 78–100)
MCV RBC AUTO: 94.6 FL (ref 78–100)
MCV RBC AUTO: 94.6 FL (ref 78–100)
MCV RBC AUTO: 95.3 FL (ref 78–100)
MCV RBC AUTO: 95.7 FL (ref 78–100)
MCV RBC AUTO: 95.7 FL (ref 78–100)
MCV RBC AUTO: 96.7 FL (ref 78–100)
MCV RBC AUTO: 96.8 FL (ref 78–100)
MCV RBC AUTO: 96.9 FL (ref 78–100)
MCV RBC AUTO: 96.9 FL (ref 78–100)
MCV RBC AUTO: 97.3 FL (ref 78–100)
MCV RBC AUTO: 97.5 FL (ref 78–100)
MCV RBC AUTO: 97.8 FL (ref 78–100)
MCV RBC AUTO: 99 FL (ref 78–100)
MCV RBC AUTO: 99.2 FL (ref 78–100)
METAMYELOCYTES NFR BLD: 1 % (ref 0–2)
METAMYELOCYTES NFR BLD: 1 % (ref 0–2)
METHGB MFR BLDMV: 0.5 %
METHGB MFR BLDMV: 0.5 %
METHGB MFR BLDMV: 0.7 %
METHGB MFR BLDMV: 0.9 %
MICROALBUMIN UR-MCNC: 1.17 MG/DL
MICROALBUMIN/CREAT UR: 33.8 MG/G
MONOCYTES # BLD: 0.1 K/MCL (ref 0.3–0.9)
MONOCYTES # BLD: 0.3 K/MCL (ref 0.3–0.9)
MONOCYTES # BLD: 0.4 K/MCL (ref 0.3–0.9)
MONOCYTES # BLD: 0.5 K/MCL (ref 0.3–0.9)
MONOCYTES # BLD: 0.6 K/MCL (ref 0.3–0.9)
MONOCYTES # BLD: 0.7 10E3/UL (ref 0.1–1)
MONOCYTES # BLD: 1 K/MCL (ref 0.3–0.9)
MONOCYTES # BLD: 1.5 K/MCL (ref 0.3–0.9)
MONOCYTES # BLD: 1.5 K/MCL (ref 0.3–0.9)
MONOCYTES # BLD: 1.8 K/MCL (ref 0.3–0.9)
MONOCYTES NFR BLD: 1 %
MONOCYTES NFR BLD: 10 %
MONOCYTES NFR BLD: 10 %
MONOCYTES NFR BLD: 11 %
MONOCYTES NFR BLD: 2 %
MONOCYTES NFR BLD: 3 %
MONOCYTES NFR BLD: 4 %
MONOCYTES NFR BLD: 5 %
MONOCYTES NFR BLD: 6 %
MONOCYTES NFR BLD: 7 %
MONOCYTES NFR BLD: 7 %
MONOCYTES NFR BLD: 9 %
MONOCYTES NFR BLD: 9.7 % (ref 2–10.99)
MPV (OFFPRE2): 11.9 FL (ref 0–10.8)
MUCOUS THREADS URNS QL MICRO: PRESENT
MV E TISSUE VEL MED (MESV): 11.3
MV E WAVE VEL/E TISSUE VEL MED(MSR): 6.09
MV PEAK A VELOCITY (MVPAV): 335
MV PEAK E VELOCITY (MVPEV): 1.18
MYELOCYTES # BLD MANUAL: 2 %
MYELOCYTES # BLD MANUAL: 3 %
NEUTROPHILS # BLD: 10.5 K/MCL (ref 1.8–7.7)
NEUTROPHILS # BLD: 10.7 K/MCL (ref 1.8–7.7)
NEUTROPHILS # BLD: 11 K/MCL (ref 1.8–7.7)
NEUTROPHILS # BLD: 11 K/MCL (ref 1.8–7.7)
NEUTROPHILS # BLD: 11.1 K/MCL (ref 1.8–7.7)
NEUTROPHILS # BLD: 11.9 K/MCL (ref 1.8–7.7)
NEUTROPHILS # BLD: 12 K/MCL (ref 1.8–7.7)
NEUTROPHILS # BLD: 12.4 K/MCL (ref 1.8–7.7)
NEUTROPHILS # BLD: 12.4 K/MCL (ref 1.8–7.7)
NEUTROPHILS # BLD: 12.5 K/MCL (ref 1.8–7.7)
NEUTROPHILS # BLD: 13.1 K/MCL (ref 1.8–7.7)
NEUTROPHILS # BLD: 14.3 K/MCL (ref 1.8–7.7)
NEUTROPHILS # BLD: 15.3 K/MCL (ref 1.8–7.7)
NEUTROPHILS # BLD: 16.3 K/MCL (ref 1.8–7.7)
NEUTROPHILS # BLD: 24.9 K/MCL (ref 1.8–7.7)
NEUTROPHILS # BLD: 5.5 K/MCL (ref 1.8–7.7)
NEUTROPHILS # BLD: 5.6 10E3/UL (ref 1.8–7.9)
NEUTROPHILS # BLD: 6.5 K/MCL (ref 1.8–7.7)
NEUTROPHILS # BLD: 7.1 K/MCL (ref 1.8–7.7)
NEUTROPHILS NFR BLD: 78 %
NEUTROPHILS NFR BLD: 78.4 %
NEUTROPHILS NFR BLD: 80 %
NEUTROPHILS NFR BLD: 86 %
NEUTROPHILS NFR BLD: 91 %
NEUTROPHILS NFR BLD: 92 %
NEUTROPHILS NFR BLD: 92 %
NEUTROPHILS NFR BLD: 93 %
NEUTROPHILS NFR BLD: 93 %
NEUTROPHILS NFR BLD: 94 %
NEUTROPHILS NFR BLD: 94 %
NEUTROPHILS NFR BLD: 95 %
NEUTS BAND NFR BLD: 1 % (ref 0–10)
NEUTS BAND NFR BLD: 3 % (ref 0–10)
NEUTS SEG NFR BLD: 77 %
NEUTS SEG NFR BLD: 82 %
NEUTS SEG NFR BLD: 85 %
NEUTS SEG NFR BLD: 85 %
NEUTS SEG NFR BLD: 88 %
NITRITE UR QL STRIP: NEGATIVE
NITRITE UR QL STRIP: NEGATIVE
NITRITE UR QL STRIP: POSITIVE
NRBC BLD MANUAL-RTO: 0 /100 WBC
NRBC BLD MANUAL-RTO: 0.1 /100WBC (ref 0–0.9)
NRBC BLD MANUAL-RTO: 1 /100 WBC
NRBC BLD MANUAL-RTO: 2 /100 WBC
NT-PROBNP SERPL-MCNC: 227 PG/ML
NT-PROBNP SERPL-MCNC: 2812 PG/ML
NT-PROBNP SERPL-MCNC: 975 PG/ML
OXYHGB MFR BLDA: 86.1 % (ref 94–98)
OXYHGB MFR BLDA: 90.8 % (ref 94–98)
OXYHGB MFR BLDA: 94.9 % (ref 94–98)
OXYHGB MFR BLDA: 96.7 % (ref 94–98)
P AXIS (DEGREES): 42
P AXIS (DEGREES): 48
P AXIS (DEGREES): 49
PATH REPORT.FINAL DX SPEC: NORMAL
PATH REPORT.GROSS SPEC: NORMAL
PCO2 BLDA: 100 MM HG (ref 32–45)
PCO2 BLDA: 50 MM HG (ref 32–45)
PCO2 BLDA: 60 MM HG (ref 32–45)
PCO2 BLDA: 73 MM HG (ref 32–45)
PH BLDA: 7.18 UNITS (ref 7.35–7.45)
PH BLDA: 7.31 UNITS (ref 7.35–7.45)
PH BLDA: 7.37 UNITS (ref 7.35–7.45)
PH BLDA: 7.46 UNITS (ref 7.35–7.45)
PH UR STRIP: 5 [PH] (ref 5–7)
PH UR STRIP: 5.5 [PH] (ref 5–7)
PH UR STRIP: 5.5 [PH] (ref 5–7)
PHOSPHATE SERPL-MCNC: 3.8 MG/DL (ref 2.4–4.7)
PHOSPHATE SERPL-MCNC: 4.2 MG/DL (ref 2.4–4.7)
PHOSPHATE SERPL-MCNC: 4.9 MG/DL (ref 2.4–4.7)
PLAT MORPH BLD: NORMAL
PLATELET # BLD AUTO: 213 K/MCL (ref 140–450)
PLATELET # BLD AUTO: 271 K/MCL (ref 140–450)
PLATELET # BLD AUTO: 272 K/MCL (ref 140–450)
PLATELET # BLD AUTO: 297 K/MCL (ref 140–450)
PLATELET # BLD AUTO: 309 K/MCL (ref 140–450)
PLATELET # BLD AUTO: 336 K/MCL (ref 140–450)
PLATELET # BLD AUTO: 337 K/MCL (ref 140–450)
PLATELET # BLD AUTO: 347 K/MCL (ref 140–450)
PLATELET # BLD AUTO: 354 K/MCL (ref 140–450)
PLATELET # BLD AUTO: 355 K/MCL (ref 140–450)
PLATELET # BLD AUTO: 359 K/MCL (ref 140–450)
PLATELET # BLD AUTO: 370 K/MCL (ref 140–450)
PLATELET # BLD AUTO: 376 K/MCL (ref 140–450)
PLATELET # BLD AUTO: 383 K/MCL (ref 140–450)
PLATELET # BLD AUTO: 392 K/MCL (ref 140–450)
PLATELET # BLD AUTO: 402 K/MCL (ref 140–450)
PLATELET # BLD AUTO: 422 K/MCL (ref 140–450)
PLATELET # BLD AUTO: 470 K/MCL (ref 140–450)
PLATELET # BLD AUTO: 522 K/MCL (ref 140–450)
PLATELET # BLD AUTO: 558 K/MCL (ref 140–450)
PLATELET # BLD: 245 10E3/UL (ref 140–440)
PO2 BLDA: 167 MM HG (ref 83–108)
PO2 BLDA: 55 MM HG (ref 83–108)
PO2 BLDA: 68 MM HG (ref 83–108)
PO2 BLDA: 77 MM HG (ref 83–108)
POLYCHROMASIA BLD QL SMEAR: ABNORMAL
POTASSIUM BLD-SCNC: 4.4 MMOL/L (ref 3.4–5.1)
POTASSIUM SERPL-SCNC: 3.1 MMOL/L (ref 3.4–5.1)
POTASSIUM SERPL-SCNC: 3.3 MMOL/L (ref 3.4–5.1)
POTASSIUM SERPL-SCNC: 3.5 MMOL/L (ref 3.4–5.1)
POTASSIUM SERPL-SCNC: 3.7 MMOL/L (ref 3.4–5.1)
POTASSIUM SERPL-SCNC: 4 MMOL/L (ref 3.4–5.1)
POTASSIUM SERPL-SCNC: 4 MMOL/L (ref 3.4–5.1)
POTASSIUM SERPL-SCNC: 4.1 MMOL/L (ref 3.4–5.1)
POTASSIUM SERPL-SCNC: 4.1 MMOL/L (ref 3.4–5.1)
POTASSIUM SERPL-SCNC: 4.2 MMOL/L (ref 3.4–5.1)
POTASSIUM SERPL-SCNC: 4.3 MMOL/L (ref 3.4–5.1)
POTASSIUM SERPL-SCNC: 4.4 MMOL/L (ref 3.4–5.1)
POTASSIUM SERPL-SCNC: 4.45 MEQ/L (ref 3.45–5.14)
POTASSIUM SERPL-SCNC: 4.5 MMOL/L (ref 3.4–5.1)
POTASSIUM SERPL-SCNC: 4.6 MMOL/L (ref 3.4–5.1)
POTASSIUM SERPL-SCNC: 4.6 MMOL/L (ref 3.4–5.1)
PR-INTERVAL (MSEC): 140
PR-INTERVAL (MSEC): 148
PR-INTERVAL (MSEC): 164
PROCALCITONIN SERPL IA-MCNC: <0.05 NG/ML
PROCALCITONIN SERPL IA-MCNC: <0.05 NG/ML
PRODUCT CODE: NORMAL
PRODUCT DESCRIPTION: NORMAL
PRODUCT ID: NORMAL
PROT SERPL-MCNC: 5.3 G/DL (ref 6.4–8.2)
PROT SERPL-MCNC: 5.4 G/DL (ref 6.4–8.2)
PROT SERPL-MCNC: 5.4 G/DL (ref 6.4–8.2)
PROT SERPL-MCNC: 5.5 G/DL (ref 6–8.3)
PROT SERPL-MCNC: 5.6 G/DL (ref 6.4–8.2)
PROT SERPL-MCNC: 5.7 G/DL (ref 6.4–8.2)
PROT SERPL-MCNC: 6.1 G/DL (ref 6.4–8.2)
PROT SERPL-MCNC: 6.3 G/DL (ref 6.4–8.2)
PROT SERPL-MCNC: 6.4 G/DL (ref 6.4–8.2)
PROT UR STRIP-MCNC: 30 MG/DL
PROT UR STRIP-MCNC: 30 MG/DL
PROT UR STRIP-MCNC: NEGATIVE MG/DL
PROTHROMBIN TIME: 10.9 SEC (ref 9.7–11.8)
PROTHROMBIN TIME: 11.7 SEC (ref 9.7–11.8)
PROTHROMBIN TIME: 11.8 SEC (ref 9.7–11.8)
PROTHROMBIN TIME: 117.3 SEC (ref 9.7–11.8)
PROTHROMBIN TIME: 12.7 SEC (ref 9.7–11.8)
PROTHROMBIN TIME: 15.1 SEC (ref 9.7–11.8)
PROTHROMBIN TIME: 18.1 SEC (ref 9.7–11.8)
PROTHROMBIN TIME: 18.2 SEC (ref 9.7–11.8)
PROTHROMBIN TIME: 19.6 SEC (ref 9.7–11.8)
PROTHROMBIN TIME: 19.8 SEC (ref 9.7–11.8)
PROTHROMBIN TIME: 20.1 SECONDS
PROTHROMBIN TIME: 20.8 SEC (ref 9.7–11.8)
PROTHROMBIN TIME: 21.2 SEC (ref 9.7–11.8)
PROTHROMBIN TIME: 23.5 SEC (ref 9.7–11.8)
PROTHROMBIN TIME: 24.3 SEC (ref 9.7–11.8)
PROTHROMBIN TIME: 26.1 SEC (ref 9.7–11.8)
PROTHROMBIN TIME: 30.8 SEC (ref 9.7–11.8)
PROTHROMBIN TIME: 32.9 SEC (ref 9.7–11.8)
QRS-INTERVAL (MSEC): 88
QRS-INTERVAL (MSEC): 92
QRS-INTERVAL (MSEC): 96
QT-INTERVAL (MSEC): 336
QT-INTERVAL (MSEC): 340
QT-INTERVAL (MSEC): 388
QTC: 422
QTC: 435
QTC: 454
R AXIS (DEGREES): 11
R AXIS (DEGREES): 3
R AXIS (DEGREES): 5
RAINBOW EXTRA TUBES HOLD SPECIMEN: NORMAL
RBC # BLD: 1.97 MIL/MCL (ref 4–5.2)
RBC # BLD: 2.46 MIL/MCL (ref 4–5.2)
RBC # BLD: 2.59 MIL/MCL (ref 4–5.2)
RBC # BLD: 2.6 MIL/MCL (ref 4–5.2)
RBC # BLD: 2.64 MIL/MCL (ref 4–5.2)
RBC # BLD: 2.71 10E6/UL (ref 4–6.39)
RBC # BLD: 2.76 MIL/MCL (ref 4–5.2)
RBC # BLD: 2.8 MIL/MCL (ref 4–5.2)
RBC # BLD: 2.8 MIL/MCL (ref 4–5.2)
RBC # BLD: 2.85 MIL/MCL (ref 4–5.2)
RBC # BLD: 2.87 MIL/MCL (ref 4–5.2)
RBC # BLD: 2.94 MIL/MCL (ref 4–5.2)
RBC # BLD: 3.06 MIL/MCL (ref 4–5.2)
RBC # BLD: 3.13 MIL/MCL (ref 4–5.2)
RBC # BLD: 3.18 MIL/MCL (ref 4–5.2)
RBC # BLD: 3.22 MIL/MCL (ref 4–5.2)
RBC # BLD: 3.22 MIL/MCL (ref 4–5.2)
RBC # BLD: 3.25 MIL/MCL (ref 4–5.2)
RBC # BLD: 3.38 MIL/MCL (ref 4–5.2)
RBC # BLD: 3.41 MIL/MCL (ref 4–5.2)
RBC # BLD: 3.71 MIL/MCL (ref 4–5.2)
RBC #/AREA URNS HPF: ABNORMAL /HPF
REPORT TEXT: NORMAL
RSV AG NPH QL IA.RAPID: NOT DETECTED
RV END SYSTOLIC LONGITUDINAL STRAIN FREE WALL (RVGS): 2
SAO2 % BLDA: 100 % (ref 95–99)
SAO2 % BLDA: 11 % (ref 15–23)
SAO2 % BLDA: 12 % (ref 15–23)
SAO2 % BLDA: 12 % (ref 15–23)
SAO2 % BLDA: 14 % (ref 15–23)
SAO2 % BLDA: 89 % (ref 95–99)
SAO2 % BLDA: 93 % (ref 95–99)
SAO2 % BLDA: 97 % (ref 95–99)
SARS-COV-2 RNA RESP QL NAA+PROBE: NOT DETECTED
SARS-COV-2 RNA RESP QL NAA+PROBE: NOT DETECTED
SERVICE CMNT-IMP: NORMAL
SODIUM BLD-SCNC: 138 MMOL/L (ref 135–145)
SODIUM SERPL-SCNC: 131 MMOL/L (ref 135–145)
SODIUM SERPL-SCNC: 133 MMOL/L (ref 135–145)
SODIUM SERPL-SCNC: 134 MMOL/L (ref 135–145)
SODIUM SERPL-SCNC: 134 MMOL/L (ref 135–145)
SODIUM SERPL-SCNC: 135 MMOL/L (ref 135–145)
SODIUM SERPL-SCNC: 136 MMOL/L (ref 135–145)
SODIUM SERPL-SCNC: 137 MMOL/L (ref 135–145)
SODIUM SERPL-SCNC: 138 MEQ/L (ref 136–146)
SODIUM SERPL-SCNC: 139 MMOL/L (ref 135–145)
SODIUM SERPL-SCNC: 140 MMOL/L (ref 135–145)
SODIUM SERPL-SCNC: 140 MMOL/L (ref 135–145)
SODIUM SERPL-SCNC: 141 MMOL/L (ref 135–145)
SODIUM SERPL-SCNC: 142 MMOL/L (ref 135–145)
SODIUM SERPL-SCNC: 143 MMOL/L (ref 135–145)
SODIUM SERPL-SCNC: 144 MMOL/L (ref 135–145)
SODIUM SERPL-SCNC: 145 MMOL/L (ref 135–145)
SODIUM SERPL-SCNC: 145 MMOL/L (ref 135–145)
SODIUM UR-SCNC: 33 MMOL/L
SP GR UR STRIP: 1.02 (ref 1–1.03)
SQUAMOUS #/AREA URNS HPF: ABNORMAL /HPF
T AXIS (DEGREES): 15
T AXIS (DEGREES): 18
T AXIS (DEGREES): 9
TIBC SERPL-MCNC: 271 MCG/DL (ref 250–450)
TRANS CELLS #/AREA URNS HPF: ABNORMAL /HPF
TRICUSPID VALVE ANNULAR PEAK VELOCITY (TVAPV): 50
TRICUSPID VALVE PEAK REGURGITATION VELOCITY (TRPV): 3
TRIGL SERPL-MCNC: 65 MG/DL (ref 39–150)
TROPONIN I SERPL DL<=0.01 NG/ML-MCNC: 12 NG/L
TROPONIN I SERPL DL<=0.01 NG/ML-MCNC: 64 NG/L
TROPONIN I SERPL DL<=0.01 NG/ML-MCNC: 74 NG/L
TROPONIN I SERPL DL<=0.01 NG/ML-MCNC: 78 NG/L
TSH SERPL-ACNC: 1.44 UIU/ML (ref 0.4–6.5)
TV ESTIMATED RIGHT ARTERIAL PRESSURE (RAP): 18.8
TYPE AND SCREEN EXPIRATION DATE: NORMAL
UNIT BLOOD TYPE: NORMAL
UNIT NUMBER: NORMAL
UROBILINOGEN UR STRIP-MCNC: 0.2 MG/DL
VENTRICULAR RATE EKG/MIN (BPM): 101
VENTRICULAR RATE EKG/MIN (BPM): 107
VENTRICULAR RATE EKG/MIN (BPM): 71
WBC # BLD: 10.5 K/MCL (ref 4.2–11)
WBC # BLD: 11.7 K/MCL (ref 4.2–11)
WBC # BLD: 12.1 K/MCL (ref 4.2–11)
WBC # BLD: 12.2 K/MCL (ref 4.2–11)
WBC # BLD: 12.4 K/MCL (ref 4.2–11)
WBC # BLD: 13.3 K/MCL (ref 4.2–11)
WBC # BLD: 14 K/MCL (ref 4.2–11)
WBC # BLD: 14.4 K/MCL (ref 4.2–11)
WBC # BLD: 14.5 K/MCL (ref 4.2–11)
WBC # BLD: 14.6 K/MCL (ref 4.2–11)
WBC # BLD: 15.4 K/MCL (ref 4.2–11)
WBC # BLD: 15.7 K/MCL (ref 4.2–11)
WBC # BLD: 16.2 K/MCL (ref 4.2–11)
WBC # BLD: 17.3 K/MCL (ref 4.2–11)
WBC # BLD: 27.7 K/MCL (ref 4.2–11)
WBC # BLD: 7 K/MCL (ref 4.2–11)
WBC # BLD: 7 K/MCL (ref 4.2–11)
WBC # BLD: 7.1 10E3/UL (ref 5–10)
WBC # BLD: 8.9 K/MCL (ref 4.2–11)
WBC #/AREA URNS HPF: >100 /HPF
WBC #/AREA URNS HPF: ABNORMAL /HPF
WBC #/AREA URNS HPF: ABNORMAL /HPF
WBC MORPH BLD: NORMAL
WBC MORPH BLD: NORMAL
WBC TOXIC VACUOLES BLD QL SMEAR: PRESENT

## 2023-01-01 PROCEDURE — 10002803 HB RX 637: Performed by: NURSE PRACTITIONER

## 2023-01-01 PROCEDURE — 36415 COLL VENOUS BLD VENIPUNCTURE: CPT | Performed by: INTERNAL MEDICINE

## 2023-01-01 PROCEDURE — 10006031 HB ROOM CHARGE TELEMETRY

## 2023-01-01 PROCEDURE — 84145 PROCALCITONIN (PCT): CPT | Performed by: NURSE PRACTITIONER

## 2023-01-01 PROCEDURE — 94660 CPAP INITIATION&MGMT: CPT | Performed by: INTERNAL MEDICINE

## 2023-01-01 PROCEDURE — 96374 THER/PROPH/DIAG INJ IV PUSH: CPT

## 2023-01-01 PROCEDURE — 10002803 HB RX 637: Performed by: PHYSICIAN ASSISTANT

## 2023-01-01 PROCEDURE — 10002803 HB RX 637: Performed by: INTERNAL MEDICINE

## 2023-01-01 PROCEDURE — 85025 COMPLETE CBC W/AUTO DIFF WBC: CPT | Performed by: NURSE PRACTITIONER

## 2023-01-01 PROCEDURE — 10002801 HB RX 250 W/O HCPCS: Performed by: STUDENT IN AN ORGANIZED HEALTH CARE EDUCATION/TRAINING PROGRAM

## 2023-01-01 PROCEDURE — 94640 AIRWAY INHALATION TREATMENT: CPT

## 2023-01-01 PROCEDURE — C9113 INJ PANTOPRAZOLE SODIUM, VIA: HCPCS | Performed by: EMERGENCY MEDICINE

## 2023-01-01 PROCEDURE — 99215 OFFICE O/P EST HI 40 MIN: CPT | Performed by: INTERNAL MEDICINE

## 2023-01-01 PROCEDURE — 85014 HEMATOCRIT: CPT | Performed by: NURSE PRACTITIONER

## 2023-01-01 PROCEDURE — 10002803 HB RX 637

## 2023-01-01 PROCEDURE — 83550 IRON BINDING TEST: CPT | Performed by: INTERNAL MEDICINE

## 2023-01-01 PROCEDURE — 10004180 HB COUNTER-TRANSPORT: Performed by: INTERNAL MEDICINE

## 2023-01-01 PROCEDURE — 99223 1ST HOSP IP/OBS HIGH 75: CPT | Performed by: INTERNAL MEDICINE

## 2023-01-01 PROCEDURE — 36415 COLL VENOUS BLD VENIPUNCTURE: CPT | Performed by: GENERAL PRACTICE

## 2023-01-01 PROCEDURE — 13003292 HB HHF OXYGEN THERAPY DAILY

## 2023-01-01 PROCEDURE — 99211 OFF/OP EST MAY X REQ PHY/QHP: CPT

## 2023-01-01 PROCEDURE — 85610 PROTHROMBIN TIME: CPT | Performed by: INTERNAL MEDICINE

## 2023-01-01 PROCEDURE — 10004180 HB COUNTER-TRANSPORT

## 2023-01-01 PROCEDURE — 71045 X-RAY EXAM CHEST 1 VIEW: CPT

## 2023-01-01 PROCEDURE — 10002801 HB RX 250 W/O HCPCS: Performed by: INTERNAL MEDICINE

## 2023-01-01 PROCEDURE — 36415 COLL VENOUS BLD VENIPUNCTURE: CPT | Performed by: NURSE PRACTITIONER

## 2023-01-01 PROCEDURE — 99285 EMERGENCY DEPT VISIT HI MDM: CPT

## 2023-01-01 PROCEDURE — C9113 INJ PANTOPRAZOLE SODIUM, VIA: HCPCS | Performed by: INTERNAL MEDICINE

## 2023-01-01 PROCEDURE — 93005 ELECTROCARDIOGRAM TRACING: CPT | Performed by: EMERGENCY MEDICINE

## 2023-01-01 PROCEDURE — 10004651 HB RX, NO CHARGE ITEM

## 2023-01-01 PROCEDURE — 10002807 HB RX 258: Performed by: INTERNAL MEDICINE

## 2023-01-01 PROCEDURE — 82805 BLOOD GASES W/O2 SATURATION: CPT

## 2023-01-01 PROCEDURE — 13000001 HB PHASE II RECOVERY EA 30 MINUTES

## 2023-01-01 PROCEDURE — 10002807 HB RX 258: Performed by: STUDENT IN AN ORGANIZED HEALTH CARE EDUCATION/TRAINING PROGRAM

## 2023-01-01 PROCEDURE — G0439 PPPS, SUBSEQ VISIT: HCPCS | Performed by: INTERNAL MEDICINE

## 2023-01-01 PROCEDURE — 10002800 HB RX 250 W HCPCS: Performed by: STUDENT IN AN ORGANIZED HEALTH CARE EDUCATION/TRAINING PROGRAM

## 2023-01-01 PROCEDURE — P9016 RBC LEUKOCYTES REDUCED: HCPCS

## 2023-01-01 PROCEDURE — 85027 COMPLETE CBC AUTOMATED: CPT | Performed by: INTERNAL MEDICINE

## 2023-01-01 PROCEDURE — 10002803 HB RX 637: Performed by: STUDENT IN AN ORGANIZED HEALTH CARE EDUCATION/TRAINING PROGRAM

## 2023-01-01 PROCEDURE — 10002800 HB RX 250 W HCPCS: Performed by: NURSE PRACTITIONER

## 2023-01-01 PROCEDURE — 84484 ASSAY OF TROPONIN QUANT: CPT | Performed by: EMERGENCY MEDICINE

## 2023-01-01 PROCEDURE — 99232 SBSQ HOSP IP/OBS MODERATE 35: CPT | Performed by: INTERNAL MEDICINE

## 2023-01-01 PROCEDURE — 88342 IMHCHEM/IMCYTCHM 1ST ANTB: CPT | Performed by: STUDENT IN AN ORGANIZED HEALTH CARE EDUCATION/TRAINING PROGRAM

## 2023-01-01 PROCEDURE — 13003289 HB OXYGEN THERAPY DAILY

## 2023-01-01 PROCEDURE — 36415 COLL VENOUS BLD VENIPUNCTURE: CPT

## 2023-01-01 PROCEDURE — 13000008 HB ANESTHESIA MAC OUTSIDE OR

## 2023-01-01 PROCEDURE — 13001086 HB INCENTIVE SPIROMETER W INSTRUCT

## 2023-01-01 PROCEDURE — 83880 ASSAY OF NATRIURETIC PEPTIDE: CPT | Performed by: EMERGENCY MEDICINE

## 2023-01-01 PROCEDURE — 10002800 HB RX 250 W HCPCS: Performed by: INTERNAL MEDICINE

## 2023-01-01 PROCEDURE — 80053 COMPREHEN METABOLIC PANEL: CPT | Performed by: NURSE PRACTITIONER

## 2023-01-01 PROCEDURE — 13000023 HB GI BASIC CASE EA ADD MINUTE

## 2023-01-01 PROCEDURE — 80053 COMPREHEN METABOLIC PANEL: CPT | Performed by: INTERNAL MEDICINE

## 2023-01-01 PROCEDURE — 10004651 HB RX, NO CHARGE ITEM: Performed by: NURSE PRACTITIONER

## 2023-01-01 PROCEDURE — 10002807 HB RX 258: Performed by: NURSE PRACTITIONER

## 2023-01-01 PROCEDURE — 99233 SBSQ HOSP IP/OBS HIGH 50: CPT | Performed by: INTERNAL MEDICINE

## 2023-01-01 PROCEDURE — 85014 HEMATOCRIT: CPT | Performed by: INTERNAL MEDICINE

## 2023-01-01 PROCEDURE — 84145 PROCALCITONIN (PCT): CPT | Performed by: INTERNAL MEDICINE

## 2023-01-01 PROCEDURE — 0DB98ZX EXCISION OF DUODENUM, VIA NATURAL OR ARTIFICIAL OPENING ENDOSCOPIC, DIAGNOSTIC: ICD-10-PCS | Performed by: STUDENT IN AN ORGANIZED HEALTH CARE EDUCATION/TRAINING PROGRAM

## 2023-01-01 PROCEDURE — 10002801 HB RX 250 W/O HCPCS: Performed by: ANESTHESIOLOGY

## 2023-01-01 PROCEDURE — 94667 MNPJ CHEST WALL 1ST: CPT

## 2023-01-01 PROCEDURE — 85610 PROTHROMBIN TIME: CPT | Performed by: NURSE PRACTITIONER

## 2023-01-01 PROCEDURE — 10006023 HB SUPPLY 272

## 2023-01-01 PROCEDURE — 81001 URINALYSIS AUTO W/SCOPE: CPT | Performed by: NURSE PRACTITIONER

## 2023-01-01 PROCEDURE — 99233 SBSQ HOSP IP/OBS HIGH 50: CPT | Performed by: STUDENT IN AN ORGANIZED HEALTH CARE EDUCATION/TRAINING PROGRAM

## 2023-01-01 PROCEDURE — 83735 ASSAY OF MAGNESIUM: CPT | Performed by: INTERNAL MEDICINE

## 2023-01-01 PROCEDURE — 0DJD8ZZ INSPECTION OF LOWER INTESTINAL TRACT, VIA NATURAL OR ARTIFICIAL OPENING ENDOSCOPIC: ICD-10-PCS | Performed by: INTERNAL MEDICINE

## 2023-01-01 PROCEDURE — 84484 ASSAY OF TROPONIN QUANT: CPT | Performed by: NURSE PRACTITIONER

## 2023-01-01 PROCEDURE — 76775 US EXAM ABDO BACK WALL LIM: CPT

## 2023-01-01 PROCEDURE — 85018 HEMOGLOBIN: CPT | Performed by: NURSE PRACTITIONER

## 2023-01-01 PROCEDURE — 99223 1ST HOSP IP/OBS HIGH 75: CPT | Performed by: NURSE PRACTITIONER

## 2023-01-01 PROCEDURE — 97161 PT EVAL LOW COMPLEX 20 MIN: CPT

## 2023-01-01 PROCEDURE — 13000025 HB GI COMPLEX CASE EACH ADD MINUTE

## 2023-01-01 PROCEDURE — 87086 URINE CULTURE/COLONY COUNT: CPT | Performed by: INTERNAL MEDICINE

## 2023-01-01 PROCEDURE — 96375 TX/PRO/DX INJ NEW DRUG ADDON: CPT

## 2023-01-01 PROCEDURE — 10002807 HB RX 258

## 2023-01-01 PROCEDURE — 80048 BASIC METABOLIC PNL TOTAL CA: CPT | Performed by: INTERNAL MEDICINE

## 2023-01-01 PROCEDURE — 10002807 HB RX 258: Performed by: ANESTHESIOLOGY

## 2023-01-01 PROCEDURE — 13000022 HB GI BASIC CASE  S/U +1ST 15 MIN

## 2023-01-01 PROCEDURE — 84484 ASSAY OF TROPONIN QUANT: CPT | Performed by: INTERNAL MEDICINE

## 2023-01-01 PROCEDURE — 85018 HEMOGLOBIN: CPT | Performed by: INTERNAL MEDICINE

## 2023-01-01 PROCEDURE — 10002801 HB RX 250 W/O HCPCS: Performed by: GENERAL PRACTICE

## 2023-01-01 PROCEDURE — 80048 BASIC METABOLIC PNL TOTAL CA: CPT | Performed by: NURSE PRACTITIONER

## 2023-01-01 PROCEDURE — 83605 ASSAY OF LACTIC ACID: CPT | Performed by: EMERGENCY MEDICINE

## 2023-01-01 PROCEDURE — 36600 WITHDRAWAL OF ARTERIAL BLOOD: CPT

## 2023-01-01 PROCEDURE — 10002016 HB COUNTER INCENTIVE SPIROMETRY

## 2023-01-01 PROCEDURE — 99223 1ST HOSP IP/OBS HIGH 75: CPT | Performed by: STUDENT IN AN ORGANIZED HEALTH CARE EDUCATION/TRAINING PROGRAM

## 2023-01-01 PROCEDURE — 83880 ASSAY OF NATRIURETIC PEPTIDE: CPT | Performed by: STUDENT IN AN ORGANIZED HEALTH CARE EDUCATION/TRAINING PROGRAM

## 2023-01-01 PROCEDURE — 85610 PROTHROMBIN TIME: CPT | Performed by: EMERGENCY MEDICINE

## 2023-01-01 PROCEDURE — 83735 ASSAY OF MAGNESIUM: CPT | Performed by: EMERGENCY MEDICINE

## 2023-01-01 PROCEDURE — 83050 HGB METHEMOGLOBIN QUAN: CPT

## 2023-01-01 PROCEDURE — 85730 THROMBOPLASTIN TIME PARTIAL: CPT | Performed by: EMERGENCY MEDICINE

## 2023-01-01 PROCEDURE — 83605 ASSAY OF LACTIC ACID: CPT

## 2023-01-01 PROCEDURE — 99214 OFFICE O/P EST MOD 30 MIN: CPT | Performed by: INTERNAL MEDICINE

## 2023-01-01 PROCEDURE — 10002801 HB RX 250 W/O HCPCS: Performed by: EMERGENCY MEDICINE

## 2023-01-01 PROCEDURE — 87635 SARS-COV-2 COVID-19 AMP PRB: CPT | Performed by: INTERNAL MEDICINE

## 2023-01-01 PROCEDURE — 93306 TTE W/DOPPLER COMPLETE: CPT | Performed by: INTERNAL MEDICINE

## 2023-01-01 PROCEDURE — 86901 BLOOD TYPING SEROLOGIC RH(D): CPT | Performed by: EMERGENCY MEDICINE

## 2023-01-01 PROCEDURE — 85730 THROMBOPLASTIN TIME PARTIAL: CPT | Performed by: INTERNAL MEDICINE

## 2023-01-01 PROCEDURE — 97530 THERAPEUTIC ACTIVITIES: CPT

## 2023-01-01 PROCEDURE — 82435 ASSAY OF BLOOD CHLORIDE: CPT

## 2023-01-01 PROCEDURE — 13000024 HB GI COMPLEX CASE S/U + 1ST 15 MIN

## 2023-01-01 PROCEDURE — 85027 COMPLETE CBC AUTOMATED: CPT | Performed by: PHYSICIAN ASSISTANT

## 2023-01-01 PROCEDURE — 84132 ASSAY OF SERUM POTASSIUM: CPT

## 2023-01-01 PROCEDURE — 94660 CPAP INITIATION&MGMT: CPT

## 2023-01-01 PROCEDURE — 85025 COMPLETE CBC W/AUTO DIFF WBC: CPT | Performed by: CLINICAL MEDICAL LABORATORY

## 2023-01-01 PROCEDURE — 93000 ELECTROCARDIOGRAM COMPLETE: CPT | Performed by: INTERNAL MEDICINE

## 2023-01-01 PROCEDURE — 85025 COMPLETE CBC W/AUTO DIFF WBC: CPT | Performed by: EMERGENCY MEDICINE

## 2023-01-01 PROCEDURE — 0DB68ZX EXCISION OF STOMACH, VIA NATURAL OR ARTIFICIAL OPENING ENDOSCOPIC, DIAGNOSTIC: ICD-10-PCS | Performed by: STUDENT IN AN ORGANIZED HEALTH CARE EDUCATION/TRAINING PROGRAM

## 2023-01-01 PROCEDURE — 36415 COLL VENOUS BLD VENIPUNCTURE: CPT | Performed by: PHYSICIAN ASSISTANT

## 2023-01-01 PROCEDURE — 84295 ASSAY OF SERUM SODIUM: CPT

## 2023-01-01 PROCEDURE — 80053 COMPREHEN METABOLIC PANEL: CPT | Performed by: GENERAL PRACTICE

## 2023-01-01 PROCEDURE — 83735 ASSAY OF MAGNESIUM: CPT

## 2023-01-01 PROCEDURE — 87040 BLOOD CULTURE FOR BACTERIA: CPT | Performed by: EMERGENCY MEDICINE

## 2023-01-01 PROCEDURE — 87086 URINE CULTURE/COLONY COUNT: CPT | Performed by: NURSE PRACTITIONER

## 2023-01-01 PROCEDURE — 84300 ASSAY OF URINE SODIUM: CPT | Performed by: INTERNAL MEDICINE

## 2023-01-01 PROCEDURE — 10002800 HB RX 250 W HCPCS: Performed by: EMERGENCY MEDICINE

## 2023-01-01 PROCEDURE — 80053 COMPREHEN METABOLIC PANEL: CPT | Performed by: EMERGENCY MEDICINE

## 2023-01-01 PROCEDURE — 10002800 HB RX 250 W HCPCS: Performed by: ANESTHESIOLOGY

## 2023-01-01 PROCEDURE — 94010 BREATHING CAPACITY TEST: CPT | Performed by: INTERNAL MEDICINE

## 2023-01-01 PROCEDURE — 81001 URINALYSIS AUTO W/SCOPE: CPT | Performed by: INTERNAL MEDICINE

## 2023-01-01 PROCEDURE — 86923 COMPATIBILITY TEST ELECTRIC: CPT

## 2023-01-01 PROCEDURE — 80053 COMPREHEN METABOLIC PANEL: CPT

## 2023-01-01 PROCEDURE — 97535 SELF CARE MNGMENT TRAINING: CPT

## 2023-01-01 PROCEDURE — 93306 TTE W/DOPPLER COMPLETE: CPT

## 2023-01-01 PROCEDURE — 82570 ASSAY OF URINE CREATININE: CPT | Performed by: CLINICAL MEDICAL LABORATORY

## 2023-01-01 PROCEDURE — 86900 BLOOD TYPING SEROLOGIC ABO: CPT | Performed by: PHYSICIAN ASSISTANT

## 2023-01-01 PROCEDURE — 36430 TRANSFUSION BLD/BLD COMPNT: CPT

## 2023-01-01 PROCEDURE — 80053 COMPREHEN METABOLIC PANEL: CPT | Performed by: CLINICAL MEDICAL LABORATORY

## 2023-01-01 PROCEDURE — 96365 THER/PROPH/DIAG IV INF INIT: CPT

## 2023-01-01 PROCEDURE — 84100 ASSAY OF PHOSPHORUS: CPT | Performed by: INTERNAL MEDICINE

## 2023-01-01 PROCEDURE — 0241U COVID/FLU/RSV PANEL: CPT | Performed by: EMERGENCY MEDICINE

## 2023-01-01 PROCEDURE — 86901 BLOOD TYPING SEROLOGIC RH(D): CPT | Performed by: NURSE PRACTITIONER

## 2023-01-01 PROCEDURE — 85027 COMPLETE CBC AUTOMATED: CPT | Performed by: EMERGENCY MEDICINE

## 2023-01-01 PROCEDURE — 82570 ASSAY OF URINE CREATININE: CPT | Performed by: INTERNAL MEDICINE

## 2023-01-01 PROCEDURE — 83735 ASSAY OF MAGNESIUM: CPT | Performed by: NURSE PRACTITIONER

## 2023-01-01 PROCEDURE — 85018 HEMOGLOBIN: CPT

## 2023-01-01 PROCEDURE — 10002807 HB RX 258: Performed by: EMERGENCY MEDICINE

## 2023-01-01 PROCEDURE — 97165 OT EVAL LOW COMPLEX 30 MIN: CPT

## 2023-01-01 PROCEDURE — 87040 BLOOD CULTURE FOR BACTERIA: CPT | Performed by: NURSE PRACTITIONER

## 2023-01-01 PROCEDURE — 82043 UR ALBUMIN QUANTITATIVE: CPT | Performed by: CLINICAL MEDICAL LABORATORY

## 2023-01-01 PROCEDURE — 82330 ASSAY OF CALCIUM: CPT

## 2023-01-01 PROCEDURE — 82947 ASSAY GLUCOSE BLOOD QUANT: CPT

## 2023-01-01 RX ORDER — WARFARIN SODIUM 1 MG/1
TABLET ORAL
Qty: 90 TABLET | Refills: 1 | Status: SHIPPED | OUTPATIENT
Start: 2023-01-01 | End: 2023-01-01 | Stop reason: ALTCHOICE

## 2023-01-01 RX ORDER — PHENYLEPHRINE HYDROCHLORIDE 10 MG/ML
INJECTION, SOLUTION INTRAMUSCULAR; INTRAVENOUS; SUBCUTANEOUS PRN
Status: DISCONTINUED | OUTPATIENT
Start: 2023-01-01 | End: 2023-01-01

## 2023-01-01 RX ORDER — BENZONATATE 100 MG/1
100 CAPSULE ORAL 3 TIMES DAILY PRN
Qty: 20 CAPSULE | Refills: 0 | Status: ON HOLD | OUTPATIENT
Start: 2023-01-01 | End: 2023-01-01 | Stop reason: HOSPADM

## 2023-01-01 RX ORDER — FORMOTEROL FUMARATE 20 UG/2ML
20 SOLUTION RESPIRATORY (INHALATION)
Status: DISCONTINUED | OUTPATIENT
Start: 2023-01-01 | End: 2023-01-01 | Stop reason: HOSPADM

## 2023-01-01 RX ORDER — AMLODIPINE BESYLATE 5 MG/1
5 TABLET ORAL DAILY
Status: DISCONTINUED | OUTPATIENT
Start: 2023-01-01 | End: 2023-01-01 | Stop reason: HOSPADM

## 2023-01-01 RX ORDER — SODIUM CHLORIDE 9 MG/ML
INJECTION, SOLUTION INTRAVENOUS CONTINUOUS PRN
Status: DISCONTINUED | OUTPATIENT
Start: 2023-01-01 | End: 2023-01-01 | Stop reason: SDUPTHER

## 2023-01-01 RX ORDER — WARFARIN SODIUM 5 MG/1
5 TABLET ORAL ONCE
Status: DISCONTINUED | OUTPATIENT
Start: 2023-01-01 | End: 2023-01-01

## 2023-01-01 RX ORDER — POTASSIUM CHLORIDE 20 MEQ/1
20 TABLET, EXTENDED RELEASE ORAL ONCE
Status: COMPLETED | OUTPATIENT
Start: 2023-01-01 | End: 2023-01-01

## 2023-01-01 RX ORDER — FORMOTEROL FUMARATE 20 UG/2ML
20 SOLUTION RESPIRATORY (INHALATION)
Qty: 120 ML | Refills: 0 | Status: SHIPPED | OUTPATIENT
Start: 2023-01-01 | End: 2023-01-01 | Stop reason: DRUGHIGH

## 2023-01-01 RX ORDER — ALBUTEROL SULFATE 90 UG/1
2 AEROSOL, METERED RESPIRATORY (INHALATION) EVERY 4 HOURS PRN
Status: DISCONTINUED | OUTPATIENT
Start: 2023-01-01 | End: 2023-01-01 | Stop reason: HOSPADM

## 2023-01-01 RX ORDER — BUMETANIDE 0.25 MG/ML
1 INJECTION INTRAMUSCULAR; INTRAVENOUS ONCE
Status: COMPLETED | OUTPATIENT
Start: 2023-01-01 | End: 2023-01-01

## 2023-01-01 RX ORDER — SODIUM CHLORIDE 9 MG/ML
INJECTION, SOLUTION INTRAVENOUS CONTINUOUS
Status: DISCONTINUED | OUTPATIENT
Start: 2023-01-01 | End: 2023-01-01

## 2023-01-01 RX ORDER — WARFARIN SODIUM 5 MG/1
5 TABLET ORAL ONCE
Status: COMPLETED | OUTPATIENT
Start: 2023-01-01 | End: 2023-01-01

## 2023-01-01 RX ORDER — SODIUM CHLORIDE FOR INHALATION 7 %
4 VIAL, NEBULIZER (ML) INHALATION
Status: DISCONTINUED | OUTPATIENT
Start: 2023-01-01 | End: 2023-01-01 | Stop reason: HOSPADM

## 2023-01-01 RX ORDER — SODIUM CHLORIDE 9 MG/ML
INJECTION, SOLUTION INTRAVENOUS CONTINUOUS
Status: DISCONTINUED | OUTPATIENT
Start: 2023-01-01 | End: 2023-01-01 | Stop reason: CLARIF

## 2023-01-01 RX ORDER — SODIUM CHLORIDE 9 MG/ML
INJECTION, SOLUTION INTRAVENOUS
Status: COMPLETED
Start: 2023-01-01 | End: 2023-01-01

## 2023-01-01 RX ORDER — ALBUTEROL SULFATE 90 UG/1
2 AEROSOL, METERED RESPIRATORY (INHALATION)
Status: DISCONTINUED | OUTPATIENT
Start: 2023-01-01 | End: 2023-01-01 | Stop reason: HOSPADM

## 2023-01-01 RX ORDER — BENZONATATE 100 MG/1
100 CAPSULE ORAL 3 TIMES DAILY PRN
Status: DISCONTINUED | OUTPATIENT
Start: 2023-01-01 | End: 2023-01-01 | Stop reason: HOSPADM

## 2023-01-01 RX ORDER — IPRATROPIUM BROMIDE AND ALBUTEROL SULFATE 2.5; .5 MG/3ML; MG/3ML
3 SOLUTION RESPIRATORY (INHALATION) ONCE
Status: COMPLETED | OUTPATIENT
Start: 2023-01-01 | End: 2023-01-01

## 2023-01-01 RX ORDER — PANTOPRAZOLE SODIUM 40 MG/1
40 TABLET, DELAYED RELEASE ORAL
Status: DISCONTINUED | OUTPATIENT
Start: 2023-01-01 | End: 2023-01-01

## 2023-01-01 RX ORDER — WARFARIN SODIUM 4 MG/1
4 TABLET ORAL ONCE
Status: COMPLETED | OUTPATIENT
Start: 2023-01-01 | End: 2023-01-01

## 2023-01-01 RX ORDER — ACETAZOLAMIDE 250 MG/1
375 TABLET ORAL ONCE
Status: DISCONTINUED | OUTPATIENT
Start: 2023-01-01 | End: 2023-01-01 | Stop reason: HOSPADM

## 2023-01-01 RX ORDER — WARFARIN SODIUM 2 MG/1
2 TABLET ORAL ONCE
Status: COMPLETED | OUTPATIENT
Start: 2023-01-01 | End: 2023-01-01

## 2023-01-01 RX ORDER — PANTOPRAZOLE SODIUM 40 MG/10ML
40 INJECTION, POWDER, LYOPHILIZED, FOR SOLUTION INTRAVENOUS ONCE
Status: COMPLETED | OUTPATIENT
Start: 2023-01-01 | End: 2023-01-01

## 2023-01-01 RX ORDER — PANTOPRAZOLE SODIUM 40 MG/1
40 TABLET, DELAYED RELEASE ORAL EVERY 12 HOURS SCHEDULED
Status: DISCONTINUED | OUTPATIENT
Start: 2023-01-01 | End: 2023-01-01 | Stop reason: HOSPADM

## 2023-01-01 RX ORDER — WARFARIN SODIUM 4 MG/1
TABLET ORAL SEE ADMIN INSTRUCTIONS
Status: ON HOLD | COMMUNITY
End: 2023-01-01 | Stop reason: HOSPADM

## 2023-01-01 RX ORDER — 0.9 % SODIUM CHLORIDE 0.9 %
2 VIAL (ML) INJECTION EVERY 12 HOURS SCHEDULED
Status: DISCONTINUED | OUTPATIENT
Start: 2023-01-01 | End: 2023-01-01 | Stop reason: HOSPADM

## 2023-01-01 RX ORDER — ACETAMINOPHEN 500 MG
500 TABLET ORAL EVERY 6 HOURS PRN
COMMUNITY

## 2023-01-01 RX ORDER — BUDESONIDE 0.5 MG/2ML
0.5 INHALANT ORAL
Status: DISCONTINUED | OUTPATIENT
Start: 2023-01-01 | End: 2023-01-01 | Stop reason: HOSPADM

## 2023-01-01 RX ORDER — PREDNISONE 10 MG/1
TABLET ORAL
Qty: 30 TABLET | Refills: 0 | Status: SHIPPED | OUTPATIENT
Start: 2023-01-01 | End: 2023-01-01

## 2023-01-01 RX ORDER — ARFORMOTEROL TARTRATE 15 UG/2ML
15 SOLUTION RESPIRATORY (INHALATION) 2 TIMES DAILY
Qty: 120 ML | Refills: 11 | Status: SHIPPED | OUTPATIENT
Start: 2023-01-01 | End: 2023-01-01 | Stop reason: SDUPTHER

## 2023-01-01 RX ORDER — WARFARIN SODIUM 3 MG/1
TABLET ORAL
Qty: 60 TABLET | Refills: 1 | Status: ON HOLD | OUTPATIENT
Start: 2023-01-01 | End: 2023-01-01 | Stop reason: SDUPTHER

## 2023-01-01 RX ORDER — ZINC SULFATE 50(220)MG
220 CAPSULE ORAL
Status: DISCONTINUED | OUTPATIENT
Start: 2023-01-01 | End: 2023-01-01 | Stop reason: HOSPADM

## 2023-01-01 RX ORDER — LIDOCAINE HYDROCHLORIDE 10 MG/ML
INJECTION, SOLUTION INFILTRATION; PERINEURAL PRN
Status: DISCONTINUED | OUTPATIENT
Start: 2023-01-01 | End: 2023-01-01

## 2023-01-01 RX ORDER — PROPOFOL 10 MG/ML
INJECTION, EMULSION INTRAVENOUS PRN
Status: DISCONTINUED | OUTPATIENT
Start: 2023-01-01 | End: 2023-01-01

## 2023-01-01 RX ORDER — WARFARIN SODIUM 2 MG/1
2 TABLET ORAL ONCE
Status: DISCONTINUED | OUTPATIENT
Start: 2023-01-01 | End: 2023-01-01

## 2023-01-01 RX ORDER — PREDNISONE 20 MG/1
40 TABLET ORAL 2 TIMES DAILY WITH MEALS
Status: DISCONTINUED | OUTPATIENT
Start: 2023-01-01 | End: 2023-01-01 | Stop reason: HOSPADM

## 2023-01-01 RX ORDER — ACETAZOLAMIDE 250 MG/1
375 TABLET ORAL DAILY
Status: DISCONTINUED | OUTPATIENT
Start: 2023-01-01 | End: 2023-01-01

## 2023-01-01 RX ORDER — CEFAZOLIN SODIUM/WATER 1 G/10 ML
1000 SYRINGE (ML) INTRAVENOUS DAILY
Status: DISCONTINUED | OUTPATIENT
Start: 2023-01-01 | End: 2023-01-01

## 2023-01-01 RX ORDER — ALPRAZOLAM 0.25 MG/1
0.25 TABLET ORAL DAILY PRN
Status: DISCONTINUED | OUTPATIENT
Start: 2023-01-01 | End: 2023-01-01 | Stop reason: HOSPADM

## 2023-01-01 RX ORDER — FUROSEMIDE 20 MG/1
20 TABLET ORAL DAILY
Status: DISCONTINUED | OUTPATIENT
Start: 2023-01-01 | End: 2023-01-01 | Stop reason: HOSPADM

## 2023-01-01 RX ORDER — CEFAZOLIN SODIUM/WATER 1 G/10 ML
1000 SYRINGE (ML) INTRAVENOUS ONCE
Status: COMPLETED | OUTPATIENT
Start: 2023-01-01 | End: 2023-01-01

## 2023-01-01 RX ORDER — FORMOTEROL FUMARATE 20 UG/2ML
20 SOLUTION RESPIRATORY (INHALATION)
Qty: 120 ML | Refills: 11 | Status: SHIPPED | OUTPATIENT
Start: 2023-01-01 | End: 2023-01-01

## 2023-01-01 RX ORDER — GUAIFENESIN 600 MG/1
600 TABLET, EXTENDED RELEASE ORAL EVERY 12 HOURS SCHEDULED
Status: DISCONTINUED | OUTPATIENT
Start: 2023-01-01 | End: 2023-01-01 | Stop reason: HOSPADM

## 2023-01-01 RX ORDER — PANTOPRAZOLE SODIUM 40 MG/1
40 TABLET, DELAYED RELEASE ORAL
Qty: 30 TABLET | Refills: 0 | Status: SHIPPED | OUTPATIENT
Start: 2023-01-01 | End: 2023-01-01

## 2023-01-01 RX ORDER — IPRATROPIUM BROMIDE AND ALBUTEROL SULFATE 2.5; .5 MG/3ML; MG/3ML
3 SOLUTION RESPIRATORY (INHALATION)
Status: DISCONTINUED | OUTPATIENT
Start: 2023-01-01 | End: 2023-01-01 | Stop reason: HOSPADM

## 2023-01-01 RX ORDER — SODIUM CHLORIDE 9 MG/ML
INJECTION, SOLUTION INTRAVENOUS CONTINUOUS PRN
Status: DISCONTINUED | OUTPATIENT
Start: 2023-01-01 | End: 2023-01-01 | Stop reason: HOSPADM

## 2023-01-01 RX ORDER — SODIUM CHLORIDE, SODIUM LACTATE, POTASSIUM CHLORIDE, CALCIUM CHLORIDE 600; 310; 30; 20 MG/100ML; MG/100ML; MG/100ML; MG/100ML
INJECTION, SOLUTION INTRAVENOUS CONTINUOUS PRN
Status: DISCONTINUED | OUTPATIENT
Start: 2023-01-01 | End: 2023-01-01

## 2023-01-01 RX ORDER — FERROUS SULFATE 325(65) MG
325 TABLET ORAL DAILY
Status: DISCONTINUED | OUTPATIENT
Start: 2023-01-01 | End: 2023-01-01 | Stop reason: HOSPADM

## 2023-01-01 RX ORDER — PANTOPRAZOLE SODIUM 40 MG/1
40 TABLET, DELAYED RELEASE ORAL
Status: DISCONTINUED | OUTPATIENT
Start: 2023-01-01 | End: 2023-01-01 | Stop reason: HOSPADM

## 2023-01-01 RX ORDER — PANTOPRAZOLE SODIUM 40 MG/10ML
40 INJECTION, POWDER, LYOPHILIZED, FOR SOLUTION INTRAVENOUS EVERY 12 HOURS SCHEDULED
Status: DISCONTINUED | OUTPATIENT
Start: 2023-01-01 | End: 2023-01-01

## 2023-01-01 RX ORDER — ACETAMINOPHEN 500 MG
500 TABLET ORAL EVERY 6 HOURS PRN
Status: DISCONTINUED | OUTPATIENT
Start: 2023-01-01 | End: 2023-01-01 | Stop reason: HOSPADM

## 2023-01-01 RX ORDER — ACETAMINOPHEN 325 MG/1
650 TABLET ORAL EVERY 6 HOURS PRN
Status: DISCONTINUED | OUTPATIENT
Start: 2023-01-01 | End: 2023-01-01 | Stop reason: HOSPADM

## 2023-01-01 RX ORDER — SODIUM CHLORIDE 9 MG/ML
INJECTION, SOLUTION INTRAVENOUS
Status: DISPENSED
Start: 2023-01-01 | End: 2023-01-01

## 2023-01-01 RX ORDER — FUROSEMIDE 20 MG/1
20 TABLET ORAL DAILY
Qty: 30 TABLET | Refills: 11 | Status: ON HOLD | OUTPATIENT
Start: 2023-01-01 | End: 2023-01-01 | Stop reason: HOSPADM

## 2023-01-01 RX ORDER — FLUTICASONE FUROATE AND VILANTEROL TRIFENATATE 100; 25 UG/1; UG/1
1 POWDER RESPIRATORY (INHALATION) DAILY
COMMUNITY
Start: 2023-01-01

## 2023-01-01 RX ORDER — ONDANSETRON 2 MG/ML
4 INJECTION INTRAMUSCULAR; INTRAVENOUS EVERY 6 HOURS PRN
Status: DISCONTINUED | OUTPATIENT
Start: 2023-01-01 | End: 2023-01-01 | Stop reason: HOSPADM

## 2023-01-01 RX ORDER — LANOLIN ALCOHOL/MO/W.PET/CERES
3 CREAM (GRAM) TOPICAL NIGHTLY PRN
Status: DISCONTINUED | OUTPATIENT
Start: 2023-01-01 | End: 2023-01-01 | Stop reason: HOSPADM

## 2023-01-01 RX ORDER — SODIUM CHLORIDE, SODIUM LACTATE, POTASSIUM CHLORIDE, CALCIUM CHLORIDE 600; 310; 30; 20 MG/100ML; MG/100ML; MG/100ML; MG/100ML
INJECTION, SOLUTION INTRAVENOUS CONTINUOUS
Status: DISCONTINUED | OUTPATIENT
Start: 2023-01-01 | End: 2023-01-01

## 2023-01-01 RX ORDER — AZITHROMYCIN 250 MG/1
500 TABLET, FILM COATED ORAL DAILY
Status: COMPLETED | OUTPATIENT
Start: 2023-01-01 | End: 2023-01-01

## 2023-01-01 RX ORDER — POTASSIUM CHLORIDE 14.9 MG/ML
20 INJECTION INTRAVENOUS ONCE
Status: COMPLETED | OUTPATIENT
Start: 2023-01-01 | End: 2023-01-01

## 2023-01-01 RX ORDER — POTASSIUM CHLORIDE 20 MEQ/1
40 TABLET, EXTENDED RELEASE ORAL ONCE
Status: COMPLETED | OUTPATIENT
Start: 2023-01-01 | End: 2023-01-01

## 2023-01-01 RX ORDER — PANTOPRAZOLE SODIUM 40 MG/1
40 TABLET, DELAYED RELEASE ORAL
Status: DISCONTINUED | OUTPATIENT
Start: 2023-01-01 | End: 2023-01-01 | Stop reason: SDUPTHER

## 2023-01-01 RX ORDER — GUAIFENESIN 600 MG/1
600 TABLET, EXTENDED RELEASE ORAL 2 TIMES DAILY
COMMUNITY

## 2023-01-01 RX ORDER — WARFARIN SODIUM 3 MG/1
TABLET ORAL
Qty: 180 TABLET | Status: CANCELLED | OUTPATIENT
Start: 2023-01-01

## 2023-01-01 RX ORDER — METHYLPREDNISOLONE SODIUM SUCCINATE 125 MG/2ML
60 INJECTION, POWDER, LYOPHILIZED, FOR SOLUTION INTRAMUSCULAR; INTRAVENOUS EVERY 12 HOURS SCHEDULED
Status: DISCONTINUED | OUTPATIENT
Start: 2023-01-01 | End: 2023-01-01

## 2023-01-01 RX ORDER — CEFAZOLIN SODIUM/WATER 1 G/10 ML
1 SYRINGE (ML) INTRAVENOUS DAILY
Status: DISCONTINUED | OUTPATIENT
Start: 2023-01-01 | End: 2023-01-01 | Stop reason: HOSPADM

## 2023-01-01 RX ORDER — WARFARIN SODIUM 1 MG/1
TABLET ORAL
Qty: 90 TABLET | Status: CANCELLED | OUTPATIENT
Start: 2023-01-01

## 2023-01-01 RX ORDER — FLUTICASONE FUROATE AND VILANTEROL 100; 25 UG/1; UG/1
1 POWDER RESPIRATORY (INHALATION) DAILY
Status: DISCONTINUED | OUTPATIENT
Start: 2023-01-01 | End: 2023-01-01 | Stop reason: HOSPADM

## 2023-01-01 RX ORDER — HYDRALAZINE HYDROCHLORIDE 20 MG/ML
10 INJECTION INTRAMUSCULAR; INTRAVENOUS EVERY 6 HOURS PRN
Status: DISCONTINUED | OUTPATIENT
Start: 2023-01-01 | End: 2023-01-01 | Stop reason: HOSPADM

## 2023-01-01 RX ORDER — POTASSIUM CHLORIDE 20 MEQ/1
20 TABLET, EXTENDED RELEASE ORAL DAILY
Qty: 30 TABLET | Refills: 0 | Status: ON HOLD | OUTPATIENT
Start: 2023-01-01 | End: 2023-01-01 | Stop reason: HOSPADM

## 2023-01-01 RX ORDER — GUAIFENESIN 600 MG/1
1200 TABLET, EXTENDED RELEASE ORAL EVERY 12 HOURS SCHEDULED
Status: DISCONTINUED | OUTPATIENT
Start: 2023-01-01 | End: 2023-01-01 | Stop reason: HOSPADM

## 2023-01-01 RX ORDER — ARFORMOTEROL TARTRATE 15 UG/2ML
15 SOLUTION RESPIRATORY (INHALATION) 2 TIMES DAILY
Qty: 120 ML | Refills: 11 | Status: SHIPPED | OUTPATIENT
Start: 2023-01-01 | End: 2023-06-29

## 2023-01-01 RX ORDER — POTASSIUM CHLORIDE 20 MEQ/1
40 TABLET, EXTENDED RELEASE ORAL
Status: DISCONTINUED | OUTPATIENT
Start: 2023-01-01 | End: 2023-01-01

## 2023-01-01 RX ORDER — BUMETANIDE 0.25 MG/ML
1 INJECTION INTRAMUSCULAR; INTRAVENOUS ONCE
Status: DISCONTINUED | OUTPATIENT
Start: 2023-01-01 | End: 2023-01-01 | Stop reason: HOSPADM

## 2023-01-01 RX ORDER — METHYLPREDNISOLONE SODIUM SUCCINATE 40 MG/ML
40 INJECTION, POWDER, LYOPHILIZED, FOR SOLUTION INTRAMUSCULAR; INTRAVENOUS EVERY 12 HOURS SCHEDULED
Status: DISCONTINUED | OUTPATIENT
Start: 2023-01-01 | End: 2023-01-01 | Stop reason: RX

## 2023-01-01 RX ORDER — IPRATROPIUM BROMIDE AND ALBUTEROL SULFATE 2.5; .5 MG/3ML; MG/3ML
6 SOLUTION RESPIRATORY (INHALATION) ONCE
Status: COMPLETED | OUTPATIENT
Start: 2023-01-01 | End: 2023-01-01

## 2023-01-01 RX ORDER — METHYLPREDNISOLONE SODIUM SUCCINATE 125 MG/2ML
125 INJECTION, POWDER, LYOPHILIZED, FOR SOLUTION INTRAMUSCULAR; INTRAVENOUS ONCE
Status: COMPLETED | OUTPATIENT
Start: 2023-01-01 | End: 2023-01-01

## 2023-01-01 RX ORDER — ASCORBIC ACID 500 MG
500 TABLET ORAL DAILY
Status: DISCONTINUED | OUTPATIENT
Start: 2023-01-01 | End: 2023-01-01 | Stop reason: HOSPADM

## 2023-01-01 RX ADMIN — FORMOTEROL FUMARATE DIHYDRATE 20 MCG: 20 SOLUTION RESPIRATORY (INHALATION) at 19:13

## 2023-01-01 RX ADMIN — FLUTICASONE FUROATE AND VILANTEROL TRIFENATATE 1 PUFF: 100; 25 POWDER RESPIRATORY (INHALATION) at 11:55

## 2023-01-01 RX ADMIN — FERROUS SULFATE TAB 325 MG (65 MG ELEMENTAL FE) 325 MG: 325 (65 FE) TAB at 08:28

## 2023-01-01 RX ADMIN — SODIUM CHLORIDE SOLN NEBU 7% 4 ML: 7 NEBU SOLN at 19:01

## 2023-01-01 RX ADMIN — GUAIFENESIN 1200 MG: 600 TABLET, EXTENDED RELEASE ORAL at 08:31

## 2023-01-01 RX ADMIN — GUAIFENESIN 1200 MG: 600 TABLET, EXTENDED RELEASE ORAL at 09:15

## 2023-01-01 RX ADMIN — SODIUM CHLORIDE: 9 INJECTION, SOLUTION INTRAVENOUS at 23:52

## 2023-01-01 RX ADMIN — IPRATROPIUM BROMIDE AND ALBUTEROL SULFATE 3 ML: .5; 3 SOLUTION RESPIRATORY (INHALATION) at 19:55

## 2023-01-01 RX ADMIN — ZINC SULFATE 220 MG (50 MG) CAPSULE 220 MG: CAPSULE at 09:24

## 2023-01-01 RX ADMIN — AZITHROMYCIN 500 MG: 500 INJECTION, POWDER, LYOPHILIZED, FOR SOLUTION INTRAVENOUS at 08:46

## 2023-01-01 RX ADMIN — SODIUM CHLORIDE, PRESERVATIVE FREE 2 ML: 5 INJECTION INTRAVENOUS at 08:33

## 2023-01-01 RX ADMIN — PANTOPRAZOLE SODIUM 40 MG: 40 TABLET, DELAYED RELEASE ORAL at 21:25

## 2023-01-01 RX ADMIN — CEFTRIAXONE SODIUM 1000 MG: 10 INJECTION, POWDER, FOR SOLUTION INTRAVENOUS at 10:16

## 2023-01-01 RX ADMIN — CEFTRIAXONE SODIUM 1000 MG: 10 INJECTION, POWDER, FOR SOLUTION INTRAVENOUS at 08:32

## 2023-01-01 RX ADMIN — PANTOPRAZOLE SODIUM 40 MG: 40 INJECTION, POWDER, FOR SOLUTION INTRAVENOUS at 08:32

## 2023-01-01 RX ADMIN — FORMOTEROL FUMARATE DIHYDRATE 20 MCG: 20 SOLUTION RESPIRATORY (INHALATION) at 08:53

## 2023-01-01 RX ADMIN — PANTOPRAZOLE SODIUM 40 MG: 40 TABLET, DELAYED RELEASE ORAL at 08:34

## 2023-01-01 RX ADMIN — SODIUM CHLORIDE, PRESERVATIVE FREE 2 ML: 5 INJECTION INTRAVENOUS at 10:08

## 2023-01-01 RX ADMIN — WARFARIN SODIUM 2 MG: 2 TABLET ORAL at 17:25

## 2023-01-01 RX ADMIN — PANTOPRAZOLE SODIUM 40 MG: 40 TABLET, DELAYED RELEASE ORAL at 20:22

## 2023-01-01 RX ADMIN — PROPOFOL 100 MCG/KG/MIN: 10 INJECTION, EMULSION INTRAVENOUS at 15:35

## 2023-01-01 RX ADMIN — FORMOTEROL FUMARATE DIHYDRATE 20 MCG: 20 SOLUTION RESPIRATORY (INHALATION) at 21:05

## 2023-01-01 RX ADMIN — GUAIFENESIN 1200 MG: 600 TABLET, EXTENDED RELEASE ORAL at 21:17

## 2023-01-01 RX ADMIN — HYDROCHLOROTHIAZIDE: 25 TABLET ORAL at 09:14

## 2023-01-01 RX ADMIN — ACETAMINOPHEN 500 MG: 500 TABLET ORAL at 16:52

## 2023-01-01 RX ADMIN — PHENYLEPHRINE HYDROCHLORIDE 100 MCG: 10 INJECTION, SOLUTION INTRAVENOUS at 13:15

## 2023-01-01 RX ADMIN — FORMOTEROL FUMARATE DIHYDRATE 20 MCG: 20 SOLUTION RESPIRATORY (INHALATION) at 08:00

## 2023-01-01 RX ADMIN — BUDESONIDE 0.5 MG: 0.5 INHALANT ORAL at 19:45

## 2023-01-01 RX ADMIN — FLUTICASONE FUROATE AND VILANTEROL TRIFENATATE 1 PUFF: 100; 25 POWDER RESPIRATORY (INHALATION) at 07:53

## 2023-01-01 RX ADMIN — THIAMINE HCL TAB 100 MG 100 MG: 100 TAB at 08:29

## 2023-01-01 RX ADMIN — PANTOPRAZOLE SODIUM 40 MG: 40 TABLET, DELAYED RELEASE ORAL at 09:17

## 2023-01-01 RX ADMIN — THIAMINE HCL TAB 100 MG 100 MG: 100 TAB at 08:34

## 2023-01-01 RX ADMIN — SODIUM CHLORIDE, PRESERVATIVE FREE 2 ML: 5 INJECTION INTRAVENOUS at 08:18

## 2023-01-01 RX ADMIN — CEFTRIAXONE SODIUM 1000 MG: 10 INJECTION, POWDER, FOR SOLUTION INTRAVENOUS at 08:39

## 2023-01-01 RX ADMIN — SODIUM CHLORIDE, PRESERVATIVE FREE 2 ML: 5 INJECTION INTRAVENOUS at 20:28

## 2023-01-01 RX ADMIN — PANTOPRAZOLE SODIUM 40 MG: 40 TABLET, DELAYED RELEASE ORAL at 20:04

## 2023-01-01 RX ADMIN — PREDNISONE 50 MG: 20 TABLET ORAL at 09:24

## 2023-01-01 RX ADMIN — PANTOPRAZOLE SODIUM 40 MG: 40 INJECTION, POWDER, FOR SOLUTION INTRAVENOUS at 20:26

## 2023-01-01 RX ADMIN — PHENYLEPHRINE HYDROCHLORIDE 100 MCG: 10 INJECTION, SOLUTION INTRAVENOUS at 13:35

## 2023-01-01 RX ADMIN — FLUTICASONE FUROATE AND VILANTEROL TRIFENATATE 1 PUFF: 100; 25 POWDER RESPIRATORY (INHALATION) at 08:34

## 2023-01-01 RX ADMIN — PREDNISONE 50 MG: 20 TABLET ORAL at 18:00

## 2023-01-01 RX ADMIN — IPRATROPIUM BROMIDE AND ALBUTEROL SULFATE 3 ML: .5; 3 SOLUTION RESPIRATORY (INHALATION) at 15:36

## 2023-01-01 RX ADMIN — WARFARIN SODIUM 5 MG: 5 TABLET ORAL at 17:52

## 2023-01-01 RX ADMIN — PREDNISONE 50 MG: 20 TABLET ORAL at 17:08

## 2023-01-01 RX ADMIN — SODIUM CHLORIDE, PRESERVATIVE FREE 2 ML: 5 INJECTION INTRAVENOUS at 08:09

## 2023-01-01 RX ADMIN — ALBUTEROL SULFATE 2 PUFF: 90 AEROSOL, METERED RESPIRATORY (INHALATION) at 22:55

## 2023-01-01 RX ADMIN — SODIUM CHLORIDE 25 ML: 9 INJECTION, SOLUTION INTRAVENOUS at 17:20

## 2023-01-01 RX ADMIN — FORMOTEROL FUMARATE DIHYDRATE 20 MCG: 20 SOLUTION RESPIRATORY (INHALATION) at 09:06

## 2023-01-01 RX ADMIN — HYDROCHLOROTHIAZIDE: 25 TABLET ORAL at 09:24

## 2023-01-01 RX ADMIN — IPRATROPIUM BROMIDE AND ALBUTEROL SULFATE 3 ML: .5; 3 SOLUTION RESPIRATORY (INHALATION) at 16:05

## 2023-01-01 RX ADMIN — CEFTRIAXONE SODIUM 1000 MG: 10 INJECTION, POWDER, FOR SOLUTION INTRAVENOUS at 08:10

## 2023-01-01 RX ADMIN — FORMOTEROL FUMARATE DIHYDRATE 20 MCG: 20 SOLUTION RESPIRATORY (INHALATION) at 10:31

## 2023-01-01 RX ADMIN — PANTOPRAZOLE SODIUM 40 MG: 40 TABLET, DELAYED RELEASE ORAL at 06:20

## 2023-01-01 RX ADMIN — FERROUS SULFATE TAB 325 MG (65 MG ELEMENTAL FE) 325 MG: 325 (65 FE) TAB at 08:35

## 2023-01-01 RX ADMIN — SODIUM CHLORIDE, PRESERVATIVE FREE 2 ML: 5 INJECTION INTRAVENOUS at 21:03

## 2023-01-01 RX ADMIN — PANTOPRAZOLE SODIUM 40 MG: 40 TABLET, DELAYED RELEASE ORAL at 20:20

## 2023-01-01 RX ADMIN — FERROUS SULFATE TAB 325 MG (65 MG ELEMENTAL FE) 325 MG: 325 (65 FE) TAB at 08:18

## 2023-01-01 RX ADMIN — CEFTRIAXONE SODIUM 1000 MG: 10 INJECTION, POWDER, FOR SOLUTION INTRAVENOUS at 19:11

## 2023-01-01 RX ADMIN — FORMOTEROL FUMARATE DIHYDRATE 20 MCG: 20 SOLUTION RESPIRATORY (INHALATION) at 07:53

## 2023-01-01 RX ADMIN — OXYCODONE HYDROCHLORIDE AND ACETAMINOPHEN 500 MG: 500 TABLET ORAL at 09:20

## 2023-01-01 RX ADMIN — SODIUM CHLORIDE, PRESERVATIVE FREE 2 ML: 5 INJECTION INTRAVENOUS at 22:24

## 2023-01-01 RX ADMIN — FORMOTEROL FUMARATE DIHYDRATE 20 MCG: 20 SOLUTION RESPIRATORY (INHALATION) at 19:45

## 2023-01-01 RX ADMIN — OXYCODONE HYDROCHLORIDE AND ACETAMINOPHEN 500 MG: 500 TABLET ORAL at 08:32

## 2023-01-01 RX ADMIN — IPRATROPIUM BROMIDE AND ALBUTEROL SULFATE 3 ML: .5; 3 SOLUTION RESPIRATORY (INHALATION) at 15:49

## 2023-01-01 RX ADMIN — SODIUM CHLORIDE, PRESERVATIVE FREE 2 ML: 5 INJECTION INTRAVENOUS at 20:20

## 2023-01-01 RX ADMIN — AMLODIPINE BESYLATE 5 MG: 5 TABLET ORAL at 08:18

## 2023-01-01 RX ADMIN — FORMOTEROL FUMARATE DIHYDRATE 20 MCG: 20 SOLUTION RESPIRATORY (INHALATION) at 08:15

## 2023-01-01 RX ADMIN — PREDNISONE 50 MG: 20 TABLET ORAL at 16:52

## 2023-01-01 RX ADMIN — BUDESONIDE 0.5 MG: 0.5 INHALANT ORAL at 08:40

## 2023-01-01 RX ADMIN — PREDNISONE 50 MG: 20 TABLET ORAL at 20:16

## 2023-01-01 RX ADMIN — SODIUM CHLORIDE, PRESERVATIVE FREE 2 ML: 5 INJECTION INTRAVENOUS at 09:17

## 2023-01-01 RX ADMIN — GUAIFENESIN 1200 MG: 600 TABLET, EXTENDED RELEASE ORAL at 08:28

## 2023-01-01 RX ADMIN — WARFARIN SODIUM 5 MG: 5 TABLET ORAL at 18:06

## 2023-01-01 RX ADMIN — IPRATROPIUM BROMIDE AND ALBUTEROL SULFATE 3 ML: .5; 3 SOLUTION RESPIRATORY (INHALATION) at 07:15

## 2023-01-01 RX ADMIN — OXYCODONE HYDROCHLORIDE AND ACETAMINOPHEN 500 MG: 500 TABLET ORAL at 08:18

## 2023-01-01 RX ADMIN — ALPRAZOLAM 0.25 MG: 0.25 TABLET ORAL at 20:50

## 2023-01-01 RX ADMIN — SODIUM CHLORIDE, PRESERVATIVE FREE 2 ML: 5 INJECTION INTRAVENOUS at 22:49

## 2023-01-01 RX ADMIN — FORMOTEROL FUMARATE DIHYDRATE 20 MCG: 20 SOLUTION RESPIRATORY (INHALATION) at 20:10

## 2023-01-01 RX ADMIN — POTASSIUM CHLORIDE 20 MEQ: 14.9 INJECTION, SOLUTION INTRAVENOUS at 10:17

## 2023-01-01 RX ADMIN — GUAIFENESIN 1200 MG: 600 TABLET, EXTENDED RELEASE ORAL at 09:25

## 2023-01-01 RX ADMIN — GUAIFENESIN 1200 MG: 600 TABLET, EXTENDED RELEASE ORAL at 21:50

## 2023-01-01 RX ADMIN — AZITHROMYCIN 500 MG: 500 INJECTION, POWDER, LYOPHILIZED, FOR SOLUTION INTRAVENOUS at 09:23

## 2023-01-01 RX ADMIN — PREDNISONE 50 MG: 20 TABLET ORAL at 09:14

## 2023-01-01 RX ADMIN — AZITHROMYCIN 500 MG: 500 INJECTION, POWDER, LYOPHILIZED, FOR SOLUTION INTRAVENOUS at 20:16

## 2023-01-01 RX ADMIN — PREDNISONE 40 MG: 20 TABLET ORAL at 08:18

## 2023-01-01 RX ADMIN — IPRATROPIUM BROMIDE AND ALBUTEROL SULFATE 3 ML: .5; 3 SOLUTION RESPIRATORY (INHALATION) at 18:53

## 2023-01-01 RX ADMIN — PREDNISONE 40 MG: 20 TABLET ORAL at 17:50

## 2023-01-01 RX ADMIN — IPRATROPIUM BROMIDE AND ALBUTEROL SULFATE 3 ML: .5; 3 SOLUTION RESPIRATORY (INHALATION) at 11:28

## 2023-01-01 RX ADMIN — FORMOTEROL FUMARATE DIHYDRATE 20 MCG: 20 SOLUTION RESPIRATORY (INHALATION) at 20:11

## 2023-01-01 RX ADMIN — GUAIFENESIN 1200 MG: 600 TABLET, EXTENDED RELEASE ORAL at 22:23

## 2023-01-01 RX ADMIN — HYDROCHLOROTHIAZIDE: 25 TABLET ORAL at 08:34

## 2023-01-01 RX ADMIN — BUDESONIDE 0.5 MG: 0.5 INHALANT ORAL at 19:07

## 2023-01-01 RX ADMIN — IPRATROPIUM BROMIDE AND ALBUTEROL SULFATE 3 ML: .5; 3 SOLUTION RESPIRATORY (INHALATION) at 06:50

## 2023-01-01 RX ADMIN — AMLODIPINE BESYLATE 5 MG: 5 TABLET ORAL at 09:20

## 2023-01-01 RX ADMIN — GUAIFENESIN 600 MG: 600 TABLET, EXTENDED RELEASE ORAL at 08:34

## 2023-01-01 RX ADMIN — ACETAMINOPHEN 500 MG: 500 TABLET ORAL at 13:51

## 2023-01-01 RX ADMIN — PANTOPRAZOLE SODIUM 40 MG: 40 INJECTION, POWDER, FOR SOLUTION INTRAVENOUS at 20:38

## 2023-01-01 RX ADMIN — GUAIFENESIN 600 MG: 600 TABLET, EXTENDED RELEASE ORAL at 12:30

## 2023-01-01 RX ADMIN — GUAIFENESIN 1200 MG: 600 TABLET, EXTENDED RELEASE ORAL at 20:19

## 2023-01-01 RX ADMIN — PREDNISONE 40 MG: 20 TABLET ORAL at 09:21

## 2023-01-01 RX ADMIN — OXYCODONE HYDROCHLORIDE AND ACETAMINOPHEN 500 MG: 500 TABLET ORAL at 09:14

## 2023-01-01 RX ADMIN — METHYLPREDNISOLONE SODIUM SUCCINATE 125 MG: 125 INJECTION, POWDER, FOR SOLUTION INTRAMUSCULAR; INTRAVENOUS at 17:56

## 2023-01-01 RX ADMIN — GUAIFENESIN 1200 MG: 600 TABLET, EXTENDED RELEASE ORAL at 20:14

## 2023-01-01 RX ADMIN — PREDNISONE 50 MG: 20 TABLET ORAL at 08:34

## 2023-01-01 RX ADMIN — PANTOPRAZOLE SODIUM 40 MG: 40 TABLET, DELAYED RELEASE ORAL at 05:42

## 2023-01-01 RX ADMIN — SODIUM CHLORIDE SOLN NEBU 7% 4 ML: 7 NEBU SOLN at 21:10

## 2023-01-01 RX ADMIN — SODIUM CHLORIDE, PRESERVATIVE FREE 2 ML: 5 INJECTION INTRAVENOUS at 20:39

## 2023-01-01 RX ADMIN — ZINC SULFATE 220 MG (50 MG) CAPSULE 220 MG: CAPSULE at 08:41

## 2023-01-01 RX ADMIN — SODIUM CHLORIDE, PRESERVATIVE FREE 2 ML: 5 INJECTION INTRAVENOUS at 10:16

## 2023-01-01 RX ADMIN — FORMOTEROL FUMARATE DIHYDRATE 20 MCG: 20 SOLUTION RESPIRATORY (INHALATION) at 20:17

## 2023-01-01 RX ADMIN — IRON SUCROSE 300 MG: 20 INJECTION, SOLUTION INTRAVENOUS at 15:17

## 2023-01-01 RX ADMIN — PROPOFOL 160 MCG/KG/MIN: 10 INJECTION, EMULSION INTRAVENOUS at 13:14

## 2023-01-01 RX ADMIN — SODIUM CHLORIDE SOLN NEBU 7% 4 ML: 7 NEBU SOLN at 09:18

## 2023-01-01 RX ADMIN — IPRATROPIUM BROMIDE AND ALBUTEROL SULFATE 3 ML: .5; 3 SOLUTION RESPIRATORY (INHALATION) at 07:42

## 2023-01-01 RX ADMIN — FLUTICASONE FUROATE AND VILANTEROL TRIFENATATE 1 PUFF: 100; 25 POWDER RESPIRATORY (INHALATION) at 08:05

## 2023-01-01 RX ADMIN — ZINC SULFATE 220 MG (50 MG) CAPSULE 220 MG: CAPSULE at 09:15

## 2023-01-01 RX ADMIN — GUAIFENESIN 600 MG: 600 TABLET, EXTENDED RELEASE ORAL at 08:10

## 2023-01-01 RX ADMIN — SODIUM CHLORIDE, PRESERVATIVE FREE 2 ML: 5 INJECTION INTRAVENOUS at 08:34

## 2023-01-01 RX ADMIN — SODIUM CHLORIDE, PRESERVATIVE FREE 2 ML: 5 INJECTION INTRAVENOUS at 21:25

## 2023-01-01 RX ADMIN — PHENYLEPHRINE HYDROCHLORIDE 100 MCG: 10 INJECTION, SOLUTION INTRAVENOUS at 13:30

## 2023-01-01 RX ADMIN — IPRATROPIUM BROMIDE AND ALBUTEROL SULFATE 3 ML: .5; 3 SOLUTION RESPIRATORY (INHALATION) at 20:50

## 2023-01-01 RX ADMIN — ZINC SULFATE 220 MG (50 MG) CAPSULE 220 MG: CAPSULE at 08:18

## 2023-01-01 RX ADMIN — SODIUM CHLORIDE, PRESERVATIVE FREE 2 ML: 5 INJECTION INTRAVENOUS at 20:17

## 2023-01-01 RX ADMIN — FERROUS SULFATE TAB 325 MG (65 MG ELEMENTAL FE) 325 MG: 325 (65 FE) TAB at 09:43

## 2023-01-01 RX ADMIN — FLUTICASONE FUROATE AND VILANTEROL TRIFENATATE 1 PUFF: 100; 25 POWDER RESPIRATORY (INHALATION) at 08:11

## 2023-01-01 RX ADMIN — FORMOTEROL FUMARATE DIHYDRATE 20 MCG: 20 SOLUTION RESPIRATORY (INHALATION) at 07:43

## 2023-01-01 RX ADMIN — FORMOTEROL FUMARATE DIHYDRATE 20 MCG: 20 SOLUTION RESPIRATORY (INHALATION) at 08:28

## 2023-01-01 RX ADMIN — OXYCODONE HYDROCHLORIDE AND ACETAMINOPHEN 500 MG: 500 TABLET ORAL at 08:29

## 2023-01-01 RX ADMIN — PANTOPRAZOLE SODIUM 40 MG: 40 TABLET, DELAYED RELEASE ORAL at 05:28

## 2023-01-01 RX ADMIN — IPRATROPIUM BROMIDE AND ALBUTEROL SULFATE 3 ML: .5; 3 SOLUTION RESPIRATORY (INHALATION) at 15:08

## 2023-01-01 RX ADMIN — PROPOFOL 50 MG: 10 INJECTION, EMULSION INTRAVENOUS at 13:14

## 2023-01-01 RX ADMIN — THIAMINE HCL TAB 100 MG 100 MG: 100 TAB at 08:57

## 2023-01-01 RX ADMIN — SODIUM CHLORIDE, PRESERVATIVE FREE 2 ML: 5 INJECTION INTRAVENOUS at 08:32

## 2023-01-01 RX ADMIN — PANTOPRAZOLE SODIUM 40 MG: 40 TABLET, DELAYED RELEASE ORAL at 08:10

## 2023-01-01 RX ADMIN — IPRATROPIUM BROMIDE AND ALBUTEROL SULFATE 3 ML: .5; 3 SOLUTION RESPIRATORY (INHALATION) at 12:15

## 2023-01-01 RX ADMIN — PREDNISONE 50 MG: 20 TABLET ORAL at 08:31

## 2023-01-01 RX ADMIN — FUROSEMIDE 20 MG: 20 TABLET ORAL at 08:57

## 2023-01-01 RX ADMIN — WARFARIN SODIUM 5 MG: 5 TABLET ORAL at 16:53

## 2023-01-01 RX ADMIN — FORMOTEROL FUMARATE DIHYDRATE 20 MCG: 20 SOLUTION RESPIRATORY (INHALATION) at 07:20

## 2023-01-01 RX ADMIN — WARFARIN SODIUM 5 MG: 5 TABLET ORAL at 12:16

## 2023-01-01 RX ADMIN — GUAIFENESIN 1200 MG: 600 TABLET, EXTENDED RELEASE ORAL at 20:16

## 2023-01-01 RX ADMIN — ACETAMINOPHEN 500 MG: 500 TABLET ORAL at 01:33

## 2023-01-01 RX ADMIN — HYDROCHLOROTHIAZIDE: 25 TABLET ORAL at 09:43

## 2023-01-01 RX ADMIN — BUDESONIDE 0.5 MG: 0.5 INHALANT ORAL at 07:43

## 2023-01-01 RX ADMIN — ACETAMINOPHEN 500 MG: 500 TABLET ORAL at 00:27

## 2023-01-01 RX ADMIN — SODIUM CHLORIDE, PRESERVATIVE FREE 2 ML: 5 INJECTION INTRAVENOUS at 08:39

## 2023-01-01 RX ADMIN — IRON SUCROSE 300 MG: 20 INJECTION, SOLUTION INTRAVENOUS at 13:24

## 2023-01-01 RX ADMIN — ACETAMINOPHEN 500 MG: 500 TABLET ORAL at 09:41

## 2023-01-01 RX ADMIN — SODIUM CHLORIDE SOLN NEBU 7% 4 ML: 7 NEBU SOLN at 21:11

## 2023-01-01 RX ADMIN — SODIUM CHLORIDE, PRESERVATIVE FREE 2 ML: 5 INJECTION INTRAVENOUS at 21:50

## 2023-01-01 RX ADMIN — SODIUM CHLORIDE SOLN NEBU 7% 4 ML: 7 NEBU SOLN at 07:51

## 2023-01-01 RX ADMIN — AZITHROMYCIN 500 MG: 250 TABLET, FILM COATED ORAL at 08:32

## 2023-01-01 RX ADMIN — WARFARIN SODIUM 2 MG: 2 TABLET ORAL at 17:50

## 2023-01-01 RX ADMIN — FLUTICASONE FUROATE AND VILANTEROL TRIFENATATE 1 PUFF: 100; 25 POWDER RESPIRATORY (INHALATION) at 07:55

## 2023-01-01 RX ADMIN — SODIUM CHLORIDE 250 ML: 9 INJECTION, SOLUTION INTRAVENOUS at 20:52

## 2023-01-01 RX ADMIN — SODIUM CHLORIDE, PRESERVATIVE FREE 2 ML: 5 INJECTION INTRAVENOUS at 09:21

## 2023-01-01 RX ADMIN — PANTOPRAZOLE SODIUM 40 MG: 40 TABLET, DELAYED RELEASE ORAL at 21:50

## 2023-01-01 RX ADMIN — FUROSEMIDE 20 MG: 20 TABLET ORAL at 09:20

## 2023-01-01 RX ADMIN — FERROUS SULFATE TAB 325 MG (65 MG ELEMENTAL FE) 325 MG: 325 (65 FE) TAB at 09:25

## 2023-01-01 RX ADMIN — FORMOTEROL FUMARATE DIHYDRATE 20 MCG: 20 SOLUTION RESPIRATORY (INHALATION) at 19:40

## 2023-01-01 RX ADMIN — SODIUM CHLORIDE, PRESERVATIVE FREE 2 ML: 5 INJECTION INTRAVENOUS at 20:14

## 2023-01-01 RX ADMIN — SODIUM CHLORIDE: 9 INJECTION, SOLUTION INTRAVENOUS at 22:00

## 2023-01-01 RX ADMIN — CEFTRIAXONE SODIUM 1000 MG: 10 INJECTION, POWDER, FOR SOLUTION INTRAVENOUS at 08:34

## 2023-01-01 RX ADMIN — IPRATROPIUM BROMIDE AND ALBUTEROL SULFATE 3 ML: .5; 3 SOLUTION RESPIRATORY (INHALATION) at 20:35

## 2023-01-01 RX ADMIN — GUAIFENESIN 600 MG: 600 TABLET, EXTENDED RELEASE ORAL at 22:48

## 2023-01-01 RX ADMIN — OXYCODONE HYDROCHLORIDE AND ACETAMINOPHEN 500 MG: 500 TABLET ORAL at 08:57

## 2023-01-01 RX ADMIN — BUDESONIDE 0.5 MG: 0.5 INHALANT ORAL at 21:00

## 2023-01-01 RX ADMIN — FORMOTEROL FUMARATE DIHYDRATE 20 MCG: 20 SOLUTION RESPIRATORY (INHALATION) at 08:43

## 2023-01-01 RX ADMIN — IPRATROPIUM BROMIDE AND ALBUTEROL SULFATE 3 ML: .5; 3 SOLUTION RESPIRATORY (INHALATION) at 09:05

## 2023-01-01 RX ADMIN — IRON SUCROSE 300 MG: 20 INJECTION, SOLUTION INTRAVENOUS at 11:27

## 2023-01-01 RX ADMIN — PANTOPRAZOLE SODIUM 40 MG: 40 TABLET, DELAYED RELEASE ORAL at 08:50

## 2023-01-01 RX ADMIN — BUDESONIDE 0.5 MG: 0.5 INHALANT ORAL at 20:05

## 2023-01-01 RX ADMIN — FORMOTEROL FUMARATE DIHYDRATE 20 MCG: 20 SOLUTION RESPIRATORY (INHALATION) at 08:05

## 2023-01-01 RX ADMIN — THIAMINE HCL TAB 100 MG 100 MG: 100 TAB at 08:32

## 2023-01-01 RX ADMIN — GUAIFENESIN 600 MG: 600 TABLET, EXTENDED RELEASE ORAL at 09:17

## 2023-01-01 RX ADMIN — IPRATROPIUM BROMIDE AND ALBUTEROL SULFATE 3 ML: .5; 3 SOLUTION RESPIRATORY (INHALATION) at 11:26

## 2023-01-01 RX ADMIN — BUDESONIDE 0.5 MG: 0.5 INHALANT ORAL at 08:32

## 2023-01-01 RX ADMIN — ZINC SULFATE 220 MG (50 MG) CAPSULE 220 MG: CAPSULE at 08:32

## 2023-01-01 RX ADMIN — GUAIFENESIN 600 MG: 600 TABLET, EXTENDED RELEASE ORAL at 08:32

## 2023-01-01 RX ADMIN — FORMOTEROL FUMARATE DIHYDRATE 20 MCG: 20 SOLUTION RESPIRATORY (INHALATION) at 19:55

## 2023-01-01 RX ADMIN — OXYCODONE HYDROCHLORIDE AND ACETAMINOPHEN 500 MG: 500 TABLET ORAL at 08:41

## 2023-01-01 RX ADMIN — HYDROCHLOROTHIAZIDE: 25 TABLET ORAL at 08:18

## 2023-01-01 RX ADMIN — FERROUS SULFATE TAB 325 MG (65 MG ELEMENTAL FE) 325 MG: 325 (65 FE) TAB at 08:32

## 2023-01-01 RX ADMIN — PREDNISONE 40 MG: 20 TABLET ORAL at 17:52

## 2023-01-01 RX ADMIN — FORMOTEROL FUMARATE DIHYDRATE 20 MCG: 20 SOLUTION RESPIRATORY (INHALATION) at 07:59

## 2023-01-01 RX ADMIN — PANTOPRAZOLE SODIUM 40 MG: 40 TABLET, DELAYED RELEASE ORAL at 21:14

## 2023-01-01 RX ADMIN — GUAIFENESIN 600 MG: 600 TABLET, EXTENDED RELEASE ORAL at 20:22

## 2023-01-01 RX ADMIN — PHENYLEPHRINE HYDROCHLORIDE 100 MCG: 10 INJECTION, SOLUTION INTRAVENOUS at 13:25

## 2023-01-01 RX ADMIN — PREDNISONE 50 MG: 20 TABLET ORAL at 17:25

## 2023-01-01 RX ADMIN — FLUTICASONE FUROATE AND VILANTEROL TRIFENATATE 1 PUFF: 100; 25 POWDER RESPIRATORY (INHALATION) at 08:41

## 2023-01-01 RX ADMIN — GUAIFENESIN 1200 MG: 600 TABLET, EXTENDED RELEASE ORAL at 08:18

## 2023-01-01 RX ADMIN — SODIUM CHLORIDE, PRESERVATIVE FREE 2 ML: 5 INJECTION INTRAVENOUS at 09:27

## 2023-01-01 RX ADMIN — IPRATROPIUM BROMIDE AND ALBUTEROL SULFATE 3 ML: .5; 3 SOLUTION RESPIRATORY (INHALATION) at 13:11

## 2023-01-01 RX ADMIN — FORMOTEROL FUMARATE DIHYDRATE 20 MCG: 20 SOLUTION RESPIRATORY (INHALATION) at 07:45

## 2023-01-01 RX ADMIN — OXYCODONE HYDROCHLORIDE AND ACETAMINOPHEN 500 MG: 500 TABLET ORAL at 08:35

## 2023-01-01 RX ADMIN — BUDESONIDE 0.5 MG: 0.5 INHALANT ORAL at 20:45

## 2023-01-01 RX ADMIN — GUAIFENESIN 1200 MG: 600 TABLET, EXTENDED RELEASE ORAL at 08:57

## 2023-01-01 RX ADMIN — FUROSEMIDE 20 MG: 20 TABLET ORAL at 08:28

## 2023-01-01 RX ADMIN — ALPRAZOLAM 0.25 MG: 0.25 TABLET ORAL at 23:16

## 2023-01-01 RX ADMIN — WARFARIN SODIUM 4 MG: 4 TABLET ORAL at 18:00

## 2023-01-01 RX ADMIN — SODIUM CHLORIDE, PRESERVATIVE FREE 2 ML: 5 INJECTION INTRAVENOUS at 09:14

## 2023-01-01 RX ADMIN — POTASSIUM CHLORIDE 20 MEQ: 14.9 INJECTION, SOLUTION INTRAVENOUS at 12:25

## 2023-01-01 RX ADMIN — SODIUM CHLORIDE: 9 INJECTION, SOLUTION INTRAVENOUS at 06:44

## 2023-01-01 RX ADMIN — ZINC SULFATE 220 MG (50 MG) CAPSULE 220 MG: CAPSULE at 09:20

## 2023-01-01 RX ADMIN — ALPRAZOLAM 0.25 MG: 0.25 TABLET ORAL at 22:18

## 2023-01-01 RX ADMIN — MAGNESIUM SULFATE HEPTAHYDRATE 2 G: 40 INJECTION, SOLUTION INTRAVENOUS at 14:24

## 2023-01-01 RX ADMIN — SODIUM CHLORIDE, PRESERVATIVE FREE 2 ML: 5 INJECTION INTRAVENOUS at 08:12

## 2023-01-01 RX ADMIN — BUDESONIDE 0.5 MG: 0.5 INHALANT ORAL at 08:17

## 2023-01-01 RX ADMIN — FORMOTEROL FUMARATE DIHYDRATE 20 MCG: 20 SOLUTION RESPIRATORY (INHALATION) at 07:37

## 2023-01-01 RX ADMIN — FERROUS SULFATE TAB 325 MG (65 MG ELEMENTAL FE) 325 MG: 325 (65 FE) TAB at 09:14

## 2023-01-01 RX ADMIN — BUDESONIDE 0.5 MG: 0.5 INHALANT ORAL at 09:12

## 2023-01-01 RX ADMIN — SODIUM CHLORIDE 25 ML: 9 INJECTION, SOLUTION INTRAVENOUS at 18:34

## 2023-01-01 RX ADMIN — PREDNISONE 40 MG: 20 TABLET ORAL at 08:29

## 2023-01-01 RX ADMIN — HYDROCHLOROTHIAZIDE: 25 TABLET ORAL at 08:28

## 2023-01-01 RX ADMIN — IPRATROPIUM BROMIDE AND ALBUTEROL SULFATE 3 ML: .5; 3 SOLUTION RESPIRATORY (INHALATION) at 20:32

## 2023-01-01 RX ADMIN — THIAMINE HCL TAB 100 MG 100 MG: 100 TAB at 09:43

## 2023-01-01 RX ADMIN — SODIUM CHLORIDE SOLN NEBU 7% 4 ML: 7 NEBU SOLN at 07:43

## 2023-01-01 RX ADMIN — CEFTRIAXONE SODIUM 1000 MG: 10 INJECTION, POWDER, FOR SOLUTION INTRAVENOUS at 16:38

## 2023-01-01 RX ADMIN — FORMOTEROL FUMARATE DIHYDRATE 20 MCG: 20 SOLUTION RESPIRATORY (INHALATION) at 07:41

## 2023-01-01 RX ADMIN — AMLODIPINE BESYLATE 5 MG: 5 TABLET ORAL at 09:41

## 2023-01-01 RX ADMIN — SODIUM CHLORIDE: 9 INJECTION, SOLUTION INTRAVENOUS at 06:24

## 2023-01-01 RX ADMIN — GUAIFENESIN 1200 MG: 600 TABLET, EXTENDED RELEASE ORAL at 21:03

## 2023-01-01 RX ADMIN — FORMOTEROL FUMARATE DIHYDRATE 20 MCG: 20 SOLUTION RESPIRATORY (INHALATION) at 20:33

## 2023-01-01 RX ADMIN — FUROSEMIDE 20 MG: 20 TABLET ORAL at 08:18

## 2023-01-01 RX ADMIN — IPRATROPIUM BROMIDE AND ALBUTEROL SULFATE 6 ML: .5; 2.5 SOLUTION RESPIRATORY (INHALATION) at 18:03

## 2023-01-01 RX ADMIN — SODIUM CHLORIDE, PRESERVATIVE FREE 2 ML: 5 INJECTION INTRAVENOUS at 09:56

## 2023-01-01 RX ADMIN — ACETAMINOPHEN 500 MG: 500 TABLET ORAL at 03:06

## 2023-01-01 RX ADMIN — FUROSEMIDE 20 MG: 20 TABLET ORAL at 09:43

## 2023-01-01 RX ADMIN — FORMOTEROL FUMARATE DIHYDRATE 20 MCG: 20 SOLUTION RESPIRATORY (INHALATION) at 08:17

## 2023-01-01 RX ADMIN — BUDESONIDE 0.5 MG: 0.5 INHALANT ORAL at 20:40

## 2023-01-01 RX ADMIN — FORMOTEROL FUMARATE DIHYDRATE 20 MCG: 20 SOLUTION RESPIRATORY (INHALATION) at 21:20

## 2023-01-01 RX ADMIN — GUAIFENESIN 1200 MG: 600 TABLET, EXTENDED RELEASE ORAL at 08:34

## 2023-01-01 RX ADMIN — THIAMINE HCL TAB 100 MG 100 MG: 100 TAB at 09:15

## 2023-01-01 RX ADMIN — HYDROCHLOROTHIAZIDE: 25 TABLET ORAL at 08:57

## 2023-01-01 RX ADMIN — AZITHROMYCIN 500 MG: 250 TABLET, FILM COATED ORAL at 08:35

## 2023-01-01 RX ADMIN — PANTOPRAZOLE SODIUM 40 MG: 40 TABLET, DELAYED RELEASE ORAL at 05:30

## 2023-01-01 RX ADMIN — POLYETHYLENE GLYCOL-3350 AND ELECTROLYTES 4000 ML: 236; 6.74; 5.86; 2.97; 22.74 POWDER, FOR SOLUTION ORAL at 18:12

## 2023-01-01 RX ADMIN — OXYCODONE HYDROCHLORIDE AND ACETAMINOPHEN 500 MG: 500 TABLET ORAL at 09:25

## 2023-01-01 RX ADMIN — ZINC SULFATE 220 MG (50 MG) CAPSULE 220 MG: CAPSULE at 08:28

## 2023-01-01 RX ADMIN — FORMOTEROL FUMARATE DIHYDRATE 20 MCG: 20 SOLUTION RESPIRATORY (INHALATION) at 11:47

## 2023-01-01 RX ADMIN — ZINC SULFATE 220 MG (50 MG) CAPSULE 220 MG: CAPSULE at 08:35

## 2023-01-01 RX ADMIN — BUDESONIDE 0.5 MG: 0.5 INHALANT ORAL at 13:51

## 2023-01-01 RX ADMIN — FORMOTEROL FUMARATE DIHYDRATE 20 MCG: 20 SOLUTION RESPIRATORY (INHALATION) at 19:54

## 2023-01-01 RX ADMIN — SODIUM CHLORIDE, POTASSIUM CHLORIDE, SODIUM LACTATE AND CALCIUM CHLORIDE: 600; 310; 30; 20 INJECTION, SOLUTION INTRAVENOUS at 13:05

## 2023-01-01 RX ADMIN — HYDROCHLOROTHIAZIDE: 25 TABLET ORAL at 09:20

## 2023-01-01 RX ADMIN — SODIUM CHLORIDE SOLN NEBU 7% 4 ML: 7 NEBU SOLN at 20:12

## 2023-01-01 RX ADMIN — FERROUS SULFATE TAB 325 MG (65 MG ELEMENTAL FE) 325 MG: 325 (65 FE) TAB at 08:57

## 2023-01-01 RX ADMIN — THIAMINE HCL TAB 100 MG 100 MG: 100 TAB at 08:18

## 2023-01-01 RX ADMIN — ALPRAZOLAM 0.25 MG: 0.25 TABLET ORAL at 21:53

## 2023-01-01 RX ADMIN — ZINC SULFATE 220 MG (50 MG) CAPSULE 220 MG: CAPSULE at 08:57

## 2023-01-01 RX ADMIN — FORMOTEROL FUMARATE DIHYDRATE 20 MCG: 20 SOLUTION RESPIRATORY (INHALATION) at 20:28

## 2023-01-01 RX ADMIN — METHYLPREDNISOLONE SODIUM SUCCINATE 60 MG: 125 INJECTION, POWDER, FOR SOLUTION INTRAMUSCULAR; INTRAVENOUS at 06:05

## 2023-01-01 RX ADMIN — PANTOPRAZOLE SODIUM 40 MG: 40 INJECTION, POWDER, FOR SOLUTION INTRAVENOUS at 09:14

## 2023-01-01 RX ADMIN — FORMOTEROL FUMARATE DIHYDRATE 20 MCG: 20 SOLUTION RESPIRATORY (INHALATION) at 07:42

## 2023-01-01 RX ADMIN — PANTOPRAZOLE SODIUM 40 MG: 40 TABLET, DELAYED RELEASE ORAL at 20:52

## 2023-01-01 RX ADMIN — FORMOTEROL FUMARATE DIHYDRATE 20 MCG: 20 SOLUTION RESPIRATORY (INHALATION) at 20:08

## 2023-01-01 RX ADMIN — SODIUM CHLORIDE, PRESERVATIVE FREE 2 ML: 5 INJECTION INTRAVENOUS at 08:57

## 2023-01-01 RX ADMIN — PANTOPRAZOLE SODIUM 40 MG: 40 TABLET, DELAYED RELEASE ORAL at 09:41

## 2023-01-01 RX ADMIN — GUAIFENESIN 1200 MG: 600 TABLET, EXTENDED RELEASE ORAL at 08:41

## 2023-01-01 RX ADMIN — BUDESONIDE 0.5 MG: 0.5 INHALANT ORAL at 07:30

## 2023-01-01 RX ADMIN — SODIUM CHLORIDE, PRESERVATIVE FREE 2 ML: 5 INJECTION INTRAVENOUS at 20:26

## 2023-01-01 RX ADMIN — PANTOPRAZOLE SODIUM 40 MG: 40 TABLET, DELAYED RELEASE ORAL at 05:11

## 2023-01-01 RX ADMIN — IRON SUCROSE 300 MG: 20 INJECTION, SOLUTION INTRAVENOUS at 11:14

## 2023-01-01 RX ADMIN — FERROUS SULFATE TAB 325 MG (65 MG ELEMENTAL FE) 325 MG: 325 (65 FE) TAB at 09:20

## 2023-01-01 RX ADMIN — MAGNESIUM SULFATE HEPTAHYDRATE 2 G: 40 INJECTION, SOLUTION INTRAVENOUS at 19:15

## 2023-01-01 RX ADMIN — SODIUM CHLORIDE, PRESERVATIVE FREE 2 ML: 5 INJECTION INTRAVENOUS at 09:16

## 2023-01-01 RX ADMIN — POLYETHYLENE GLYCOL-3350 AND ELECTROLYTES 2000 ML: 236; 6.74; 5.86; 2.97; 22.74 POWDER, FOR SOLUTION ORAL at 20:03

## 2023-01-01 RX ADMIN — FORMOTEROL FUMARATE DIHYDRATE 20 MCG: 20 SOLUTION RESPIRATORY (INHALATION) at 20:07

## 2023-01-01 RX ADMIN — PANTOPRAZOLE SODIUM 40 MG: 40 TABLET, DELAYED RELEASE ORAL at 08:32

## 2023-01-01 RX ADMIN — HYDROCHLOROTHIAZIDE: 25 TABLET ORAL at 08:31

## 2023-01-01 RX ADMIN — FLUTICASONE FUROATE AND VILANTEROL TRIFENATATE 1 PUFF: 100; 25 POWDER RESPIRATORY (INHALATION) at 08:12

## 2023-01-01 RX ADMIN — SODIUM CHLORIDE SOLN NEBU 7% 4 ML: 7 NEBU SOLN at 07:15

## 2023-01-01 RX ADMIN — SODIUM CHLORIDE SOLN NEBU 7% 4 ML: 7 NEBU SOLN at 08:03

## 2023-01-01 RX ADMIN — GUAIFENESIN 600 MG: 600 TABLET, EXTENDED RELEASE ORAL at 21:14

## 2023-01-01 RX ADMIN — GUAIFENESIN 600 MG: 600 TABLET, EXTENDED RELEASE ORAL at 20:04

## 2023-01-01 RX ADMIN — AZITHROMYCIN 500 MG: 250 TABLET, FILM COATED ORAL at 15:44

## 2023-01-01 RX ADMIN — LIDOCAINE HYDROCHLORIDE 50 MG: 10 INJECTION, SOLUTION INFILTRATION; PERINEURAL at 15:33

## 2023-01-01 RX ADMIN — SODIUM CHLORIDE SOLN NEBU 7% 4 ML: 7 NEBU SOLN at 20:55

## 2023-01-01 RX ADMIN — BUDESONIDE 0.5 MG: 0.5 INHALANT ORAL at 07:00

## 2023-01-01 RX ADMIN — FLUTICASONE FUROATE AND VILANTEROL TRIFENATATE 1 PUFF: 100; 25 POWDER RESPIRATORY (INHALATION) at 07:37

## 2023-01-01 RX ADMIN — GUAIFENESIN 1200 MG: 600 TABLET, EXTENDED RELEASE ORAL at 20:28

## 2023-01-01 RX ADMIN — FLUTICASONE FUROATE AND VILANTEROL TRIFENATATE 1 PUFF: 100; 25 POWDER RESPIRATORY (INHALATION) at 08:22

## 2023-01-01 RX ADMIN — AMLODIPINE BESYLATE 5 MG: 5 TABLET ORAL at 08:32

## 2023-01-01 RX ADMIN — AMLODIPINE BESYLATE 5 MG: 5 TABLET ORAL at 08:28

## 2023-01-01 RX ADMIN — SODIUM CHLORIDE, PRESERVATIVE FREE 2 ML: 5 INJECTION INTRAVENOUS at 21:17

## 2023-01-01 RX ADMIN — PHENYLEPHRINE HYDROCHLORIDE 100 MCG: 10 INJECTION, SOLUTION INTRAVENOUS at 13:19

## 2023-01-01 RX ADMIN — SODIUM CHLORIDE SOLN NEBU 7% 4 ML: 7 NEBU SOLN at 08:31

## 2023-01-01 RX ADMIN — SODIUM CHLORIDE SOLN NEBU 7% 4 ML: 7 NEBU SOLN at 20:03

## 2023-01-01 RX ADMIN — FUROSEMIDE 20 MG: 20 TABLET ORAL at 09:15

## 2023-01-01 RX ADMIN — SODIUM CHLORIDE, PRESERVATIVE FREE 2 ML: 5 INJECTION INTRAVENOUS at 08:50

## 2023-01-01 RX ADMIN — GUAIFENESIN 600 MG: 600 TABLET, EXTENDED RELEASE ORAL at 21:25

## 2023-01-01 RX ADMIN — SODIUM CHLORIDE, PRESERVATIVE FREE 2 ML: 5 INJECTION INTRAVENOUS at 20:22

## 2023-01-01 RX ADMIN — FORMOTEROL FUMARATE DIHYDRATE 20 MCG: 20 SOLUTION RESPIRATORY (INHALATION) at 19:16

## 2023-01-01 RX ADMIN — POTASSIUM CHLORIDE 20 MEQ: 1500 TABLET, EXTENDED RELEASE ORAL at 12:30

## 2023-01-01 RX ADMIN — THIAMINE HCL TAB 100 MG 100 MG: 100 TAB at 09:21

## 2023-01-01 RX ADMIN — SODIUM CHLORIDE, PRESERVATIVE FREE 2 ML: 5 INJECTION INTRAVENOUS at 08:38

## 2023-01-01 RX ADMIN — AMLODIPINE BESYLATE 5 MG: 5 TABLET ORAL at 08:34

## 2023-01-01 RX ADMIN — ACETAMINOPHEN 650 MG: 325 TABLET ORAL at 03:21

## 2023-01-01 RX ADMIN — PREDNISONE 50 MG: 20 TABLET ORAL at 08:57

## 2023-01-01 RX ADMIN — SODIUM CHLORIDE, PRESERVATIVE FREE 2 ML: 5 INJECTION INTRAVENOUS at 21:14

## 2023-01-01 RX ADMIN — PREDNISONE 50 MG: 20 TABLET ORAL at 18:06

## 2023-01-01 RX ADMIN — ALBUTEROL SULFATE 2 PUFF: 90 AEROSOL, METERED RESPIRATORY (INHALATION) at 21:08

## 2023-01-01 RX ADMIN — FUROSEMIDE 20 MG: 20 TABLET ORAL at 08:32

## 2023-01-01 RX ADMIN — IPRATROPIUM BROMIDE AND ALBUTEROL SULFATE 3 ML: .5; 3 SOLUTION RESPIRATORY (INHALATION) at 10:49

## 2023-01-01 RX ADMIN — LIDOCAINE HYDROCHLORIDE 50 MG: 10 INJECTION, SOLUTION INFILTRATION; PERINEURAL at 13:14

## 2023-01-01 RX ADMIN — BUDESONIDE 0.5 MG: 0.5 INHALANT ORAL at 07:16

## 2023-01-01 RX ADMIN — GUAIFENESIN 1200 MG: 600 TABLET, EXTENDED RELEASE ORAL at 09:21

## 2023-01-01 RX ADMIN — GUAIFENESIN 600 MG: 600 TABLET, EXTENDED RELEASE ORAL at 20:20

## 2023-01-01 RX ADMIN — ACETAMINOPHEN 500 MG: 500 TABLET ORAL at 00:02

## 2023-01-01 RX ADMIN — BUMETANIDE 1 MG: 0.25 INJECTION INTRAMUSCULAR; INTRAVENOUS at 10:28

## 2023-01-01 RX ADMIN — FUROSEMIDE 20 MG: 20 TABLET ORAL at 09:25

## 2023-01-01 RX ADMIN — SODIUM CHLORIDE SOLN NEBU 7% 4 ML: 7 NEBU SOLN at 08:04

## 2023-01-01 RX ADMIN — FUROSEMIDE 20 MG: 20 TABLET ORAL at 08:35

## 2023-01-01 RX ADMIN — BUDESONIDE 0.5 MG: 0.5 INHALANT ORAL at 20:11

## 2023-01-01 RX ADMIN — IPRATROPIUM BROMIDE AND ALBUTEROL SULFATE 3 ML: .5; 3 SOLUTION RESPIRATORY (INHALATION) at 11:00

## 2023-01-01 RX ADMIN — PANTOPRAZOLE SODIUM 40 MG: 40 INJECTION, POWDER, FOR SOLUTION INTRAVENOUS at 12:50

## 2023-01-01 RX ADMIN — SODIUM CHLORIDE 25 ML: 9 INJECTION, SOLUTION INTRAVENOUS at 09:21

## 2023-01-01 RX ADMIN — FORMOTEROL FUMARATE DIHYDRATE 20 MCG: 20 SOLUTION RESPIRATORY (INHALATION) at 21:11

## 2023-01-01 RX ADMIN — FORMOTEROL FUMARATE DIHYDRATE 20 MCG: 20 SOLUTION RESPIRATORY (INHALATION) at 20:03

## 2023-01-01 RX ADMIN — POTASSIUM CHLORIDE 40 MEQ: 1500 TABLET, EXTENDED RELEASE ORAL at 14:10

## 2023-01-01 RX ADMIN — SODIUM CHLORIDE, PRESERVATIVE FREE 2 ML: 5 INJECTION INTRAVENOUS at 20:53

## 2023-01-01 RX ADMIN — SODIUM CHLORIDE, POTASSIUM CHLORIDE, SODIUM LACTATE AND CALCIUM CHLORIDE: 600; 310; 30; 20 INJECTION, SOLUTION INTRAVENOUS at 15:31

## 2023-01-01 RX ADMIN — IPRATROPIUM BROMIDE AND ALBUTEROL SULFATE 3 ML: 2.5; .5 SOLUTION RESPIRATORY (INHALATION) at 12:26

## 2023-01-01 RX ADMIN — SODIUM CHLORIDE 25 ML: 9 INJECTION, SOLUTION INTRAVENOUS at 11:25

## 2023-01-01 RX ADMIN — FORMOTEROL FUMARATE DIHYDRATE 20 MCG: 20 SOLUTION RESPIRATORY (INHALATION) at 08:01

## 2023-01-01 RX ADMIN — THIAMINE HCL TAB 100 MG 100 MG: 100 TAB at 09:25

## 2023-01-01 RX ADMIN — FORMOTEROL FUMARATE DIHYDRATE 20 MCG: 20 SOLUTION RESPIRATORY (INHALATION) at 07:50

## 2023-01-01 RX ADMIN — PROPOFOL 50 MG: 10 INJECTION, EMULSION INTRAVENOUS at 15:33

## 2023-01-01 RX ADMIN — PHYTONADIONE 5 MG: 10 INJECTION, EMULSION INTRAMUSCULAR; INTRAVENOUS; SUBCUTANEOUS at 13:18

## 2023-01-01 SDOH — ECONOMIC STABILITY: FOOD INSECURITY: HOW OFTEN IN THE PAST 12 MONTHS WERE YOU WORRIED OR STRESSED ABOUT HAVING ENOUGH MONEY TO BUY NUTRITIOUS MEALS?: NEVER

## 2023-01-01 SDOH — SOCIAL STABILITY: SOCIAL NETWORK
HOW OFTEN DO YOU SEE OR TALK TO PEOPLE THAT YOU CARE ABOUT AND FEEL CLOSE TO? (FOR EXAMPLE: TALKING TO FRIENDS ON THE PHONE, VISITING FRIENDS OR FAMILY, GOING TO CHURCH OR CLUB MEETINGS): 5 OR MORE TIMES A WEEK

## 2023-01-01 SDOH — ECONOMIC STABILITY: HOUSING INSECURITY: ARE YOU WORRIED ABOUT LOSING YOUR HOUSING?: NO

## 2023-01-01 SDOH — HEALTH STABILITY: GENERAL: BECAUSE OF A PHYSICAL, MENTAL, OR EMOTIONAL CONDITION, DO YOU HAVE DIFFICULTY DOING ERRANDS ALONE?: NO

## 2023-01-01 SDOH — SOCIAL STABILITY: SOCIAL INSECURITY: RISK FACTORS: HEART DISEASE

## 2023-01-01 SDOH — SOCIAL STABILITY: SOCIAL INSECURITY: RISK FACTORS: PULMONARY DISEASE

## 2023-01-01 SDOH — ECONOMIC STABILITY: HOUSING INSECURITY: WHAT IS YOUR LIVING SITUATION TODAY?: SUPPORTED INDEPENDENT

## 2023-01-01 SDOH — HEALTH STABILITY: GENERAL
BECAUSE OF A PHYSICAL, MENTAL, OR EMOTIONAL CONDITION, DO YOU HAVE SERIOUS DIFFICULTY CONCENTRATING, REMEMBERING OR MAKING DECISIONS?: NO

## 2023-01-01 SDOH — ECONOMIC STABILITY: GENERAL

## 2023-01-01 SDOH — ECONOMIC STABILITY: TRANSPORTATION INSECURITY
IN THE PAST 12 MONTHS, HAS LACK OF TRANSPORTATION KEPT YOU FROM MEETINGS, WORK, OR FROM GETTING THINGS NEEDED FOR DAILY LIVING?: NO

## 2023-01-01 SDOH — SOCIAL STABILITY: SOCIAL NETWORK: SUPPORT SYSTEMS: CHILDREN;FAMILY MEMBERS

## 2023-01-01 SDOH — SOCIAL STABILITY: SOCIAL NETWORK: SUPPORT SYSTEMS: CHILDREN;FRIENDS

## 2023-01-01 SDOH — SOCIAL STABILITY: SOCIAL INSECURITY: RISK FACTORS: AGE

## 2023-01-01 SDOH — HEALTH STABILITY: PHYSICAL HEALTH: DO YOU HAVE SERIOUS DIFFICULTY WALKING OR CLIMBING STAIRS?: YES

## 2023-01-01 SDOH — SOCIAL STABILITY: SOCIAL INSECURITY: RISK FACTORS: HEMATOLOGICAL DISEASE

## 2023-01-01 SDOH — ECONOMIC STABILITY: HOUSING INSECURITY: WHAT IS YOUR LIVING SITUATION TODAY?: ALONE

## 2023-01-01 SDOH — ECONOMIC STABILITY: TRANSPORTATION INSECURITY
IN THE PAST 12 MONTHS, HAS THE LACK OF TRANSPORTATION KEPT YOU FROM MEDICAL APPOINTMENTS OR FROM GETTING MEDICATIONS?: NO

## 2023-01-01 SDOH — HEALTH STABILITY: GENERAL: BECAUSE OF A PHYSICAL, MENTAL, OR EMOTIONAL CONDITION, DO YOU HAVE DIFFICULTY DOING ERRANDS ALONE?: YES

## 2023-01-01 SDOH — ECONOMIC STABILITY: HOUSING INSECURITY: WHAT IS YOUR LIVING SITUATION TODAY?: ALONE;OTHER FACILITY RESIDENTS

## 2023-01-01 SDOH — HEALTH STABILITY: PHYSICAL HEALTH: DO YOU HAVE DIFFICULTY DRESSING OR BATHING?: NO

## 2023-01-01 SDOH — HEALTH STABILITY: PHYSICAL HEALTH: DO YOU HAVE SERIOUS DIFFICULTY WALKING OR CLIMBING STAIRS?: NO

## 2023-01-01 SDOH — ECONOMIC STABILITY: HOUSING INSECURITY: WHAT IS YOUR LIVING SITUATION TODAY?: ASSISTED LIVING

## 2023-01-01 ASSESSMENT — PAIN SCALES - GENERAL
PAINLEVEL: 3
PAINLEVEL_OUTOF10: 0
PAINLEVEL_OUTOF10: 2
PAINLEVEL_OUTOF10: 5
PAINLEVEL_OUTOF10: 0
PAINLEVEL_OUTOF10: 3
PAINLEVEL_OUTOF10: 0
PAINLEVEL_OUTOF10: 4
PAINLEVEL_OUTOF10: 0
PAINLEVEL_OUTOF10: 4
PAINLEVEL_OUTOF10: 5
PAINLEVEL_OUTOF10: 4
PAINLEVEL_OUTOF10: 0
PAINLEVEL_OUTOF10: 2
PAINLEVEL_OUTOF10: 0
PAINLEVEL_OUTOF10: 3
PAINLEVEL_OUTOF10: 0
PAINLEVEL_OUTOF10: 0
PAINLEVEL_OUTOF10: 2
PAINLEVEL_OUTOF10: 4
PAINLEVEL_OUTOF10: 2
PAINLEVEL: 2
PAINLEVEL_OUTOF10: 0
PAINLEVEL_OUTOF10: 1
PAINLEVEL_OUTOF10: 0
PAINLEVEL_OUTOF10: 5
PAINLEVEL_OUTOF10: 2
PAINLEVEL_OUTOF10: 0
PAINLEVEL_OUTOF10: 0

## 2023-01-01 ASSESSMENT — SLEEP AND FATIGUE QUESTIONNAIRES
HOW LIKELY ARE YOU TO NOD OFF OR FALL ASLEEP WHILE SITTING QUIETLY AFTER LUNCH WITHOUT ALCOHOL: MODERATE CHANCE OF DOZING
HOW LIKELY ARE YOU TO NOD OFF OR FALL ASLEEP IN A CAR, WHILE STOPPED FOR A FEW MINUTES IN TRAFFIC: WOULD NEVER DOZE
HOW LIKELY ARE YOU TO NOD OFF OR FALL ASLEEP WHILE WATCHING TV: MODERATE CHANCE OF DOZING
HOW LIKELY ARE YOU TO NOD OFF OR FALL ASLEEP WHILE SITTING INACTIVE IN A PUBLIC PLACE: WOULD NEVER DOZE
HOW LIKELY ARE YOU TO NOD OFF OR FALL ASLEEP WHEN YOU ARE A PASSENGER IN A CAR FOR AN HOUR WITHOUT A BREAK: MODERATE CHANCE OF DOZING
ESS TOTAL SCORE: 10
HOW LIKELY ARE YOU TO NOD OFF OR FALL ASLEEP WHILE SITTING AND READING: SLIGHT CHANCE OF DOZING
HOW LIKELY ARE YOU TO NOD OFF OR FALL ASLEEP WHILE LYING DOWN TO REST IN THE AFTERNOON WHEN CIRCUMSTANCES PERMIT: HIGH CHANCE OF DOZING
HOW LIKELY ARE YOU TO NOD OFF OR FALL ASLEEP WHILE SITTING AND TALKING TO SOMEONE: WOULD NEVER DOZE

## 2023-01-01 ASSESSMENT — ACTIVITIES OF DAILY LIVING (ADL)
EATING: MODIFIED INDEPENDENT
RECENT_DECLINE_ADL: NO
ADL_SHORT_OF_BREATH: YES
RECENT_DECLINE_ADL: YES, ACUTE ILLNESS WITHOUT THERAPY NEEDS
ADL_SCORE: 10
ADL_BEFORE_ADMISSION: NEEDS/REQUIRES ASSISTANCE
ADL_BEFORE_ADMISSION: INDEPENDENT
BATHING: NEEDS ASSISTANCE
CONTINENCE: NEEDS ASSISTANCE
FEEDING: INDEPENDENT
PRIOR_ADL: MODIFIED INDEPENDENT
ADL_SCORE: 12
ADL_BEFORE_ADMISSION: NEEDS/REQUIRES ASSISTANCE
ADL_SCORE: 7
MOBILITY_ASSIST_DEVICES: WHEELCHAIR;FOUR WHEEL WALKER
ADL_SHORT_OF_BREATH: YES
NEEDS_ASSIST: NO
RECENT_DECLINE_ADL: NO
TRANSFERRING: NEEDS ASSISTANCE
GROOMING: MODIFIED INDEPENDENT
PRIOR_ADL_BATHING: MODIFIED INDEPENDENT
TOILETING: NEEDS ASSISTANCE
DRESSING: INDEPENDENT
DRESSING: NEEDS ASSISTANCE
BATHING: INDEPENDENT
FEEDING: INDEPENDENT
TOILETING: INDEPENDENT
HISTORY OF FALLING IN THE LAST YEAR (PRIOR TO ADMISSION): NO
PRIOR_ADL_TOILETING: MODIFIED INDEPENDENT
RECENT_DECLINE_ADL: YES, DECLINE IN BATHING/DRESSING/FEEDING, COLLABORATE WITH PROVIDER (T);YES, DECLINE IN AMBULATION/TRANSFERRING, COLLABORATE WITH PROVIDER (T)
ADL_SHORT_OF_BREATH: YES
NEEDS_ASSIST: YES
CHRONIC_PAIN_PRESENT: NO
HOME_MANAGEMENT_TIME_ENTRY: 13
ADL_SHORT_OF_BREATH: YES

## 2023-01-01 ASSESSMENT — PATIENT HEALTH QUESTIONNAIRE - PHQ9
SUM OF ALL RESPONSES TO PHQ9 QUESTIONS 1 AND 2: 0
1. LITTLE INTEREST OR PLEASURE IN DOING THINGS: NOT AT ALL
2. FEELING DOWN, DEPRESSED OR HOPELESS: NOT AT ALL
SUM OF ALL RESPONSES TO PHQ9 QUESTIONS 1 AND 2: 0
SUM OF ALL RESPONSES TO PHQ9 QUESTIONS 1 AND 2: 0
IS PATIENT ABLE TO COMPLETE PHQ2 OR PHQ9: YES
1. LITTLE INTEREST OR PLEASURE IN DOING THINGS: NOT AT ALL
SUM OF ALL RESPONSES TO PHQ9 QUESTIONS 1 AND 2: 0
CLINICAL INTERPRETATION OF PHQ2 SCORE: NO FURTHER SCREENING NEEDED
1. LITTLE INTEREST OR PLEASURE IN DOING THINGS: NOT AT ALL
SUM OF ALL RESPONSES TO PHQ9 QUESTIONS 1 AND 2: 0
CLINICAL INTERPRETATION OF PHQ2 SCORE: NO FURTHER SCREENING NEEDED
SUM OF ALL RESPONSES TO PHQ9 QUESTIONS 1 AND 2: 0
CLINICAL INTERPRETATION OF PHQ2 SCORE: NO FURTHER SCREENING NEEDED
2. FEELING DOWN, DEPRESSED OR HOPELESS: NOT AT ALL
SUM OF ALL RESPONSES TO PHQ9 QUESTIONS 1 AND 2: 0
CLINICAL INTERPRETATION OF PHQ2 SCORE: NO FURTHER SCREENING NEEDED
2. FEELING DOWN, DEPRESSED OR HOPELESS: NOT AT ALL
2. FEELING DOWN, DEPRESSED OR HOPELESS: NOT AT ALL
CLINICAL INTERPRETATION OF PHQ2 SCORE: NO FURTHER SCREENING NEEDED
2. FEELING DOWN, DEPRESSED OR HOPELESS: NOT AT ALL
1. LITTLE INTEREST OR PLEASURE IN DOING THINGS: NOT AT ALL
1. LITTLE INTEREST OR PLEASURE IN DOING THINGS: NOT AT ALL
IS PATIENT ABLE TO COMPLETE PHQ2 OR PHQ9: YES
SUM OF ALL RESPONSES TO PHQ9 QUESTIONS 1 AND 2: 0

## 2023-01-01 ASSESSMENT — ENCOUNTER SYMPTOMS
RHINORRHEA: 0
EXERCISE TOLERANCE: POOR (<4 METS)
ABDOMINAL PAIN: 0
WEAKNESS: 0
EYE DISCHARGE: 0
COUGH: 1
ENDOCRINE NEGATIVE: 1
VOMITING: 0
SHORTNESS OF BREATH: 1
EYES NEGATIVE: 1
NAUSEA: 0
FATIGUE: 0
VOMITING: 0
SORE THROAT: 0
LIGHT-HEADEDNESS: 0
WHEEZING: 0
ABDOMINAL PAIN: 0
LIGHT-HEADEDNESS: 0
COUGH: 0
ABDOMINAL DISTENTION: 0
HEADACHES: 0
EYE PAIN: 0
SHORTNESS OF BREATH: 1
FATIGUE: 1
PAIN SEVERITY NOW: 0
BRUISES/BLEEDS EASILY: 0
DIZZINESS: 0
WEAKNESS: 1
GASTROINTESTINAL NEGATIVE: 1
BLOOD IN STOOL: 0
CONSTITUTIONAL NEGATIVE: 1
CONFUSION: 0
WHEEZING: 0
FEVER: 0
VOMITING: 0
APPETITE CHANGE: 0
ABDOMINAL DISTENTION: 0
ALLERGIC/IMMUNOLOGIC NEGATIVE: 1
CONSTITUTIONAL NEGATIVE: 1
NERVOUS/ANXIOUS: 0
COUGH: 0
DIAPHORESIS: 0
BLOOD IN STOOL: 0
SLEEP DISTURBANCE: 0
DIZZINESS: 0
NERVOUS/ANXIOUS: 0
SPEECH DIFFICULTY: 0
ABDOMINAL PAIN: 0
CONSTIPATION: 1
CONSTIPATION: 0
TROUBLE SWALLOWING: 0
CHEST TIGHTNESS: 0
DIARRHEA: 0
NAUSEA: 0
PAIN SEVERITY NOW: 0
SHORTNESS OF BREATH: 1
NAUSEA: 0
WOUND: 0
CHEST TIGHTNESS: 0
BLOOD IN STOOL: 0
SLEEP DISTURBANCE: 0
BRUISES/BLEEDS EASILY: 0
CONFUSION: 0
DIARRHEA: 0
CHILLS: 0

## 2023-01-01 ASSESSMENT — ORIENTATION MEMORY CONCENTRATION TEST (OMCT)
COUNT BACKWARDS FROM 20 TO 1: CORRECT
SAY THE MONTHS IN REVERSE ORDER STARTING WITH LAST MONTH: CORRECT
REPEAT THE NAME AND ADDRESS I ASKED YOU TO REMEMBER: CORRECT
OMCT SCORE: 0
WHAT MONTH IS IT NOW: CORRECT
WHAT TIME IS IT (NO WATCH OR CLOCK): CORRECT
WHAT YEAR IS IT NOW (MUST BE EXACT): CORRECT
OMCT INTERPRETATION: 0-6: NO SIGNIFICANT IMPAIRMENT

## 2023-01-01 ASSESSMENT — LIFESTYLE VARIABLES
AUDIT-C TOTAL SCORE: 0
HOW MANY STANDARD DRINKS CONTAINING ALCOHOL DO YOU HAVE ON A TYPICAL DAY: 0,1 OR 2
HOW OFTEN DO YOU HAVE A DRINK CONTAINING ALCOHOL: NEVER
HOW MANY STANDARD DRINKS CONTAINING ALCOHOL DO YOU HAVE ON A TYPICAL DAY: 0,1 OR 2
AUDIT-C TOTAL SCORE: 0
HOW OFTEN DO YOU HAVE 6 OR MORE DRINKS ON ONE OCCASION: NEVER
ALCOHOL_USE_STATUS: NO OR LOW RISK WITH VALIDATED TOOL
HOW OFTEN DO YOU HAVE A DRINK CONTAINING ALCOHOL: NEVER
HOW OFTEN DO YOU HAVE 6 OR MORE DRINKS ON ONE OCCASION: NEVER
ALCOHOL_USE_STATUS: NO OR LOW RISK WITH VALIDATED TOOL

## 2023-01-01 ASSESSMENT — COGNITIVE AND FUNCTIONAL STATUS - GENERAL
BASIC_MOBILITY_CONVERTED_SCORE: 41.05
DO YOU HAVE SERIOUS DIFFICULTY WALKING OR CLIMBING STAIRS: YES
BASIC_MOBILITY_RAW_SCORE: 18
HELP NEEDED FOR PERSONAL GROOMING: A LITTLE
BASIC_MOBILITY_CONVERTED_SCORE: 22.61
DO YOU HAVE DIFFICULTY DRESSING OR BATHING: NO
ARE YOU BLIND OR DO YOU HAVE SERIOUS DIFFICULTY SEEING, EVEN WHEN WEARING GLASSES: NO
DO YOU HAVE SERIOUS DIFFICULTY WALKING OR CLIMBING STAIRS: YES
HELP NEEDED FOR TOILETING: A LITTLE
DO YOU HAVE DIFFICULTY DRESSING OR BATHING: NO
BECAUSE OF A PHYSICAL, MENTAL, OR EMOTIONAL CONDITION, DO YOU HAVE SERIOUS DIFFICULTY CONCENTRATING, REMEMBERING OR MAKING DECISIONS: NO
HELP NEEDED DRESSING REGULAR LOWER BODY CLOTHING: A LOT
DAILY_ACTIVITY_RAW_SCORE: 18
HELP NEEDED FOR BATHING: A LITTLE
BASIC_MOBILITY_RAW_SCORE: 8
DO YOU HAVE SERIOUS DIFFICULTY WALKING OR CLIMBING STAIRS: YES
BECAUSE OF A PHYSICAL, MENTAL, OR EMOTIONAL CONDITION, DO YOU HAVE DIFFICULTY DOING ERRANDS ALONE: YES
HELP NEEDED DRESSING REGULAR UPPER BODY CLOTHING: A LITTLE
ARE YOU BLIND OR DO YOU HAVE SERIOUS DIFFICULTY SEEING, EVEN WHEN WEARING GLASSES: NO
BASIC_MOBILITY_CONVERTED_SCORE: 28.13
ARE YOU DEAF OR DO YOU HAVE SERIOUS DIFFICULTY  HEARING: NO
DO YOU HAVE DIFFICULTY DRESSING OR BATHING: NO
ARE YOU DEAF OR DO YOU HAVE SERIOUS DIFFICULTY  HEARING: NO
DAILY_ACTIVITY_CONVERTED_SCORE: 38.66
BASIC_MOBILITY_RAW_SCORE: 10

## 2023-01-01 ASSESSMENT — PAIN SCALES - PAIN ASSESSMENT IN ADVANCED DEMENTIA (PAINAD)
BODYLANGUAGE: RELAXED
TOTALSCORE: 1
CONSOLABILITY: NO NEED TO CONSOLE
CONSOLABILITY: NO NEED TO CONSOLE
BREATHING: NORMAL
BREATHING: OCCASIONAL LABORED BREATHING, SHORT PERIOD OF HYPERVENTILATION
FACIALEXPRESSION: SMILING OR INEXPRESSIVE
BODYLANGUAGE: RELAXED
TOTALSCORE: 0
FACIALEXPRESSION: SMILING OR INEXPRESSIVE

## 2023-01-01 ASSESSMENT — COLUMBIA-SUICIDE SEVERITY RATING SCALE - C-SSRS
1. WITHIN THE PAST MONTH, HAVE YOU WISHED YOU WERE DEAD OR WISHED YOU COULD GO TO SLEEP AND NOT WAKE UP?: NO
2. HAVE YOU ACTUALLY HAD ANY THOUGHTS OF KILLING YOURSELF?: NO
6. HAVE YOU EVER DONE ANYTHING, STARTED TO DO ANYTHING, OR PREPARED TO DO ANYTHING TO END YOUR LIFE?: NO
1. WITHIN THE PAST MONTH, HAVE YOU WISHED YOU WERE DEAD OR WISHED YOU COULD GO TO SLEEP AND NOT WAKE UP?: NO
IS THE PATIENT ABLE TO COMPLETE C-SSRS: YES
6. HAVE YOU EVER DONE ANYTHING, STARTED TO DO ANYTHING, OR PREPARED TO DO ANYTHING TO END YOUR LIFE?: NO
IS THE PATIENT ABLE TO COMPLETE C-SSRS: YES
2. HAVE YOU ACTUALLY HAD ANY THOUGHTS OF KILLING YOURSELF?: NO

## 2023-01-01 ASSESSMENT — MINI COG
TOTAL SCORE: 5
PATIENT WAS GIVEN REPEAT BACK WORDS FROM VERSION: 1 - BANANA SUNRISE CHAIR
PATIENT ABLE TO FILL IN THE CLOCK FACE WITH 10 MINUTES PAST 11 O'CLOCK?: YES, CLOCK IS CORRECT
PATIENT ABLE TO REPEAT THE 3 WORDS GIVEN PREVIOUSLY?: WAS ABLE TO REPEAT BACK 3 WORDS CORRECTLY

## 2023-01-01 ASSESSMENT — VISUAL ACUITY
OS_CC: 20/20
OD_CC: 20/20

## 2023-01-01 ASSESSMENT — COPD QUESTIONNAIRES
CAT_SEVERITY: OXYGEN DEPENDANT
COPD: 1
COPD: 1

## 2023-05-02 PROBLEM — J96.90 RESPIRATORY FAILURE (CMD): Status: ACTIVE | Noted: 2023-01-01

## 2023-06-04 PROBLEM — K92.2 UPPER GI BLEED: Status: ACTIVE | Noted: 2023-01-01

## 2023-06-16 PROBLEM — K92.2 UPPER GI BLEED: Status: RESOLVED | Noted: 2023-01-01 | Resolved: 2023-01-01

## 2023-06-22 ENCOUNTER — EXTERNAL RECORD (OUTPATIENT)
Dept: HEALTH INFORMATION MANAGEMENT | Facility: OTHER | Age: 88
End: 2023-06-22

## 2023-06-26 ENCOUNTER — TELEPHONE (OUTPATIENT)
Dept: INTERNAL MEDICINE | Age: 88
End: 2023-06-26

## 2023-06-27 ENCOUNTER — APPOINTMENT (OUTPATIENT)
Dept: INTERNAL MEDICINE | Age: 88
End: 2023-06-27

## 2023-10-26 ENCOUNTER — APPOINTMENT (OUTPATIENT)
Dept: PULMONOLOGY | Age: 88
End: 2023-10-26

## 2024-01-01 NOTE — PROGRESS NOTE ADULT - ASSESSMENT
Frequent oxygen desaturations noted to 78-84% along with shallow/periodic breathing. NNP notified, restarted 1/4L low flow nasal cannula per NNP.   A 85 y/o female w PMH COPD, HTN/HLD, peptic ulcer presents for progressively worsening dyspnea. She was seen by her pulmonologist and CXR was negative then referred to cardiology. ST & TTE negative, and was referred to GI.  She was found to be anemic w/ hgb of 8, which is not normal for her.  She has noticed some melena 1-2 episodes over the past month. Her last colonoscopy was 2 years ago and was informed of "polyps." She is s/p 2 units pRBC with H/H improvement noted of 9.3/32.6 today. Pending endoscopy but pt in acute respiratory failure, saturating ~85% on 3LNC, and improved with ventimask. GI team would like to stabilize her respiratory status prior to procedure. Pt currently on low dose heparin gtt for new left soleal vein DVT and RUL focal PE found on admission. Will need to transition her to oral AC, possibly with warfarin, after endoscopy and hematology's evaluation.     4/10: Per vascular and med- will hold off on IVC filter for now. Awaiting respiratory status to improve for endo to r/u GI bleed. HH holding at this time. No active bleeding as of now- awaiting endoscopy. Discussed Coumadin and bridging with patient. Educated on lovenox as well. She reports being able to self inject if the occasion does arise. Will start coumadin when cleared by GI.     PLAN:  ::Continue UFH, titrate per aPTT  ::CBC/BMP  ::Monitor for s/s bleeding  ::Encourage ambulation  ::Venodyne contraindicated in left lower extremity    Will continue to follow.

## 2024-03-26 ENCOUNTER — APPOINTMENT (OUTPATIENT)
Dept: CARDIOLOGY | Age: 89
End: 2024-03-26

## 2024-04-09 NOTE — PATIENT PROFILE ADULT. - NS SC CAGE ALCOHOL CUT DOWN
Procedure:  PV SLING; EUA (Vagina )  CYSTO (Bladder)    Relevant Problems   ANESTHESIA   (+) PONV (postoperative nausea and vomiting)      CARDIO  S/p stent 8/23   (+) Coronary artery disease involving native coronary artery      ENDO  MO      PULMONARY   (+) Sleep apnea      Blood   (+) History of thrombocytopenic purpura      Rheumatology   (+) Lupus (HCC)      Other   (+) CPAP (continuous positive airway pressure) dependence        Physical Exam    Airway    Mallampati score: III  TM Distance: >3 FB  Neck ROM: full     Dental   No notable dental hx     Cardiovascular  Rhythm: regular, Rate: normal, Cardiovascular exam normal    Pulmonary  Pulmonary exam normal Breath sounds clear to auscultation    Other Findings  post-pubertal.      Anesthesia Plan  ASA Score- 3     Anesthesia Type- IV sedation with anesthesia with ASA Monitors.         Additional Monitors:     Airway Plan:            Plan Factors-Exercise tolerance (METS): >4 METS.    Chart reviewed. EKG reviewed.  Existing labs reviewed. Patient summary reviewed.    Patient is not a current smoker.              Induction- intravenous.    Postoperative Plan-     Informed Consent- Anesthetic plan and risks discussed with patient.                   no